# Patient Record
Sex: FEMALE | Race: WHITE | Employment: OTHER | URBAN - NONMETROPOLITAN AREA
[De-identification: names, ages, dates, MRNs, and addresses within clinical notes are randomized per-mention and may not be internally consistent; named-entity substitution may affect disease eponyms.]

---

## 2017-01-26 ENCOUNTER — TELEPHONE (OUTPATIENT)
Dept: CARDIOLOGY | Age: 42
End: 2017-01-26

## 2017-01-27 ENCOUNTER — OFFICE VISIT (OUTPATIENT)
Dept: CARDIOLOGY | Age: 42
End: 2017-01-27

## 2017-01-27 VITALS
HEIGHT: 66 IN | WEIGHT: 293 LBS | BODY MASS INDEX: 47.09 KG/M2 | DIASTOLIC BLOOD PRESSURE: 70 MMHG | SYSTOLIC BLOOD PRESSURE: 110 MMHG | HEART RATE: 80 BPM

## 2017-01-27 DIAGNOSIS — R07.89 CHEST PAIN, ATYPICAL: ICD-10-CM

## 2017-01-27 DIAGNOSIS — G90.A POTS (POSTURAL ORTHOSTATIC TACHYCARDIA SYNDROME): ICD-10-CM

## 2017-01-27 DIAGNOSIS — I50.32 CHRONIC DIASTOLIC CONGESTIVE HEART FAILURE (HCC): Primary | ICD-10-CM

## 2017-01-27 DIAGNOSIS — E66.01 MORBID OBESITY DUE TO EXCESS CALORIES (HCC): ICD-10-CM

## 2017-01-27 DIAGNOSIS — R42 DIZZINESS: ICD-10-CM

## 2017-01-27 DIAGNOSIS — R07.9 CHEST PAIN, UNSPECIFIED TYPE: ICD-10-CM

## 2017-01-27 DIAGNOSIS — R60.0 BILATERAL LEG EDEMA: ICD-10-CM

## 2017-01-27 PROCEDURE — 99214 OFFICE O/P EST MOD 30 MIN: CPT | Performed by: INTERNAL MEDICINE

## 2017-01-27 PROCEDURE — G8484 FLU IMMUNIZE NO ADMIN: HCPCS | Performed by: INTERNAL MEDICINE

## 2017-01-27 PROCEDURE — 93000 ELECTROCARDIOGRAM COMPLETE: CPT | Performed by: INTERNAL MEDICINE

## 2017-01-27 PROCEDURE — G8419 CALC BMI OUT NRM PARAM NOF/U: HCPCS | Performed by: INTERNAL MEDICINE

## 2017-01-27 PROCEDURE — 1036F TOBACCO NON-USER: CPT | Performed by: INTERNAL MEDICINE

## 2017-01-27 PROCEDURE — G8427 DOCREV CUR MEDS BY ELIG CLIN: HCPCS | Performed by: INTERNAL MEDICINE

## 2017-01-27 RX ORDER — FUROSEMIDE 20 MG/1
20 TABLET ORAL DAILY
Qty: 90 TABLET | Refills: 1 | Status: SHIPPED | OUTPATIENT
Start: 2017-01-27 | End: 2017-07-28 | Stop reason: SDUPTHER

## 2017-01-27 RX ORDER — METOPROLOL SUCCINATE 25 MG/1
25 TABLET, EXTENDED RELEASE ORAL DAILY
Qty: 90 TABLET | Refills: 1 | Status: SHIPPED | OUTPATIENT
Start: 2017-01-27 | End: 2017-07-28 | Stop reason: SDUPTHER

## 2017-04-13 PROBLEM — I89.0 LYMPHEDEMA OF BOTH LOWER EXTREMITIES: Status: ACTIVE | Noted: 2017-04-13

## 2017-07-21 ENCOUNTER — HOSPITAL ENCOUNTER (EMERGENCY)
Age: 42
Discharge: HOME | End: 2017-07-21
Payer: MEDICARE

## 2017-07-21 VITALS
HEART RATE: 107 BPM | HEIGHT: 66 IN | TEMPERATURE: 98.9 F | WEIGHT: 293 LBS | BODY MASS INDEX: 47.09 KG/M2 | SYSTOLIC BLOOD PRESSURE: 131 MMHG | RESPIRATION RATE: 18 BRPM | OXYGEN SATURATION: 97 % | DIASTOLIC BLOOD PRESSURE: 74 MMHG

## 2017-07-21 DIAGNOSIS — H65.04 RECURRENT ACUTE SEROUS OTITIS MEDIA OF RIGHT EAR: Primary | ICD-10-CM

## 2017-07-21 PROCEDURE — 99212 OFFICE O/P EST SF 10 MIN: CPT

## 2017-07-21 PROCEDURE — 99213 OFFICE O/P EST LOW 20 MIN: CPT | Performed by: NURSE PRACTITIONER

## 2017-07-21 RX ORDER — AMOXICILLIN 500 MG/1
500 CAPSULE ORAL 3 TIMES DAILY
Qty: 30 CAPSULE | Refills: 0 | Status: SHIPPED | OUTPATIENT
Start: 2017-07-21 | End: 2017-07-27 | Stop reason: ALTCHOICE

## 2017-07-21 RX ORDER — FEXOFENADINE HYDROCHLORIDE 60 MG/1
60 TABLET, FILM COATED ORAL DAILY
Qty: 30 TABLET | Refills: 0 | Status: SHIPPED | OUTPATIENT
Start: 2017-07-21 | End: 2017-08-20

## 2017-07-21 ASSESSMENT — ENCOUNTER SYMPTOMS
VOMITING: 0
EYE REDNESS: 0
CHEST TIGHTNESS: 0
SORE THROAT: 0
ABDOMINAL PAIN: 0
COUGH: 0
EYE PAIN: 0
NAUSEA: 0
RHINORRHEA: 1
DIARRHEA: 0

## 2017-07-21 ASSESSMENT — PAIN DESCRIPTION - LOCATION: LOCATION: EAR

## 2017-07-21 ASSESSMENT — PAIN DESCRIPTION - ORIENTATION: ORIENTATION: RIGHT

## 2017-07-21 ASSESSMENT — PAIN DESCRIPTION - DESCRIPTORS: DESCRIPTORS: ACHING

## 2017-07-21 ASSESSMENT — PAIN DESCRIPTION - PAIN TYPE: TYPE: ACUTE PAIN

## 2017-07-21 ASSESSMENT — PAIN SCALES - GENERAL: PAINLEVEL_OUTOF10: 8

## 2017-07-27 ENCOUNTER — HOSPITAL ENCOUNTER (EMERGENCY)
Age: 42
Discharge: HOME | End: 2017-07-27
Payer: MEDICARE

## 2017-07-27 VITALS
BODY MASS INDEX: 49.23 KG/M2 | WEIGHT: 293 LBS | HEART RATE: 107 BPM | DIASTOLIC BLOOD PRESSURE: 69 MMHG | TEMPERATURE: 98.8 F | RESPIRATION RATE: 20 BRPM | SYSTOLIC BLOOD PRESSURE: 100 MMHG | OXYGEN SATURATION: 97 %

## 2017-07-27 DIAGNOSIS — H60.391 INFECTIVE OTITIS EXTERNA, RIGHT: Primary | ICD-10-CM

## 2017-07-27 PROCEDURE — 99213 OFFICE O/P EST LOW 20 MIN: CPT | Performed by: NURSE PRACTITIONER

## 2017-07-27 PROCEDURE — 99212 OFFICE O/P EST SF 10 MIN: CPT

## 2017-07-27 RX ORDER — CEFDINIR 300 MG/1
300 CAPSULE ORAL 2 TIMES DAILY
Qty: 20 CAPSULE | Refills: 0 | Status: SHIPPED | OUTPATIENT
Start: 2017-07-27 | End: 2017-08-06

## 2017-07-27 RX ORDER — CIPROFLOXACIN AND DEXAMETHASONE 3; 1 MG/ML; MG/ML
4 SUSPENSION/ DROPS AURICULAR (OTIC) 2 TIMES DAILY
Qty: 1 BOTTLE | OUTPATIENT
Start: 2017-07-27 | End: 2017-08-03

## 2017-07-27 RX ORDER — CIPROFLOXACIN AND DEXAMETHASONE 3; 1 MG/ML; MG/ML
4 SUSPENSION/ DROPS AURICULAR (OTIC) 2 TIMES DAILY
Qty: 1 BOTTLE | Refills: 0 | Status: SHIPPED | OUTPATIENT
Start: 2017-07-27 | End: 2017-08-03

## 2017-07-27 ASSESSMENT — PAIN DESCRIPTION - PAIN TYPE: TYPE: ACUTE PAIN

## 2017-07-27 ASSESSMENT — ENCOUNTER SYMPTOMS
VOMITING: 0
RHINORRHEA: 0
ABDOMINAL PAIN: 0
DIARRHEA: 0
SORE THROAT: 0
COUGH: 0

## 2017-07-27 ASSESSMENT — PAIN DESCRIPTION - LOCATION: LOCATION: EAR

## 2017-07-27 ASSESSMENT — PAIN SCALES - GENERAL: PAINLEVEL_OUTOF10: 9

## 2017-07-27 ASSESSMENT — PAIN DESCRIPTION - DESCRIPTORS: DESCRIPTORS: ACHING

## 2017-07-27 ASSESSMENT — PAIN DESCRIPTION - ORIENTATION: ORIENTATION: RIGHT

## 2017-07-31 RX ORDER — FUROSEMIDE 20 MG/1
TABLET ORAL
Qty: 90 TABLET | Refills: 0 | Status: SHIPPED | OUTPATIENT
Start: 2017-07-31 | End: 2017-10-05 | Stop reason: SDUPTHER

## 2017-07-31 RX ORDER — METOPROLOL SUCCINATE 25 MG/1
TABLET, EXTENDED RELEASE ORAL
Qty: 90 TABLET | Refills: 0 | Status: SHIPPED | OUTPATIENT
Start: 2017-07-31 | End: 2017-10-05 | Stop reason: SDUPTHER

## 2017-09-15 ENCOUNTER — TELEPHONE (OUTPATIENT)
Dept: ADMINISTRATIVE | Age: 42
End: 2017-09-15

## 2017-09-28 ENCOUNTER — HOSPITAL ENCOUNTER (OUTPATIENT)
Age: 42
Discharge: HOME | End: 2017-09-28
Payer: MEDICARE

## 2017-09-28 DIAGNOSIS — R07.9 CHEST PAIN, UNSPECIFIED TYPE: ICD-10-CM

## 2017-09-28 DIAGNOSIS — R07.89 CHEST PAIN, ATYPICAL: ICD-10-CM

## 2017-09-28 DIAGNOSIS — R60.0 BILATERAL LEG EDEMA: ICD-10-CM

## 2017-09-28 DIAGNOSIS — E66.01 MORBID OBESITY DUE TO EXCESS CALORIES (HCC): ICD-10-CM

## 2017-09-28 DIAGNOSIS — G90.A POTS (POSTURAL ORTHOSTATIC TACHYCARDIA SYNDROME): ICD-10-CM

## 2017-09-28 DIAGNOSIS — I50.32 CHRONIC DIASTOLIC CONGESTIVE HEART FAILURE (HCC): ICD-10-CM

## 2017-09-28 DIAGNOSIS — R42 DIZZINESS: ICD-10-CM

## 2017-09-28 LAB
ANION GAP SERPL CALCULATED.3IONS-SCNC: 14 MEQ/L (ref 8–16)
BUN BLDV-MCNC: 8 MG/DL (ref 7–22)
CALCIUM SERPL-MCNC: 8.8 MG/DL (ref 8.5–10.5)
CHLORIDE BLD-SCNC: 101 MEQ/L (ref 98–111)
CO2: 22 MEQ/L (ref 23–33)
CREAT SERPL-MCNC: 0.6 MG/DL (ref 0.4–1.2)
GFR SERPL CREATININE-BSD FRML MDRD: > 90 ML/MIN/1.73M2
GLUCOSE BLD-MCNC: 102 MG/DL (ref 70–108)
POTASSIUM SERPL-SCNC: 3.9 MEQ/L (ref 3.5–5.2)
SODIUM BLD-SCNC: 137 MEQ/L (ref 135–145)

## 2017-09-28 PROCEDURE — 36415 COLL VENOUS BLD VENIPUNCTURE: CPT

## 2017-09-28 PROCEDURE — 80048 BASIC METABOLIC PNL TOTAL CA: CPT

## 2017-10-05 ENCOUNTER — OFFICE VISIT (OUTPATIENT)
Dept: CARDIOLOGY CLINIC | Age: 42
End: 2017-10-05
Payer: MEDICARE

## 2017-10-05 VITALS
HEART RATE: 111 BPM | BODY MASS INDEX: 47.09 KG/M2 | DIASTOLIC BLOOD PRESSURE: 86 MMHG | HEIGHT: 66 IN | WEIGHT: 293 LBS | SYSTOLIC BLOOD PRESSURE: 141 MMHG

## 2017-10-05 DIAGNOSIS — E66.01 MORBID OBESITY DUE TO EXCESS CALORIES (HCC): ICD-10-CM

## 2017-10-05 DIAGNOSIS — I50.32 CHRONIC DIASTOLIC CONGESTIVE HEART FAILURE (HCC): Primary | ICD-10-CM

## 2017-10-05 DIAGNOSIS — R60.0 BILATERAL LEG EDEMA: ICD-10-CM

## 2017-10-05 DIAGNOSIS — R06.02 SOB (SHORTNESS OF BREATH) ON EXERTION: ICD-10-CM

## 2017-10-05 DIAGNOSIS — G90.A POTS (POSTURAL ORTHOSTATIC TACHYCARDIA SYNDROME): ICD-10-CM

## 2017-10-05 PROCEDURE — 1036F TOBACCO NON-USER: CPT | Performed by: INTERNAL MEDICINE

## 2017-10-05 PROCEDURE — G8419 CALC BMI OUT NRM PARAM NOF/U: HCPCS | Performed by: INTERNAL MEDICINE

## 2017-10-05 PROCEDURE — G8427 DOCREV CUR MEDS BY ELIG CLIN: HCPCS | Performed by: INTERNAL MEDICINE

## 2017-10-05 PROCEDURE — G8484 FLU IMMUNIZE NO ADMIN: HCPCS | Performed by: INTERNAL MEDICINE

## 2017-10-05 PROCEDURE — 99214 OFFICE O/P EST MOD 30 MIN: CPT | Performed by: INTERNAL MEDICINE

## 2017-10-05 NOTE — PROGRESS NOTES
Other Topics Concern    Not on file     Social History Narrative       Current Outpatient Prescriptions   Medication Sig Dispense Refill    metoprolol succinate (TOPROL XL) 25 MG extended release tablet take 1 tablet by mouth once daily 90 tablet 0    furosemide (LASIX) 20 MG tablet take 1 tablet by mouth once daily 90 tablet 0    Acetaminophen (TYLENOL PO) Take 500 mg by mouth      MedroxyPROGESTERone Acetate (DEPO-PROVERA IM) Inject into the muscle      IRON PO Take 45 mg by mouth daily.  miconazole (MICOTIN) 2 % powder Apply topically 2 times to 3 times  daily. 45 g 1    Dicyclomine HCl (BENTYL PO)   Take 20 mg by mouth 2 times daily       QUEtiapine Fumarate (SEROQUEL PO) Take 400 mg by mouth 2 times daily.  atomoxetine (STRATTERA) 40 MG capsule Take 40 mg by mouth 2 times daily.  aripiprazole (ABILIFY) 10 MG tablet Take 10 mg by mouth daily.  ACYCLOVIR   400 mg by Does not apply route daily        No current facility-administered medications for this visit. Review of Systems -     General ROS: negative  Psychological ROS: negative  Hematological and Lymphatic ROS: No history of blood clots or bleeding disorder. Respiratory ROS: no cough, shortness of breath, or wheezing  Cardiovascular ROS: no chest pain or dyspnea on exertion  Gastrointestinal ROS: negative  Genito-Urinary ROS: no dysuria, trouble voiding, or hematuria  Musculoskeletal ROS: negative  Neurological ROS: no TIA or stroke symptoms  Dermatological ROS: negative      Blood pressure (!) 141/86, pulse 111, height 5' 6\" (1.676 m), weight (!) 313 lb 3.2 oz (142.1 kg), not currently breastfeeding.         Physical Examination:    General appearance - alert, well appearing, and in no distress  Mental status - alert, oriented to person, place, and time  Neck - supple, no significant adenopathy, no JVD, or carotid bruits  Chest - clear to auscultation, no wheezes, rales or rhonchi, symmetric air entry  Heart - normal rate, regular rhythm, normal S1, S2, no murmurs, rubs, clicks or gallops  Abdomen - soft, nontender, nondistended, no masses or organomegaly  Neurological - alert, oriented, normal speech, no focal findings or movement disorder noted  Musculoskeletal - no joint tenderness, deformity or swelling  Extremities - peripheral pulses normal, no pedal edema, no clubbing or cyanosis  Skin - normal coloration and turgor, no rashes, no suspicious skin lesions noted    Lab  No results for input(s): CKTOTAL, CKMB, CKMBINDEX, TROPONINI in the last 72 hours. CBC:   No results found for: WBC  BMP:    Lab Results   Component Value Date     09/28/2017    K 3.9 09/28/2017     09/28/2017    CO2 22 09/28/2017    BUN 8 09/28/2017    CREATININE 0.6 09/28/2017    CALCIUM 8.8 09/28/2017    LABGLOM >90 09/28/2017    GLUCOSE 102 09/28/2017     Hepatic Function Panel:    No results found for: [facility]  Magnesium:    Lab Results   Component Value Date    MG 2.2 08/08/2016     Warfarin PT/INR:    No components found for: PTPATWAR,  PTINRWAR  HgBA1c:    No results found for: LABA1C  FLP:    No components found for: CHLPL,  TRIG,  HDL,  LDLCALC,  LDLDIRECT,  LABVLDL  TSH:    No results found for: TSH  EKG 5/7/15-Sinus  Rhythm   Low voltage in precordial leads. ABNORMAL     Nuc stress test negative    IMPRESSION:      1. CT coronary artery calcium score of zero indicates low risk for coronary disease. 2. Diffusely small coronary arteries. In particular the LAD and right coronary artery are very small in their middle and distal thirds. These segments cannot be assessed. Occlusion of the distal segments cannot be excluded. 3. Normal cardiac function. Final report electronically signed by Dr. Alexis Knight on 5/15/2015 4:50 PM         EKG 8/8/16  Sinus  Rhythm   Low voltage in precordial leads. ABNORMAL       Assessment    Grandmother had CAD in her 66's  1.  Chronic diastolic congestive heart failure

## 2017-10-05 NOTE — MR AVS SNAPSHOT
After Visit Summary             Ninfa Thomason   10/5/2017 9:15 AM   Office Visit    Description:  Female : 1975   Provider:  Destin Hillman MD   Department:  [facility] [place] [address] and Future Appointments         Below is a list of your follow-up and future appointments. This may not be a complete list as you may have made appointments directly with providers that we are not aware of or your providers may have made some for you. Please call your providers to confirm appointments. It is important to keep your appointments. Please bring your current insurance card, photo ID, co-pay, and all medication bottles to your appointment. If self-pay, payment is expected at the time of service. Your To-Do List     Future Appointments Provider Department Dept Phone    2018 9:30 AM Destin Hillman MD [facility] [phone]    Please arrive 15 minutes prior to appointment, bring photo ID and insurance card. Follow-Up    Return in about 6 months (around 2018). Information from Your Visit        Department     Name Address Phone Fax    [facility] [address] [facility] [place] [address] [phone]      You Were Seen for:         Comments    Chronic diastolic congestive heart failure [facility])   [[id number]]         Vital Signs     Blood Pressure Pulse Height Weight Body Mass Index Smoking Status    141/86 111 5' 6\" (1.676 m) 313 lb 3.2 oz (142.1 kg) 50.55 kg/m2 Never Smoker      Additional Information about your Body Mass Index (BMI)           Your BMI as listed above is considered obese (30 or more). BMI is an estimate of body fat, calculated from your height and weight. The higher your BMI, the greater your risk of heart disease, high blood pressure, type 2 diabetes, stroke, gallstones, arthritis, sleep apnea, and certain cancers. BMI is not perfect.   It may overestimate body fat in athletes and people who are more muscular. Even a small weight loss (between 5 and 10 percent of your current weight) by decreasing your calorie intake and becoming more physically active will help lower your risk of developing or worsening diseases associated with obesity. Learn more at: [email or web address]             Medications and Orders      Your Current Medications Are              metoprolol succinate (TOPROL XL) 25 MG extended release tablet take 1 tablet by mouth once daily    furosemide (LASIX) 20 MG tablet take 1 tablet by mouth once daily    Acetaminophen (TYLENOL PO) Take 500 mg by mouth    MedroxyPROGESTERone Acetate (DEPO-PROVERA IM) Inject into the muscle    IRON PO Take 45 mg by mouth daily. miconazole (MICOTIN) 2 % powder Apply topically 2 times to 3 times  daily. Dicyclomine HCl (BENTYL PO)   Take 20 mg by mouth 2 times daily     QUEtiapine Fumarate (SEROQUEL PO) Take 400 mg by mouth 2 times daily. atomoxetine (STRATTERA) 40 MG capsule Take 40 mg by mouth 2 times daily. aripiprazole (ABILIFY) 10 MG tablet Take 10 mg by mouth daily.       ACYCLOVIR   400 mg by Does not apply route daily       Allergies              Latex Rash    Cat Hair Extract     Seasonal          Additional Information        Basic Information     Date Of Birth Sex Race Ethnicity Preferred Language Preferred Written Language    1975 Female White Non-/Non  English English      Problem List as of 10/5/2017  Date Reviewed: 6/29/2017                SOB (shortness of breath) on exertion    Lymphedema of both lower extremities    Bilateral leg edema +3    Morbid obesity (HCC)    POTS (postural orthostatic tachycardia syndrome)    CHF (congestive heart failure) (HCC) chronic diastolic    Skin ulcer of abdominal wall (HCC)    Dermatophytosis of groin    Keloid    MRSA (methicillin resistant staph aureus) culture positive      Preventive Care        Date Due HIV screening is recommended for all people regardless of risk factors  aged 15-65 years at least once (lifetime) who have never been HIV tested. 2/28/1992    Tetanus Combination Vaccine (1 - Tdap) 2/28/1996    Pneumococcal Vaccine - Pneumovax for adults aged 19-64 years with: chronic heart disease, chronic lung disease, diabetes mellitus, alcoholism, chronic liver disease, or cigarette smoking. (1 of 1 - PPSV23) 2/28/1996    Pap Smear 2/28/1998    Cholesterol Screening 2/28/2017    Diabetes Screening 2/28/2017    Yearly Flu Vaccine (1) 9/1/2017            MyChart Signup           [employer] allows you to send messages to your doctor, view your test results, renew your prescriptions, schedule appointments, view visit notes, and more. How Do I Sign Up? 1. In your Internet browser, go to [email or web address]  2. Click on the Sign Up Now link in the Sign In box. You will see the New Member Sign Up page. 3. Enter your [employer] Access Code exactly as it appears below. You will not need to use this code after youve completed the sign-up process. If you do not sign up before the expiration date, you must request a new code. [employer] Access Code: [email or web address]  Expires: 12/4/2017  9:33 AM    4. Enter your Social Security Number ([id number]) and Date of Birth (mm/dd/yyyy) as indicated and click Submit. You will be taken to the next sign-up page. 5. Create a [employer] ID. This will be your [employer] login ID and cannot be changed, so think of one that is secure and easy to remember. 6. Create a [employer] password. You can change your password at any time. 7. Enter your Password Reset Question and Answer. This can be used at a later time if you forget your password. 8. Enter your e-mail address. You will receive e-mail notification when new information is available in [address] 9. Click Sign Up. You can now view your medical record.      Additional Information

## 2017-10-06 RX ORDER — METOPROLOL SUCCINATE 25 MG/1
TABLET, EXTENDED RELEASE ORAL
Qty: 90 TABLET | Refills: 1 | Status: SHIPPED | OUTPATIENT
Start: 2017-10-06 | End: 2018-04-05 | Stop reason: SDUPTHER

## 2017-10-06 RX ORDER — FUROSEMIDE 20 MG/1
TABLET ORAL
Qty: 90 TABLET | Refills: 1 | Status: SHIPPED | OUTPATIENT
Start: 2017-10-06 | End: 2018-04-05 | Stop reason: SDUPTHER

## 2017-11-06 ENCOUNTER — HOSPITAL ENCOUNTER (OUTPATIENT)
Dept: PHYSICAL THERAPY | Age: 42
Setting detail: THERAPIES SERIES
Discharge: HOME | End: 2017-11-06
Payer: MEDICARE

## 2017-11-06 PROCEDURE — 97163 PT EVAL HIGH COMPLEX 45 MIN: CPT

## 2017-11-06 PROCEDURE — G8990 OTHER PT/OT CURRENT STATUS: HCPCS

## 2017-11-06 PROCEDURE — G8991 OTHER PT/OT GOAL STATUS: HCPCS

## 2017-11-06 PROCEDURE — 97140 MANUAL THERAPY 1/> REGIONS: CPT

## 2017-11-07 ASSESSMENT — PAIN SCALES - GENERAL: PAINLEVEL_OUTOF10: 8

## 2017-11-07 ASSESSMENT — PAIN DESCRIPTION - LOCATION: LOCATION: ANKLE;FOOT;LEG

## 2017-11-07 ASSESSMENT — PAIN DESCRIPTION - PAIN TYPE: TYPE: CHRONIC PAIN

## 2017-11-07 NOTE — PROGRESS NOTES
for Lymphedema:           Complete Decongestive Therapy/Manual Lymph Drainage:  No            Compression garments only:  No            Sequential compression pumps:  No        Elevation: Yes    Restrictions/Precautions:  Fall risk, Universal precautions,The patient ambulates with the support of a straight cane. Pain:  Patient Currently in Pain: Yes  Pain Assessment  Pain Assessment: 0-10  Pain Level: 8  Pain Type: Chronic pain  Pain Location: Ankle, Foot, Leg  Pain Orientation:  (RIGHT > LEFT)  Pain Descriptors: Numbness, Heaviness, Throbbing, Burning (\"My right leg feels like dead weight\" per patient.)    SUBJECTIVE:     -The patient is a pleasant 38year old female who presented to the Lymphedema clinic on this date with severe bilateral lower extremity and truncal Lymphedema swelling. The mother was presented and assisted the patient with answering some of the questions. According to the patient and her mother, the patient was in her normal state of health until she developed swelling in her lower extremities in July 2017. After 3 weeks the swelling resolved and returned to normal. However, the swelling returned again with a progression of swelling in her feet. The swelling resolved again, however, within one week (October 2017) the swelling returned in her legs/feet and has gotten progressively worse (right > left). The patient's mother reported that she has tried wrapping her legs, elevation and increased diuretics for 3 days. \"I am afraid that if we don't get this swelling down that it's going to get worse and she has such hard time with healing that I don't want any wounds to get started. \" per patient. The patient reported that she has had a non-healing wound on her abdominal region for 15 years and it has recently healed. SOCIAL  -The patient lives alone in an apartment.   -FUNCTIONAL DEFICITS: Difficulty with prolonged standing, difficulty with ambulation due to size and heaviness of lower extremities, Difficulty donning regular sized shoes and leg braces, which could increase potential of falls/injuries. OBJECTIVE    Physical Assessment:  Range of Motion: Impaired: Bilateral lower extremities limited due to large size and heaviness of legs. Strength: Impaired: Generalized weakness bilateral lower extremities. Sensation: Intact to light touch. However, the patient reported numbness in the right lower extremity and intermittent burning sensations in feet. Transfers: Sit to stand transfer with modified independence with bilateral upper extremity support of armrests. Ambulation: Impaired: Limited distance due to heaviness of legs, increased genu valgus of the right knee, decreased hip/knee/ankle flexion during swing phase leading to decreased foot/floor cleanance and decreased decreased heel strike at initial contact. Hyperkeratosis: Mild - Right lower extremity (medially). Hyperplasia:  Moderate - Bilateral lower extremities (right distlly > left). -medially. Hyperpigmentation:Mild - Bilateral lower extremities. Papillomas: NA  Lymphorrhea (Weeping): NA    Lymphedema Assessment:  Pitting Edema Slight (+1) - Bilateral ankle regions. Skin Appearance/Condition Refer to Hyperplasia and Hyperpigmentation above. Skin Condition Dry - Severe bilateral lower extremities. Open Wound(s) No   Tissue Temperature Normal   Skin Folds Large - Location: Right LE (Distal > Proximal). Moderate-Left LE (Distal > Proximal). Fibrotic Tissue Moderate - Bilateral lower extremities. Orange Peel Texture None   Nailbed Appearance/Condition Thickened. Leakage of Fluid Amount: None   -- --     Circumferential Measurements:  Measurements to be taken during initial treatment session.     TREATMENT  Treatment was initiated during this evaluation and consisted of Skin Care and Compression Bandaging  -SKIN CARE: Washed, dried and applied Original Eucerin Moisturizing creme to the left lower extremity and foot. -COMPRESSION BANDAGING:    -LOCATION: Metatarsal heads to knee joint line. -COMPRESSION GRADIENT: Moderate to minimal   -SUPPLIES: (Stockinette, Artiflex size 10 cm x 1 roll; Rosidal Soft x 2 rolls; Rosidal K size 4 cm x 1 roll, 6 cm x 1 roll, 8 cm x 1 roll and 10 cm x 1 roll; Secured with Tubigrip stockinette size H). Treatment tolerance: Patient tolerance of evaluation: fair     ASSESSMENT  Problem Areas: (X indicates a problem area)  X Severe edema at bilateral lower extremities and lower trunk which has led to the following limitations: Difficulty with ambulation, donning regular sized shoes and brace. X Increased risk of recurrent infection   X Limited Range of Motion   - Open wounds   - Leakage of fluid from - leading to risk of infection         Clinical Presentation: High - Unstable with Unpredictable Characteristics: Severe bilateral lower extremity sweling and lower truncal swelling, progressive swelling. :    Decision Making (based on patient assessment and decision making process of determining plan of care and establishing reasonable expectations for measurable functional outcomes): Decision making was of High Complexity secondary to Patient lives alone, mother works outside of home, patient attends [facility]. Rehabilitation Potential: Patient demonstrates good  potential to achieve rehabilitation goals. Rickie Push Strength(s): Patient demonstrates the ability to achieve established functional goals due to the following strengths:Motivated, Cooperative and Pleasant    Treatment Recommendations: This patient would benefit from skilled therapy services to achieve the established functional goals by utilizing the following treatment interventions:    X Manual Lymph Drainage: To promote and re direct drainage of excess lymphatic fluid back into the central circulation and lead to a decrease in swelling. X Skin Care/Education:  To improve the elasticity of the skin and decrease the risk of recurrent infection. X Compression Bandaging: To decrease the excess lymphatic fluid from re accumulating in the involved area, break down fibrotic tissue and act as a counter force against the muscle pumping action during functional mobility and exercising. X Therapeutic Exercising: To stimulate the flow of excess lymphatic fluid while the muscles contract against the compression bandages/garments during functional mobility/exercises that will lead to a decrease in swelling. X Patient Education: To train and educate the patient and/or caregiver on becoming independent with the home management skills for this chronic condition of Lymphedema. X Garment Fitting/Assessment:  Assist with recommending and obtaining appropriate compression garments to be worn daily to keep swelling down in the involved areas. EDUCATION   New Education Provided: Lymphedema awareness, treatment options, goals, plan of care, compression bandaging precautions, ankle pumps (hourly) with compression bandages on, wearing schedule (remove compression bandages within the next 2 days). Learner:patient and patient's mother. Method: demonstration, explanation and handout       Outcome: acknowledged understanding of goals/plan of care/precautions/exercises, verbalized concerns, demonstrated understanding, needs reinforcement and asked questions     GOALS:   PATIENT GOAL: \"I want to get rid of this swelling so I can walk better\" per patient. SHORT-TERM GOALS:  to be met in 6 weeks   X  Patient will demonstrate a decrease in circumferential measurements of the affected extremity by 20.0 cm working towards the lymphedema swelling stabilizing and patient being able to get measured and fitted for a new compression garment to be worn daily to keep swelling down.    X  Patient will tolerate wearing the compression bandages from one treatment session until the next treatment session working towards meeting short-term high complexity. Niranjan CORDON, DPM FOR THE REFERRAL OF THIS PATIENT. PT G-Codes  Functional Assessment Tool Used: LYMPHEDEMA LIFE IMPACT SCALE  Score: 71%  Functional Limitation: Other PT primary  Other PT Primary Current Status (): At least 60 percent but less than 80 percent impaired, limited or restricted  Other PT Primary Goal Status ():  At least 40 percent but less than 60 percent impaired, limited or restricted    Lisa Bush, P.T. #[id number], [facility]       11/6/2017

## 2017-11-15 ENCOUNTER — HOSPITAL ENCOUNTER (OUTPATIENT)
Dept: PHYSICAL THERAPY | Age: 42
Setting detail: THERAPIES SERIES
Discharge: HOME | End: 2017-11-15
Payer: MEDICARE

## 2017-11-15 ENCOUNTER — TELEPHONE (OUTPATIENT)
Dept: CARDIOLOGY CLINIC | Age: 42
End: 2017-11-15

## 2017-11-15 PROCEDURE — 97140 MANUAL THERAPY 1/> REGIONS: CPT

## 2017-11-15 ASSESSMENT — PAIN DESCRIPTION - PAIN TYPE: TYPE: CHRONIC PAIN

## 2017-11-15 ASSESSMENT — PAIN DESCRIPTION - LOCATION: LOCATION: LEG

## 2017-11-15 ASSESSMENT — PAIN DESCRIPTION - ORIENTATION: ORIENTATION: RIGHT;LEFT;LOWER

## 2017-11-15 ASSESSMENT — PAIN SCALES - GENERAL: PAINLEVEL_OUTOF10: 7

## 2017-11-15 NOTE — TELEPHONE ENCOUNTER
GREGORIO De Anda from [facility] called and wanted to let you know that they will be seeing pt in the lymphedema clinic for treatments. If you have any questions or concerns to let them know.

## 2017-11-15 NOTE — PROGRESS NOTES
Measures  Washed involved extremity/body part and applied Original Eucerin moisturizing creme and Vaseline to moisturize skin    Compression Bandaging  Location: Right lower extremity (Metatarsal heads to knee joint line). Compression Gradient: Moderate to Minimal  Supplies (per involved extremity):   Stockinette: Size: 6 inches, Thickness: Thick, soft   Transelast none   10 cm Artiflex Cotton none   15 cm Artiflex Cotton 1 roll   4 cm Rosidal K none   6 cm Rosidal K 1 roll   8 cm Rosidal K 2 rolls   10 cm Rosidal K 1 roll   12 cm Rosidal K none   Rosidal Soft 1.5 rolls   --   --       Decongestive/Therapeutic Exercise  The patient was able to complete Decongestive Therapy exercises (x 10 reps) including ankle pumps. The exercises were completed with compression bandages on, without complaints of pain from the patient. The Decongestive Therapy exercises assist with decreasing the swelling by increasing the muscle pumping action of the lymph collectors and by increasing the fluid uptake in the lymphatic system. Patient Education  New Education Provided:   -11/15/2017: Reviewed the Lymphedema program, goals/plan of care and precautions. Learner:patient and patient's mother. Method: demonstration and explanation       Outcome: acknowledged understanding of goals/plan of care, verbalized concerns, demonstrated understanding and asked questions     GOALS   SHORT-TERM GOALS:  to be met in 6 weeks   X  Patient will demonstrate a decrease in circumferential measurements of the affected extremity by 20.0 cm working towards the lymphedema swelling stabilizing and patient being able to get measured and fitted for a new compression garment to be worn daily to keep swelling down. X  Patient will tolerate wearing the compression bandages from one treatment session until the next treatment session working towards meeting short-term goal #1.    X Patient/family/caregiver will demonstrate and verbalize 3-5 skin care

## 2017-11-16 ENCOUNTER — APPOINTMENT (OUTPATIENT)
Dept: PHYSICAL THERAPY | Age: 42
End: 2017-11-16
Payer: MEDICARE

## 2017-11-17 ENCOUNTER — HOSPITAL ENCOUNTER (OUTPATIENT)
Dept: PHYSICAL THERAPY | Age: 42
Setting detail: THERAPIES SERIES
Discharge: HOME | End: 2017-11-17
Payer: MEDICARE

## 2017-11-17 PROCEDURE — 97140 MANUAL THERAPY 1/> REGIONS: CPT

## 2017-11-17 ASSESSMENT — PAIN DESCRIPTION - ORIENTATION: ORIENTATION: LEFT;RIGHT

## 2017-11-17 ASSESSMENT — PAIN DESCRIPTION - PAIN TYPE: TYPE: CHRONIC PAIN;ACUTE PAIN

## 2017-11-17 ASSESSMENT — PAIN SCALES - GENERAL: PAINLEVEL_OUTOF10: 7

## 2017-11-17 ASSESSMENT — PAIN DESCRIPTION - LOCATION: LOCATION: LEG;ANKLE

## 2017-11-17 NOTE — PROGRESS NOTES
[facility] 62  LYMPHEDEMA SERVICES  DAILY NOTE    Date: 2017  Patient Name: Clif Palomino        CSN: [id number]   : 1975  (38 y.o.)  Gender: female   Referring Practitioner: DR. Stephanie Jones DPM  Diagnosis: LYMPHEDEMA OF LEGS  Referral Date : 10/30/17    PT Visit Information  PT Insurance Information: MEDICARE-PRIMARY ([address] [place] [address]. PT/ST HAVE A $3700 CAP PER CALENDAR YEAR). MEDICAID-SECONDARY  Total # of Visits to Date: 3  Plan of Care/Certification Expiration Date: 18  No Show: 0  Canceled Appointment: 0  Progress Note Counter: 3/10    Restrictions/Precautions  Fall risk, Universal precautions,CHF (Medication controlled) and POTS Syndrome (which affects the patient's heart. Pain  Patient Currently in Pain: Yes  Pain Assessment  Pain Assessment: 0-10  Pain Level: 7  Pain Type: Chronic pain, Acute pain  Pain Location: Leg, Ankle  Pain Orientation: Left, Right     SUBJECTIVE     -2017: The patient presented to the Lymphedema clinic on this date with the right lower extremity compression bandages intact. -11/15/2017: The patient presented to the Lymphedema clinic on this date with reports of increased swelling and redness on the distal aspect of bilateral lower legs. The patient stated, \"I'm excited about starting my new treatments\" per patient.      TREATMENT SESSION  Manual Lymph Drainage  Unilateral Lower Extremity Manual Lymph Drainage (MLD):   Right  Trunk:  Effleurage, Profundus, Terminus, Inguinal Lnn, Axillary Lnn and (IA) Anterior Inguinal to Axillary (3 steps)       Lower Extremity:  Thigh (Anterior, Posterior, Lateral, Medial to Lateral), Knee (Anterior, Posterior, Lateral, Medial, Lower Leg (Anterior,Posterior), Ankle (Anterior, Posterior, Lateral, Medial and Foot/Toes (Anterior, Posterior)       Rework  Lower Extremity (Toes to Groin), Inguinal Lnn, Axillary Lnn, (IA) Anterior Inguinal to Axillary, Terminus, Profundus and Effleurage      Skin Care Measures  Washed involved extremity/body part and applied Original Eucerin moisturizing creme and Vaseline to moisturize skin    Compression Bandaging  Location: Right lower extremity (Metatarsal heads to knee joint line). Compression Gradient: Moderate to Minimal  Supplies (per involved extremity):   Stockinette: Size: 6 inches, Thickness: Thick, soft   Transelast none   10 cm Artiflex Cotton none   15 cm Artiflex Cotton 1 roll   4 cm Rosidal K none   6 cm Rosidal K 1 roll   8 cm Rosidal K 2 rolls   10 cm Rosidal K 1 roll   12 cm Rosidal K none   Rosidal Soft 1.5 rolls   --   --       Decongestive/Therapeutic Exercise  The patient was able to complete Decongestive Therapy exercises (x 10 reps) including ankle pumps. The exercises were completed with compression bandages on, without complaints of pain from the patient. The Decongestive Therapy exercises assist with decreasing the swelling by increasing the muscle pumping action of the lymph collectors and by increasing the fluid uptake in the lymphatic system. Patient Education  New Education Provided:   -11/17/2017: Reviewed plan of care. -11/15/2017: Reviewed the Lymphedema program, goals/plan of care and precautions. Learner:patient and patient's mother. Method: demonstration and explanation       Outcome: acknowledged understanding of goals/plan of care, verbalized concerns, demonstrated understanding and asked questions     GOALS   SHORT-TERM GOALS:  to be met in 6 weeks   X  Patient will demonstrate a decrease in circumferential measurements of the affected extremity by 20.0 cm working towards the lymphedema swelling stabilizing and patient being able to get measured and fitted for a new compression garment to be worn daily to keep swelling down. X  Patient will tolerate wearing the compression bandages from one treatment session until the next treatment session working towards meeting short-term goal #1.    X Patient/family/caregiver will demonstrate and verbalize 3-5 skin care precautionary measures to decrease dry skin and risk of infection. X Patient/family/caregiver will demonstrate applying compression bandages with no greater than moderate assist and verbal cues from the therapist working towards being modified independent with applying the compression bandages for night time swelling. ASSESSMENT:  Assessment:  Patient progressing toward goal achievement. Activity Tolerance:  Patient tolerance of  treatment: Fair.       Plan: Continue treatment according to established plan of care         Time In: 1345   Time Out: 1515   Timed Code Minutes: 90   Untimed Code Minutes: 0   Total Treatment Time: [address], P.T. #7641,[place], [facility]     11/17/2017

## 2017-11-20 ENCOUNTER — HOSPITAL ENCOUNTER (EMERGENCY)
Age: 42
Discharge: HOME | End: 2017-11-20
Payer: MEDICARE

## 2017-11-20 ENCOUNTER — HOSPITAL ENCOUNTER (OUTPATIENT)
Dept: PHYSICAL THERAPY | Age: 42
Setting detail: THERAPIES SERIES
Discharge: HOME | End: 2017-11-20
Payer: MEDICARE

## 2017-11-20 VITALS
HEART RATE: 94 BPM | DIASTOLIC BLOOD PRESSURE: 77 MMHG | WEIGHT: 293 LBS | BODY MASS INDEX: 47.09 KG/M2 | RESPIRATION RATE: 18 BRPM | SYSTOLIC BLOOD PRESSURE: 130 MMHG | HEIGHT: 66 IN | OXYGEN SATURATION: 97 % | TEMPERATURE: 98.2 F

## 2017-11-20 DIAGNOSIS — H65.01 RIGHT ACUTE SEROUS OTITIS MEDIA, RECURRENCE NOT SPECIFIED: Primary | ICD-10-CM

## 2017-11-20 DIAGNOSIS — J02.9 ACUTE PHARYNGITIS, UNSPECIFIED ETIOLOGY: ICD-10-CM

## 2017-11-20 PROCEDURE — 99213 OFFICE O/P EST LOW 20 MIN: CPT | Performed by: NURSE PRACTITIONER

## 2017-11-20 PROCEDURE — 97140 MANUAL THERAPY 1/> REGIONS: CPT

## 2017-11-20 PROCEDURE — 99212 OFFICE O/P EST SF 10 MIN: CPT

## 2017-11-20 RX ORDER — AMOXICILLIN AND CLAVULANATE POTASSIUM 875; 125 MG/1; MG/1
1 TABLET, FILM COATED ORAL 2 TIMES DAILY
Qty: 20 TABLET | Refills: 0 | Status: SHIPPED | OUTPATIENT
Start: 2017-11-20 | End: 2017-11-30

## 2017-11-20 ASSESSMENT — ENCOUNTER SYMPTOMS
CONSTIPATION: 0
SORE THROAT: 1
WHEEZING: 0
NAUSEA: 0
SHORTNESS OF BREATH: 1
ABDOMINAL PAIN: 0
COUGH: 1
DIARRHEA: 1

## 2017-11-20 ASSESSMENT — PAIN DESCRIPTION - LOCATION
LOCATION: CHEST;EAR;THROAT
LOCATION: LEG;ANKLE

## 2017-11-20 ASSESSMENT — PAIN SCALES - GENERAL
PAINLEVEL_OUTOF10: 8
PAINLEVEL_OUTOF10: 7

## 2017-11-20 ASSESSMENT — PAIN DESCRIPTION - PAIN TYPE: TYPE: CHRONIC PAIN

## 2017-11-20 ASSESSMENT — PAIN DESCRIPTION - DESCRIPTORS: DESCRIPTORS: TENDER;THROBBING

## 2017-11-20 ASSESSMENT — PAIN DESCRIPTION - ORIENTATION: ORIENTATION: LEFT;RIGHT;ANTERIOR

## 2017-11-20 NOTE — ED NOTES
Pt discharged. Discharge assessments completed. No changes. All discharge education and information given. Pt instructed to go to ED for any shortness of breath, chest pain or abd pain. Verbalized understanding. Left stable.      Court Sinclair LPN  52/10/91 [id number]

## 2017-11-20 NOTE — ED NOTES
Presents with c/o productive cough, PERERA, bilat ear pain, sore throat, chest pain that is worse with deep breathing. Mom at bedside.      Salo Whiting RN  11/20/17 1270

## 2017-11-20 NOTE — ED PROVIDER NOTES
maxillary sinus tenderness. Mouth/Throat: Uvula is midline and mucous membranes are normal. Posterior oropharyngeal edema and posterior oropharyngeal erythema present. Eyes: Conjunctivae, EOM and lids are normal. Pupils are equal, round, and reactive to light. Neck: Full passive range of motion without pain. Cardiovascular: Normal rate, regular rhythm and normal heart sounds. Pulmonary/Chest: Breath sounds normal.   Lymphadenopathy:     She has no cervical adenopathy. Neurological: She is alert. She has normal strength. GCS eye subscore is 4. GCS verbal subscore is 5. GCS motor subscore is 6. Skin: Skin is warm and dry. No rash noted. She is not diaphoretic. No pallor. Psychiatric: She has a normal mood and affect. Her speech is normal and behavior is normal. Judgment and thought content normal. Cognition and memory are normal.       DIAGNOSTIC RESULTS   Labs:No results found for this visit on 11/20/17. IMAGING:    URGENT CARE COURSE:     Vitals:    11/20/17 1054   BP: 130/77   Pulse: 94   Resp: 18   Temp: 98.2 °F (36.8 °C)   TempSrc: Temporal   SpO2: 97%   Weight: (!) 325 lb (147.4 kg)   Height: 5' 6\" (1.676 m)     Patient appears ill and tired, is afebrile with a pulse ox of 97%. Significant nasal congestion, skin is warm and dry, respirations easy, lungs are clear. Posterior oropharynx is erythematous and edematous without exudate, no cervical adenopathy. Right TM is bulging and erythematous, left TM is normal.    Medications - No data to display  PROCEDURES:  None  FINAL IMPRESSION      1. Right acute serous otitis media, recurrence not specified    2.  Acute pharyngitis, unspecified etiology        DISPOSITION/PLAN   DISPOSITION Decision to Discharge  PATIENT REFERRED TO:  Sanjay Stout MD  [address] [facility] [address] [phone]    In 3 days  If symptoms worsen    DISCHARGE MEDICATIONS:  New Prescriptions    AMOXICILLIN-CLAVULANATE (AUGMENTIN) 875-125 MG PER TABLET    Take

## 2017-11-20 NOTE — PROGRESS NOTES
[facility]  LYMPHEDEMA SERVICES  DAILY NOTE    Date: 2017  Patient Name: Robert Serrano        CSN: [id number]   : 1975  (38 y.o.)  Gender: female   Referring Practitioner: DR. Terence Ibanez DPM  Diagnosis: LYMPHEDEMA OF LEGS  Referral Date : 10/30/17    PT Visit Information  PT Insurance Information: MEDICARE-PRIMARY ([address] [facility]. PT/ST HAVE A $3700 CAP PER CALENDAR YEAR). MEDICAID-SECONDARY  Total # of Visits to Date: 4  Plan of Care/Certification Expiration Date: 18  No Show: 0  Canceled Appointment: 0  Progress Note Counter: 4/10    Restrictions/Precautions  Fall risk, Universal precautions,CHF (Medication controlled) and POTS Syndrome (which affects the patient's heart. Pain  Patient Currently in Pain: Yes  Pain Assessment  Pain Assessment: 0-10  Pain Level: 7  Pain Type: Chronic pain  Pain Location: Leg, Ankle  Pain Orientation: Left, Right, Anterior  Pain Descriptors: Tender, Throbbing     SUBJECTIVE     -2017: The patient presented to the Lymphedema clinic on this date with bilateral lower extremity compression bandages intact. She reported tenderness in the anterior aspect of bilateral ankles. The therapist explained to the patient and her mother that sometimes when the patient walks and her ankles dorsiflex and plantarflex, and when she does the ankle pumps, the bandages may rub at times, causing irritation. Although foam padding was applied during the last treatment session. Additional padding will be applied today. The patient reported not feeling well today \"I think I might have strep\" per patient. The patient stated, \"I can tell that my legs are smaller, I was even able to get my legs up into my mom's car easier today, it's not as heavy\" per patient. -2017: The patient presented to the Lymphedema clinic on this date with the right lower extremity compression bandages intact.     -11/15/2017: The patient presented to the Decongestive Therapy exercises (x 10 reps) including ankle pumps. The exercises were completed with compression bandages on, without complaints of pain from the patient. The Decongestive Therapy exercises assist with decreasing the swelling by increasing the muscle pumping action of the lymph collectors and by increasing the fluid uptake in the lymphatic system. Patient Education  Education Provided:   -11/20/2017: Reviewed goals, and use of basic pump for home trial (the patient has limited treatment sessions this week due to scheduling conflicts and department closure due to holiday). The patient and her mother were able to brandon/doff pump and operate machine with independence, instructed to remove bandages in 3 days and begin pump use twice daily (one leg per trial) for no longer than 1 hour per session. -11/17/2017: Reviewed plan of care. -11/15/2017: Reviewed the Lymphedema program, goals/plan of care and precautions. Learner:patient and patient's mother. Method: demonstration and explanation       Outcome: acknowledged understanding of goals/plan of care, verbalized concerns, demonstrated understanding and asked questions     GOALS   SHORT-TERM GOALS:  to be met in 6 weeks   X  Patient will demonstrate a decrease in circumferential measurements of the affected extremity by 20.0 cm working towards the lymphedema swelling stabilizing and patient being able to get measured and fitted for a new compression garment to be worn daily to keep swelling down. X  Patient will tolerate wearing the compression bandages from one treatment session until the next treatment session working towards meeting short-term goal #1. X Patient/family/caregiver will demonstrate and verbalize 3-5 skin care precautionary measures to decrease dry skin and risk of infection.    X Patient/family/caregiver will demonstrate applying compression bandages with no greater than moderate assist and verbal cues from the therapist working towards being modified independent with applying the compression bandages for night time swelling. ASSESSMENT:  Assessment:  Patient progressing toward goal achievement. Activity Tolerance:  Patient tolerance of  treatment: Fair. Plan: Week of November 27 (2x).          Time In: 0830   Time Out: 1028   Timed Code Minutes: 88   Untimed Code Minutes: 0   Total Treatment Time: [address]., P.T. #7641,CLT, [facility]     11/20/2017

## 2017-11-29 ENCOUNTER — HOSPITAL ENCOUNTER (OUTPATIENT)
Dept: PHYSICAL THERAPY | Age: 42
Setting detail: THERAPIES SERIES
Discharge: HOME | End: 2017-11-29
Payer: MEDICARE

## 2017-11-29 PROCEDURE — 97140 MANUAL THERAPY 1/> REGIONS: CPT

## 2017-11-29 ASSESSMENT — PAIN DESCRIPTION - PAIN TYPE: TYPE: CHRONIC PAIN

## 2017-11-29 ASSESSMENT — PAIN SCALES - GENERAL: PAINLEVEL_OUTOF10: 6

## 2017-11-29 ASSESSMENT — PAIN DESCRIPTION - DESCRIPTORS: DESCRIPTORS: TENDER;SORE

## 2017-11-29 ASSESSMENT — PAIN DESCRIPTION - ORIENTATION: ORIENTATION: RIGHT

## 2017-11-29 ASSESSMENT — PAIN DESCRIPTION - LOCATION: LOCATION: ANKLE;FOOT

## 2017-11-29 NOTE — PROGRESS NOTES
M[facility] Cosme 62  LYMPHEDEMA SERVICES  DAILY NOTE    Date: 2017  Patient Name: Carmencita Mendes        CSN: [id number]   : 1975  (38 y.o.)  Gender: female   Referring Practitioner: DR. Oneida Snyder DPM  Diagnosis: LYMPHEDEMA OF LEGS  Referral Date : 10/30/17    PT Visit Information  PT Insurance Information: MEDICARE-PRIMARY ([address] [facility]. PT/ST HAVE A $3700 CAP PER CALENDAR YEAR). MEDICAID-SECONDARY  Total # of Visits to Date: 5  Plan of Care/Certification Expiration Date: 18  No Show: 0  Canceled Appointment: 0  Progress Note Counter: 5/10    Restrictions/Precautions  Fall risk, Universal precautions,CHF (Medication controlled) and POTS Syndrome (which affects the patient's heart. Pain  Patient Currently in Pain: Yes  Pain Assessment  Pain Assessment: 0-10  Pain Level: 6  Pain Type: Chronic pain  Pain Location: Ankle, Foot  Pain Orientation: Right  Pain Descriptors: Tender, Sore     SUBJECTIVE     -2017: The patient presented to the Lymphedema clinic on this date with Tubigrip stockinette on bilateral lower legs. The right lower extremity is larger than the left lower extremity. -2017: The patient presented to the Lymphedema clinic on this date with bilateral lower extremity compression bandages intact. She reported tenderness in the anterior aspect of bilateral ankles. The therapist explained to the patient and her mother that sometimes when the patient walks and her ankles dorsiflex and plantarflex, and when she does the ankle pumps, the bandages may rub at times, causing irritation. Although foam padding was applied during the last treatment session. Additional padding will be applied today. The patient reported not feeling well today \"I think I might have strep\" per patient. The patient stated, \"I can tell that my legs are smaller, I was even able to get my legs up into my mom's car easier today, it's not as heavy\" per patient. progressing toward goal achievement. Activity Tolerance:  Patient tolerance of  treatment: Fair. Plan: Week of November 27 (2x).          Time In: 1515   Time Out: 1610   Timed Code Minutes: 55   Untimed Code Minutes: 0   Total Treatment Time: Delfino Martines, P.T. #7641,[place], Anali Israel Cliveas 9445     11/29/2017

## 2017-11-30 ENCOUNTER — HOSPITAL ENCOUNTER (OUTPATIENT)
Dept: PHYSICAL THERAPY | Age: 42
Setting detail: THERAPIES SERIES
Discharge: HOME | End: 2017-11-30
Payer: MEDICARE

## 2017-11-30 PROCEDURE — 97140 MANUAL THERAPY 1/> REGIONS: CPT

## 2017-11-30 ASSESSMENT — PAIN DESCRIPTION - DESCRIPTORS: DESCRIPTORS: TENDER;SORE

## 2017-11-30 ASSESSMENT — PAIN SCALES - GENERAL: PAINLEVEL_OUTOF10: 7

## 2017-11-30 ASSESSMENT — PAIN DESCRIPTION - ORIENTATION: ORIENTATION: LEFT;RIGHT;ANTERIOR

## 2017-11-30 ASSESSMENT — PAIN DESCRIPTION - PAIN TYPE: TYPE: CHRONIC PAIN

## 2017-11-30 ASSESSMENT — PAIN DESCRIPTION - LOCATION: LOCATION: ANKLE;FOOT

## 2017-11-30 NOTE — PROGRESS NOTES
session. Additional padding will be applied today. The patient reported not feeling well today \"I think I might have strep\" per patient. The patient stated, \"I can tell that my legs are smaller, I was even able to get my legs up into my mom's car easier today, it's not as heavy\" per patient. -11/17/2017: The patient presented to the Lymphedema clinic on this date with the right lower extremity compression bandages intact. -11/15/2017: The patient presented to the Lymphedema clinic on this date with reports of increased swelling and redness on the distal aspect of bilateral lower legs. The patient stated, \"I'm excited about starting my new treatments\" per patient. OBJECTIVE  Blood Pressure: 126/91 (Resting)  Heart Rate: 87 beats per minute  Patient denied chest pain or shortness of breath. TREATMENT SESSION  Manual Lymph Drainage   Bilateral Lower Extremity Manual Lymph Drainage (MLD):     Trunk:   Effleurage, Profundus, Terminus, Axillary Lnn and (IA) Anterior Inguinal to Axillary (3 steps)       Lower Extremity:  Thigh (Anterior, Posterior, Lateral, Medial to Lateral), Knee (Anterior, Posterior, Lateral, Medial, Lower Leg (Anterior,Posterior), Ankle (Anterior, Posterior, Lateral, Medial and Foot/Toes (Anterior, Posterior)       Rework  Lower Extremity (Toes to Groin), Axillary Lnn, (IA) Anterior Inguinal to Axillary, Terminus, Profundus and Effleurage    Skin Care Measures  Washed involved extremity/body part and applied Original Eucerin moisturizing creme and Vaseline to moisturize skin    Compression Bandaging  Location: Right lower extremity (Metatarsal heads to knee joint line). Compression Gradient: Moderate to Minimal  Supplies (per involved extremity):    Stockinette: Size: 6 inches, Thickness: Thick, soft   Transelast none   10 cm Artiflex Cotton none   15 cm Artiflex Cotton 1 roll   4 cm Rosidal K none   6 cm Rosidal K 1 roll   8 cm Rosidal K 1 roll.    10 cm Rosidal K none   12 cm Rosidal K 1 roll   Rosidal Soft 1.5 rolls   --Tubigrip stockinette   Applied to bilateral lower extremities. Decongestive/Therapeutic Exercise  The patient was able to complete Decongestive Therapy exercises (x 10 reps) including ankle pumps. The exercises were completed with compression bandages on, without complaints of pain from the patient. The Decongestive Therapy exercises assist with decreasing the swelling by increasing the muscle pumping action of the lymph collectors and by increasing the fluid uptake in the lymphatic system. Patient Education  Education Provided:   -11/30/2017: Reviewed goals. -11/29/2017: Reviewed plan of care to resume bilateral lower extremities during tomorrow's treatment. -11/20/2017: Reviewed goals, and use of basic pump for home trial (the patient has limited treatment sessions this week due to scheduling conflicts and department closure due to holiday). The patient and her mother were able to brandon/doff pump and operate machine with independence, instructed to remove bandages in 3 days and begin pump use twice daily (one leg per trial) for no longer than 1 hour per session. -11/17/2017: Reviewed plan of care. -11/15/2017: Reviewed the Lymphedema program, goals/plan of care and precautions. Learner:patient and patient's mother. Method: demonstration and explanation       Outcome: acknowledged understanding of goals/plan of care, verbalized concerns, demonstrated understanding and asked questions     GOALS   SHORT-TERM GOALS:  to be met in 6 weeks   X  Patient will demonstrate a decrease in circumferential measurements of the affected extremity by 20.0 cm working towards the lymphedema swelling stabilizing and patient being able to get measured and fitted for a new compression garment to be worn daily to keep swelling down.    X  Patient will tolerate wearing the compression bandages from one treatment session until the next treatment session working towards meeting short-term goal #1. X Patient/family/caregiver will demonstrate and verbalize 3-5 skin care precautionary measures to decrease dry skin and risk of infection. X Patient/family/caregiver will demonstrate applying compression bandages with no greater than moderate assist and verbal cues from the therapist working towards being modified independent with applying the compression bandages for night time swelling. ASSESSMENT:  Assessment:  Patient progressing toward goal achievement. Activity Tolerance:  Patient tolerance of  treatment: Fair. Plan: Week of December 4 (1x)-frequency decreased due to department closure the week of December 4.          Time In: 0935   Time Out: 1045   Timed Code Minutes: 70   Untimed Code Minutes: 0   Total Treatment Time: [address], P.TAshely #7641,CLT, [facility]     11/30/2017

## 2017-12-07 ENCOUNTER — HOSPITAL ENCOUNTER (OUTPATIENT)
Dept: PHYSICAL THERAPY | Age: 42
Setting detail: THERAPIES SERIES
Discharge: HOME | End: 2017-12-07
Payer: MEDICARE

## 2017-12-07 PROCEDURE — 97140 MANUAL THERAPY 1/> REGIONS: CPT

## 2017-12-07 ASSESSMENT — PAIN DESCRIPTION - DESCRIPTORS: DESCRIPTORS: TENDER;THROBBING

## 2017-12-07 ASSESSMENT — PAIN DESCRIPTION - ORIENTATION: ORIENTATION: RIGHT;LEFT

## 2017-12-07 ASSESSMENT — PAIN SCALES - GENERAL: PAINLEVEL_OUTOF10: 6

## 2017-12-07 ASSESSMENT — PAIN DESCRIPTION - LOCATION: LOCATION: ANKLE;LEG

## 2017-12-07 ASSESSMENT — PAIN DESCRIPTION - PAIN TYPE: TYPE: CHRONIC PAIN

## 2017-12-07 NOTE — PROGRESS NOTES
LE).  Heart Rate: 87 beats per minute (Pre); 87 beats per minute (post)  Patient denied chest pain or shortness of breath. TREATMENT SESSION  Manual Lymph Drainage   Bilateral Lower Extremity Manual Lymph Drainage (MLD):     Trunk:   Effleurage, Profundus, Terminus, Axillary Lnn and (IA) Anterior Inguinal to Axillary (3 steps)       Lower Extremity:  Thigh (Anterior, Posterior, Lateral, Medial to Lateral), Knee (Anterior, Posterior, Lateral, Medial, Lower Leg (Anterior,Posterior), Ankle (Anterior, Posterior, Lateral, Medial and Foot/Toes (Anterior, Posterior)       Rework  Lower Extremity (Toes to Groin), Axillary Lnn, (IA) Anterior Inguinal to Axillary, Terminus, Profundus and Effleurage    Skin Care Measures  Washed involved extremity/body part and applied Original Eucerin moisturizing creme and Vaseline to moisturize skin    Compression Bandaging  Location: Right lower extremity (Metatarsal heads to knee joint line). Compression Gradient: Moderate to Minimal  Supplies (per involved extremity):    Stockinette: Size: 6 inches, Thickness: Thick, soft   Transelast none   10 cm Artiflex Cotton none   15 cm Artiflex Cotton 1 roll   4 cm Rosidal K none   6 cm Rosidal K 1 roll   8 cm Rosidal K 1 roll. 10 cm Rosidal K none   12 cm Rosidal K 1 roll   Rosidal Soft 1.5 rolls   --Tubigrip stockinette   Applied to bilateral lower extremities. Decongestive/Therapeutic Exercise  The patient was able to complete Decongestive Therapy exercises (x 10 reps) including ankle pumps. The exercises were completed with compression bandages on, without complaints of pain from the patient. The Decongestive Therapy exercises assist with decreasing the swelling by increasing the muscle pumping action of the lymph collectors and by increasing the fluid uptake in the lymphatic system.     Patient Education  Education Provided:   -12/07/2017: Discussed recommendations for a CIRC AID REDUCTION KIT compression garment for compression for bilateral lower extremities being more appropriate versus compression stockings and compression bandages when the patient is discharged. Patient's mother was in agreement. -11/30/2017: Reviewed goals. -11/29/2017: Reviewed plan of care to resume bilateral lower extremities during tomorrow's treatment. -11/20/2017: Reviewed goals, and use of basic pump for home trial (the patient has limited treatment sessions this week due to scheduling conflicts and department closure due to holiday). The patient and her mother were able to brandon/doff pump and operate machine with independence, instructed to remove bandages in 3 days and begin pump use twice daily (one leg per trial) for no longer than 1 hour per session. -11/17/2017: Reviewed plan of care. -11/15/2017: Reviewed the Lymphedema program, goals/plan of care and precautions. Learner:patient and patient's mother. Method: demonstration and explanation       Outcome: acknowledged understanding of goals/plan of care, verbalized concerns, demonstrated understanding and asked questions     GOALS   SHORT-TERM GOALS:  to be met in 6 weeks   X  Patient will demonstrate a decrease in circumferential measurements of the affected extremity by 20.0 cm working towards the lymphedema swelling stabilizing and patient being able to get measured and fitted for a new compression garment to be worn daily to keep swelling down. X  Patient will tolerate wearing the compression bandages from one treatment session until the next treatment session working towards meeting short-term goal #1. X Patient/family/caregiver will demonstrate and verbalize 3-5 skin care precautionary measures to decrease dry skin and risk of infection. X Patient/family/caregiver will demonstrate applying compression bandages with no greater than moderate assist and verbal cues from the therapist working towards being modified independent with applying the compression bandages for night time swelling. ASSESSMENT:  Assessment:  Patient progressing toward goal achievement. Activity Tolerance:  Patient tolerance of  treatment: Fair. Plan: Week of December 11 (1x).          Time In: 0830   Time Out: 0945   Timed Code Minutes: 75   Untimed Code Minutes: 0   Total Treatment Time: [address], P.T. #8391,[place], [facility]     12/7/2017

## 2017-12-14 ENCOUNTER — HOSPITAL ENCOUNTER (OUTPATIENT)
Dept: PHYSICAL THERAPY | Age: 42
Setting detail: THERAPIES SERIES
Discharge: HOME | End: 2017-12-14
Payer: MEDICARE

## 2017-12-14 PROCEDURE — 97140 MANUAL THERAPY 1/> REGIONS: CPT

## 2017-12-14 ASSESSMENT — PAIN SCALES - GENERAL: PAINLEVEL_OUTOF10: 8

## 2017-12-14 ASSESSMENT — PAIN DESCRIPTION - ORIENTATION: ORIENTATION: LEFT;RIGHT

## 2017-12-14 ASSESSMENT — PAIN DESCRIPTION - PAIN TYPE: TYPE: CHRONIC PAIN

## 2017-12-14 ASSESSMENT — PAIN DESCRIPTION - DESCRIPTORS: DESCRIPTORS: TENDER;THROBBING;SORE

## 2017-12-14 ASSESSMENT — PAIN DESCRIPTION - LOCATION: LOCATION: LEG;ANKLE

## 2017-12-14 NOTE — PROGRESS NOTES
[facility] 62  LYMPHEDEMA SERVICES  DAILY NOTE    Date: 2017  Patient Name: Chance Oh        CSN: [id number]   : 1975  (38 y.o.)  Gender: female   Referring Practitioner: DR. Joanna Snyder DPM  Diagnosis: LYMPHEDEMA OF LEGS  Referral Date : 10/30/17    PT Visit Information  PT Insurance Information: MEDICARE-PRIMARY ([address] [place] [address]. PT/ST HAVE A $3700 CAP PER CALENDAR YEAR). MEDICAID-SECONDARY  Total # of Visits to Date: 8  Plan of Care/Certification Expiration Date: 18  No Show: 0  Canceled Appointment: 0  Progress Note Counter: 8/10    Restrictions/Precautions  Fall risk, Universal precautions,CHF (Medication controlled) and POTS Syndrome (which affects the patient's heart. Pain  Patient Currently in Pain: Yes  Pain Assessment  Pain Assessment: 0-10  Pain Level: 8  Pain Type: Chronic pain  Pain Location: Leg, Ankle  Pain Orientation: Left, Right  Pain Descriptors: Tender, Throbbing, Sore     SUBJECTIVE     -2017: The patient presented to the Lymphedema clinic on this date with Tubigrip stockinette in place (double layer). Noted increased swelling in right lower extremity > left lower extremity. \"I was concerned because the swelling was good for a few days but then it started going up again. \" per patient. -2017: The patient presented to the Lymphedema clinic on this date with Tubigrip stockinette on bilateral lower extremities. She reported increased swelling within the last 24 hours,  \"I don't think I did anything different, I keep my legs up on a box when I'm at [address] and then I don't eat a lot of salt. \" per patient. The therapist noted increased swelling in the right LE > left LE.    -2017: The patient presented to the Lymphedema clinic on this date with compression bandages on the right lower extremity. She denied pain from the bandages.  The patient's mother verbalized being concerned over the increase in the amount of swelling in the left lower extremity. -11/29/2017: The patient presented to the Lymphedema clinic on this date with Tubigrip stockinette on bilateral lower legs. The right lower extremity is larger than the left lower extremity. OBJECTIVE  -MEASUREMENTS  -LOCATION A: Metatarsal heads to knee joint line. -RIGHT: 537.3 cm (Increased by 6.7 cm in comparison to baseline measurements taken on 11/17/17). -LEFT: 496.7 cm (Decreased by 22.4 cm in comparison to baseline measurements taken on 11/17/17). -LOCATION B: Knee joint line to mid-thigh. -RIGHT: 300.8 cm (Decreased by 7.4 cm in comparison to baseline measurements taken on 11/17/17). -LEFT: 290.4 cm (Decreasead by 7.8 cm in comparison to baseline measurements taken on 11/1/7/17). TREATMENT SESSION  Manual Lymph Drainage   Bilateral Lower Extremity Manual Lymph Drainage (MLD):     Trunk:   Effleurage, Profundus, Terminus, Axillary Lnn and (IA) Anterior Inguinal to Axillary (3 steps)       Lower Extremity:  Thigh (Anterior, Posterior, Lateral, Medial to Lateral), Knee (Anterior, Posterior, Lateral, Medial, Lower Leg (Anterior,Posterior), Ankle (Anterior, Posterior, Lateral, Medial and Foot/Toes (Anterior, Posterior)       Rework  Lower Extremity (Toes to Groin), Axillary Lnn, (IA) Anterior Inguinal to Axillary, Terminus, Profundus and Effleurage    Skin Care Measures  Washed involved extremity/body part and applied Original Eucerin moisturizing creme and Vaseline to moisturize skin    Compression Bandaging  Location: Right lower extremity (Metatarsal heads to knee joint line). Compression Gradient: Moderate to Minimal  Supplies (per involved extremity):    Stockinette: Size: 6 inches, Thickness: Thick, soft   Transelast none   10 cm Artiflex Cotton none   15 cm Artiflex Cotton 1 roll   4 cm Rosidal K none   6 cm Rosidal K 1 roll   8 cm Rosidal K 1 roll.    10 cm Rosidal K none   12 cm Rosidal K 1 roll   Rosidal Soft 1.5 rolls   --Tubigrip stockinette   Applied to bilateral lower extremities. Decongestive/Therapeutic Exercise  The patient was able to complete Decongestive Therapy exercises (x 10 reps) including ankle pumps. The exercises were completed with compression bandages on, without complaints of pain from the patient. The Decongestive Therapy exercises assist with decreasing the swelling by increasing the muscle pumping action of the lymph collectors and by increasing the fluid uptake in the lymphatic system. Patient Education  Education Provided:   -12/14/2017: Reviewed plan of care. -12/07/2017: Discussed recommendations for a CIRC AID REDUCTION KIT compression garment for compression for bilateral lower extremities being more appropriate versus compression stockings and compression bandages when the patient is discharged. Patient's mother was in agreement. -11/30/2017: Reviewed goals. -11/29/2017: Reviewed plan of care to resume bilateral lower extremities during tomorrow's treatment. -11/20/2017: Reviewed goals, and use of basic pump for home trial (the patient has limited treatment sessions this week due to scheduling conflicts and department closure due to holiday). The patient and her mother were able to brandon/doff pump and operate machine with independence, instructed to remove bandages in 3 days and begin pump use twice daily (one leg per trial) for no longer than 1 hour per session. -11/17/2017: Reviewed plan of care. -11/15/2017: Reviewed the Lymphedema program, goals/plan of care and precautions. Learner:patient and patient's mother.   Method: demonstration and explanation       Outcome: acknowledged understanding of goals/plan of care, verbalized concerns, demonstrated understanding and asked questions     GOALS   SHORT-TERM GOALS:  to be met in 6 weeks   X  Patient will demonstrate a decrease in circumferential measurements of the affected extremity by 20.0 cm working towards the lymphedema swelling stabilizing and patient being able to get measured and fitted for a new compression garment to be worn daily to keep swelling down. X  Patient will tolerate wearing the compression bandages from one treatment session until the next treatment session working towards meeting short-term goal #1. X Patient/family/caregiver will demonstrate and verbalize 3-5 skin care precautionary measures to decrease dry skin and risk of infection. X Patient/family/caregiver will demonstrate applying compression bandages with no greater than moderate assist and verbal cues from the therapist working towards being modified independent with applying the compression bandages for night time swelling. ASSESSMENT:  Assessment:  Patient progressing toward goal achievement. The patient has demonstrated improvement with treatments as indicated above the the decrease in total circumferential measurements. However, it has been noted that the patient continues to have problems with swelling as indicated with the increase in swelling in the right lower extremity. She would strongly benefit from a sequential compression pump for home use when discharged from the program since the patient lives alone (with minimal assistance as needed) and has psychological and emotional issues. Keeping the swelling down would decrease the risk of the Lymphedema swelling progressing to the final stages of Lymphedema, including Elephantiasis. Activity Tolerance:  Patient tolerance of  treatment: Fair. Plan: Week of December 18 (3x).          Time In: 1030   Time Out: 1145   Timed Code Minutes: 75   Untimed Code Minutes: 0   Total Treatment Time: [address], [address],[place], [facility]     12/14/2017

## 2017-12-18 ENCOUNTER — HOSPITAL ENCOUNTER (OUTPATIENT)
Dept: PHYSICAL THERAPY | Age: 42
Setting detail: THERAPIES SERIES
Discharge: HOME | End: 2017-12-18
Payer: MEDICARE

## 2017-12-18 PROCEDURE — 97140 MANUAL THERAPY 1/> REGIONS: CPT

## 2017-12-18 ASSESSMENT — PAIN SCALES - GENERAL: PAINLEVEL_OUTOF10: 9

## 2017-12-18 ASSESSMENT — PAIN DESCRIPTION - PAIN TYPE: TYPE: CHRONIC PAIN

## 2017-12-18 ASSESSMENT — PAIN DESCRIPTION - LOCATION: LOCATION: LEG;ANKLE;FOOT

## 2017-12-18 ASSESSMENT — PAIN DESCRIPTION - ORIENTATION: ORIENTATION: LEFT;RIGHT

## 2017-12-18 NOTE — PROGRESS NOTES
[facility] 62  LYMPHEDEMA SERVICES  DAILY NOTE    Date: 2017  Patient Name: Arianne Carrillo        CSN: [id number]   : 1975  (38 y.o.)  Gender: female   Referring Practitioner: DR. Gorge Guevara DPM  Diagnosis: LYMPHEDEMA OF LEGS  Referral Date : 10/30/17    PT Visit Information  PT Insurance Information: MEDICARE-PRIMARY ([address] [facility]. PT/ST HAVE A $3700 CAP PER CALENDAR YEAR). MEDICAID-SECONDARY  Total # of Visits to Date: 5  Plan of Care/Certification Expiration Date: 18  No Show: 0  Canceled Appointment: 0  Progress Note Counter: 9/10    Restrictions/Precautions  Fall risk, Universal precautions,CHF (Medication controlled) and POTS Syndrome (which affects the patient's heart. Pain  Patient Currently in Pain: Yes  Pain Assessment  Pain Assessment: 0-10  Pain Level: 9  Pain Type: Chronic pain  Pain Location: Leg, Ankle, Foot  Pain Orientation: Left, Right  Pain Descriptors: Aching, Tender, Sore, Tightness, Heaviness     SUBJECTIVE     -2017: The patient presented to the Lymphedema clinic on this date with Tubigrip stockinette off. She reported, \"My right leg really feels bigger and it hurts. It's so heavy. \" per patient. Noticeable increase in swelling in the right lower extremity in comparison to the last treatment session. Continued discussion with the patient and her mother and she reported that the patient's legs have always been big, but it has gotten progressively worse. Indicates possible Primary Lymphedema versus Secondary Lymphedema swelling. -2017: The patient presented to the Lymphedema clinic on this date with Tubigrip stockinette in place (double layer). Noted increased swelling in right lower extremity > left lower extremity. \"I was concerned because the swelling was good for a few days but then it started going up again. \" per patient.    -2017: The patient presented to the Lymphedema clinic on this date with Tubigrip stockinette on bilateral lower extremities. She reported increased swelling within the last 24 hours,  \"I don't think I did anything different, I keep my legs up [facility] and then I don't eat a lot of salt. \" per patient. The therapist noted increased swelling in the right LE > left LE.    -11/30/2017: The patient presented to the Lymphedema clinic on this date with compression bandages on the right lower extremity. She denied pain from the bandages. The patient's mother verbalized being concerned over the increase in the amount of swelling in the left lower extremity. -11/29/2017: The patient presented to the Lymphedema clinic on this date with Tubigrip stockinette on bilateral lower legs. The right lower extremity is larger than the left lower extremity. OBJECTIVE  -MEASUREMENTS  -LOCATION A: Metatarsal heads to knee joint line. -RIGHT: 561.1 cm (Increased by 23.8 cm in comparison to baseline measurements taken on 12/14/17). -LEFT: 491.5 cm (Decreased by 5.2 cm in comparison to baseline measurements taken on 12/14/17). -LOCATION B: Knee joint line to mid-thigh. -RIGHT: 311.6 cm (Decreased by 10.8 cm in comparison to baseline measurements taken on 12/14/17). -LEFT: 299.4 cm (Decreasead by 9.0 cm in comparison to baseline measurements taken on 12/14/17).   TREATMENT SESSION  Manual Lymph Drainage   Bilateral Lower Extremity Manual Lymph Drainage (MLD):     Trunk:   Effleurage, Profundus, Terminus, Axillary Lnn and (IA) Anterior Inguinal to Axillary (3 steps)       Lower Extremity:  Thigh (Anterior, Posterior, Lateral, Medial to Lateral), Knee (Anterior, Posterior, Lateral, Medial, Lower Leg (Anterior,Posterior), Ankle (Anterior, Posterior, Lateral, Medial and Foot/Toes (Anterior, Posterior)       Rework  Lower Extremity (Toes to Groin), Axillary Lnn, (IA) Anterior Inguinal to Axillary, Terminus, Profundus and Effleurage    Skin Care Measures  Washed involved extremity/body part and

## 2017-12-20 ENCOUNTER — HOSPITAL ENCOUNTER (OUTPATIENT)
Dept: PHYSICAL THERAPY | Age: 42
Setting detail: THERAPIES SERIES
Discharge: HOME | End: 2017-12-20
Payer: MEDICARE

## 2017-12-20 PROCEDURE — 97140 MANUAL THERAPY 1/> REGIONS: CPT

## 2017-12-20 ASSESSMENT — PAIN DESCRIPTION - PAIN TYPE: TYPE: CHRONIC PAIN

## 2017-12-20 ASSESSMENT — PAIN SCALES - GENERAL: PAINLEVEL_OUTOF10: 7

## 2017-12-20 ASSESSMENT — PAIN DESCRIPTION - DESCRIPTORS: DESCRIPTORS: TENDER;ACHING;SORE

## 2017-12-20 ASSESSMENT — PAIN DESCRIPTION - LOCATION: LOCATION: ANKLE;FOOT;LEG

## 2017-12-22 PROCEDURE — G8991 OTHER PT/OT GOAL STATUS: HCPCS

## 2017-12-22 PROCEDURE — G8990 OTHER PT/OT CURRENT STATUS: HCPCS

## 2017-12-22 NOTE — PROGRESS NOTES
[facility]  LYMPHEDEMA SERVICES  DAILY NOTE & PROGRESS NOTE    Date: 2017  Patient Name: Leighton Vicente        CSN: [id number]   : 1975  (38 y.o.)  Gender: female   Referring Practitioner: DR. Cheng Guzman DPM  Diagnosis: LYMPHEDEMA OF LEGS  Referral Date : 10/30/17    PT Visit Information  PT Insurance Information: MEDICARE-PRIMARY ([address] [place] [address]. PT/ST HAVE A $3700 CAP PER CALENDAR YEAR). MEDICAID-SECONDARY  Total # of Visits to Date: 10  Plan of Care/Certification Expiration Date: 18  No Show: 0  Canceled Appointment: 0  Progress Note Counter: 10/10 (PROGRESS NOTE)    Restrictions/Precautions  Fall risk, Universal precautions,CHF (Medication controlled) and POTS Syndrome (which affects the patient's heart. Pain  Patient Currently in Pain: Yes  Pain Assessment  Pain Assessment: 0-10  Pain Level: 7  Pain Type: Chronic pain  Pain Location: Ankle, Foot, Leg  Pain Orientation:  (Right distally greater than left.)  Pain Descriptors: Tender, Aching, Sore (Less tightness reported by patient.)     SUBJECTIVE     -2017: The patient is a pleasant and motivated 38year old female who initially presented  with severe bilateral lower extremity and lower truncal late onset (Tarda) Lymphedema swelling. The patient  presented to the Lymphedema clinic on this date with compression bandages on. She denied pain from the bandages. She reported that she can tell that her legs don't feel as heavy as they did prior to beginning the Lymphedema treatments. She stated \"I was able to lift my legs better when I had to get on the bus\" per patient. The patient's mother stated that she can tell that the treatments are helping her daughter's legs. OBJECTIVE  Lymphedema Assessment:  Pitting Edema None   Skin Appearance/Condition Hyperplasia -Grossly on bilateral lower extremities.   Moderate Redness-located at the distal aspect of bilateral lower legs and ankle region (Right > Left). Hyperpigmentation: Noted at the distal, medial aspect of the right lower leg (moderately). Skin Condition Decreased dry skin by approximately 50% in comparison to the initial evaluation. Noted a moderate dry patch on the distal, medial aspect of the right lower leg. Open Wound(s) No   Tissue Temperature Normal   Skin Folds Moderate skin fold/lobe noted at the distal aspect of the right lower leg (above the right ankle). Minimal skin fold/lobe noted on the left distal aspect of the lower leg (above the ankle region). Fibrotic Tissue Moderate    Orange Peel Texture None   Nailbed Appearance/Condition Thickened, and brittle toe nails noted on bilateral feet. Leakage of Fluid Amount: None   Measurements -LOCATION A: Metatarsal heads to knee joint line. -RIGHT: 561.1 cm (Increased by 23.8 cm since 12/14)  -LEFT: 491.5 cm (Decreased by 5.2 cm since 12/14)  -LOCATION B: Knee joint line to mid-thigh. -RIGHT: 311.6 cm (Increased by 10.8 cm since 12/14)  -LEFT: 299.4 cm (Increased by 9.0 cm since 12/14)     TREATMENT SESSION  Manual Lymph Drainage   Bilateral Lower Extremity Manual Lymph Drainage (MLD):     Trunk:   Effleurage, Profundus, Terminus, Axillary Lnn and (IA) Anterior Inguinal to Axillary (3 steps)       Lower Extremity:  Thigh (Anterior, Posterior, Lateral, Medial to Lateral), Knee (Anterior, Posterior, Lateral, Medial, Lower Leg (Anterior,Posterior), Ankle (Anterior, Posterior, Lateral, Medial and Foot/Toes (Anterior, Posterior)       Rework  Lower Extremity (Toes to Groin), Axillary Lnn, (IA) Anterior Inguinal to Axillary, Terminus, Profundus and Effleurage    Skin Care Measures  Washed involved extremity/body part and applied Original Eucerin moisturizing creme and Vaseline to moisturize skin    Compression Bandaging  Location: Right lower extremity (Metatarsal heads to knee joint line).   Compression Gradient: Moderate to Minimal  Supplies (per involved extremity):    Stockinette: Size: 6 inches, Thickness: Thick, soft   Transelast none   10 cm Artiflex Cotton 1 roll   15 cm Artiflex Cotton 1 roll   4 cm Rosidal K none   6 cm Rosidal K 1 roll   8 cm Rosidal K 1 roll. 10 cm Rosidal K none   12 cm Rosidal K 1 roll   Rosidal Soft 1.5 rolls   --Tubigrip stockinette   Applied to bilateral lower extremities to secure compression bandages. Decongestive/Therapeutic Exercise  The patient was able to complete Decongestive Therapy exercises (x 10 reps) including ankle pumps. The exercises were completed with compression bandages on, without complaints of pain from the patient. The Decongestive Therapy exercises assist with decreasing the swelling by increasing the muscle pumping action of the lymph collectors and by increasing the fluid uptake in the lymphatic system. Patient Education  Education Provided:   -12/20/2017: Reviewed goals, plan of care. -12/18/2017: Discussed goal of obtaining a compression pump and a CIRC AID REDUCTION KIT.   -12/14/2017: Reviewed plan of care. -12/07/2017: Discussed recommendations for a CIRC AID REDUCTION KIT compression garment for compression for bilateral lower extremities being more appropriate versus compression stockings and compression bandages when the patient is discharged. Patient's mother was in agreement. -11/30/2017: Reviewed goals. -11/29/2017: Reviewed plan of care to resume bilateral lower extremities during tomorrow's treatment. -11/20/2017: Reviewed goals, and use of basic pump for home trial (the patient has limited treatment sessions this week due to scheduling conflicts and department closure due to holiday). The patient and her mother were able to brandon/doff pump and operate machine with independence, instructed to remove bandages in 3 days and begin pump use twice daily (one leg per trial) for no longer than 1 hour per session. -11/17/2017: Reviewed plan of care.   -11/15/2017: Reviewed the Lymphedema program, goals/plan of care and precautions. Learner:patient and patient's mother. Method: demonstration and explanation       Outcome: acknowledged understanding of goals/plan of care, verbalized concerns, demonstrated understanding and asked questions     GOALS   SHORT-TERM GOALS:  to be met in 6 weeks   X  NOT MET  (ONGOING)  Patient will demonstrate a decrease in circumferential measurements of the affected extremity by 20.0 cm working towards the lymphedema swelling stabilizing and patient being able to get measured and fitted for a new compression garment to be worn daily to keep swelling down. X  MET  Patient will tolerate wearing the compression bandages from one treatment session until the next treatment session working towards meeting short-term goal #1. X  MET Patient/family/caregiver will demonstrate and verbalize 3-5 skin care precautionary measures to decrease dry skin and risk of infection. X  NOT MET  (ONGOING) Patient/family/caregiver will demonstrate applying compression bandages with no greater than moderate assist and verbal cues from the therapist working towards being modified independent with applying the compression bandages for night time swelling. LONG-TERM GOALS:  to be met in 12 weeks   X  NOT MET  (ONGOING) 1. Lymphedema swelling will stabilize as noted by total circumferential changes being no greater than 3.0-5.0 cm in preparation for being measured for new compression garment(s) to be worn daily to keep swelling down. X  NOT MET  (ONGOING) 2. Patient/family/caregiver will demonstrate applying compression bandages with modified independence to apply at night to keep swelling down overnight to be able to brandon compression stockings in the morning. X  NOT MET   (ONGOING) 3. Patient/family/caregiver will demonstrate donning/doffing compression garments with modified independence to wear compression garments daily to keep swelling down. X  NOT MET  (ONGOING) 4.  Patient/family/caregiver will verbalize a good understanding regarding proper wearing and replacement schedule, precautions and care of garment(s). ASSESSMENT:  Assessment:  Patient progressing toward goal achievement. The patient is a pleasant and cooperative 38year old female who has made good progress with the Complete Decongestive Therapy/Manual Lymph Drainage (CDT/MLD) treatments as noted by the followin) The patient has demonstrated a decrease in total circumferential measurements in comparison to previous measurements by 5.2 cm in the left lower extremity. 2) The patient has met 2/4 short term goals and 0/4 long term goals. 3) The patient has demonstrated a decrease in firm/fibrotic tissue from moderate to minimal (left lower extremity) as noted by the increase in skin stretch during MLD and by softened tissue texture. 4) The patient has demonstrated decreased dry skin. 6) The patient has functionally improved her ambulation and being able to ascend/descend her transportation/bus by lifting her legs up and down easier with less assistance reported by patient. Although the patient is making progress with the Complete Decongestive Therapy/Manual Lymph Drainage (CDT/MLD) treatments, she continues to demonstrate difficulty with keeping the swelling down as noted by the circumferential measurements above. It is highly recommended that this patient is able to obtain a sequential compression pump for bilateral lower extremities to be able to use at home daily when she is discharged from the Lymphedema program. The patient has good potential to meet all remaining goals while progressing through Phases I & II working towards becoming modified independent prior to discharge to home management. Activity Tolerance:  Patient tolerance of  treatment: Fair to [address] (+).      Plan: Recommend continue with Complete Decongestive Therapy/Manual Lymph Drainage (CDT/MLD) treatments for 5 weeks at 3 times per week (decreasing

## 2017-12-28 ENCOUNTER — HOSPITAL ENCOUNTER (OUTPATIENT)
Dept: PHYSICAL THERAPY | Age: 42
Setting detail: THERAPIES SERIES
Discharge: HOME | End: 2017-12-28
Payer: MEDICARE

## 2017-12-28 PROCEDURE — 97140 MANUAL THERAPY 1/> REGIONS: CPT

## 2017-12-28 ASSESSMENT — PAIN DESCRIPTION - LOCATION: LOCATION: ANKLE;LEG

## 2017-12-28 ASSESSMENT — PAIN DESCRIPTION - PAIN TYPE: TYPE: CHRONIC PAIN

## 2017-12-28 ASSESSMENT — PAIN DESCRIPTION - ORIENTATION: ORIENTATION: RIGHT;LEFT

## 2017-12-28 ASSESSMENT — PAIN SCALES - GENERAL: PAINLEVEL_OUTOF10: 3

## 2017-12-28 ASSESSMENT — PAIN DESCRIPTION - DESCRIPTORS: DESCRIPTORS: ACHING;TENDER;SORE

## 2017-12-28 NOTE — PROGRESS NOTES
[facility] 62  LYMPHEDEMA SERVICES  DAILY NOTE     Date: 2017   Patient Name: Karissa Chin        CSN: [id number]   : 1975  (38 y.o.)  Gender: female   Referring Practitioner: DR. Ciera Francois DPM  Diagnosis: LYMPHEDEMA OF LEGS  Referral Date : 10/30/17    PT Visit Information  PT Insurance Information: MEDICARE-PRIMARY ([facility]. PT/ST HAVE A $3700 CAP PER CALENDAR YEAR). MEDICAID-SECONDARY  Total # of Visits to Date: 15  Plan of Care/Certification Expiration Date: 18  No Show: 0  Canceled Appointment: 0  Progress Note Counter:     Restrictions/Precautions  Fall risk, Universal precautions,CHF (Medication controlled) and POTS Syndrome (which affects the patient's heart. Pain  Patient Currently in Pain: Yes  Pain Assessment  Pain Assessment: 0-10  Pain Level: 3  Pain Type: Chronic pain  Pain Location: Ankle, Leg  Pain Orientation: Right, Left  Pain Descriptors: Aching, Tender, Sore     SUBJECTIVE     -2017: The patient presented to the Lymphedema clinic on this date with Tubigrip stockinette on bilateral lower extremities. Noted decreased swelling in bilateral lower extremities (Right lower extremity larger than Left lower extremity). Noted decreased tissue tightness and firmness in bilateral lower extremities. \"My legs look a lot better. They're not as big and I'm able to walk better and get up on the bus easier, without the  helping\" per patient.  -2017: The patient presented to the Lymphedema clinic on this date with bilateral lower extremity compression bandages off due to removing this morning for showering. The patient reported less pain in her legs/ankle regions today. -2017: The patient is a pleasant and motivated 38year old female who initially presented  with severe bilateral lower extremity and lower truncal late onset (Tarda) Lymphedema swelling.  The patient  presented to the Lymphedema clinic on this date with compression bandages on. She denied pain from the bandages. She reported that she can tell that her legs don't feel as heavy as they did prior to beginning the Lymphedema treatments. She stated \"I was able to lift my legs better when I had to get on the bus\" per patient. The patient's mother stated that she can tell that the treatments are helping her daughter's legs. TREATMENT SESSION  Manual Lymph Drainage   Bilateral Lower Extremity Manual Lymph Drainage (MLD):     Trunk:   Effleurage, Profundus, Terminus, Axillary Lnn and (IA) Anterior Inguinal to Axillary (3 steps)       Lower Extremity:  Thigh (Anterior, Posterior, Lateral, Medial to Lateral), Knee (Anterior, Posterior, Lateral, Medial, Lower Leg (Anterior,Posterior), Ankle (Anterior, Posterior, Lateral, Medial and Foot/Toes (Anterior, Posterior)       Rework  Lower Extremity (Toes to Groin), Axillary Lnn, (IA) Anterior Inguinal to Axillary, Terminus, Profundus and Effleurage    Skin Care Measures  Washed involved extremity/body part and applied Original Eucerin moisturizing creme and Vaseline to moisturize skin    Compression Bandaging  Location: Right lower extremity (Metatarsal heads to knee joint line). Compression Gradient: Moderate to Minimal  Supplies (per involved extremity):    Stockinette: Size: 6 inches, Thickness: Thick, soft   Transelast none   10 cm Artiflex Cotton 1 roll   15 cm Artiflex Cotton 1 roll   4 cm Rosidal K none   6 cm Rosidal K 1 roll   8 cm Rosidal K 1 roll. 10 cm Rosidal K none   12 cm Rosidal K 1 roll   Rosidal Soft 1.5 rolls   --Tubigrip stockinette   Applied to bilateral lower extremities to secure compression bandages. Decongestive/Therapeutic Exercise  The patient was able to complete Decongestive Therapy exercises (x 10 reps) including ankle pumps. The exercises were completed with compression bandages on, without complaints of pain from the patient.  The Decongestive Therapy exercises assist with decreasing the swelling by increasing the muscle pumping action of the lymph collectors and by increasing the fluid uptake in the lymphatic system. Patient Education  Education Provided:   -12/28/2017: Reviewed plan of care. -12/22/2017: Ongoing education. -12/20/2017: Reviewed goals, plan of care. -12/18/2017: Discussed goal of obtaining a compression pump and a CIRC AID REDUCTION KIT.   -12/14/2017: Reviewed plan of care. -12/07/2017: Discussed recommendations for a CIRC AID REDUCTION KIT compression garment for compression for bilateral lower extremities being more appropriate versus compression stockings and compression bandages when the patient is discharged. Patient's mother was in agreement. -11/30/2017: Reviewed goals. -11/29/2017: Reviewed plan of care to resume bilateral lower extremities during tomorrow's treatment. -11/20/2017: Reviewed goals, and use of basic pump for home trial (the patient has limited treatment sessions this week due to scheduling conflicts and department closure due to holiday). The patient and her mother were able to brandon/doff pump and operate machine with independence, instructed to remove bandages in 3 days and begin pump use twice daily (one leg per trial) for no longer than 1 hour per session. -11/17/2017: Reviewed plan of care. -11/15/2017: Reviewed the Lymphedema program, goals/plan of care and precautions. Learner:patient and patient's mother. Method: demonstration and explanation       Outcome: acknowledged understanding of goals/plan of care, verbalized concerns, demonstrated understanding and asked questions     GOALS   SHORT-TERM GOALS:  to be met in 6 weeks   X  NOT MET  (ONGOING)  Patient will demonstrate a decrease in circumferential measurements of the affected extremity by 20.0 cm working towards the lymphedema swelling stabilizing and patient being able to get measured and fitted for a new compression garment to be worn daily to keep swelling down. Time: Jesus Black P.T. #[id number],[place], [facility]     12/28/2017

## 2018-01-02 ENCOUNTER — APPOINTMENT (OUTPATIENT)
Dept: PHYSICAL THERAPY | Age: 43
End: 2018-01-02
Payer: MEDICARE

## 2018-01-05 ENCOUNTER — APPOINTMENT (OUTPATIENT)
Dept: PHYSICAL THERAPY | Age: 43
End: 2018-01-05
Payer: MEDICARE

## 2018-01-09 ENCOUNTER — HOSPITAL ENCOUNTER (OUTPATIENT)
Dept: PHYSICAL THERAPY | Age: 43
Setting detail: THERAPIES SERIES
Discharge: HOME | End: 2018-01-09
Payer: MEDICARE

## 2018-01-09 PROCEDURE — 97140 MANUAL THERAPY 1/> REGIONS: CPT

## 2018-01-09 ASSESSMENT — PAIN DESCRIPTION - PAIN TYPE: TYPE: CHRONIC PAIN

## 2018-01-09 ASSESSMENT — PAIN DESCRIPTION - ORIENTATION: ORIENTATION: LEFT;RIGHT

## 2018-01-09 ASSESSMENT — PAIN DESCRIPTION - LOCATION: LOCATION: LEG;ANKLE

## 2018-01-09 ASSESSMENT — PAIN DESCRIPTION - DESCRIPTORS: DESCRIPTORS: ACHING;TENDER;SORE

## 2018-01-09 ASSESSMENT — PAIN SCALES - GENERAL: PAINLEVEL_OUTOF10: 5

## 2018-01-09 NOTE — PROGRESS NOTES
[facility] 62  LYMPHEDEMA SERVICES  DAILY NOTE     Date: 2018   Patient Name: Felipe Littlejohn        CSN: [id number]   : 1975  (38 y.o.)  Gender: female   Referring Practitioner: DR. Pablo Evans DPM  Diagnosis: LYMPHEDEMA OF LEGS  Referral Date : 10/30/17    PT Visit Information  PT Insurance Information: MEDICARE-PRIMARY ([address] [place] [address]. PT/ST HAVE A $3700 CAP PER CALENDAR YEAR). MEDICAID-SECONDARY  Total # of Visits to Date: 15  Plan of Care/Certification Expiration Date: 18  No Show: 0  Canceled Appointment: 0  Progress Note Counter:     Restrictions/Precautions  Fall risk, Universal precautions,CHF (Medication controlled) and POTS Syndrome (which affects the patient's heart. Pain  Patient Currently in Pain: Yes  Pain Assessment  Pain Assessment: 0-10  Pain Level: 5  Pain Type: Chronic pain  Pain Location: Leg, Ankle  Pain Orientation: Left, Right  Pain Descriptors: Aching, Tender, Sore     SUBJECTIVE     -2017: The patient presented to the Lymphedema clinic on this date without compression bandages/tubigrip stockinette intact. However, noted a decrease in swelling in bilateral lower extremities. The patient's mother reported that she received a call in regards to the compression pump, but she will notify the therapist when they receive it.   -2017: The patient presented to the Lymphedema clinic on this date with Tubigrip stockinette on bilateral lower extremities. Noted decreased swelling in bilateral lower extremities (Right lower extremity larger than Left lower extremity). Noted decreased tissue tightness and firmness in bilateral lower extremities. \"My legs look a lot better.  They're not as big and I'm able to walk better and get up on the bus easier, without the  helping\" per patient.  -2017: The patient presented to the Lymphedema clinic on this date with bilateral lower extremity compression bandages off due to

## 2018-01-12 ENCOUNTER — HOSPITAL ENCOUNTER (OUTPATIENT)
Dept: PHYSICAL THERAPY | Age: 43
Setting detail: THERAPIES SERIES
Discharge: HOME | End: 2018-01-12
Payer: MEDICARE

## 2018-01-12 PROCEDURE — 97140 MANUAL THERAPY 1/> REGIONS: CPT

## 2018-01-12 ASSESSMENT — PAIN DESCRIPTION - PAIN TYPE: TYPE: CHRONIC PAIN

## 2018-01-12 ASSESSMENT — PAIN DESCRIPTION - DESCRIPTORS: DESCRIPTORS: TENDER;SORE

## 2018-01-12 ASSESSMENT — PAIN SCALES - GENERAL: PAINLEVEL_OUTOF10: 5

## 2018-01-12 ASSESSMENT — PAIN DESCRIPTION - ORIENTATION: ORIENTATION: LEFT;RIGHT

## 2018-01-12 ASSESSMENT — PAIN DESCRIPTION - LOCATION: LOCATION: ANKLE

## 2018-01-12 NOTE — PROGRESS NOTES
part and applied Original Eucerin moisturizing creme and Vaseline to moisturize skin  COMPRESSION GARMENT RE- ASSESSMENT WITH HANDS ON TRAINING WITH THE PATIENT. -BRAND: CIRC AID     -TYPE: Bilateral lower extremities (ankle to knee joint line). -COMPRESSION GRADIENT: Adjustable (Recommend not greater than 30-40 mm Hg)     -GARMENT SIZE: Long, adjustable. -ADDITIONAL INFORMATION ON GARMENT: Shelf strap used to secured the medial aspect of the right lower leg (skin fold/shelf). -HAS A GOOD FIT BEEN ACHIEVED: Yes (additional adjustments may need to be made during the next treatment session). -TRAINING: The patient was able to doff the compression leggings with minimal verbal cues to make sure that she attaches the velcro straps properly. The patient was able to brandon the compression leggings with minimal hands on assistance and moderate verbal cues for proper alignment of the garment, proper application and adjustments for the velcro straps. -OTHER: The therapist fabricated shoestring loops on the bottom 4 velcro straps to make it easier for the patient to adjust the straps properly. The patient verbalized that the loops made it easier to brandon the lower straps. Sequential Compression Pump Training  -BRAND: Entre  -TYPE: Basic 8 chamber sequential compression pump.  -PRESSURE: Moderate (no greater than 40 mm Hg)  -LOCATION: Bilateral lower extremities (Toes to groin)  -DURATION: 20 minutes (Recommend 60 minutes per day. Recommend 30 minutes per day unilateral if shortness of breath develops). TRAINING: The patient was able to brandon/doff, turn on/off, problem solve with the use of the [employer] sequential compression pump with minimal verbal cues. Patient Education  Education Provided:   -01/12/2018:  The patient's mother was present (very attentive) and the patient completed training for the CIRC AID compression leggings and the compression pump (Refer above).  -01/09/2018: Refer

## 2018-01-16 ENCOUNTER — HOSPITAL ENCOUNTER (OUTPATIENT)
Dept: PHYSICAL THERAPY | Age: 43
Setting detail: THERAPIES SERIES
Discharge: HOME | End: 2018-01-16
Payer: MEDICARE

## 2018-01-16 PROCEDURE — 97140 MANUAL THERAPY 1/> REGIONS: CPT

## 2018-01-16 ASSESSMENT — PAIN DESCRIPTION - ORIENTATION: ORIENTATION: RIGHT;LEFT

## 2018-01-16 ASSESSMENT — PAIN SCALES - GENERAL: PAINLEVEL_OUTOF10: 3

## 2018-01-16 ASSESSMENT — PAIN DESCRIPTION - PAIN TYPE: TYPE: CHRONIC PAIN

## 2018-01-16 ASSESSMENT — PAIN DESCRIPTION - LOCATION: LOCATION: ANKLE

## 2018-01-16 ASSESSMENT — PAIN DESCRIPTION - DESCRIPTORS: DESCRIPTORS: TENDER

## 2018-01-17 NOTE — PROGRESS NOTES
care.  -12/18/2017: Discussed goal of obtaining a compression pump and a CIRC AID REDUCTION KIT.   -12/14/2017: Reviewed plan of care. -12/07/2017: Discussed recommendations for a CIRC AID REDUCTION KIT compression garment for compression for bilateral lower extremities being more appropriate versus compression stockings and compression bandages when the patient is discharged. Patient's mother was in agreement. -11/30/2017: Reviewed goals. -11/29/2017: Reviewed plan of care to resume bilateral lower extremities during tomorrow's treatment. -11/20/2017: Reviewed goals, and use of basic pump for home trial (the patient has limited treatment sessions this week due to scheduling conflicts and department closure due to holiday). The patient and her mother were able to brandon/doff pump and operate machine with independence, instructed to remove bandages in 3 days and begin pump use twice daily (one leg per trial) for no longer than 1 hour per session. -11/17/2017: Reviewed plan of care. -11/15/2017: Reviewed the Lymphedema program, goals/plan of care and precautions. Learner:patient and patient's mother. Method: demonstration and explanation       Outcome: acknowledged understanding of goals/plan of care, verbalized concerns, demonstrated understanding and asked questions     GOALS   SHORT-TERM GOALS:  to be met in 6 weeks   X  MET  Patient will demonstrate a decrease in circumferential measurements of the affected extremity by 20.0 cm working towards the lymphedema swelling stabilizing and patient being able to get measured and fitted for a new compression garment to be worn daily to keep swelling down. X  MET  Patient will tolerate wearing the compression bandages from one treatment session until the next treatment session working towards meeting short-term goal #1. X  MET Patient/family/caregiver will demonstrate and verbalize 3-5 skin care precautionary measures to decrease dry skin and risk of infection.

## 2018-01-23 ENCOUNTER — HOSPITAL ENCOUNTER (OUTPATIENT)
Dept: PHYSICAL THERAPY | Age: 43
Setting detail: THERAPIES SERIES
Discharge: HOME | End: 2018-01-23
Payer: MEDICARE

## 2018-01-23 PROCEDURE — 97140 MANUAL THERAPY 1/> REGIONS: CPT

## 2018-01-23 ASSESSMENT — PAIN DESCRIPTION - PAIN TYPE: TYPE: CHRONIC PAIN

## 2018-01-23 ASSESSMENT — PAIN DESCRIPTION - ORIENTATION: ORIENTATION: RIGHT;LEFT

## 2018-01-23 NOTE — PROGRESS NOTES
[facility] 62  LYMPHEDEMA SERVICES  DAILY NOTE     Date: 2018   Patient Name: Ed Pablo        CSN: [id number]   : 1975  (38 y.o.)  Gender: female   Referring Practitioner: EDDIE Aleman  Diagnosis: LYMPHEDEMA OF LEGS  Referral Date : 10/30/17    PT Visit Information  PT Insurance Information: MEDICARE-PRIMARY ([facility]. PT/ST HAVE A $3700 CAP PER CALENDAR YEAR). MEDICAID-SECONDARY  Total # of Visits to Date: 12  Plan of Care/Certification Expiration Date: 18  No Show: 0  Canceled Appointment: 0  Progress Note Counter:     Restrictions/Precautions  Fall risk, Universal precautions,CHF (Medication controlled) and POTS Syndrome (which affects the patient's heart. Pain  Patient Currently in Pain: Yes  Pain Assessment  Pain Assessment: 0-10  Pain Level:  (6-Left heel; 4-right distal, medial lower leg-above ankle.)  Pain Type: Chronic pain  Pain Orientation: Right, Left  Pain Descriptors:  (The patient reported that when she removed the bandages, the skin on the bottom of her left heel peel off and her left heel has been sore. No open areas.)     SUBJECTIVE     -2018: The patient presented to the Lymphedema clinic on this date with the CIRC AID compression leggings intact. Noted that the leggings were tighter than the last time and that the patient has donned them on her legs better.   -2018: The patient presented to the Lymphedema clinic on this date with CIRC AID compression leggings intact. She reported that she felt as if the leggings had gotten loose on the right leg. The therapist reassured the patient and her mother that she would continue to undergo training to be able to properly brandon/doff and adjust the compression leggings and also on the proper use of the sequential compression pump, which she received in the mail today. \"I'm so excited that I got my pump for my legs,  I can hardly wait to use them\" per patient. to slow down and sit up and rest as needed. (The therapist wrote numbers on the straps (1-8) from distal to proximal to assist the patient with applying the straps in proper sequencing on the left lower extremity due to the patient verbalizing that the numbers on the right legging helped with her donning the compression garment). Numbers were written upside down in order for the patient to be able to see them when donning the leggings. Patient Education  Education Provided:   -01/23/2018: Reviewed home management program including: daily use of sequential compression pump, proper skin care, proper donning techniques and wearing schedule for CIRC AID compression garments. -01/16/2018: Refer to Compression Garment Re-assessment above.   -01/12/2018: The patient's mother was present (very attentive) and the patient completed training for the CIRC AID compression leggings and the compression pump (Refer above).  -01/09/2018: Refer to \"Compression Garment Adjustment and Fitting\" per patient.   -12/28/2017: Reviewed plan of care. -12/22/2017: Ongoing education. -12/20/2017: Reviewed goals, plan of care. -12/18/2017: Discussed goal of obtaining a compression pump and a CIRC AID REDUCTION KIT.   -12/14/2017: Reviewed plan of care. -12/07/2017: Discussed recommendations for a CIRC AID REDUCTION KIT compression garment for compression for bilateral lower extremities being more appropriate versus compression stockings and compression bandages when the patient is discharged. Patient's mother was in agreement. -11/30/2017: Reviewed goals. -11/29/2017: Reviewed plan of care to resume bilateral lower extremities during tomorrow's treatment. -11/20/2017: Reviewed goals, and use of basic pump for home trial (the patient has limited treatment sessions this week due to scheduling conflicts and department closure due to holiday).  The patient and her mother were able to brandon/doff pump and operate machine with independence, instructed to remove bandages in 3 days and begin pump use twice daily (one leg per trial) for no longer than 1 hour per session. -11/17/2017: Reviewed plan of care. -11/15/2017: Reviewed the Lymphedema program, goals/plan of care and precautions. Learner:patient and patient's mother. Method: demonstration and explanation       Outcome: acknowledged understanding of goals/plan of care, verbalized concerns, demonstrated understanding and asked questions     GOALS   SHORT-TERM GOALS:  to be met in 6 weeks   X  MET  Patient will demonstrate a decrease in circumferential measurements of the affected extremity by 20.0 cm working towards the lymphedema swelling stabilizing and patient being able to get measured and fitted for a new compression garment to be worn daily to keep swelling down. X  MET  Patient will tolerate wearing the compression bandages from one treatment session until the next treatment session working towards meeting short-term goal #1. X  MET Patient/family/caregiver will demonstrate and verbalize 3-5 skin care precautionary measures to decrease dry skin and risk of infection. X  MET Patient/family/caregiver will demonstrate applying compression bandages with no greater than moderate assist and verbal cues from the therapist working towards being modified independent with applying the compression bandages for night time swelling. LONG-TERM GOALS:  to be met in 12 weeks   X  MET 1. Lymphedema swelling will stabilize as noted by total circumferential changes being no greater than 3.0-5.0 cm in preparation for being measured for new compression garment(s) to be worn daily to keep swelling down. X  MET   2. Patient/family/caregiver will demonstrate applying compression bandages with modified independence to apply at night to keep swelling down overnight to be able to brandon compression stockings in the morning. X  NOT MET   (ONGOING) 3.  Patient/family/caregiver will demonstrate donning/doffing compression garments with modified independence to wear compression garments daily to keep swelling down. X  NOT MET  (ONGOING) 4. Patient/family/caregiver will verbalize a good understanding regarding proper wearing and replacement schedule, precautions and care of garment(s). ASSESSMENT:  Assessment:  Patient progressing toward goal achievement. Activity Tolerance:  Patient tolerance of  treatment: Good. Plan:Follow up appointment in 3 weeks (Week of February 12 (1x) with possible discharge if patient modified independent with home management program for the Lymphedema. THE PATIENT DEMONSTRATES GOOD POTENTIAL TO MAKE GOOD PROGRESS AND MEET ALL GOALS WITH TREATMENTS COMPLETED BY A SKILLED PHYSICAL THERAPIST/PHYSICAL THERAPIST ASSISTANT.          Time In: 1300   Time Out: 1425   Timed Code Minutes: 85   Untimed Code Minutes: 0   Total Treatment Time: [place], P.T. #[id number],CLT, [facility]     1/23/2018

## 2018-01-31 ENCOUNTER — HOSPITAL ENCOUNTER (OUTPATIENT)
Dept: PHYSICAL THERAPY | Age: 43
Setting detail: THERAPIES SERIES
End: 2018-01-31
Payer: MEDICARE

## 2018-02-15 ENCOUNTER — HOSPITAL ENCOUNTER (OUTPATIENT)
Dept: PHYSICAL THERAPY | Age: 43
Setting detail: THERAPIES SERIES
Discharge: HOME | End: 2018-02-15
Payer: MEDICARE

## 2018-02-15 PROCEDURE — G8991 OTHER PT/OT GOAL STATUS: HCPCS

## 2018-02-15 PROCEDURE — G8990 OTHER PT/OT CURRENT STATUS: HCPCS

## 2018-02-15 PROCEDURE — G8992 OTHER PT/OT  D/C STATUS: HCPCS

## 2018-02-15 PROCEDURE — 97140 MANUAL THERAPY 1/> REGIONS: CPT

## 2018-02-15 ASSESSMENT — PAIN DESCRIPTION - DESCRIPTORS: DESCRIPTORS: TENDER;SORE

## 2018-02-15 ASSESSMENT — PAIN DESCRIPTION - LOCATION: LOCATION: ANKLE

## 2018-02-15 ASSESSMENT — PAIN SCALES - GENERAL: PAINLEVEL_OUTOF10: 4

## 2018-02-15 ASSESSMENT — PAIN DESCRIPTION - ORIENTATION: ORIENTATION: RIGHT

## 2018-02-15 ASSESSMENT — PAIN DESCRIPTION - PAIN TYPE: TYPE: CHRONIC PAIN

## 2018-02-15 NOTE — PROGRESS NOTES
would try and that it is difficult for her to reach down that far. Open Wound(s) No   Tissue Temperature Normal   Skin Folds Minimal at the left ankle region and moderate at the right ankle region. Fibrotic Tissue Minimal in the left lower extremity. Minimal in the right lower extremity with moderate in the distal right lower extremity. Orange Peel Texture None   Nailbed Appearance/Condition Thickened toe nails in bilateral feet. Leakage of Fluid Amount: None   -- --   Circumferential Measurements:   Left Right   (A) Metatarsal heads to knee joint line 430.9 cm 457.0 cm   (B) Knee joint line to mid-thigh 292.9 cm 300.4 cm   (C) = (A) + (B) 723.8 cm 757.4 cm     TREATMENT SESSION  Unilateral Lower Extremity Manual Lymph Drainage (MLD):   Right  Trunk:  Effleurage, Profundus, Terminus, Inguinal Lnn, Axillary Lnn and (IA) Anterior Inguinal to Axillary (3 steps)       Lower Extremity:  Thigh (Anterior, Posterior, Lateral, Medial to Lateral), Knee (Anterior, Posterior, Lateral, Medial, Lower Leg (Anterior,Posterior), Ankle (Anterior, Posterior, Lateral, Medial and Foot/Toes (Anterior, Posterior)       Rework  Lower Extremity (Toes to Groin), Inguinal Lnn, Axillary Lnn, (IA) Anterior Inguinal to Axillary, Terminus, Profundus and Effleurage  Skin Care Measures  Washed involved extremity/body part and applied Original Eucerin moisturizing creme and Vaseline to moisturize skin  COMPRESSION GARMENT RE- ASSESSMENT with Review with patient on proper compression gradient. -BRAND: CIRC AID     -TYPE: Bilateral lower extremities (ankle to knee joint line). -COMPRESSION GRADIENT: Adjustable (Recommend not greater than 30-40 mm Hg)     -GARMENT SIZE: Long, adjustable. -HAS A GOOD FIT BEEN ACHIEVED: Yes     -TRAINING: Reviewed securing the CIRC AID Velcro straps and readjusting the straps during the day with the patient.   Patient Education  Education Provided:   -02/15/2018: Reviewed Compression garment donnicko, Lymphedema home program including (use of the sequential compression pump, skin care, compression garment), proper laundering of garment and reordering information. Reviewed goals and plan for discharge.   -01/23/2018: Reviewed home management program including: daily use of sequential compression pump, proper skin care, proper donning techniques and wearing schedule for CIRC AID compression garments. -01/16/2018: Refer to Compression Garment Re-assessment above.   -01/12/2018: The patient's mother was present (very attentive) and the patient completed training for the CIRC AID compression leggings and the compression pump (Refer above).  -01/09/2018: Refer to \"Compression Garment Adjustment and Fitting\" per patient.   -12/28/2017: Reviewed plan of care. -12/22/2017: Ongoing education. -12/20/2017: Reviewed goals, plan of care. -12/18/2017: Discussed goal of obtaining a compression pump and a CIRC AID REDUCTION KIT.   -12/14/2017: Reviewed plan of care. -12/07/2017: Discussed recommendations for a CIRC AID REDUCTION KIT compression garment for compression for bilateral lower extremities being more appropriate versus compression stockings and compression bandages when the patient is discharged. Patient's mother was in agreement. -11/30/2017: Reviewed goals. -11/29/2017: Reviewed plan of care to resume bilateral lower extremities during tomorrow's treatment. -11/20/2017: Reviewed goals, and use of basic pump for home trial (the patient has limited treatment sessions this week due to scheduling conflicts and department closure due to holiday). The patient and her mother were able to brandon/doff pump and operate machine with independence, instructed to remove bandages in 3 days and begin pump use twice daily (one leg per trial) for no longer than 1 hour per session. -11/17/2017: Reviewed plan of care. -11/15/2017: Reviewed the Lymphedema program, goals/plan of care and precautions.   Natasha Williamson

## 2018-04-05 RX ORDER — METOPROLOL SUCCINATE 25 MG/1
TABLET, EXTENDED RELEASE ORAL
Qty: 90 TABLET | Refills: 1 | Status: SHIPPED | OUTPATIENT
Start: 2018-04-05 | End: 2018-06-08 | Stop reason: SDUPTHER

## 2018-04-05 RX ORDER — FUROSEMIDE 20 MG/1
TABLET ORAL
Qty: 90 TABLET | Refills: 1 | Status: SHIPPED | OUTPATIENT
Start: 2018-04-05 | End: 2018-06-08 | Stop reason: SDUPTHER

## 2018-04-06 ENCOUNTER — HOSPITAL ENCOUNTER (EMERGENCY)
Age: 43
Discharge: HOME | End: 2018-04-06
Payer: MEDICARE

## 2018-04-06 VITALS
DIASTOLIC BLOOD PRESSURE: 69 MMHG | SYSTOLIC BLOOD PRESSURE: 153 MMHG | OXYGEN SATURATION: 97 % | RESPIRATION RATE: 18 BRPM | WEIGHT: 293 LBS | HEART RATE: 87 BPM | BODY MASS INDEX: 50.52 KG/M2 | TEMPERATURE: 98.9 F

## 2018-04-06 DIAGNOSIS — J04.0 LARYNGITIS: ICD-10-CM

## 2018-04-06 DIAGNOSIS — J02.9 ACUTE PHARYNGITIS, UNSPECIFIED ETIOLOGY: ICD-10-CM

## 2018-04-06 DIAGNOSIS — J40 BRONCHITIS: Primary | ICD-10-CM

## 2018-04-06 PROCEDURE — 99213 OFFICE O/P EST LOW 20 MIN: CPT | Performed by: NURSE PRACTITIONER

## 2018-04-06 PROCEDURE — 99214 OFFICE O/P EST MOD 30 MIN: CPT

## 2018-04-06 RX ORDER — AMOXICILLIN AND CLAVULANATE POTASSIUM 875; 125 MG/1; MG/1
1 TABLET, FILM COATED ORAL 2 TIMES DAILY
Qty: 20 TABLET | Refills: 0 | Status: SHIPPED | OUTPATIENT
Start: 2018-04-06 | End: 2018-04-16

## 2018-04-06 ASSESSMENT — ENCOUNTER SYMPTOMS
CHEST TIGHTNESS: 0
NAUSEA: 0
ABDOMINAL PAIN: 0
VOICE CHANGE: 1
BACK PAIN: 0
DIARRHEA: 0
SINUS PRESSURE: 0
RHINORRHEA: 0
VOMITING: 0
SORE THROAT: 1
SINUS PAIN: 0
COUGH: 1

## 2018-04-06 ASSESSMENT — PAIN DESCRIPTION - PAIN TYPE: TYPE: ACUTE PAIN

## 2018-04-06 ASSESSMENT — PAIN SCALES - GENERAL: PAINLEVEL_OUTOF10: 5

## 2018-04-19 ENCOUNTER — HOSPITAL ENCOUNTER (OUTPATIENT)
Age: 43
Discharge: HOME | End: 2018-04-19
Payer: MEDICARE

## 2018-04-19 DIAGNOSIS — R06.02 SOB (SHORTNESS OF BREATH) ON EXERTION: ICD-10-CM

## 2018-04-19 DIAGNOSIS — E66.01 MORBID OBESITY DUE TO EXCESS CALORIES (HCC): ICD-10-CM

## 2018-04-19 DIAGNOSIS — G90.A POTS (POSTURAL ORTHOSTATIC TACHYCARDIA SYNDROME): ICD-10-CM

## 2018-04-19 DIAGNOSIS — I50.32 CHRONIC DIASTOLIC CONGESTIVE HEART FAILURE (HCC): ICD-10-CM

## 2018-04-19 DIAGNOSIS — R60.0 BILATERAL LEG EDEMA: ICD-10-CM

## 2018-04-19 LAB
ANION GAP SERPL CALCULATED.3IONS-SCNC: 17 MEQ/L (ref 8–16)
BUN BLDV-MCNC: 10 MG/DL (ref 7–22)
CALCIUM SERPL-MCNC: 9.2 MG/DL (ref 8.5–10.5)
CHLORIDE BLD-SCNC: 106 MEQ/L (ref 98–111)
CO2: 20 MEQ/L (ref 23–33)
CREAT SERPL-MCNC: 0.7 MG/DL (ref 0.4–1.2)
GFR SERPL CREATININE-BSD FRML MDRD: > 90 ML/MIN/1.73M2
GLUCOSE BLD-MCNC: 100 MG/DL (ref 70–108)
POTASSIUM SERPL-SCNC: 4.1 MEQ/L (ref 3.5–5.2)
SODIUM BLD-SCNC: 143 MEQ/L (ref 135–145)

## 2018-04-19 PROCEDURE — 36415 COLL VENOUS BLD VENIPUNCTURE: CPT

## 2018-04-19 PROCEDURE — 80048 BASIC METABOLIC PNL TOTAL CA: CPT

## 2018-04-20 ENCOUNTER — TELEPHONE (OUTPATIENT)
Dept: CARDIOLOGY CLINIC | Age: 43
End: 2018-04-20

## 2018-04-21 ENCOUNTER — HOSPITAL ENCOUNTER (EMERGENCY)
Age: 43
Discharge: HOME | End: 2018-04-21
Payer: MEDICARE

## 2018-04-21 VITALS
BODY MASS INDEX: 47.09 KG/M2 | RESPIRATION RATE: 16 BRPM | TEMPERATURE: 97.8 F | SYSTOLIC BLOOD PRESSURE: 135 MMHG | HEART RATE: 99 BPM | WEIGHT: 293 LBS | DIASTOLIC BLOOD PRESSURE: 74 MMHG | OXYGEN SATURATION: 98 % | HEIGHT: 66 IN

## 2018-04-21 DIAGNOSIS — R30.0 DYSURIA: ICD-10-CM

## 2018-04-21 DIAGNOSIS — L30.4 INTERTRIGO: Primary | ICD-10-CM

## 2018-04-21 LAB
BILIRUBIN URINE: NEGATIVE
BLOOD, URINE: NORMAL
CHARACTER, URINE: CLEAR
COLOR: YELLOW
GLUCOSE, URINE: NEGATIVE MG/DL
KETONES, URINE: NEGATIVE
LEUKOCYTES, UA: NEGATIVE
NITRATE, UA: NEGATIVE
PH UA: 5.5 (ref 5–9)
PROTEIN UA: NEGATIVE MG/DL
REFLEX TO URINE C & S: NORMAL
SPECIFIC GRAVITY UA: 1.02 (ref 1–1.03)
UROBILINOGEN, URINE: 0.2 EU/DL (ref 0–1)

## 2018-04-21 PROCEDURE — 51701 INSERT BLADDER CATHETER: CPT

## 2018-04-21 PROCEDURE — 99213 OFFICE O/P EST LOW 20 MIN: CPT

## 2018-04-21 PROCEDURE — 81003 URINALYSIS AUTO W/O SCOPE: CPT

## 2018-04-21 PROCEDURE — 99213 OFFICE O/P EST LOW 20 MIN: CPT | Performed by: NURSE PRACTITIONER

## 2018-04-21 RX ORDER — NYSTATIN 100000 [USP'U]/G
POWDER TOPICAL
Qty: 1 BOTTLE | Refills: 0 | Status: SHIPPED | OUTPATIENT
Start: 2018-04-21 | End: 2018-10-26 | Stop reason: ALTCHOICE

## 2018-04-21 RX ORDER — FLUCONAZOLE 150 MG/1
TABLET ORAL
Qty: 2 TABLET | Refills: 0 | Status: SHIPPED | OUTPATIENT
Start: 2018-04-21 | End: 2018-04-21 | Stop reason: CLARIF

## 2018-04-21 RX ORDER — FLUCONAZOLE 150 MG/1
150 TABLET ORAL
Qty: 3 TABLET | Refills: 0 | Status: SHIPPED | OUTPATIENT
Start: 2018-04-21 | End: 2018-04-26

## 2018-04-21 ASSESSMENT — ENCOUNTER SYMPTOMS
EYES NEGATIVE: 1
RESPIRATORY NEGATIVE: 1
ALLERGIC/IMMUNOLOGIC NEGATIVE: 1
GASTROINTESTINAL NEGATIVE: 1
COLOR CHANGE: 1

## 2018-04-21 ASSESSMENT — PAIN DESCRIPTION - PAIN TYPE: TYPE: ACUTE PAIN

## 2018-04-21 ASSESSMENT — PAIN DESCRIPTION - LOCATION: LOCATION: ABDOMEN

## 2018-04-21 ASSESSMENT — PAIN SCALES - GENERAL: PAINLEVEL_OUTOF10: 8

## 2018-06-06 ENCOUNTER — HOSPITAL ENCOUNTER (EMERGENCY)
Age: 43
Discharge: HOME | End: 2018-06-06
Attending: EMERGENCY MEDICINE
Payer: MEDICARE

## 2018-06-06 VITALS
TEMPERATURE: 98.8 F | DIASTOLIC BLOOD PRESSURE: 75 MMHG | SYSTOLIC BLOOD PRESSURE: 134 MMHG | HEART RATE: 84 BPM | BODY MASS INDEX: 51.33 KG/M2 | WEIGHT: 293 LBS | OXYGEN SATURATION: 98 % | RESPIRATION RATE: 18 BRPM

## 2018-06-06 DIAGNOSIS — H60.391 ACUTE INFECTIVE OTITIS EXTERNA OF RIGHT EAR: Primary | ICD-10-CM

## 2018-06-06 PROCEDURE — 99213 OFFICE O/P EST LOW 20 MIN: CPT | Performed by: EMERGENCY MEDICINE

## 2018-06-06 PROCEDURE — 99212 OFFICE O/P EST SF 10 MIN: CPT

## 2018-06-06 RX ORDER — ACETAMINOPHEN 500 MG
1000 TABLET ORAL EVERY 6 HOURS PRN
COMMUNITY

## 2018-06-06 RX ORDER — CIPROFLOXACIN 500 MG/1
500 TABLET, FILM COATED ORAL 2 TIMES DAILY
Qty: 14 TABLET | Refills: 0 | Status: SHIPPED | OUTPATIENT
Start: 2018-06-06 | End: 2018-06-13

## 2018-06-06 RX ORDER — IBUPROFEN 600 MG/1
600 TABLET ORAL 3 TIMES DAILY PRN
Qty: 20 TABLET | Refills: 0 | Status: SHIPPED | OUTPATIENT
Start: 2018-06-06 | End: 2018-10-26 | Stop reason: ALTCHOICE

## 2018-06-06 RX ORDER — CIPROFLOXACIN AND DEXAMETHASONE 3; 1 MG/ML; MG/ML
4 SUSPENSION/ DROPS AURICULAR (OTIC) 2 TIMES DAILY
Qty: 1 BOTTLE | Refills: 0 | Status: SHIPPED | OUTPATIENT
Start: 2018-06-06 | End: 2018-06-13

## 2018-06-06 ASSESSMENT — ENCOUNTER SYMPTOMS
BLOOD IN STOOL: 0
CONSTIPATION: 0
VOICE CHANGE: 0
CHOKING: 0
TROUBLE SWALLOWING: 0
NAUSEA: 0
EYE PAIN: 0
WHEEZING: 0
COUGH: 0
SHORTNESS OF BREATH: 0
EYE DISCHARGE: 0
FACIAL SWELLING: 0
VOMITING: 0
ABDOMINAL PAIN: 0
DIARRHEA: 0
BACK PAIN: 0
SINUS PRESSURE: 0
SORE THROAT: 0
EYE REDNESS: 0
STRIDOR: 0

## 2018-06-06 ASSESSMENT — PAIN SCALES - GENERAL: PAINLEVEL_OUTOF10: 8

## 2018-06-06 ASSESSMENT — PAIN DESCRIPTION - ORIENTATION: ORIENTATION: RIGHT

## 2018-06-06 ASSESSMENT — PAIN DESCRIPTION - LOCATION: LOCATION: EAR

## 2018-06-06 ASSESSMENT — PAIN DESCRIPTION - FREQUENCY: FREQUENCY: CONTINUOUS

## 2018-06-06 ASSESSMENT — PAIN DESCRIPTION - PAIN TYPE: TYPE: ACUTE PAIN

## 2018-06-06 ASSESSMENT — PAIN DESCRIPTION - DESCRIPTORS: DESCRIPTORS: ACHING

## 2018-06-08 ENCOUNTER — OFFICE VISIT (OUTPATIENT)
Dept: CARDIOLOGY CLINIC | Age: 43
End: 2018-06-08
Payer: MEDICARE

## 2018-06-08 VITALS
DIASTOLIC BLOOD PRESSURE: 81 MMHG | BODY MASS INDEX: 47.09 KG/M2 | WEIGHT: 293 LBS | SYSTOLIC BLOOD PRESSURE: 128 MMHG | HEIGHT: 66 IN | HEART RATE: 81 BPM

## 2018-06-08 DIAGNOSIS — I50.32 CHRONIC DIASTOLIC CONGESTIVE HEART FAILURE (HCC): Primary | ICD-10-CM

## 2018-06-08 DIAGNOSIS — I89.0 LYMPHEDEMA OF BOTH LOWER EXTREMITIES: ICD-10-CM

## 2018-06-08 DIAGNOSIS — R06.02 SOB (SHORTNESS OF BREATH) ON EXERTION: ICD-10-CM

## 2018-06-08 DIAGNOSIS — G90.A POTS (POSTURAL ORTHOSTATIC TACHYCARDIA SYNDROME): ICD-10-CM

## 2018-06-08 PROCEDURE — G8427 DOCREV CUR MEDS BY ELIG CLIN: HCPCS | Performed by: INTERNAL MEDICINE

## 2018-06-08 PROCEDURE — 1036F TOBACCO NON-USER: CPT | Performed by: INTERNAL MEDICINE

## 2018-06-08 PROCEDURE — 93000 ELECTROCARDIOGRAM COMPLETE: CPT | Performed by: INTERNAL MEDICINE

## 2018-06-08 PROCEDURE — 99214 OFFICE O/P EST MOD 30 MIN: CPT | Performed by: INTERNAL MEDICINE

## 2018-06-08 PROCEDURE — G8417 CALC BMI ABV UP PARAM F/U: HCPCS | Performed by: INTERNAL MEDICINE

## 2018-06-08 RX ORDER — MEDROXYPROGESTERONE ACETATE 150 MG/ML
150 INJECTION, SUSPENSION INTRAMUSCULAR
COMMUNITY
End: 2018-11-09

## 2018-06-08 RX ORDER — FUROSEMIDE 20 MG/1
TABLET ORAL
Qty: 90 TABLET | Refills: 3 | Status: SHIPPED | OUTPATIENT
Start: 2018-06-08 | End: 2018-12-10 | Stop reason: SDUPTHER

## 2018-06-08 RX ORDER — METOPROLOL SUCCINATE 25 MG/1
TABLET, EXTENDED RELEASE ORAL
Qty: 90 TABLET | Refills: 3 | Status: SHIPPED | OUTPATIENT
Start: 2018-06-08 | End: 2018-12-10 | Stop reason: SDUPTHER

## 2018-08-22 ENCOUNTER — HOSPITAL ENCOUNTER (EMERGENCY)
Age: 43
Discharge: HOME | End: 2018-08-22
Payer: MEDICARE

## 2018-08-22 VITALS
TEMPERATURE: 98.4 F | RESPIRATION RATE: 24 BRPM | HEART RATE: 92 BPM | BODY MASS INDEX: 50.2 KG/M2 | DIASTOLIC BLOOD PRESSURE: 63 MMHG | WEIGHT: 293 LBS | SYSTOLIC BLOOD PRESSURE: 139 MMHG | OXYGEN SATURATION: 96 %

## 2018-08-22 DIAGNOSIS — M94.0 ACUTE COSTOCHONDRITIS: ICD-10-CM

## 2018-08-22 DIAGNOSIS — H60.393 OTHER INFECTIVE ACUTE OTITIS EXTERNA OF BOTH EARS: ICD-10-CM

## 2018-08-22 DIAGNOSIS — H66.003 ACUTE SUPPURATIVE OTITIS MEDIA OF BOTH EARS WITHOUT SPONTANEOUS RUPTURE OF TYMPANIC MEMBRANES, RECURRENCE NOT SPECIFIED: ICD-10-CM

## 2018-08-22 DIAGNOSIS — H10.31 ACUTE BACTERIAL CONJUNCTIVITIS OF RIGHT EYE: ICD-10-CM

## 2018-08-22 DIAGNOSIS — J20.9 ACUTE BRONCHITIS, UNSPECIFIED ORGANISM: Primary | ICD-10-CM

## 2018-08-22 PROCEDURE — 2709999900 HC NON-CHARGEABLE SUPPLY

## 2018-08-22 PROCEDURE — 99212 OFFICE O/P EST SF 10 MIN: CPT

## 2018-08-22 PROCEDURE — 6360000002 HC RX W HCPCS: Performed by: NURSE PRACTITIONER

## 2018-08-22 PROCEDURE — 94640 AIRWAY INHALATION TREATMENT: CPT

## 2018-08-22 PROCEDURE — 99214 OFFICE O/P EST MOD 30 MIN: CPT | Performed by: NURSE PRACTITIONER

## 2018-08-22 RX ORDER — PREDNISONE 20 MG/1
20 TABLET ORAL 2 TIMES DAILY
Qty: 10 TABLET | Refills: 0 | Status: SHIPPED | OUTPATIENT
Start: 2018-08-22 | End: 2018-08-27

## 2018-08-22 RX ORDER — ALBUTEROL SULFATE 2.5 MG/3ML
2.5 SOLUTION RESPIRATORY (INHALATION) ONCE
Status: COMPLETED | OUTPATIENT
Start: 2018-08-22 | End: 2018-08-22

## 2018-08-22 RX ORDER — AMOXICILLIN AND CLAVULANATE POTASSIUM 875; 125 MG/1; MG/1
1 TABLET, FILM COATED ORAL 2 TIMES DAILY
Qty: 14 TABLET | Refills: 0 | Status: SHIPPED | OUTPATIENT
Start: 2018-08-22 | End: 2018-08-29

## 2018-08-22 RX ORDER — AZITHROMYCIN 250 MG/1
TABLET, FILM COATED ORAL
Qty: 6 TABLET | Refills: 0 | Status: SHIPPED | OUTPATIENT
Start: 2018-08-22 | End: 2018-10-26 | Stop reason: ALTCHOICE

## 2018-08-22 RX ORDER — OFLOXACIN 3 MG/ML
10 SOLUTION AURICULAR (OTIC) DAILY
Qty: 1 BOTTLE | Refills: 0 | Status: SHIPPED | OUTPATIENT
Start: 2018-08-22 | End: 2018-08-29

## 2018-08-22 RX ORDER — TOBRAMYCIN 3 MG/ML
1 SOLUTION/ DROPS OPHTHALMIC
Qty: 1 BOTTLE | Refills: 0 | Status: SHIPPED | OUTPATIENT
Start: 2018-08-22 | End: 2018-08-29

## 2018-08-22 RX ADMIN — ALBUTEROL SULFATE 2.5 MG: 2.5 SOLUTION RESPIRATORY (INHALATION) at 19:13

## 2018-08-22 ASSESSMENT — ENCOUNTER SYMPTOMS
WHEEZING: 0
STRIDOR: 0
VOMITING: 0
DOUBLE VISION: 0
SHORTNESS OF BREATH: 0
NAUSEA: 0
VOICE CHANGE: 0
CHEST TIGHTNESS: 1
PHOTOPHOBIA: 0
SORE THROAT: 1
RHINORRHEA: 0
EYE DISCHARGE: 1
EYE PAIN: 0
EYE ITCHING: 0
EYE REDNESS: 1
COUGH: 1
DIARRHEA: 0
SINUS PRESSURE: 0
TROUBLE SWALLOWING: 0
ABDOMINAL PAIN: 0
SWOLLEN GLANDS: 1

## 2018-08-22 ASSESSMENT — PAIN DESCRIPTION - PAIN TYPE: TYPE: ACUTE PAIN

## 2018-08-22 ASSESSMENT — PAIN SCALES - GENERAL: PAINLEVEL_OUTOF10: 8

## 2018-08-22 NOTE — ED PROVIDER NOTES
painful than the other. The sore throat is characterized by pain only. The ear pain is severe. There is pain in both ears. There is no abnormality behind the ear. The eye pain is mild. The right eye is affected. The eye pain is not associated with movement. The eyelid exhibits no abnormality. The neck pain is moderate. There is no swelling present in the neck. There is generalized neck pain. She has been eating and drinking normally. Urine output has been normal. There were sick contacts at home. She has received no recent medical care. REVIEW OF SYSTEMS     Review of Systems   Constitutional: Negative for activity change, appetite change, chills, diaphoresis, fatigue, fever and unexpected weight change. HENT: Positive for congestion, ear discharge, ear pain and sore throat. Negative for hearing loss, mouth sores, postnasal drip, rhinorrhea, sinus pressure, trouble swallowing and voice change. Eyes: Positive for discharge and redness. Negative for double vision, photophobia, pain and itching. Respiratory: Positive for cough and chest tightness (anterior chest soreness with deep breathing). Negative for shortness of breath, wheezing and stridor. Gastrointestinal: Negative for abdominal pain, diarrhea, nausea and vomiting. Musculoskeletal: Negative for myalgias, neck pain and neck stiffness. Skin: Negative for pallor and rash. Neurological: Positive for dizziness. Negative for headaches. Hematological: Positive for adenopathy.        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     during hospital visit    Bronchitis     CHF (congestive heart failure) ([address]     Elbow fracture 10/09/14    left    Irritable bowel syndrome     Irritable bowel    Lymph edema 2017    Obesity     Conde disease     Schizophrenia ([address]     Seasonal allergies        SURGICAL HISTORY Patient  has a past surgical history that includes Tonsillectomy and adenoidectomy and layer wound closure. CURRENT MEDICATIONS       Discharge Medication List as of 8/22/2018  7:18 PM      CONTINUE these medications which have NOT CHANGED    Details   !! medroxyPROGESTERone (DEPO-PROVERA) 150 MG/ML injection Inject 150 mg into the muscle every 3 monthsHistorical Med      furosemide (LASIX) 20 MG tablet take 1 tablet by mouth once daily, Disp-90 tablet, R-3Normal      metoprolol succinate (TOPROL XL) 25 MG extended release tablet take 1 tablet by mouth once daily, Disp-90 tablet, R-3Normal      acetaminophen (TYLENOL) 500 MG tablet Take 1,000 mg by mouth every 6 hours as needed for PainHistorical Med      ibuprofen (ADVIL;MOTRIN) 600 MG tablet Take 1 tablet by mouth 3 times daily as needed for Pain or Fever (Take with food 3 times daily for pain or fever), Disp-20 tablet, R-0Print      nystatin (MYCOSTATIN) 257090 UNIT/GM powder Apply topically 4 times daily until clear, Disp-1 Bottle, R-0, Normal      Cholecalciferol (VITAMIN D3) 5000 units TABS Take by mouthHistorical Med      !! MedroxyPROGESTERone Acetate (DEPO-PROVERA IM) Inject into the muscle      IRON PO Take 45 mg by mouth daily. miconazole (MICOTIN) 2 % powder Apply topically 2 times to 3 times  daily. , Disp-45 g, R-1, NO PRINT      Dicyclomine HCl (BENTYL PO)   Take 20 mg by mouth 2 times daily       QUEtiapine Fumarate (SEROQUEL PO) Take 400 mg by mouth 2 times daily. atomoxetine (STRATTERA) 40 MG capsule Take 40 mg by mouth 2 times daily. aripiprazole (ABILIFY) 10 MG tablet Take 10 mg by mouth 2 times daily Historical Med      ACYCLOVIR 400 mg by Does not apply route 3 times daily Historical Med       !! - Potential duplicate medications found. Please discuss with provider.           ALLERGIES     Patient is is allergic to latex; cat hair extract; and seasonal.    FAMILY HISTORY     Patient's family history includes Cancer in her maternal grandmother and paternal grandmother; Diabetes in her father. SOCIAL HISTORY     Patient  reports that she has never smoked. She has never used smokeless tobacco. She reports that she does not drink alcohol or use drugs. PHYSICAL EXAM     ED TRIAGE VITALS  BP: 139/63, Temp: 98.4 °F (36.9 °C), Pulse: 92, Resp: 24, SpO2: 96 %  Physical Exam   Constitutional: She is oriented to person, place, and time. She appears well-developed and well-nourished. Non-toxic appearance. She has a sickly appearance. She does not appear ill. No distress. HENT:   Head: Normocephalic and atraumatic. Head is without right periorbital erythema and without left periorbital erythema. Right Ear: Hearing and ear canal normal. There is drainage (serous), swelling and tenderness (over tragus and pulling of auricle). No mastoid tenderness. Tympanic membrane is injected and erythematous. Tympanic membrane is not perforated, not retracted and not bulging. A middle ear effusion is present. No hemotympanum. Left Ear: Hearing and ear canal normal. There is drainage (serous), swelling and tenderness (over tragus with pulling auricle). No mastoid tenderness. Tympanic membrane is injected and erythematous. Tympanic membrane is not perforated, not retracted and not bulging. A middle ear effusion is present. No hemotympanum. Ears:    Nose: Mucosal edema (bilateral turbinates) present. No rhinorrhea or sinus tenderness. Right sinus exhibits no maxillary sinus tenderness and no frontal sinus tenderness. Left sinus exhibits no maxillary sinus tenderness and no frontal sinus tenderness. Mouth/Throat: Uvula is midline, oropharynx is clear and moist and mucous membranes are normal. No trismus in the jaw. No uvula swelling. No oropharyngeal exudate, posterior oropharyngeal edema or posterior oropharyngeal erythema. Eyes: Pupils are equal, round, and reactive to light. EOM are normal. Right eye exhibits discharge.  Right conjunctiva is Disp-1 Bottle, R-0Normal      tobramycin (TOBREX) 0.3 % ophthalmic solution Place 1 drop into the right eye every 6 hours while awake for 7 days, Disp-1 Bottle, R-0Normal           Discharge Medication List as of 8/22/2018  7:18 PM          Patient given educational materials - see patient instructions. Discussed use, benefit, and side effects of prescribed medications. All patient questions answered. Pt voiced understanding. Reviewed health maintenance. Patient agreed with treatment plan. Follow up as directed.      VIRGIE Vasquez CNP, APRN - CNP  08/22/18 [id number]

## 2018-10-03 ENCOUNTER — TELEPHONE (OUTPATIENT)
Dept: CARDIOLOGY CLINIC | Age: 43
End: 2018-10-03

## 2018-10-03 DIAGNOSIS — I89.0 LYMPHEDEMA OF BOTH LOWER EXTREMITIES: ICD-10-CM

## 2018-10-03 DIAGNOSIS — I50.9 CONGESTIVE HEART FAILURE, UNSPECIFIED HF CHRONICITY, UNSPECIFIED HEART FAILURE TYPE (HCC): ICD-10-CM

## 2018-10-03 DIAGNOSIS — G90.A POTS (POSTURAL ORTHOSTATIC TACHYCARDIA SYNDROME): Primary | ICD-10-CM

## 2018-10-08 NOTE — TELEPHONE ENCOUNTER
Called and talked to [id number] [facility], on [address]. Orders placed and she stated she would get blood work done here.

## 2018-10-12 ENCOUNTER — HOSPITAL ENCOUNTER (OUTPATIENT)
Dept: PHYSICAL THERAPY | Age: 43
Setting detail: THERAPIES SERIES
Discharge: HOME | End: 2018-10-12
Payer: MEDICARE

## 2018-10-12 PROCEDURE — 97163 PT EVAL HIGH COMPLEX 45 MIN: CPT

## 2018-10-12 PROCEDURE — G8979 MOBILITY GOAL STATUS: HCPCS

## 2018-10-12 PROCEDURE — G8978 MOBILITY CURRENT STATUS: HCPCS

## 2018-10-12 NOTE — FLOWSHEET NOTE
manage your lymphedema from 0 being good understanding to 4 no understanding? 0   III. Functional Concerns   13. How would you rate how your lymphedema affects your ability to perform self-care activities (i.e. eating, dressing, hygiene) from 0 being no interference to 4 being interferes completely? 2   14. How would you rate how your lymphedema affects your ability to perform routine home or work duties from 0 being no interference to 4 being interferes completely? 3   15. How would you rate how your lymphedema affects your performance of preferred leisure activities from 0 being no interference to 4 being interferes completely? 4   16. How would you rate how your lymphedema has affected the proper fit of clothing/shoes from 0 being fit normally to 4 unable to wear? 3   17. How would you rate how your lymphedema has affected your sleep from 0 being no interference to 4 being interferes greatly? 2   IV. Infection Occurrence (reference only, not included in score)   18.  How often have you become ill with an infection in your swollen limb requiring oral antibiotics or hospitalization in the past year? 0   LLIS Total Score (sum of all points for first 17 items) 48   Percent Impairment (Total score/68 x100) 70.6%   *Used with permission from Alicia Palacios, [facility], 2016*        Treatment tolerance: Patient tolerance of evaluation: good     ASSESSMENT  Problem Areas: (X indicates a problem area)  X Severe edema at Bilateral LE which has led to the following limitations: Difficulty with ambulation, Difficulty with transfers   X Increased risk of recurrent infection   X Limited Range of Motion    Open wounds    Leakage of fluid from  leading to risk of infection       Clinical Presentation: High - Unstable with Unpredictable Characteristics: Patient has possible cellulitis, difficulty with ambulation:  Decision Making (based on patient assessment and decision making process of determining plan of care and establishing reasonable expectations for measurable functional outcomes): Decision making was of High Complexity secondary to Poor decision making, difficulty following directions    Rehabilitation Potential: Patient demonstrates fair  potential to achieve rehabilitation goals. Parth Diaz Strength(s): Patient demonstrates the ability to achieve established functional goals due to the following strengths:Family support, Cooperative and Pleasant    Treatment Recommendations: This patient would benefit from skilled therapy services to achieve the established functional goals by utilizing the following treatment interventions:    X Manual Lymph Drainage: To promote and re direct drainage of excess lymphatic fluid back into the central circulation and lead to a decrease in swelling. X Skin Care/Education: To improve the elasticity of the skin and decrease the risk of recurrent infection. X Compression Bandaging: To decrease the excess lymphatic fluid from re accumulating in the involved area, break down fibrotic tissue and act as a counter force against the muscle pumping action during functional mobility and exercising. X Therapeutic Exercising: To stimulate the flow of excess lymphatic fluid while the muscles contract against the compression bandages/garments during functional mobility/exercises that will lead to a decrease in swelling. X Patient Education: To train and educate the patient and/or caregiver on becoming independent with the home management skills for this chronic condition of Lymphedema. X Garment Fitting/Assessment:  Assist with recommending and obtaining appropriate compression garments to be worn daily to keep swelling down in the involved areas. EDUCATION   New Education Provided: Mother contacted doctor regarding possible cellulitis and patient is waiting a call back from doctor. Learner:patient  Method: demonstration and explanation. Handouts.      Outcome:

## 2018-10-15 ENCOUNTER — HOSPITAL ENCOUNTER (OUTPATIENT)
Dept: PHYSICAL THERAPY | Age: 43
Setting detail: THERAPIES SERIES
Discharge: HOME | End: 2018-10-15
Payer: MEDICARE

## 2018-10-15 PROCEDURE — 97140 MANUAL THERAPY 1/> REGIONS: CPT

## 2018-10-15 ASSESSMENT — PAIN SCALES - GENERAL: PAINLEVEL_OUTOF10: 8

## 2018-10-15 ASSESSMENT — PAIN DESCRIPTION - LOCATION: LOCATION: LEG

## 2018-10-15 ASSESSMENT — PAIN DESCRIPTION - ORIENTATION: ORIENTATION: RIGHT

## 2018-10-15 NOTE — PROGRESS NOTES
[facility]  LYMPHEDEMA SERVICES  DAILY NOTE    Date: 10/15/2018  Patient Name: Kiersten Can        CSN: [id number]   : 1975  (41 y.o.)  Gender: female   Referring Practitioner: Evangelista Terry MD  Diagnosis: I89.0 Lymphedema Bilateral Legs       PT Visit Information  Onset Date: 10/12/18  PT Insurance Information: MEDICARE-Unlimited visits based on medical necessity. Aquatics and modalities are covered except ionto and hot/cold packs. Total # of Visits to Date: 2  Progress Note Counter: 2/10 for PN    Restrictions/Precautions  Fall risk, Universal precautions,NA    Pain  Patient Currently in Pain: Yes  Pain Assessment  Pain Assessment: 0-10  Pain Level: 8  Pain Location: Leg  Pain Orientation: Right  Pain Radiating Towards: 2/10 left leg    SUBJECTIVE     Patient reporting started to take antibiotics on Friday afternoon. Reports legs are looking better, but still having pain. OBJECTIVE  Skin Appearance/Texture: Moist  Skin Color: Mottled  Papillomas: Mild - Bilateral LE   Lymphorrhea (Weeping): None    TREATMENT SESSION   Manual Lymph Drainage  MLD completed to Left LE with patient lying in supine with a bolster under knees. Trunk:   Effleurage, Profundus, Terminus, Axillary Lnn and (IA) (Did not completed right inguinal today due to open wound on abdomen and patient does not have it covered today)  Lower Extremity:  Thigh (Anterior, Posterior, Lateral, Medial to Lateral), Knee (Anterior, Posterior, Lateral, Medial, Lower Leg (Anterior,Posterior), Ankle (Anterior, Posterior, Lateral, Medial and Foot/Toes (Anterior, Posterior)       Rework  Lower Extremity (Toes to Groin), Axillary Lnn, (IA) Terminus, Profundus and Effleurage  Skin Care Measures  Due to patient continuing to recoverer from cellulitis, kept everything off of skin. Educated patient to not use CircAid wraps until next appointment. Compression Bandaging  Location: No bandages completed today.   Patient to

## 2018-10-17 ENCOUNTER — HOSPITAL ENCOUNTER (OUTPATIENT)
Dept: PHYSICAL THERAPY | Age: 43
Setting detail: THERAPIES SERIES
Discharge: HOME | End: 2018-10-17
Payer: MEDICARE

## 2018-10-17 PROCEDURE — G8982 BODY POS GOAL STATUS: HCPCS

## 2018-10-17 PROCEDURE — 97140 MANUAL THERAPY 1/> REGIONS: CPT

## 2018-10-17 PROCEDURE — G8978 MOBILITY CURRENT STATUS: HCPCS

## 2018-10-17 PROCEDURE — 97161 PT EVAL LOW COMPLEX 20 MIN: CPT

## 2018-10-17 PROCEDURE — G8979 MOBILITY GOAL STATUS: HCPCS

## 2018-10-17 PROCEDURE — 97110 THERAPEUTIC EXERCISES: CPT

## 2018-10-17 PROCEDURE — G8981 BODY POS CURRENT STATUS: HCPCS

## 2018-10-17 ASSESSMENT — PAIN SCALES - GENERAL: PAINLEVEL_OUTOF10: 8

## 2018-10-17 ASSESSMENT — PAIN DESCRIPTION - PROGRESSION: CLINICAL_PROGRESSION: GRADUALLY WORSENING

## 2018-10-17 ASSESSMENT — PAIN DESCRIPTION - PAIN TYPE: TYPE: CHRONIC PAIN

## 2018-10-17 ASSESSMENT — PAIN DESCRIPTION - DESCRIPTORS: DESCRIPTORS: BURNING;SPASM

## 2018-10-17 ASSESSMENT — PAIN DESCRIPTION - FREQUENCY: FREQUENCY: CONTINUOUS

## 2018-10-17 ASSESSMENT — PAIN DESCRIPTION - ORIENTATION: ORIENTATION: LEFT

## 2018-10-17 ASSESSMENT — PAIN DESCRIPTION - LOCATION: LOCATION: BACK

## 2018-10-17 NOTE — FLOWSHEET NOTE
the back seized up on her. She has had muscle spasms since. Patient finds that sleeping helps the most; when pain intensifies she finds that putting a pillow under her back helps the most. Sitting and bending forward are most painful; once she has been standing for a minute or 2 she is fine. Denies any disruption of sleep, however she does sleep in a recliner due to breathing issues. Pain:  Patient Currently in Pain: Yes  Pain Assessment: 0-10  Pain Level: 8  Pain Type: Chronic pain  Pain Location: Back  Pain Orientation: Left  Pain Radiating Towards: localized to left side of low back  Pain Descriptors: Burning, Spasm  Pain Frequency: Continuous  Clinical Progression: Gradually worsening  Effect of Pain on Daily Activities: Continues to push through the pain, however it is painful to get up out of the chair.       Social/Functional History:    Lives With: Alone  Type of Home: Apartment  Home Layout: One level  Home Access: Level entry             ADL Assistance: Independent  Homemaking Assistance: Independent  Homemaking Responsibilities: Yes  Ambulation Assistance: Independent  Transfer Assistance: Independent    Active : No  Occupation: On disability  Leisure & Hobbies: watching TV; talking on the phone; Julieanne Goldmann doing different jobs and works 2 days per week at [employer]     Objective              Observation/Palpation  Posture: Fair  Palpation: Highly tender to palpation throughout right iliac crest and along lumbar paraspinals   Observation: obese  Edema: lymphedema in both legs          Lumbar: Very little to no rotation, side bend or extension; very limited by pain          Comment: Hip flexor: 4-; quads and hamstrings: 4+              Comment: Hip flexor: 4-; quads and hamstrings: 4+                Supine to Sit: Modified independent  Sit to Supine: Minimal assistance                Transfers  Sit to Stand: Dependent/Total  Stand to sit: Modified independent  Comment: Completely dependent upon rollator to help her get up and stand             Ambulation 1  Surface: carpet  Device: Rollator  Assistance: Supervision  Quality of Gait: shortened stride on left side with toe drag; increase in hip hike on right to clear the foot; maintains knee extension throughout all phases of gait   Distance: in clinic                    Balance  Posture: Poor  Sitting - Static: Good  Sitting - Dynamic: Good, -  Standing - Static: Poor  Comments: Unwilling to let go of rollator to test her standing balance            Exercises  Exercise 1: Isometric abdominal exercise to perform while sitting; 10 second hold performing throughout the day                  Activity Tolerance:  Activity Tolerance: Patient limited by pain  Activity Tolerance: Limited with tolerated positions due to back pain; unable to consider prone position due to open wound on abdomen     Assessment: Body structures, Functions, Activity limitations: Decreased functional mobility , Decreased ROM, Decreased strength, Decreased endurance, Decreased safe awareness, Decreased balance  Assessment: Chris Her is a 41 yr old woman presenting to therapy with clinical signs and symptoms consistent with likely muscle spasm due to her postural dysfunction. Palpation and observation of her spine indicates possible scoliosis, however this has not been diagnosed with imaging. She will greatly benefit from therapy to help restore tissue flexibility, resolve pain and help to improve posture and core stabilization to minimize future exacerbations.    Prognosis: Fair  Discharge Recommendations: Continue to assess pending progress    Patient Education:  Patient Education: Provided and reviewed HEP with TA activation isometric                    Plan:  Times per week: 2x   Plan weeks: 6  Specific instructions for Next Treatment: Review TA isometric; initiate neutral spine core exercises   Current Treatment Recommendations: Strengthening, ROM, Balance Training, Transfer Training, Functional Mobility Training, Endurance Training, Gait Training, Neuromuscular Re-education, Manual Therapy - Soft Tissue Mobilization, Pain Management, Home Exercise Program, Patient/Caregiver Education & Training, Modalities, Positioning    History: Personal factors or comorbidities that impact plan of care -  Moderate Complexity: 1-2 personal factors or comorbidities. See history section above for details. Examination: Body structures and functions, activity limitations, participation restrictions; using standardized tests and measures - Low Complexity: 1-2 body structures and functional, activity limitation and/or participation restrictions. See restrictions and objective section above for details. Clinical Presentation: Low - Stable and Uncomplicated: signs and symptoms are consistent with muscle spasm; no additional complicating factors present     Decision Making: Low Complexity due to standard presentation of low back pain due to muscle guarding/spasms . Decision making was based on patient assessment and decision making process in determining plan of care and establishing reasonable expectations for measurable functional outcomes. Evaluation Complexity: Based on the findings of patient history, examination, clinical presentation, and decision making during this evaluation, the evaluation of Hui [place]  is of low complexity. Goals:  Patient goals : Resolve low back pain    Short term goals  Time Frame for Short term goals: 3 weeks   Short term goal 1: Patient will be able to transfer from sit-stand with mod-assist using rollator minimally. Short term goal 2: Patient will be able to demonstrate neutral spine when seated and standing requiring minimal verbal or manual cuing. Short term goal 3: Patient will report pain of 3-4/10 at it's max with all acitivites and positions.      Long term goals  Time Frame for Long term goals : 6 weeks   Long term goal 1: Patient will transfer from

## 2018-10-17 NOTE — PROGRESS NOTES
compression wraps off of bilateral legs  Compression Gradient: NA  Supplies (per involved extremity):   Stockinette: none   Transelast none   10 cm Artiflex Cotton none   15 cm Artiflex Cotton none   4 cm Rosidal K none   6 cm Rosidal K none   8 cm Rosidal K none   10 cm Rosidal K none   12 cm Rosidal K none   Rosidal Soft none                Decongestive/Therapeutic Exercise  Deep Breathing    Patient Education  New Education Provided:   Completed measuring and MLD to left leg only today. Legs are looking better but right leg still has a redness to it. Instructed to not use CIrcAids until returns to therapy. Learner:caregiver and patient  Method: demonstration and explanation       Outcome: verbalized concerns, demonstrated understanding, needs reinforcement and asked questions     GOALS   SHORT-TERM GOALS:  to be met in 4 weeks   X  Patient will demonstrate a decrease in circumferential measurements of the affected extremity by 2 cm working towards the lymphedema swelling stabilizing and patient being able to get measured and fitted for a new compression garment to be worn daily to keep swelling down. X  Patient will tolerate wearing the compression bandages from one treatment session until the next treatment session working towards meeting short-term goal #1. X Patient/family/caregiver will demonstrate and verbalize 3-5 skin care precautionary measures to decrease dry skin and risk of infection. X Patient/family/caregiver will demonstrate applying compression bandages with no greater than moderate assist and verbal cues from the therapist working towards being modified independent with applying the compression bandages for night time swelling. ASSESSMENT:  Assessment:  Patient progressing toward goal achievement. Activity Tolerance:  Patient tolerance of  treatment: Good.       Plan: Continue treatment according to established plan of care         Time In: 0900   Time Out: 1005   Timed Code Minutes: 65   Untimed Code Minutes: 0   Total Treatment Time: 3 [facility].  [id number], [address] [facility]

## 2018-10-19 ENCOUNTER — HOSPITAL ENCOUNTER (OUTPATIENT)
Dept: PHYSICAL THERAPY | Age: 43
Setting detail: THERAPIES SERIES
Discharge: HOME | End: 2018-10-19
Payer: MEDICARE

## 2018-10-19 PROCEDURE — 97035 APP MDLTY 1+ULTRASOUND EA 15: CPT

## 2018-10-19 PROCEDURE — 97140 MANUAL THERAPY 1/> REGIONS: CPT

## 2018-10-19 ASSESSMENT — PAIN DESCRIPTION - DIRECTION: RADIATING_TOWARDS: RIGHT LOW BACK.

## 2018-10-19 ASSESSMENT — PAIN DESCRIPTION - PAIN TYPE: TYPE: CHRONIC PAIN

## 2018-10-19 ASSESSMENT — PAIN DESCRIPTION - ORIENTATION: ORIENTATION: RIGHT;LOWER

## 2018-10-19 ASSESSMENT — PAIN SCALES - GENERAL: PAINLEVEL_OUTOF10: 8

## 2018-10-22 ENCOUNTER — HOSPITAL ENCOUNTER (OUTPATIENT)
Dept: PHYSICAL THERAPY | Age: 43
Setting detail: THERAPIES SERIES
Discharge: HOME | End: 2018-10-22
Payer: MEDICARE

## 2018-10-22 PROCEDURE — 97110 THERAPEUTIC EXERCISES: CPT

## 2018-10-22 PROCEDURE — 97140 MANUAL THERAPY 1/> REGIONS: CPT

## 2018-10-22 PROCEDURE — 97035 APP MDLTY 1+ULTRASOUND EA 15: CPT

## 2018-10-22 ASSESSMENT — PAIN SCALES - GENERAL: PAINLEVEL_OUTOF10: 8

## 2018-10-22 ASSESSMENT — PAIN DESCRIPTION - LOCATION: LOCATION: BACK

## 2018-10-22 ASSESSMENT — PAIN DESCRIPTION - ORIENTATION: ORIENTATION: RIGHT;LOWER

## 2018-10-24 ENCOUNTER — HOSPITAL ENCOUNTER (OUTPATIENT)
Dept: PHYSICAL THERAPY | Age: 43
Setting detail: THERAPIES SERIES
Discharge: HOME | End: 2018-10-24
Payer: MEDICARE

## 2018-10-24 PROCEDURE — 97140 MANUAL THERAPY 1/> REGIONS: CPT

## 2018-10-24 PROCEDURE — 97110 THERAPEUTIC EXERCISES: CPT

## 2018-10-24 PROCEDURE — 97035 APP MDLTY 1+ULTRASOUND EA 15: CPT

## 2018-10-24 ASSESSMENT — PAIN DESCRIPTION - LOCATION: LOCATION: BACK

## 2018-10-24 ASSESSMENT — PAIN DESCRIPTION - PAIN TYPE: TYPE: CHRONIC PAIN

## 2018-10-24 ASSESSMENT — PAIN SCALES - GENERAL: PAINLEVEL_OUTOF10: 8

## 2018-10-24 ASSESSMENT — PAIN DESCRIPTION - ORIENTATION: ORIENTATION: LOWER;RIGHT

## 2018-10-24 NOTE — PROGRESS NOTES
[place]     Time In: 2883  Time Out: [address]  Minutes: 29  Timed Code Treatment Minutes: 29 Minutes     Date: 10/24/2018  Patient Name: River Llanes,  Gender:  female        CSN: [id number]   : 1975  (41 y.o.)  Referral Date : 10/10/18    Referring Practitioner: Brayan Omalley MD      Diagnosis: Low back pain (M54.5)  Treatment Diagnosis: postural dysfunction; low back pain           General:  PT Visit Information  Onset Date: 10/17/18  PT Insurance Information: MEDICARE-Unlimited visits based on medical necessity. Aquatics and modalities are covered except ionto and hot/cold packs. Total # of Visits to Date: 5  Plan of Care/Certification Expiration Date: 18  Progress Note Counter:                Subjective:  Chart Reviewed: Yes  Patient assessed for rehabilitation services?: Yes  Response To Previous Treatment: Patient with no complaints from previous session. Family / Caregiver Present: Yes  Comments: Unsure of any follow ups      Subjective: Patient states US does help with pain for about an hour then pain returns. Pain:  Patient Currently in Pain: Yes  Pain Assessment: 0-10  Pain Level: 8  Pain Type: Chronic pain  Pain Location: Back  Pain Orientation: Lower, Right    Objective              Exercises  Exercise 1:                                                                                                          Exercise 2: US 1.0, 100%, 1.2 w/cm2 x 8 minute to right low back in left  sidelying. Exercise 4: Education: log roll transfer. Exercise 5: Seated ball squeeze x10, LAQ x8, hip ABD x5 with abd bracing -c/o pain with LAQ and hip ABD         Activity Tolerance: Tolerated well. Assessment:   Body structures, Functions, Activity limitations: Decreased functional mobility , Decreased ROM, Decreased strength, Decreased endurance, Decreased balance  Assessment: Seated DLS but

## 2018-10-24 NOTE — PROGRESS NOTES
bottom of left foot. Compression Bandaging  Location: No bandages completed today. Patient to leave compression wraps off of bilateral legs  Compression Gradient: NA  Supplies (per involved extremity):   Stockinette: none   Transelast none   10 cm Artiflex Cotton none   15 cm Artiflex Cotton none   4 cm Rosidal K none   6 cm Rosidal K none   8 cm Rosidal K none   10 cm Rosidal K none   12 cm Rosidal K none   Rosidal Soft none                Decongestive/Therapeutic Exercise  Deep Breathing and ankle pumps    Patient Education  New Education Provided:   Therapist concerned with redness on right lower leg. This area is also shiny and tight. Caregiver aware and therapist called mother and suggested contacting doctor since the redness is still present. Learner:caregiver and patient  Method: demonstration and explanation       Outcome: verbalized concerns, demonstrated understanding, needs reinforcement and asked questions     GOALS   SHORT-TERM GOALS:  to be met in 4 weeks   X  Patient will demonstrate a decrease in circumferential measurements of the affected extremity by 2 cm working towards the lymphedema swelling stabilizing and patient being able to get measured and fitted for a new compression garment to be worn daily to keep swelling down. X  Patient will tolerate wearing the compression bandages from one treatment session until the next treatment session working towards meeting short-term goal #1. X Patient/family/caregiver will demonstrate and verbalize 3-5 skin care precautionary measures to decrease dry skin and risk of infection. X Patient/family/caregiver will demonstrate applying compression bandages with no greater than moderate assist and verbal cues from the therapist working towards being modified independent with applying the compression bandages for night time swelling. ASSESSMENT:  Assessment:  Patient progressing toward goal achievement.      Activity Tolerance:  Patient

## 2018-10-26 ENCOUNTER — HOSPITAL ENCOUNTER (OUTPATIENT)
Dept: WOUND CARE | Age: 43
Discharge: HOME | End: 2018-10-26
Payer: MEDICARE

## 2018-10-26 VITALS
SYSTOLIC BLOOD PRESSURE: 125 MMHG | OXYGEN SATURATION: 98 % | DIASTOLIC BLOOD PRESSURE: 82 MMHG | WEIGHT: 293 LBS | HEART RATE: 115 BPM | TEMPERATURE: 98.9 F | RESPIRATION RATE: 20 BRPM | BODY MASS INDEX: 54.54 KG/M2

## 2018-10-26 DIAGNOSIS — L91.0 KELOID: ICD-10-CM

## 2018-10-26 DIAGNOSIS — L98.491 ULCER OF ABDOMEN WALL, LIMITED TO BREAKDOWN OF SKIN (HCC): Primary | ICD-10-CM

## 2018-10-26 PROCEDURE — 99214 OFFICE O/P EST MOD 30 MIN: CPT | Performed by: NURSE PRACTITIONER

## 2018-10-26 PROCEDURE — 99213 OFFICE O/P EST LOW 20 MIN: CPT

## 2018-10-26 RX ORDER — PREDNISONE 20 MG/1
10 TABLET ORAL DAILY
Qty: 7 TABLET | Refills: 0 | Status: SHIPPED | OUTPATIENT
Start: 2018-10-26 | End: 2018-11-09

## 2018-10-26 NOTE — PROGRESS NOTES
HISTORY    Past Surgical History:   Procedure Laterality Date    LAYER WOUND CLOSURE      TONSILLECTOMY AND ADENOIDECTOMY         FAMILY HISTORY    Family History   Problem Relation Age of Onset    Diabetes Father     Cancer Maternal Grandmother     Cancer Paternal Grandmother        SOCIAL HISTORY    Social History   Substance Use Topics    Smoking status: Never Smoker    Smokeless tobacco: Never Used    Alcohol use No       ALLERGIES    Allergies   Allergen Reactions    Latex Rash    Cat Hair Extract     Seasonal        MEDICATIONS    Current Outpatient Prescriptions on File Prior to Encounter   Medication Sig Dispense Refill    furosemide (LASIX) 20 MG tablet take 1 tablet by mouth once daily 90 tablet 3    metoprolol succinate (TOPROL XL) 25 MG extended release tablet take 1 tablet by mouth once daily 90 tablet 3    acetaminophen (TYLENOL) 500 MG tablet Take 1,000 mg by mouth every 6 hours as needed for Pain      Cholecalciferol (VITAMIN D3) 5000 units TABS Take by mouth      IRON PO Take 45 mg by mouth daily.  miconazole (MICOTIN) 2 % powder Apply topically 2 times to 3 times  daily. 45 g 1    Dicyclomine HCl (BENTYL PO)   Take 20 mg by mouth 2 times daily       QUEtiapine Fumarate (SEROQUEL PO) Take 400 mg by mouth 2 times daily.  atomoxetine (STRATTERA) 40 MG capsule Take 40 mg by mouth 2 times daily.  aripiprazole (ABILIFY) 10 MG tablet Take 10 mg by mouth 2 times daily       ACYCLOVIR 400 mg by Does not apply route three times a week       medroxyPROGESTERone (DEPO-PROVERA) 150 MG/ML injection Inject 150 mg into the muscle every 3 months      MedroxyPROGESTERone Acetate (DEPO-PROVERA IM) Inject into the muscle       No current facility-administered medications on file prior to encounter.         REVIEW OF SYSTEMS    Constitutional: negative for chills, fatigue and fevers  Eyes: negative for irritation and redness  Ears, nose, mouth, throat, and face: negative for

## 2018-10-29 ENCOUNTER — HOSPITAL ENCOUNTER (OUTPATIENT)
Dept: PHYSICAL THERAPY | Age: 43
Setting detail: THERAPIES SERIES
Discharge: HOME | End: 2018-10-29
Payer: MEDICARE

## 2018-10-29 PROCEDURE — 97140 MANUAL THERAPY 1/> REGIONS: CPT

## 2018-10-29 PROCEDURE — 97110 THERAPEUTIC EXERCISES: CPT

## 2018-10-29 PROCEDURE — 97035 APP MDLTY 1+ULTRASOUND EA 15: CPT

## 2018-10-29 ASSESSMENT — PAIN DESCRIPTION - PAIN TYPE: TYPE: CHRONIC PAIN

## 2018-10-29 ASSESSMENT — PAIN SCALES - GENERAL: PAINLEVEL_OUTOF10: 8

## 2018-10-31 ENCOUNTER — HOSPITAL ENCOUNTER (OUTPATIENT)
Dept: PHYSICAL THERAPY | Age: 43
Setting detail: THERAPIES SERIES
Discharge: HOME | End: 2018-10-31
Payer: MEDICARE

## 2018-10-31 PROCEDURE — 97035 APP MDLTY 1+ULTRASOUND EA 15: CPT

## 2018-10-31 PROCEDURE — 97140 MANUAL THERAPY 1/> REGIONS: CPT

## 2018-10-31 PROCEDURE — 97110 THERAPEUTIC EXERCISES: CPT

## 2018-10-31 ASSESSMENT — PAIN DESCRIPTION - ORIENTATION: ORIENTATION: LOWER;RIGHT

## 2018-10-31 ASSESSMENT — PAIN SCALES - GENERAL: PAINLEVEL_OUTOF10: 8

## 2018-10-31 ASSESSMENT — PAIN DESCRIPTION - PAIN TYPE: TYPE: CHRONIC PAIN

## 2018-10-31 ASSESSMENT — PAIN DESCRIPTION - LOCATION: LOCATION: BACK

## 2018-11-02 ENCOUNTER — HOSPITAL ENCOUNTER (OUTPATIENT)
Dept: PHYSICAL THERAPY | Age: 43
Setting detail: THERAPIES SERIES
Discharge: HOME | End: 2018-11-02
Payer: MEDICARE

## 2018-11-02 PROCEDURE — 97140 MANUAL THERAPY 1/> REGIONS: CPT

## 2018-11-02 PROCEDURE — 97110 THERAPEUTIC EXERCISES: CPT

## 2018-11-02 PROCEDURE — 97035 APP MDLTY 1+ULTRASOUND EA 15: CPT

## 2018-11-02 ASSESSMENT — PAIN SCALES - GENERAL: PAINLEVEL_OUTOF10: 8

## 2018-11-02 NOTE — PROGRESS NOTES
Decreased functional mobility , Decreased strength, Decreased balance, Decreased endurance  Assessment: Seated exercises completed at start of session with patient tolerating well and without increased pain. Attempted seated hamstring curl but patient reported \"pulling\" in her back. Ended session with ultrasound followed by myofascial work. Amarilis felt \"much better\" at end of session and was very appreciative. Discharge Recommendations: Continue to assess pending progress    Patient Education:  Patient Education: Continue with seated ex. Plan:  Times per week: 2x   Plan weeks: 6  Current Treatment Recommendations: Strengthening, ROM, Balance Training, Transfer Training, Functional Mobility Training, Endurance Training, Gait Training, Neuromuscular Re-education, Manual Therapy - Soft Tissue Mobilization, Pain Management, Home Exercise Program, Patient/Caregiver Education & Training, Modalities, Positioning    Goals:  Patient goals : Resolve low back pain    Short term goals  Time Frame for Short term goals: 3 weeks   Short term goal 1: Patient will be able to transfer from sit-stand with mod-assist using rollator minimally. Short term goal 2: Patient will be able to demonstrate neutral spine when seated and standing requiring minimal verbal or manual cuing. Short term goal 3: Patient will report pain of 3-4/10 at it's max with all acitivites and positions. Long term goals  Time Frame for Long term goals : 6 weeks   Long term goal 1: Patient will transfer from sit-stand independently without assistance from her rollator. Long term goal 2: Patient will be able to demonstrate and maintain neutral spine, without verbal or manual cuing, when performing all exercises and transfers. Long term goal 3: Patient will report trace to no pain with all activities and positions. Long term goal 4: Discharge with independent Jenny Bush.  Fernando Mullen, [address] [place], [place] [address]aiden, 11/2/2018

## 2018-11-05 ENCOUNTER — HOSPITAL ENCOUNTER (OUTPATIENT)
Dept: PHYSICAL THERAPY | Age: 43
Setting detail: THERAPIES SERIES
Discharge: HOME | End: 2018-11-05
Payer: MEDICARE

## 2018-11-05 PROCEDURE — 97035 APP MDLTY 1+ULTRASOUND EA 15: CPT

## 2018-11-05 PROCEDURE — 97140 MANUAL THERAPY 1/> REGIONS: CPT

## 2018-11-05 PROCEDURE — 97530 THERAPEUTIC ACTIVITIES: CPT

## 2018-11-05 PROCEDURE — G8979 MOBILITY GOAL STATUS: HCPCS

## 2018-11-05 PROCEDURE — G8978 MOBILITY CURRENT STATUS: HCPCS

## 2018-11-07 ENCOUNTER — HOSPITAL ENCOUNTER (OUTPATIENT)
Dept: PHYSICAL THERAPY | Age: 43
Setting detail: THERAPIES SERIES
Discharge: HOME | End: 2018-11-07
Payer: MEDICARE

## 2018-11-07 PROCEDURE — 97140 MANUAL THERAPY 1/> REGIONS: CPT

## 2018-11-09 ENCOUNTER — HOSPITAL ENCOUNTER (OUTPATIENT)
Dept: WOUND CARE | Age: 43
Discharge: HOME | End: 2018-11-09
Payer: MEDICARE

## 2018-11-09 ENCOUNTER — APPOINTMENT (OUTPATIENT)
Dept: PHYSICAL THERAPY | Age: 43
End: 2018-11-09
Payer: MEDICARE

## 2018-11-09 VITALS
WEIGHT: 293 LBS | BODY MASS INDEX: 47.09 KG/M2 | OXYGEN SATURATION: 99 % | TEMPERATURE: 98.1 F | RESPIRATION RATE: 20 BRPM | HEIGHT: 66 IN | DIASTOLIC BLOOD PRESSURE: 70 MMHG | HEART RATE: 104 BPM | SYSTOLIC BLOOD PRESSURE: 131 MMHG

## 2018-11-09 DIAGNOSIS — B37.2 CANDIDIASIS, INTERTRIGINOUS: ICD-10-CM

## 2018-11-09 DIAGNOSIS — I89.0 LYMPHEDEMA OF BOTH LOWER EXTREMITIES: ICD-10-CM

## 2018-11-09 PROCEDURE — 99213 OFFICE O/P EST LOW 20 MIN: CPT

## 2018-11-09 PROCEDURE — 99213 OFFICE O/P EST LOW 20 MIN: CPT | Performed by: NURSE PRACTITIONER

## 2018-11-09 ASSESSMENT — PAIN DESCRIPTION - FREQUENCY: FREQUENCY: CONTINUOUS

## 2018-11-09 ASSESSMENT — PAIN SCALES - GENERAL: PAINLEVEL_OUTOF10: 8

## 2018-11-09 ASSESSMENT — PAIN DESCRIPTION - DESCRIPTORS: DESCRIPTORS: BURNING;SPASM

## 2018-11-09 ASSESSMENT — PAIN DESCRIPTION - ORIENTATION: ORIENTATION: MID

## 2018-11-09 ASSESSMENT — PAIN DESCRIPTION - LOCATION: LOCATION: ABDOMEN

## 2018-11-09 NOTE — PROGRESS NOTES
[facility]          Progress Note      Cj RECORD NUMBER:  [id number]  AGE: 41 y.o. GENDER: female  : 1975  EPISODE DATE:  2018    Subjective:     Chief Complaint   Patient presents with    Wound Check     Abdomen          HISTORY of PRESENT ILLNESS HPI     Kaye Mojica is a 41 y.o. female Established patient referred by Dr. Veronica Riddle, who presents today for wound/ulcer evaluation. History of Wound Context: Patient has reoccurrence of open ulcer to abdominal keloid. She has a history of abdominal ulcers related to keloids since 2001 which were open for nearly 15 years and healed in 2017. She had been doing well until early Oct 2018 when a small area spontaneously opened to the mid distal keloid and and since gotten larger. She has a history of the areas being surgically excised and injected with steroids with no improvement. She has significant bilateral lower leg lymphedema and is being treated at the lymphedema clinic as well as doing physical therapy for a back sprain. 18: The ulcer is measuring smaller. She is almost done with her Prednisone. She is also on an antibiotic for cellulitis of her right leg ordered by her PCP. She has been complaining of issues with odor and has found to have significant fungal rash under her abdominal pannus and groin. She has not been showering since her abdominal ulcer opened up. She has a small open area to the upper abdomen keloid from scratching which is dry and scabbed. Wound/Ulcer Pain Timing/Severity: moderate  Quality of pain: aching, tender  Severity:  8 / 10   Modifying Factors: Pain worsens with dressing changes  Associated Signs/Symptoms: drainage and pain    Interval History:   Patient presents today for follow up on wound/ulcer's progression. The patient is currently on antibiotics.   Current dressing care includes:    Endoform to the wound and moisten with a couple drops of saline, cover with hydrofera blue ready then apply medipore tape to the edges. Change twice a week. 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 [address]     Elbow fracture 10/09/14    left    Irritable bowel syndrome     Irritable bowel    Lymph edema 2017    Obesity     Conde disease     Schizophrenia [facility] [address]     Seasonal allergies        PAST SURGICAL HISTORY    Past Surgical History:   Procedure Laterality Date    LAYER WOUND CLOSURE      TONSILLECTOMY AND ADENOIDECTOMY         FAMILY HISTORY    Family History   Problem Relation Age of Onset    Diabetes Father     Cancer Maternal Grandmother     Cancer Paternal Grandmother        SOCIAL HISTORY    Social History   Substance Use Topics    Smoking status: Never Smoker    Smokeless tobacco: Never Used    Alcohol use No       ALLERGIES    Allergies   Allergen Reactions    Latex Rash    Cat Hair Extract     Seasonal        MEDICATIONS    Current Outpatient Prescriptions on File Prior to Encounter   Medication Sig Dispense Refill    predniSONE (DELTASONE) 20 MG tablet Take 0.5 tablets by mouth daily for 14 days 7 tablet 0    furosemide (LASIX) 20 MG tablet take 1 tablet by mouth once daily 90 tablet 3    metoprolol succinate (TOPROL XL) 25 MG extended release tablet take 1 tablet by mouth once daily 90 tablet 3    acetaminophen (TYLENOL) 500 MG tablet Take 1,000 mg by mouth every 6 hours as needed for Pain      Cholecalciferol (VITAMIN D3) 5000 units TABS Take by mouth      IRON PO Take 45 mg by mouth daily.  miconazole (MICOTIN) 2 % powder Apply topically 2 times to 3 times  daily. 45 g 1    Dicyclomine HCl (BENTYL PO)   Take 20 mg by mouth 2 times daily       QUEtiapine Fumarate (SEROQUEL PO) Take 400 mg by mouth 2 times daily.         atomoxetine (STRATTERA) 40 MG capsule Take 40 mg by mouth 2 times daily.  aripiprazole (ABILIFY) 10 MG tablet Take 10 mg by mouth 2 times daily       ACYCLOVIR 400 mg by Does not apply route three times a week       MedroxyPROGESTERone Acetate (DEPO-PROVERA IM) Inject into the muscle       No current facility-administered medications on file prior to encounter. REVIEW OF SYSTEMS    Constitutional: negative for chills, fatigue and fevers  Eyes: negative for irritation and redness  Ears, nose, mouth, throat, and face: negative for hearing loss and hoarseness   Respiratory: negative for cough and shortness of breath  Cardiovascular: negative for chest pain, dyspnea and positive for bilateral lower leg edema  Integument/breast: positive for abdominal keloids and abdominal ulcer   Neurological: negative for dizziness and gait problems  Behavioral/Psych: positive for learning difficulty    Objective:      /70   Pulse 104   Temp 98.1 °F (36.7 °C) (Tympanic)   Resp 20   Ht 5' 6\" (1.676 m)   Wt (!) 320 lb (145.2 kg)   SpO2 99%   BMI 51.65 kg/m²     Wt Readings from Last 3 Encounters:   11/09/18 (!) 320 lb (145.2 kg)   10/26/18 (!) 337 lb 14.4 oz (153.3 kg)   08/22/18 (!) 311 lb (141.1 kg)       PHYSICAL EXAM    General Appearance: alert and oriented to person, place and time  Skin: warm and dry; fungal rash noted under the abdominal pannus and in the bilateral groin  Head: normocephalic and atraumatic  Eyes: Pupils equal and round. No redness or irritation  ENT: hearing grossly normal bilaterally  Pulmonary/Chest: clear to auscultation bilaterally- normal air movement, no respiratory distress  Cardiovascular: normal rate, regular rhythm  Abdomen: soft, non-tender and bowel sounds normal  Neurologic: gait and coordination normal, speech normal and developmentally delayed  Extremities: 3-4+ edema to bilateral lower legs  Abdominal ulcer near the umbilicus: Measurements are smaller.  Ulcer bed is 100% red and elevated. Draining moderate amount of serosanguineous drainage. Periulcer skin is scarred and intact. No induration, redness, or warmth noted. Abdomen, lower    Wound 10/24/13 Abdomen Upper #1 (Active)   Wound Image   11/9/2018  1:11 PM   Wound Type Wound 11/9/2018  1:11 PM   Dressing Status Intact 10/26/2018 10:54 AM   Dressing Changed Changed/New 10/26/2018 10:54 AM   Wound Cleansed Rinsed/Irrigated with saline 11/9/2018  1:11 PM   Wound Length (cm) 1.3 cm 11/9/2018  1:11 PM   Wound Width (cm) 5.5 cm 11/9/2018  1:11 PM   Wound Depth (cm)  hypergranular 11/9/2018  1:11 PM   Calculated Wound Size (cm^2) (l*w) 7.15 cm^2 11/9/2018  1:11 PM   Change in Wound Size % (l*w) 85.33 11/9/2018  1:11 PM   Wound Assessment Red 11/9/2018  1:11 PM   Drainage Amount Small 11/9/2018  1:11 PM   Drainage Description Serosanguinous 11/9/2018  1:11 PM   Odor Mild 11/9/2018  1:11 PM   Margins Attached edges 11/9/2018  1:11 PM   Gwen-wound Assessment Excoriated; Maceration 11/9/2018  1:11 PM   [place]%Wound Bed 50 10/26/2018 10:54 AM   Red%Wound Bed 100 11/9/2018  1:11 PM   Yellow%Wound Bed 50 10/26/2018 10:54 AM   Black%Wound Bed 100 6/15/2017 10:06 AM   Time out Yes 5/25/2017 10:05 AM   Number of days: 1842           LABS      CBC: No results found for: WBC, HGB, HCT, MCV, PLT  BMP:   Lab Results   Component Value Date     04/19/2018    K 4.1 04/19/2018     04/19/2018    CO2 20 04/19/2018    BUN 10 04/19/2018    CREATININE 0.7 04/19/2018     PT/INR: No results found for: PROTIME, INR  Prealbumin: No results found for: PREALBUMIN  Albumin:No results found for: Danita Ehsan  Sed Rate:No results found for: SEDRATE  CRP: No results found for: CRP  Micro: No results found for: BC   Hemoglobin A1C: No results found for: LABA1C    Assessment:     Skin ulcer of abdominal wall  Keloid     Contributing Factors: obesity and History of keloids    Patient Active Problem List   Diagnosis Code    Keloid L91.0    MRSA (methicillin resistant staph aureus) culture positive Z22.322    Skin ulcer of abdominal wall ([facility]) L98.499    POTS (postural orthostatic tachycardia syndrome) R00.0, I95.1    CHF (congestive heart failure) ([facility]) chronic diastolic O56.5    Morbid obesity ([facility]) E66.01    Bilateral leg edema =LYMPHEDEMA- ON f/U WITH LYMPHEDEMA CLINIC R60.0    Lymphedema of both lower extremities I89.0    SOB (shortness of breath) on exertion R06.02    Candidiasis, intertriginous B37.2       Procedure Note  Indications:  Based on my examination of this patient's wound(s)/ulcer(s) today, debridement is not required to promote healing and evaluate the extent healing. Wound/Ulcer Descriptions are listed under Physical Exam above. Plan:     Patient examined and evaluated. The ulcer is measuring smaller. She is currently on an antibiotic for cellulitis of the lower leg prescribed by her PCP. She is concerned about an odor and was found to have fungal rash and moisture under her abdominal pannus and bilateral groin most likely contributing to her odor. She has not been able to shower since her ulcer reopened. Continue Prednisone until gone    Wash and dry abdominal folds and groin 2-3 times daily and apply Desenex powder. Abdomen: Apply Endoform (either side)to the wound/ulcer and moisten with a couple drops of saline, cover with hydrofera blue, cover with gauze ( the shiny side up) cover with transparent dressing. Change twice a week. Ok to shower with transparent waterproof dressing in place    Antibiotics: No    Follow up: 3 weeks    Please see attached Discharge Instructions    Written patient dismissal instructions given to patient and signed by patient or POA.          Discharge Instructions       Visit Discharge/Physician Orders:  - Clean abdomen twice a day with soap and water apply Desenex to folds  - May shower if cover with water proof dressing   -continue      Wound Location: abdomen     Dressing orders:

## 2018-11-09 NOTE — PLAN OF CARE
Problem: Wound:  Goal: Will show signs of wound healing; wound closure and no evidence of infection  Will show signs of wound healing; wound closure and no evidence of infection  Outcome: Ongoing  Pt. Seen today for follow up for  an abdominal wound. Mother at bedside. Wound measured smaller in size. Patient afebrile currently reports taking antibiotics but mother does not know what kind. Abdomen washed and Desenex applied to abdominal folds. endoform and hydrofera blue ready, gauze and transparent dressing applied. Patient may shower as long as dressing is in place. Pt.'s mother to apply twice a week. Pt mother states has enough supplies and will call if need more ordered. Pt to follow up in 2 weeks. Comments: Care plan reviewed with patient. Patient  verbalize understanding of the plan of care and contribute to goal setting.

## 2018-11-12 ENCOUNTER — HOSPITAL ENCOUNTER (OUTPATIENT)
Dept: PHYSICAL THERAPY | Age: 43
Setting detail: THERAPIES SERIES
Discharge: HOME | End: 2018-11-12
Payer: MEDICARE

## 2018-11-12 PROCEDURE — 97140 MANUAL THERAPY 1/> REGIONS: CPT

## 2018-11-12 NOTE — PROGRESS NOTES
established plan of care         Time In: 1015   Time Out: 1125   Timed Code Minutes: 70   Untimed Code Minutes: 0   Total Treatment Time: [address]  [id number] [address], [address] [facility]

## 2018-11-14 ENCOUNTER — HOSPITAL ENCOUNTER (OUTPATIENT)
Dept: PHYSICAL THERAPY | Age: 43
Setting detail: THERAPIES SERIES
Discharge: HOME | End: 2018-11-14
Payer: MEDICARE

## 2018-11-14 PROCEDURE — 97110 THERAPEUTIC EXERCISES: CPT

## 2018-11-14 PROCEDURE — 97140 MANUAL THERAPY 1/> REGIONS: CPT

## 2018-11-14 ASSESSMENT — PAIN DESCRIPTION - LOCATION: LOCATION: BACK

## 2018-11-14 ASSESSMENT — PAIN SCALES - GENERAL: PAINLEVEL_OUTOF10: 7

## 2018-11-14 ASSESSMENT — PAIN DESCRIPTION - ORIENTATION: ORIENTATION: RIGHT

## 2018-11-14 ASSESSMENT — PAIN DESCRIPTION - PAIN TYPE: TYPE: CHRONIC PAIN

## 2018-11-16 ENCOUNTER — HOSPITAL ENCOUNTER (OUTPATIENT)
Dept: PHYSICAL THERAPY | Age: 43
Setting detail: THERAPIES SERIES
Discharge: HOME | End: 2018-11-16
Payer: MEDICARE

## 2018-11-16 PROCEDURE — 97140 MANUAL THERAPY 1/> REGIONS: CPT

## 2018-11-16 NOTE — PROGRESS NOTES
[facility]  LYMPHEDEMA SERVICES  DAILY NOTE    Date: 2018  Patient Name: Manuel Austin        CSN: [id number]   : 1975  (41 y.o.)  Gender: female   Referring Practitioner: Humza Dos Santos MD  Diagnosis: I89.0 Lymphedema Bilateral Legs       PT Visit Information  Onset Date: 10/12/18  PT Insurance Information: MEDICARE-Unlimited visits based on medical necessity. Aquatics and modalities are covered except ionto and hot/cold packs. Total # of Visits to Date: 14  Progress Note Counter: 4/10 for PN    Restrictions/Precautions  Fall risk, Universal precautions,NA    Pain  Patient Currently in Pain: Yes  Pain Assessment  Pain Assessment: 0-10  Pain Level: 8  Pain Location: Leg  Pain Orientation: Right, 0/10 left leg    SUBJECTIVE     Patient reporting she can tell her legs are getting smaller. OBJECTIVE  Skin Appearance/Texture: Dry, Shiny at spots Right>Left  Skin Color: Mottled  Papillomas: Mild - Bilateral LE   Lymphorrhea (Weeping): None       TREATMENT SESSION     Manual Lymph Drainage  MLD completed to bilateral LE with patient lying in supine with HOB elevated. Trunk:   Effleurage, Profundus, Terminus, Axillary Lnn and (IA) (Did not complete abdominal watershed today due to open wound on abdomen)  Lower Extremity:  Thigh (Anterior, Posterior, Lateral, Medial to Lateral), Knee (Anterior, Posterior, Lateral, Medial, Lower Leg (Anterior,Posterior), Ankle (Anterior, Posterior, Lateral, Medial and Foot/Toes (Anterior, Posterior)       Rework  Lower Extremity (Toes to Groin), Axillary Lnn, (IA) Terminus, Profundus and Effleurage  Skin Care Measures  Completed MLD to left LE and right LE today. Compression Bandaging  Location: No bandages completed today. Patient using pump on left leg at home.   Compression Gradient: NA  Supplies (per involved extremity):   Stockinette: none   Transelast none   10 cm Artiflex Cotton none   15 cm Artiflex Cotton none   4 cm Rosidal K none   6 cm

## 2018-11-19 ENCOUNTER — HOSPITAL ENCOUNTER (OUTPATIENT)
Dept: PHYSICAL THERAPY | Age: 43
Setting detail: THERAPIES SERIES
Discharge: HOME | End: 2018-11-19
Payer: MEDICARE

## 2018-11-19 PROCEDURE — 97140 MANUAL THERAPY 1/> REGIONS: CPT

## 2018-11-21 ENCOUNTER — HOSPITAL ENCOUNTER (OUTPATIENT)
Dept: PHYSICAL THERAPY | Age: 43
Setting detail: THERAPIES SERIES
Discharge: HOME | End: 2018-11-21
Payer: MEDICARE

## 2018-11-21 PROCEDURE — 97110 THERAPEUTIC EXERCISES: CPT

## 2018-11-21 PROCEDURE — 97140 MANUAL THERAPY 1/> REGIONS: CPT

## 2018-11-21 ASSESSMENT — PAIN DESCRIPTION - PAIN TYPE: TYPE: CHRONIC PAIN

## 2018-11-21 ASSESSMENT — PAIN SCALES - GENERAL: PAINLEVEL_OUTOF10: 8

## 2018-11-21 ASSESSMENT — PAIN DESCRIPTION - LOCATION: LOCATION: BACK

## 2018-11-21 ASSESSMENT — PAIN DESCRIPTION - ORIENTATION: ORIENTATION: LOWER

## 2018-11-21 NOTE — PROGRESS NOTES
[place]     Time In: 3701  Time Out: 1100  Minutes: 38  Timed Code Treatment Minutes: 38 Minutes     Date: 2018  Patient Name: Chayito Ford,  Gender:  female        CSN: [id number]   : 1975  (41 y.o.)            Treatment Diagnosis: postural dysfunction; low back pain       General:  PT Visit Information  Total # of Visits to Date: 10  Plan of Care/Certification Expiration Date: 18  Progress Note Counter: 2/3. Subjective:  Comments: Unsure of any follow ups      Subjective: Patient 8 minutes late. States she always has back pain. Pain:  Patient Currently in Pain: Yes  Pain Level: 8  Pain Type: Chronic pain  Pain Location: Back  Pain Orientation: Lower    Objective          Exercises  Exercise 7: Standing: marches, ham curls, squats  3 way hip x 10. seated rest break after. Exercise 8: Balance: feet together x 30 seconds, patient steady. Step stance x 30 seconds B patient steady x 30 seconds. Exercise 9: Step ups 2\" forward x 10. Cues for sequence. Exercise 10: Nustep S11, A12 L2 x 7 minutes. New machine. Exercise 11: Rockerboard x 2 directions B UE,  x 10. Balance x 30 seconds 1 UE. Activity Tolerance: Tolerated well. Assessment: Body structures, Functions, Activity limitations: Decreased functional mobility , Decreased ROM, Decreased strength, Decreased endurance, Decreased balance  Assessment: Patient less fearfull of balance exercise. Patient keeping in midline and steady with balance activity. Patient tolerated standing ex well with seated rest time afterwards. Increase time on Nustep at Level 2. Discharge Recommendations: Continue to assess pending progress    Patient Education:  Patient Education: Standing at kitchen sink and marching. Continue with seated ex.              Plan:  Times per week: 1x  Plan weeks: 4  Specific instructions for Next Treatment: Review walking without rollator   Current Treatment Recommendations: Strengthening, ROM, Balance Training, Transfer Training, Functional Mobility Training, Endurance Training, Gait Training, Neuromuscular Re-education, Manual Therapy - Soft Tissue Mobilization, Pain Management, Home Exercise Program, Patient/Caregiver Education & Training, Modalities, Positioning    Goals:  Patient goals : Resolve low back pain    Short term goals  Time Frame for Short term goals: 3 weeks   Short term goal 1: Patient will be able to transfer from sit-stand with mod-assist using rollator minimally. GOAL MET: while in clinic she was observed performing sit-stand transfer from bed to chair without using her UE or rollator  Short term goal 2: Patient will be able to demonstrate neutral spine when seated and standing requiring minimal verbal or manual cuing. NOT MET-struggles with muscle pain  Short term goal 3: Patient will report pain of 3-4/10 at it's max with all acitivites and positions. NOT MET: 25% improvement     Long term goals  Time Frame for Long term goals : 6 weeks   Long term goal 1: Patient will transfer from sit-stand independently without assistance from her rollator. NOT MET: 80% improvement-she is able to perform sit-stand transfer without her hands, however she is not consistent with it. Long term goal 2: Patient will be able to demonstrate and maintain neutral spine, without verbal or manual cuing, when performing all exercises and transfers. NOT MET-limited by pain   Long term goal 3: Patient will report trace to no pain with all activities and positions.  NOT MET-improving   Long term goal 4: Discharge with independent [address] [place] [address]  PTA [id number]

## 2018-11-26 ENCOUNTER — HOSPITAL ENCOUNTER (OUTPATIENT)
Dept: PHYSICAL THERAPY | Age: 43
Setting detail: THERAPIES SERIES
Discharge: HOME | End: 2018-11-26
Payer: MEDICARE

## 2018-11-26 PROCEDURE — 97140 MANUAL THERAPY 1/> REGIONS: CPT

## 2018-11-26 NOTE — PROGRESS NOTES
55.5cm   32cm 59.5 cm 58.1cm   40cm 63.5 cm 64.2cm   48cm 66.8 cm 64.2 cm   Left=366.1cm (decrease of 19.4cm since last week) and Right=378.3cm (decrease of 7.8cm since last week)    Compression Bandaging  Location: Using pumps at home. Compression Gradient: minimal  Supplies (per involved extremity):   Stockinette: none   Transelast none   10 cm Artiflex Cotton none   15 cm Artiflex Cotton none   4 cm Rosidal K none   6 cm Rosidal K none   8 cm Rosidal K none   10 cm Rosidal K none   12 cm Rosidal K none   Rosidal Soft none             Decongestive/Therapeutic Exercise  Deep Breathing and ankle pumps    Patient Education  New Education Provided:   Can use pumps on bilateral legs after seeing wound clinic if they clear her with opened area behind left knee. Learner:caregiver and patient  Method: demonstration and explanation       Outcome: verbalized concerns, demonstrated understanding, needs reinforcement and asked questions     GOALS   SHORT-TERM GOALS:  to be met in 4 weeks   X MET  Patient will demonstrate a decrease in circumferential measurements of the affected extremity by 2 cm working towards the lymphedema swelling stabilizing and patient being able to get measured and fitted for a new compression garment to be worn daily to keep swelling down. X  Patient will tolerate wearing the compression bandages from one treatment session until the next treatment session working towards meeting short-term goal #1. X Patient/family/caregiver will demonstrate and verbalize 3-5 skin care precautionary measures to decrease dry skin and risk of infection. X Patient/family/caregiver will demonstrate applying compression bandages with no greater than moderate assist and verbal cues from the therapist working towards being modified independent with applying the compression bandages for night time swelling. ASSESSMENT:  Assessment:  Patient progressing toward goal achievement.      Activity Tolerance:

## 2018-11-28 ENCOUNTER — HOSPITAL ENCOUNTER (OUTPATIENT)
Age: 43
Discharge: HOME | End: 2018-11-28
Payer: MEDICARE

## 2018-11-28 ENCOUNTER — HOSPITAL ENCOUNTER (OUTPATIENT)
Dept: PHYSICAL THERAPY | Age: 43
Setting detail: THERAPIES SERIES
Discharge: HOME | End: 2018-11-28
Payer: MEDICARE

## 2018-11-28 ENCOUNTER — HOSPITAL ENCOUNTER (OUTPATIENT)
Dept: WOUND CARE | Age: 43
Discharge: HOME | End: 2018-11-28
Payer: MEDICARE

## 2018-11-28 VITALS
HEART RATE: 121 BPM | DIASTOLIC BLOOD PRESSURE: 81 MMHG | RESPIRATION RATE: 20 BRPM | TEMPERATURE: 98.4 F | WEIGHT: 293 LBS | SYSTOLIC BLOOD PRESSURE: 130 MMHG | OXYGEN SATURATION: 98 % | BODY MASS INDEX: 52.96 KG/M2

## 2018-11-28 DIAGNOSIS — L89.893 PRESSURE ULCER OF LEFT LEG, STAGE 3 (HCC): ICD-10-CM

## 2018-11-28 DIAGNOSIS — I50.9 CONGESTIVE HEART FAILURE, UNSPECIFIED HF CHRONICITY, UNSPECIFIED HEART FAILURE TYPE (HCC): ICD-10-CM

## 2018-11-28 DIAGNOSIS — G90.A POTS (POSTURAL ORTHOSTATIC TACHYCARDIA SYNDROME): ICD-10-CM

## 2018-11-28 DIAGNOSIS — L91.0 KELOID: Primary | ICD-10-CM

## 2018-11-28 DIAGNOSIS — L98.491 ULCER OF ABDOMEN WALL, LIMITED TO BREAKDOWN OF SKIN (HCC): ICD-10-CM

## 2018-11-28 DIAGNOSIS — I89.0 LYMPHEDEMA OF BOTH LOWER EXTREMITIES: ICD-10-CM

## 2018-11-28 LAB
ANION GAP SERPL CALCULATED.3IONS-SCNC: 13 MEQ/L (ref 8–16)
BUN BLDV-MCNC: 7 MG/DL (ref 7–22)
CALCIUM SERPL-MCNC: 9.6 MG/DL (ref 8.5–10.5)
CHLORIDE BLD-SCNC: 104 MEQ/L (ref 98–111)
CO2: 22 MEQ/L (ref 23–33)
CREAT SERPL-MCNC: 0.6 MG/DL (ref 0.4–1.2)
GFR SERPL CREATININE-BSD FRML MDRD: > 90 ML/MIN/1.73M2
GLUCOSE BLD-MCNC: 87 MG/DL (ref 70–108)
MAGNESIUM: 2.1 MG/DL (ref 1.6–2.4)
POTASSIUM SERPL-SCNC: 3.6 MEQ/L (ref 3.5–5.2)
SODIUM BLD-SCNC: 139 MEQ/L (ref 135–145)

## 2018-11-28 PROCEDURE — 99213 OFFICE O/P EST LOW 20 MIN: CPT | Performed by: NURSE PRACTITIONER

## 2018-11-28 PROCEDURE — G8981 BODY POS CURRENT STATUS: HCPCS

## 2018-11-28 PROCEDURE — 36415 COLL VENOUS BLD VENIPUNCTURE: CPT

## 2018-11-28 PROCEDURE — 97110 THERAPEUTIC EXERCISES: CPT

## 2018-11-28 PROCEDURE — 83735 ASSAY OF MAGNESIUM: CPT

## 2018-11-28 PROCEDURE — 99213 OFFICE O/P EST LOW 20 MIN: CPT

## 2018-11-28 PROCEDURE — G8982 BODY POS GOAL STATUS: HCPCS

## 2018-11-28 PROCEDURE — 6370000000 HC RX 637 (ALT 250 FOR IP): Performed by: NURSE PRACTITIONER

## 2018-11-28 PROCEDURE — G8983 BODY POS D/C STATUS: HCPCS

## 2018-11-28 PROCEDURE — 80048 BASIC METABOLIC PNL TOTAL CA: CPT

## 2018-11-28 PROCEDURE — 2709999900 HC NON-CHARGEABLE SUPPLY

## 2018-11-28 PROCEDURE — 97530 THERAPEUTIC ACTIVITIES: CPT

## 2018-11-28 RX ADMIN — FLUOCINONIDE: 0.5 CREAM TOPICAL at 14:30

## 2018-11-28 ASSESSMENT — PAIN SCALES - GENERAL
PAINLEVEL_OUTOF10: 4
PAINLEVEL_OUTOF10: 8

## 2018-11-28 ASSESSMENT — PAIN DESCRIPTION - LOCATION
LOCATION: BACK
LOCATION: ABDOMEN

## 2018-11-28 ASSESSMENT — PAIN DESCRIPTION - PAIN TYPE: TYPE: CHRONIC PAIN

## 2018-11-28 NOTE — PROGRESS NOTES
atraumatic  Eyes: Pupils equal and round. No redness or irritation  ENT: hearing grossly normal bilaterally  Pulmonary/Chest: clear to auscultation bilaterally- normal air movement, no respiratory distress  Cardiovascular: normal rate, regular rhythm  Abdomen: soft, non-tender and bowel sounds normal  Neurologic: gait and coordination normal, speech normal and developmentally delayed  Extremities: 3-4+ edema to bilateral lower legs  Abdominal ulcer near the umbilicus: Healed with scarring and irritation noted.  Periulcer skin is scarred and intact. No induration, redness, or warmth noted. Abdomen, upper right: Ulcer bed is 100% red. Draining moderate amount of serosanguinous drainage. Periulcer skin is scarred and intact. No redness, warmth, or induration noted. Abdomen, upper mid: dry scab noted. Small amount of drainage. Periulcer skin is scarred and intact. Left leg/medial posterior knee: Pressure injury-stage 3- Ulcer bed is 60% yellow slough, 20% red, and 20% brown slough. Draining small amount of serosanguinous drainage. Periulcer skin is red and intact. No warmth or induration noted. Lower mid abdomen    Right upper abdomen    Left medial posterior knee    Wound 10/24/13 Abdomen Upper #1 (Active)   Wound Image   11/28/2018  1:44 PM   Wound Type Wound 11/28/2018  1:44 PM   Dressing Status Intact; Old drainage 11/28/2018  1:44 PM   Dressing Changed Changed/New 11/28/2018  1:44 PM   Wound Cleansed Rinsed/Irrigated with saline 11/28/2018  1:44 PM   Wound Length (cm) 2.1 cm 11/28/2018  1:44 PM   Wound Width (cm) 3.2 cm 11/28/2018  1:44 PM   Wound Depth (cm)  .05 11/28/2018  1:44 PM   Calculated Wound Size (cm^2) (l*w) 6.72 cm^2 11/28/2018  1:44 PM   Change in Wound Size % (l*w) 86.22 11/28/2018  1:44 PM   Wound Assessment Red 11/28/2018  1:44 PM   Drainage Amount Moderate 11/28/2018  1:44 PM   Drainage Description Serosanguinous 11/28/2018  1:44 PM   Odor None 11/28/2018  1:44 PM   Margins Attached edges 11/28/2018  1:44 PM   Margins Attached edges 11/28/2018  1:44 PM   Gwen-wound Assessment Dry;Red 11/28/2018  1:44 PM   Red%Wound Bed 30 11/28/2018  1:44 PM   Yellow%Wound Bed 50 11/28/2018  1:44 PM   Black%Wound Bed 20 11/28/2018  1:44 PM   Number of days: 0       Wound 11/28/18 Abdomen Mid #2 (Active)   Wound Image   11/28/2018  1:44 PM   Dressing Status Intact; Old drainage 11/28/2018  1:44 PM   Dressing Changed Changed/New 11/28/2018  1:44 PM   Wound Cleansed Rinsed/Irrigated with saline 11/28/2018  1:44 PM   Wound Length (cm) 1.5 cm 11/28/2018  1:44 PM   Wound Width (cm) 2.5 cm 11/28/2018  1:44 PM   Wound Depth (cm) 0.05 cm 11/28/2018  1:44 PM   Wound Surface Area (cm^2) 3.75 cm^2 11/28/2018  1:44 PM   Wound Volume (cm^3) 0.19 cm^3 11/28/2018  1:44 PM   Wound Assessment Red 11/28/2018  1:44 PM   Drainage Amount Small 11/28/2018  1:44 PM   Drainage Description Serosanguinous 11/28/2018  1:44 PM   Margins Attached edges 11/28/2018  1:44 PM   Gwen-wound Assessment Dry;Red 11/28/2018  1:44 PM   Red%Wound Bed 100 11/28/2018  1:44 PM   Number of days: 0       LABS      CBC: No results found for: WBC, HGB, HCT, MCV, PLT  BMP:   Lab Results   Component Value Date     11/28/2018    K 3.6 11/28/2018     11/28/2018    CO2 22 11/28/2018    BUN 7 11/28/2018    CREATININE 0.6 11/28/2018     PT/INR: No results found for: PROTIME, INR  Prealbumin: No results found for: PREALBUMIN  Albumin:No results found for: LABALBU  Sed Rate:No results found for: SEDRATE  CRP: No results found for: CRP  Micro: No results found for: BC   Hemoglobin A1C: No results found for: LABA1C    Assessment:     Skin ulcer of abdominal wall  Keloid  Pressure ulcer of left leg, stage 3     Contributing Factors: obesity and History of keloids    Patient Active Problem List   Diagnosis Code    Keloid L91.0    MRSA (methicillin resistant staph aureus) culture positive Z22.322    Skin ulcer of abdominal wall ([facility]) L98.499    POTS (postural

## 2018-11-28 NOTE — PROGRESS NOTES
position  Changing and Maintaining Body Position Current Status (): At least 20 percent but less than 40 percent impaired, limited or restricted  Changing and Maintaining Body Position Goal Status (): At least 1 percent but less than 20 percent impaired, limited or restricted  Changing and Maintaining Body Position Discharge Status ():  At least 20 percent but less than 40 percent impaired, limited or restricted    Todd Pitch, PT

## 2018-11-30 ENCOUNTER — HOSPITAL ENCOUNTER (OUTPATIENT)
Dept: PHYSICAL THERAPY | Age: 43
Setting detail: THERAPIES SERIES
Discharge: HOME | End: 2018-11-30
Payer: MEDICARE

## 2018-11-30 PROCEDURE — 97140 MANUAL THERAPY 1/> REGIONS: CPT

## 2018-11-30 NOTE — PROGRESS NOTES
[facility]  LYMPHEDEMA SERVICES  DAILY NOTE    Date: 2018  Patient Name: Denys Denson        CSN: [id number]   : 1975  (41 y.o.)  Gender: female   Referring Practitioner: Reece Booth MD  Diagnosis: I89.0 Lymphedema Bilateral Legs       PT Visit Information  Onset Date: 10/12/18  PT Insurance Information: MEDICARE-Unlimited visits based on medical necessity. Aquatics and modalities are covered except ionto and hot/cold packs. Total # of Visits to Date: 25  Progress Note Counter: 8/10 for PN    Restrictions/Precautions  Fall risk, Universal precautions,NA    Pain  Patient Currently in Pain: Yes  Pain Assessment  Pain Assessment: 0-10  Pain Level: 7  Pain Location: Leg  Pain Orientation: Right, 4/10 left leg    SUBJECTIVE     Patient presenting to lymphedema clinic today with CircAids on bilateral legs. Went to Wound Clinic on Wednesday and patient is now putting antimicrobial gauze over open area posterior left knee. Therapist had called and spoke with patient's mother after last session informing mother of opened area behind left knee. Mother was unaware of this spot. Patient had dried blood on right tubigrip but had no blood on right leg. Apparently when patient had removed tubigrip previously she had switched legs since the only bloody area was behind left knee. Patient and mother were both unaware of bloody area behind left knee. Informed to make Wound Clinic aware of this at her Wednesday appointment which they did. Patient is now using antimicrobial gauze on it. OBJECTIVE  Skin Appearance/Texture: Dry, Shiny at spots Right>Left  Skin Color: Mottled  Papillomas: Mild - Bilateral LE   Lymphorrhea (Weeping): None      TREATMENT SESSION     Manual Lymph Drainage  MLD completed to bilateral LE with patient lying in supine with HOB elevated.     Trunk:   Effleurage, Profundus, Terminus, Axillary Lnn and (IA) (Did not complete abdominal watershed today due to open Patient/family/caregiver will demonstrate applying compression bandages with no greater than moderate assist and verbal cues from the therapist working towards being modified independent with applying the compression bandages for night time swelling. ASSESSMENT:  Assessment:  Patient progressing toward goal achievement. Activity Tolerance:  Patient tolerance of  treatment: Good. Plan: Continue treatment according to established plan of care         Time In: 1000   Time Out: 1115   Timed Code Minutes: 75   Untimed Code Minutes: 0   Total Treatment Time: [address].  [id number], [address] [facility]

## 2018-12-03 ENCOUNTER — HOSPITAL ENCOUNTER (OUTPATIENT)
Dept: PHYSICAL THERAPY | Age: 43
Setting detail: THERAPIES SERIES
Discharge: HOME | End: 2018-12-03
Payer: MEDICARE

## 2018-12-03 PROCEDURE — 97140 MANUAL THERAPY 1/> REGIONS: CPT

## 2018-12-03 NOTE — PROGRESS NOTES
[facility]  LYMPHEDEMA SERVICES  DAILY NOTE    Date: 12/3/2018  Patient Name: Zonia Peres        CSN: [id number]   : 1975  (41 y.o.)  Gender: female   Referring Practitioner: Rudy Carl MD  Diagnosis: I89.0 Lymphedema Bilateral Legs       PT Visit Information  Onset Date: 10/12/18  PT Insurance Information: MEDICARE-Unlimited visits based on medical necessity. Aquatics and modalities are covered except ionto and hot/cold packs. Total # of Visits to Date: 19  Progress Note Counter: 9/10 for PN    Restrictions/Precautions  Fall risk, Universal precautions,NA    Pain  Patient Currently in Pain: Yes  Pain Assessment  Pain Assessment: 0-10  Pain Level: 7  Pain Location: Leg  Pain Orientation: Right, 4/10 left leg    SUBJECTIVE     Patient presenting to lymphedema clinic today with blood on hands. Patient reported she had bent down earlier today and hit her head on an open dresser drawer. Betsey reports her head was bleeding and the blood was from that. After hitting her head, Betsey said she had blurred vision for 5 minutes. [facility] staff present and made aware of situation. Therapist did not see any blood on patient's head. Betsey answered questions appropriately and did not appear injured. After session, therapist called Betsey's mother and left a message informing her of above and also that Betsey had the information on CirAid wraps. OBJECTIVE  Skin Appearance/Texture: Dry, Shiny at spots Right>Left  Skin Color: Mottled  Papillomas: Mild - Bilateral LE   Lymphorrhea (Weeping): None      TREATMENT SESSION     Manual Lymph Drainage  MLD completed to bilateral LE with patient lying in supine with HOB elevated.     Trunk:   Effleurage, Profundus, Terminus, Axillary Lnn and (IA) (Did not complete abdominal watershed today due to open wound on abdomen)  Lower Extremity:  Thigh (Anterior, Posterior, Lateral, Medial to Lateral), Knee (Anterior, Posterior, Lateral, Medial, Lower Leg (Anterior,Posterior), Ankle (Anterior, Posterior, Lateral, Medial and Foot/Toes (Anterior, Posterior)       Rework  Lower Extremity (Toes to Groin), Axillary Lnn, (IA) Terminus, Profundus and Effleurage  Skin Care Measures  Completed MLD to left LE and right LE today. Opened area behind left knee covered with Abd pad today. Compression Bandaging  Location: Using pumps at home. Patient reports she left CircAid wraps in mother's car over the weekend. Compression Gradient: none today  Supplies (per involved extremity):   Stockinette: none   Transelast none   10 cm Artiflex Cotton none   15 cm Artiflex Cotton none   4 cm Rosidal K none   6 cm Rosidal K none   8 cm Rosidal K none   10 cm Rosidal K none   12 cm Rosidal K none   Rosidal Soft none             Decongestive/Therapeutic Exercise  Deep Breathing and ankle pumps    Patient Education  New Education Provided:   Can use CircAids and pumps on bilateral legs. Patient instrusted to continuing inspecting skin and to let staff know when she hits her head or gets hurt. Learner:patient  Method: demonstration and explanation       Outcome: verbalized concerns, demonstrated understanding, needs reinforcement and asked questions     GOALS   SHORT-TERM GOALS:  to be met in 4 weeks   X MET  Patient will demonstrate a decrease in circumferential measurements of the affected extremity by 2 cm working towards the lymphedema swelling stabilizing and patient being able to get measured and fitted for a new compression garment to be worn daily to keep swelling down. X  Patient will tolerate wearing the compression bandages from one treatment session until the next treatment session working towards meeting short-term goal #1. X Patient/family/caregiver will demonstrate and verbalize 3-5 skin care precautionary measures to decrease dry skin and risk of infection.    X Patient/family/caregiver will demonstrate applying compression bandages with no greater than moderate assist and verbal cues from the therapist working towards being modified independent with applying the compression bandages for night time swelling. ASSESSMENT:  Assessment:  Patient progressing toward goal achievement. Activity Tolerance:  Patient tolerance of  treatment: Good. Plan: Continue treatment according to established plan of care         Time In: 0845   Time Out: 0945   Timed Code Minutes: 60   Untimed Code Minutes: 0   Total Treatment Time: 57 [facility].  [id number], [address] [facility]

## 2018-12-05 ENCOUNTER — HOSPITAL ENCOUNTER (OUTPATIENT)
Dept: PHYSICAL THERAPY | Age: 43
Setting detail: THERAPIES SERIES
Discharge: HOME | End: 2018-12-05
Payer: MEDICARE

## 2018-12-05 PROCEDURE — 97140 MANUAL THERAPY 1/> REGIONS: CPT

## 2018-12-05 PROCEDURE — G8979 MOBILITY GOAL STATUS: HCPCS

## 2018-12-05 PROCEDURE — G8978 MOBILITY CURRENT STATUS: HCPCS

## 2018-12-05 NOTE — PROGRESS NOTES
limb from 0 being same size to 4 being extremely large? 3   5. How would you rate how the lymphedema affects movement of your swollen limb from 0 being normal movement to 4 being greatly limited? 3   6. How would you rate the strength in your swollen limb compared with the unaffected limb from \"0\" being equal strength to \"4\" being extremely weak? 3   II. Psychosocial Concerns   7. How would you rate how lymphedema affects your body image from 0 being not at all to 4 being severely? 3   8. How would you rate how lymphedema affects your socializing with others from 0 being no interference to 4 being interferes completely? 0   9. How would you rate how lymphedema affects your intimate relations from 0 being no interference to 4 being interferes completely? 0   10. How often does lymphedema get you down (i.e. feelings of depression, frustration, or anger due to the lymphedema) from 0 being never to 4 being constantly? 4   11. How would you rate your reliance on others for help due to your lymphedema from 0 being not at all to [facility] completely? 3   12. How would you rate your understanding of what to do to manage your lymphedema from 0 being good understanding to 4 no understanding? 0   III. Functional Concerns   13. How would you rate how your lymphedema affects your ability to perform self-care activities (i.e. eating, dressing, hygiene) from 0 being no interference to 4 being interferes completely? 3   14. How would you rate how your lymphedema affects your ability to perform routine home or work duties from 0 being no interference to 4 being interferes completely? 3   15. How would you rate how your lymphedema affects your performance of preferred leisure activities from 0 being no interference to 4 being interferes completely? 3   16.  How would you rate how your lymphedema has affected the proper fit of clothing/shoes from 0 being fit normally to 4 unable to wear? 3

## 2018-12-10 ENCOUNTER — OFFICE VISIT (OUTPATIENT)
Dept: CARDIOLOGY CLINIC | Age: 43
End: 2018-12-10
Payer: MEDICARE

## 2018-12-10 VITALS
DIASTOLIC BLOOD PRESSURE: 78 MMHG | HEART RATE: 96 BPM | WEIGHT: 293 LBS | BODY MASS INDEX: 47.09 KG/M2 | SYSTOLIC BLOOD PRESSURE: 124 MMHG | HEIGHT: 66 IN

## 2018-12-10 DIAGNOSIS — E66.01 MORBID OBESITY (HCC): ICD-10-CM

## 2018-12-10 DIAGNOSIS — R60.0 BILATERAL LEG EDEMA: ICD-10-CM

## 2018-12-10 DIAGNOSIS — I89.0 LYMPHEDEMA OF BOTH LOWER EXTREMITIES: ICD-10-CM

## 2018-12-10 DIAGNOSIS — I50.32 CHRONIC DIASTOLIC CONGESTIVE HEART FAILURE (HCC): Primary | ICD-10-CM

## 2018-12-10 DIAGNOSIS — G90.A POTS (POSTURAL ORTHOSTATIC TACHYCARDIA SYNDROME): ICD-10-CM

## 2018-12-10 DIAGNOSIS — R06.02 SOB (SHORTNESS OF BREATH) ON EXERTION: ICD-10-CM

## 2018-12-10 PROCEDURE — 99214 OFFICE O/P EST MOD 30 MIN: CPT | Performed by: INTERNAL MEDICINE

## 2018-12-10 PROCEDURE — 1036F TOBACCO NON-USER: CPT | Performed by: INTERNAL MEDICINE

## 2018-12-10 PROCEDURE — G8484 FLU IMMUNIZE NO ADMIN: HCPCS | Performed by: INTERNAL MEDICINE

## 2018-12-10 PROCEDURE — G8427 DOCREV CUR MEDS BY ELIG CLIN: HCPCS | Performed by: INTERNAL MEDICINE

## 2018-12-10 PROCEDURE — G8417 CALC BMI ABV UP PARAM F/U: HCPCS | Performed by: INTERNAL MEDICINE

## 2018-12-10 NOTE — PROGRESS NOTES
tobacco: Never Used    Alcohol use No    Drug use: No    Sexual activity: Not on file     Other Topics Concern    Not on file     Social History Narrative    No narrative on file       Current Outpatient Prescriptions   Medication Sig Dispense Refill    fluocinonide (LIDEX) 0.05 % cream Apply topically to abdominal ulcers twice weekly and as needed. 60 g 3    furosemide (LASIX) 20 MG tablet take 1 tablet by mouth once daily 90 tablet 3    metoprolol succinate (TOPROL XL) 25 MG extended release tablet take 1 tablet by mouth once daily 90 tablet 3    acetaminophen (TYLENOL) 500 MG tablet Take 1,000 mg by mouth every 6 hours as needed for Pain      Cholecalciferol (VITAMIN D3) 5000 units TABS Take by mouth      MedroxyPROGESTERone Acetate (DEPO-PROVERA IM) Inject into the muscle      IRON PO Take 45 mg by mouth daily.  miconazole (MICOTIN) 2 % powder Apply topically 2 times to 3 times  daily. 45 g 1    Dicyclomine HCl (BENTYL PO)   Take 20 mg by mouth 2 times daily       QUEtiapine Fumarate (SEROQUEL PO) Take 400 mg by mouth 2 times daily.  atomoxetine (STRATTERA) 40 MG capsule Take 40 mg by mouth 2 times daily.  aripiprazole (ABILIFY) 10 MG tablet Take 10 mg by mouth 2 times daily       ACYCLOVIR 400 mg by Does not apply route three times a week        No current facility-administered medications for this visit. Review of Systems -     General ROS: negative  Psychological ROS: negative  Hematological and Lymphatic ROS: No history of blood clots or bleeding disorder.    Respiratory ROS: no cough, shortness of breath, or wheezing  Cardiovascular ROS: no chest pain or dyspnea on exertion  Gastrointestinal ROS: negative  Genito-Urinary ROS: no dysuria, trouble voiding, or hematuria  Musculoskeletal ROS: negative  Neurological ROS: no TIA or stroke symptoms  Dermatological ROS: negative      Blood pressure 124/78, pulse 96, height 5' 6\" (1.676 m), weight (!) 328 lb (148.8 kg), not

## 2018-12-11 RX ORDER — METOPROLOL SUCCINATE 25 MG/1
TABLET, EXTENDED RELEASE ORAL
Qty: 90 TABLET | Refills: 3 | Status: SHIPPED | OUTPATIENT
Start: 2018-12-11 | End: 2019-12-18 | Stop reason: SDUPTHER

## 2018-12-11 RX ORDER — FUROSEMIDE 20 MG/1
TABLET ORAL
Qty: 90 TABLET | Refills: 3 | Status: SHIPPED | OUTPATIENT
Start: 2018-12-11 | End: 2019-12-18 | Stop reason: SDUPTHER

## 2018-12-12 ENCOUNTER — HOSPITAL ENCOUNTER (OUTPATIENT)
Dept: PHYSICAL THERAPY | Age: 43
Setting detail: THERAPIES SERIES
Discharge: HOME | End: 2018-12-12
Payer: MEDICARE

## 2018-12-12 PROCEDURE — 97140 MANUAL THERAPY 1/> REGIONS: CPT

## 2018-12-14 ENCOUNTER — APPOINTMENT (OUTPATIENT)
Dept: INTERVENTIONAL RADIOLOGY/VASCULAR | Age: 43
End: 2018-12-14
Payer: MEDICARE

## 2018-12-14 ENCOUNTER — APPOINTMENT (OUTPATIENT)
Dept: GENERAL RADIOLOGY | Age: 43
End: 2018-12-14
Payer: MEDICARE

## 2018-12-14 ENCOUNTER — HOSPITAL ENCOUNTER (OUTPATIENT)
Dept: PHYSICAL THERAPY | Age: 43
Setting detail: THERAPIES SERIES
Discharge: HOME | End: 2018-12-14
Payer: MEDICARE

## 2018-12-14 ENCOUNTER — HOSPITAL ENCOUNTER (OUTPATIENT)
Dept: WOUND CARE | Age: 43
Discharge: HOME | End: 2018-12-14
Payer: MEDICARE

## 2018-12-14 ENCOUNTER — HOSPITAL ENCOUNTER (EMERGENCY)
Age: 43
Discharge: HOME | End: 2018-12-14
Payer: MEDICARE

## 2018-12-14 VITALS
SYSTOLIC BLOOD PRESSURE: 130 MMHG | DIASTOLIC BLOOD PRESSURE: 70 MMHG | WEIGHT: 293 LBS | RESPIRATION RATE: 18 BRPM | BODY MASS INDEX: 48.58 KG/M2 | HEART RATE: 95 BPM | TEMPERATURE: 98.1 F | OXYGEN SATURATION: 100 %

## 2018-12-14 VITALS
SYSTOLIC BLOOD PRESSURE: 126 MMHG | HEART RATE: 104 BPM | RESPIRATION RATE: 20 BRPM | TEMPERATURE: 98.6 F | DIASTOLIC BLOOD PRESSURE: 79 MMHG | OXYGEN SATURATION: 100 %

## 2018-12-14 DIAGNOSIS — S99.911A RIGHT ANKLE INJURY, INITIAL ENCOUNTER: ICD-10-CM

## 2018-12-14 DIAGNOSIS — L03.115 CELLULITIS OF RIGHT LOWER EXTREMITY: Primary | ICD-10-CM

## 2018-12-14 LAB
ANION GAP SERPL CALCULATED.3IONS-SCNC: 14 MEQ/L (ref 8–16)
BASOPHILS # BLD: 0.7 %
BASOPHILS ABSOLUTE: 0.1 THOU/MM3 (ref 0–0.1)
BUN BLDV-MCNC: 10 MG/DL (ref 7–22)
CALCIUM SERPL-MCNC: 9.1 MG/DL (ref 8.5–10.5)
CHLORIDE BLD-SCNC: 104 MEQ/L (ref 98–111)
CO2: 22 MEQ/L (ref 23–33)
CREAT SERPL-MCNC: 0.5 MG/DL (ref 0.4–1.2)
EOSINOPHIL # BLD: 3.8 %
EOSINOPHILS ABSOLUTE: 0.3 THOU/MM3 (ref 0–0.4)
ERYTHROCYTE [DISTWIDTH] IN BLOOD BY AUTOMATED COUNT: 13.5 % (ref 11.5–14.5)
ERYTHROCYTE [DISTWIDTH] IN BLOOD BY AUTOMATED COUNT: 47.8 FL (ref 35–45)
GFR SERPL CREATININE-BSD FRML MDRD: > 90 ML/MIN/1.73M2
GLUCOSE BLD-MCNC: 86 MG/DL (ref 70–108)
HCT VFR BLD CALC: 47.7 % (ref 37–47)
HEMOGLOBIN: 14.9 GM/DL (ref 12–16)
IMMATURE GRANS (ABS): 0.04 THOU/MM3 (ref 0–0.07)
IMMATURE GRANULOCYTES: 0.5 %
LYMPHOCYTES # BLD: 31.2 %
LYMPHOCYTES ABSOLUTE: 2.3 THOU/MM3 (ref 1–4.8)
MCH RBC QN AUTO: 30.3 PG (ref 26–33)
MCHC RBC AUTO-ENTMCNC: 31.2 GM/DL (ref 32.2–35.5)
MCV RBC AUTO: 97.1 FL (ref 81–99)
MONOCYTES # BLD: 8.7 %
MONOCYTES ABSOLUTE: 0.6 THOU/MM3 (ref 0.4–1.3)
NUCLEATED RED BLOOD CELLS: 0 /100 WBC
OSMOLALITY CALCULATION: 277.7 MOSMOL/KG (ref 275–300)
PLATELET # BLD: 235 THOU/MM3 (ref 130–400)
PMV BLD AUTO: 10.5 FL (ref 9.4–12.4)
POTASSIUM SERPL-SCNC: 3.9 MEQ/L (ref 3.5–5.2)
RBC # BLD: 4.91 MILL/MM3 (ref 4.2–5.4)
SEG NEUTROPHILS: 55.1 %
SEGMENTED NEUTROPHILS ABSOLUTE COUNT: 4 THOU/MM3 (ref 1.8–7.7)
SODIUM BLD-SCNC: 140 MEQ/L (ref 135–145)
WBC # BLD: 7.3 THOU/MM3 (ref 4.8–10.8)

## 2018-12-14 PROCEDURE — 99213 OFFICE O/P EST LOW 20 MIN: CPT | Performed by: NURSE PRACTITIONER

## 2018-12-14 PROCEDURE — 99213 OFFICE O/P EST LOW 20 MIN: CPT

## 2018-12-14 PROCEDURE — 6370000000 HC RX 637 (ALT 250 FOR IP): Performed by: STUDENT IN AN ORGANIZED HEALTH CARE EDUCATION/TRAINING PROGRAM

## 2018-12-14 PROCEDURE — 73610 X-RAY EXAM OF ANKLE: CPT

## 2018-12-14 PROCEDURE — 97140 MANUAL THERAPY 1/> REGIONS: CPT

## 2018-12-14 PROCEDURE — 85025 COMPLETE CBC W/AUTO DIFF WBC: CPT

## 2018-12-14 PROCEDURE — 36415 COLL VENOUS BLD VENIPUNCTURE: CPT

## 2018-12-14 PROCEDURE — 99284 EMERGENCY DEPT VISIT MOD MDM: CPT

## 2018-12-14 PROCEDURE — 93971 EXTREMITY STUDY: CPT

## 2018-12-14 PROCEDURE — 80048 BASIC METABOLIC PNL TOTAL CA: CPT

## 2018-12-14 RX ORDER — CEPHALEXIN 500 MG/1
500 CAPSULE ORAL 4 TIMES DAILY
Qty: 28 CAPSULE | Refills: 0 | Status: SHIPPED | OUTPATIENT
Start: 2018-12-14 | End: 2018-12-21

## 2018-12-14 RX ORDER — ACETAMINOPHEN 325 MG/1
650 TABLET ORAL ONCE
Status: COMPLETED | OUTPATIENT
Start: 2018-12-14 | End: 2018-12-14

## 2018-12-14 RX ADMIN — ACETAMINOPHEN 650 MG: 325 TABLET ORAL at 13:30

## 2018-12-14 ASSESSMENT — ENCOUNTER SYMPTOMS
EYE PAIN: 0
BACK PAIN: 0
COUGH: 0
ABDOMINAL PAIN: 0
RHINORRHEA: 0
SHORTNESS OF BREATH: 0
DIARRHEA: 0
NAUSEA: 0
VOMITING: 0
WHEEZING: 0
EYE DISCHARGE: 0
SORE THROAT: 0

## 2018-12-14 ASSESSMENT — PAIN SCALES - GENERAL
PAINLEVEL_OUTOF10: 8
PAINLEVEL_OUTOF10: 8

## 2018-12-14 ASSESSMENT — PAIN DESCRIPTION - PAIN TYPE: TYPE: ACUTE PAIN

## 2018-12-14 ASSESSMENT — PAIN DESCRIPTION - ORIENTATION: ORIENTATION: RIGHT

## 2018-12-14 ASSESSMENT — PAIN DESCRIPTION - LOCATION: LOCATION: LEG

## 2018-12-14 NOTE — ED NOTES
Pt returned from vascular in stable condition another set of vs obtained and stable. Pt medicated per order will continue to monitor and update on the POC. KRISTIN Carter Mediate into update on the POC at this time.      Jon Duarte RN  12/14/18 [id number]

## 2018-12-14 NOTE — ED TRIAGE NOTES
Patient states since last night around midnight she started having pain in her left ankle. Patient states it hurts on the bed but also hurts worse with pressure of standing. Patient right ankle appears redden and per patients care taker it looks better.

## 2018-12-14 NOTE — PROGRESS NOTES
folds  Neurological: negative for dizziness and gait problems  Behavioral/Psych: positive for learning difficulty    Objective:      /79   Pulse 104   Temp 98.6 °F (37 °C) (Oral)   Resp 20   SpO2 100%     Wt Readings from Last 3 Encounters:   12/10/18 (!) 328 lb (148.8 kg)   11/28/18 (!) 328 lb 1.6 oz (148.8 kg)   11/09/18 (!) 320 lb (145.2 kg)       PHYSICAL EXAM    General Appearance: alert and oriented to person, place and time  Skin: warm and dry; fungal rash noted under the abdominal pannus and in the bilateral groin  Head: normocephalic and atraumatic  Eyes: Pupils equal and round. No redness or irritation  ENT: hearing grossly normal bilaterally  Pulmonary/Chest: clear to auscultation bilaterally- normal air movement, no respiratory distress  Cardiovascular: normal rate, regular rhythm  Abdomen: soft, non-tender and bowel sounds normal  Neurologic: gait and coordination normal, speech normal and developmentally delayed  Extremities: 3-4+ edema to bilateral lower legs  Abdominal ulcer near the umbilicus: Healed with scarring and irritation noted.  Periulcer skin is scarred and intact. No induration, redness, or warmth noted. Abdomen, upper right: Improved, measurements are smaller. Ulcer bed is 100% red. Draining moderate amount of serosanguinous drainage. Periulcer skin is scarred and intact. No redness, warmth, or induration noted. Abdomen, upper left: Ulcer is red and moist. Draining moderate amount of serosanguinous drainage. Periulcer skin is scarred and intact. No redness, warmth, or induration noted. Left leg/medial posterior knee: Pressure injury-stage 3- Improved, measurements are smaller. Ulcer bed is 60% yellow slough, 40% red. Draining small amount of serosanguinous drainage. Periulcer skin is scarred and intact. No warmth, redness, or induration noted.        Upper abdomen    Lower abdomen    Left medial posterior knee    Wound 11/28/18 Knee Left;Posterior #3 (Active)   Wound Image (Active)   Dressing Status Intact; Old drainage 12/14/2018 11:25 AM   Dressing Changed Changed/New 12/14/2018 11:25 AM   Wound Cleansed Rinsed/Irrigated with saline 12/14/2018 11:25 AM   Wound Length (cm) 0.8 cm 12/14/2018 11:25 AM   Wound Width (cm) 0.9 cm 12/14/2018 11:25 AM   Wound Depth (cm) 0.05 cm 12/14/2018 11:25 AM   Wound Surface Area (cm^2) 0.72 cm^2 12/14/2018 11:25 AM   Wound Volume (cm^3) 0.04 cm^3 12/14/2018 11:25 AM   Wound Assessment Pink 12/14/2018 11:25 AM   Drainage Amount Small 12/14/2018 11:25 AM   Drainage Description Serosanguinous 12/14/2018 11:25 AM   Odor None 12/14/2018 11:25 AM   Margins Attached edges 12/14/2018 11:25 AM   Gwen-wound Assessment Dry;Pink 12/14/2018 11:25 AM   [place]%Wound Bed 100 12/14/2018 11:25 AM   Number of days: 0       LABS      CBC: No results found for: WBC, HGB, HCT, MCV, PLT  BMP:   Lab Results   Component Value Date     11/28/2018    K 3.6 11/28/2018     11/28/2018    CO2 22 11/28/2018    BUN 7 11/28/2018    CREATININE 0.6 11/28/2018     PT/INR: No results found for: PROTIME, INR  Prealbumin: No results found for: PREALBUMIN  Albumin:No results found for: Lonn Cinnamon  Sed Rate:No results found for: SEDRATE  CRP: No results found for: CRP  Micro: No results found for: BC   Hemoglobin A1C: No results found for: LABA1C    Assessment:     Skin ulcer of abdominal wall  Keloid  Pressure ulcer of left leg, stage 3     Contributing Factors: obesity and History of keloids    Patient Active Problem List   Diagnosis Code    Keloid L91.0    MRSA (methicillin resistant staph aureus) culture positive Z22.322    Skin ulcer of abdominal wall ([facility]) L98.499    POTS (postural orthostatic tachycardia syndrome) R00.0, I95.1    CHF (congestive heart failure) ([facility]) chronic diastolic G21.2    Morbid obesity ([facility]) E66.01    Bilateral leg edema =LYMPHEDEMA- ON f/U WITH LYMPHEDEMA CLINIC R60.0    Lymphedema of both lower extremities I89.0    SOB (shortness of breath) on

## 2018-12-14 NOTE — ED PROVIDER NOTES
[facility] EMERGENCY DEPT      CHIEF COMPLAINT     No chief complaint on file. Nurses Notesreviewed and I agree except as noted in the HPI. HISTORY OF PRESENT ILLNESS    Jairo Sanchez is a 41 y.o. female who presents to the ED with history of lymphedema to lower extremities. Patient has been diagnosed with cellulitis in the past. Patient woke up this morning and was experiencing pain, redness, and increased swelling to the right lower leg. She explained her symptoms to her physical therapist this morning whom advised patient to come here for evaluation. Patient rates her pain at a 8/10 in severity which worsens with palpation. Patient does not have a history of blood clots. She denies new shortness of breath out of her baseline. She denies chest pain. There are no other complaints or symptoms at this time. Caregiver of patient is at bedside. Location/Symptom: right leg pain, redness, and swelling  Timing/Onset: this morning  Context/Setting: woke up with symptoms  Quality: aching  Duration: constant  Modifying Factors: none  Severity: 8/10    REVIEW OF SYSTEMS     Review of Systems   Constitutional: Negative for appetite change, chills, fatigue and fever. HENT: Negative for congestion, ear pain, rhinorrhea and sore throat. Eyes: Negative for pain, discharge and visual disturbance. Respiratory: Negative for cough, shortness of breath and wheezing. Cardiovascular: Positive for leg swelling (chronic - worse on right today). Negative for chest pain and palpitations. Gastrointestinal: Negative for abdominal pain, diarrhea, nausea and vomiting. Genitourinary: Negative for difficulty urinating, dysuria, hematuria and vaginal discharge. Musculoskeletal: Negative for arthralgias, back pain, joint swelling and neck pain. Skin: Negative for pallor and rash. Neurological: Negative for dizziness, syncope, weakness, light-headedness, numbness and headaches.    Hematological: Negative for Her behavior is normal.   Nursing note and vitals reviewed. DIFFERENTIAL DIAGNOSIS:   Including but not limited to Cellulitis, DVT, Lymphedema, sprain, fracture    DIAGNOSTIC RESULTS     EKG: All EKG's are interpreted by the Emergency Department Physician who either signs or Co-signs this chart in the absence of a cardiologist.    RADIOLOGY: non-plain film images(s) such as CT, Ultrasound andMRI are read by the radiologist.  Plain radiographic images are visualized and preliminarily interpreted by the emergency physician unless otherwise stated below. XR ANKLE RIGHT (MIN 3 VIEWS)   Final Result      No acute fracture or dislocation. Final report electronically signed by Dr. Héctor Roberson on 12/14/2018 1:59 PM      VL DUP LOWER EXTREMITY VENOUS RIGHT   Final Result   Study very difficult and somewhat limited, as mentioned above. No evidence for acute deep or superficial venous thrombosis. **This report has been created using voice recognition software. It may contain minor errors which are inherent in voice recognition technology. **      Final report electronically signed by Dr. Yue Eller on 12/14/2018 1:25 PM          LABS:   Labs Reviewed   CBC WITH AUTO DIFFERENTIAL - Abnormal; Notable for the following:        Result Value    Hematocrit 47.7 (*)     MCHC 31.2 (*)     RDW-SD 47.8 (*)     All other components within normal limits   BASIC METABOLIC PANEL - Abnormal; Notable for the following:     CO2 22 (*)     All other components within normal limits   ANION GAP   GLOMERULAR FILTRATION RATE, ESTIMATED   OSMOLALITY       EMERGENCY DEPARTMENT COURSE:   Vitals:    Vitals:    12/14/18 1214 12/14/18 1332   BP: (!) 142/91 130/70   Pulse: 94 95   Resp: 20 18   Temp: 98.1 °F (36.7 °C)    TempSrc: Oral    SpO2: 100% 100%   Weight: (!) 301 lb (136.5 kg)      1220 Labs ordered. Tylenol ordered. Imaging ordered    History and physical exam were obtained.  Vitals, above reviewed, within normal limits. Clinically indicated imaging and labs were ordered and reviewed upon completion. Within the department, the pt was treated with tylenol. I observed the pt's condition to improve during the duration of their stay. She was able to ambulate and weight bear. Imaging shows no DVT or fracture and WBC is within normal limits. Clinically, this appears to be cellulitis. She requested crutches due to pain with weight bearing; family and I both agreed she may have difficulty and we discussed trying a wheelchair. Patient is amenable. I explained my proposed course of treatment to family and patient, and they were amenable to my decision. I discussed nursing home care if she is unable to ambulate at home or unable to take care of herself; they are aware this may need to occur for the patient and will try outpatient treatment first with family checking in on her for assistance. They were discharged home, and they will return to the ED if their symptoms become more severe in nature, or otherwise change. I have given the patient strict written and verbal instructions about care at home, follow-up, and sign and symptoms of worsening of condition and they did verbalize understanding. CRITICAL CARE:   None    CONSULTS:  ED physician, Dr. Alexys Olivera:  None    FINALIMPRESSION      1. Cellulitis of right lower extremity    2.  Right ankle injury, initial encounter          DISPOSITION/PLAN   discharge    PATIENT REFERRED TO:  Rudy Carl, [address] [place] [address]  [phone]    Go in 2 days  For wound re-check    [facility] EMERGENCY DEPT  [address]  [phone]    If symptoms worsen or change, chest pain, shortness of breath, coughing up blood      DISCHARGE MEDICATIONS:  Discharge Medication List as of 12/14/2018  1:32 PM      START taking these medications    Details   cephALEXin (KEFLEX) 500 MG capsule Take 1 capsule by mouth 4 times daily for 7 days,

## 2018-12-14 NOTE — PLAN OF CARE
Problem: Wound:  Intervention: Assess wound size, appearance and drainage  Care plan reviewed with patient. Patient verbalize understanding of the plan of care and contribute to goal setting. Goal: Will show signs of wound healing; wound closure and no evidence of infection  Will show signs of wound healing; wound closure and no evidence of infection   Outcome: Ongoing  Pt. Seen today for abdomen wounds and left posterior knee wound. Lower wound to abdomen is healed. Upper abdomen is measuring smaller and left knee is measuring smaller. Steroid cream to be applied to lower abdomen twice a week, endoform and hydrofera blue, gauze, ABD and drape to the upper abdomen twice a week and AMD gauze to the left posterior knee daily.  Follow up in 3 weeeks    Comments:

## 2018-12-17 ENCOUNTER — HOSPITAL ENCOUNTER (OUTPATIENT)
Dept: PHYSICAL THERAPY | Age: 43
Setting detail: THERAPIES SERIES
Discharge: HOME | End: 2018-12-17
Payer: MEDICARE

## 2018-12-17 PROCEDURE — 97530 THERAPEUTIC ACTIVITIES: CPT

## 2018-12-17 NOTE — PROGRESS NOTES
[facility]  LYMPHEDEMA SERVICES  LYMPHEDEMA NOTE    Date: 2018  Patient Name: Mitzy Mar        CSN: [id number]   : 1975  (41 y.o.)  Gender: female   Referring Practitioner: Rommel Boland MD  Diagnosis: I89.0 Lymphedema Bilateral Legs       PT Visit Information  Onset Date: 10/12/18  PT Insurance Information: MEDICARE-Unlimited visits based on medical necessity. Aquatics and modalities are covered except ionto and hot/cold packs. Total # of Visits to Date: 23  Progress Note Counter: 3/10 for PN    Restrictions/Precautions  Fall risk, Universal precautions,NA    Pain  Patient Currently in Pain: Yes  Pain Assessment  Pain Assessment: 0-10  Pain Level: 6  Pain Location: Leg  Pain Orientation: Right, 0/10 left leg     SUBJECTIVE     Patient presenting to lymphedema clinic today in wheelchair. Patient's ER trip on Friday went well and was diagnosed with a bad sprain. Had doppler and xray which was clear. Is supposed to stay off of right ankle for 4 days. OBJECTIVE  Skin Appearance/Texture: Dry, Shiny at spots Right>Left  Patient not wearing CircAids on legs when presented to clinic. TREATMENT SESSION   Manual Lymph Drainage  No MLD completed today. CircAids were fitted to bilateral LE. Right CircAid needed an additional stitch to help fit at lower leg. Patient instructed to wear CircAids daily and to take off at night. If CircAids begins to dig in, cause skin irritation, or uncomfortable to take off legs until therapist adjusts. Skin Care Measures  Small wound behind left knee covered with dressing throughout session. Compression Bandaging  Location: Using pumps at home.    Compression Gradient:   Supplies (per involved extremity):   Stockinette: none   Transelast none   10 cm Artiflex Cotton none   15 cm Artiflex Cotton none   4 cm Rosidal K none   6 cm Rosidal K none   8 cm Rosidal K none   10 cm Rosidal K none   12 cm Rosidal K none   Rosidal Soft none

## 2018-12-19 ENCOUNTER — HOSPITAL ENCOUNTER (OUTPATIENT)
Dept: PHYSICAL THERAPY | Age: 43
Setting detail: THERAPIES SERIES
Discharge: HOME | End: 2018-12-19
Payer: MEDICARE

## 2018-12-19 PROCEDURE — G8980 MOBILITY D/C STATUS: HCPCS

## 2018-12-19 PROCEDURE — 97140 MANUAL THERAPY 1/> REGIONS: CPT

## 2018-12-19 PROCEDURE — G8979 MOBILITY GOAL STATUS: HCPCS

## 2018-12-19 NOTE — DISCHARGE SUMMARY
interference to 4 being interferes completely? 0   15. How would you rate how your lymphedema affects your performance of preferred leisure activities from 0 being no interference to 4 being interferes completely? 0   16. How would you rate how your lymphedema has affected the proper fit of clothing/shoes from 0 being fit normally to 4 unable to wear? 2   17. How would you rate how your lymphedema has affected your sleep from 0 being no interference to 4 being interferes greatly? 2   IV. Infection Occurrence (reference only, not included in score)   18. How often have you become ill with an infection in your swollen limb requiring oral antibiotics or hospitalization in the past year? 1X   LLIS Total Score (sum of all points for first 17 items) 24   Percent Impairment (Total score/68 x100) 35.3%   *Used with permission from [employer], 2016*      Decongestive/Therapeutic Exercise  Deep Breathing and ankle pumps    Patient Education  New Education Provided:   Can use CircAids on bilateral legs. Learner:patient  Method: demonstration and explanation       Outcome: verbalized concerns, demonstrated understanding, needs reinforcement and asked questions     GOALS   SHORT-TERM GOALS:  to be met in 4 weeks   X MET  Patient will demonstrate a decrease in circumferential measurements of the affected extremity by 2 cm working towards the lymphedema swelling stabilizing and patient being able to get measured and fitted for a new compression garment to be worn daily to keep swelling down. X MET  Patient will tolerate wearing the compression bandages from one treatment session until the next treatment session working towards meeting short-term goal #1. X MET Patient/family/caregiver will demonstrate and verbalize 3-5 skin care precautionary measures to decrease dry skin and risk of infection.    X MET Patient/family/caregiver will demonstrate applying compression bandages with no greater than

## 2019-01-03 ENCOUNTER — HOSPITAL ENCOUNTER (OUTPATIENT)
Dept: WOUND CARE | Age: 44
Discharge: HOME | End: 2019-01-03
Payer: MEDICARE

## 2019-01-03 VITALS
HEIGHT: 66 IN | OXYGEN SATURATION: 98 % | HEART RATE: 99 BPM | BODY MASS INDEX: 47.09 KG/M2 | WEIGHT: 293 LBS | DIASTOLIC BLOOD PRESSURE: 83 MMHG | SYSTOLIC BLOOD PRESSURE: 144 MMHG | RESPIRATION RATE: 18 BRPM | TEMPERATURE: 98.9 F

## 2019-01-03 DIAGNOSIS — I89.0 LYMPHEDEMA OF BOTH LOWER EXTREMITIES: ICD-10-CM

## 2019-01-03 DIAGNOSIS — B37.2 CANDIDIASIS, INTERTRIGINOUS: ICD-10-CM

## 2019-01-03 PROCEDURE — 99213 OFFICE O/P EST LOW 20 MIN: CPT

## 2019-01-03 PROCEDURE — 99213 OFFICE O/P EST LOW 20 MIN: CPT | Performed by: NURSE PRACTITIONER

## 2019-01-03 ASSESSMENT — PAIN DESCRIPTION - ORIENTATION: ORIENTATION: RIGHT

## 2019-01-03 ASSESSMENT — PAIN DESCRIPTION - LOCATION: LOCATION: ABDOMEN

## 2019-01-03 ASSESSMENT — PAIN DESCRIPTION - FREQUENCY: FREQUENCY: CONTINUOUS

## 2019-01-03 ASSESSMENT — PAIN DESCRIPTION - DESCRIPTORS: DESCRIPTORS: BURNING

## 2019-01-03 ASSESSMENT — PAIN SCALES - GENERAL: PAINLEVEL_OUTOF10: 6

## 2019-01-03 ASSESSMENT — PAIN DESCRIPTION - PAIN TYPE: TYPE: ACUTE PAIN

## 2019-01-24 ENCOUNTER — HOSPITAL ENCOUNTER (OUTPATIENT)
Dept: WOUND CARE | Age: 44
Discharge: HOME | End: 2019-01-24
Payer: MEDICARE

## 2019-01-24 VITALS
SYSTOLIC BLOOD PRESSURE: 126 MMHG | TEMPERATURE: 98.3 F | BODY MASS INDEX: 51.83 KG/M2 | DIASTOLIC BLOOD PRESSURE: 81 MMHG | RESPIRATION RATE: 18 BRPM | WEIGHT: 293 LBS | HEART RATE: 110 BPM | OXYGEN SATURATION: 97 %

## 2019-01-24 PROCEDURE — 99213 OFFICE O/P EST LOW 20 MIN: CPT | Performed by: NURSE PRACTITIONER

## 2019-01-24 PROCEDURE — 99211 OFF/OP EST MAY X REQ PHY/QHP: CPT

## 2019-06-12 ENCOUNTER — OFFICE VISIT (OUTPATIENT)
Dept: CARDIOLOGY CLINIC | Age: 44
End: 2019-06-12
Payer: MEDICARE

## 2019-06-12 VITALS
HEIGHT: 66 IN | HEART RATE: 100 BPM | DIASTOLIC BLOOD PRESSURE: 70 MMHG | SYSTOLIC BLOOD PRESSURE: 128 MMHG | WEIGHT: 293 LBS | BODY MASS INDEX: 47.09 KG/M2

## 2019-06-12 DIAGNOSIS — E66.01 MORBID OBESITY WITH BMI OF 50.0-59.9, ADULT (HCC): ICD-10-CM

## 2019-06-12 DIAGNOSIS — G90.A POTS (POSTURAL ORTHOSTATIC TACHYCARDIA SYNDROME): ICD-10-CM

## 2019-06-12 DIAGNOSIS — I50.32 CHRONIC DIASTOLIC CONGESTIVE HEART FAILURE (HCC): Primary | ICD-10-CM

## 2019-06-12 PROCEDURE — G8427 DOCREV CUR MEDS BY ELIG CLIN: HCPCS | Performed by: PHYSICIAN ASSISTANT

## 2019-06-12 PROCEDURE — 93000 ELECTROCARDIOGRAM COMPLETE: CPT | Performed by: PHYSICIAN ASSISTANT

## 2019-06-12 PROCEDURE — G8417 CALC BMI ABV UP PARAM F/U: HCPCS | Performed by: PHYSICIAN ASSISTANT

## 2019-06-12 PROCEDURE — 1036F TOBACCO NON-USER: CPT | Performed by: PHYSICIAN ASSISTANT

## 2019-06-12 PROCEDURE — 99213 OFFICE O/P EST LOW 20 MIN: CPT | Performed by: PHYSICIAN ASSISTANT

## 2019-06-12 NOTE — PROGRESS NOTES
Pt here for 6 mo check up     Pt denies chest pain , heart palpitations     Pt continues with sob on exertion , swelling in bilateral legs and feet, dizziness at times,     Pt concerned has fallen today  Right wrist is sore

## 2019-06-12 NOTE — PROGRESS NOTES
[facility] PROFESSIONAL SERVICES  [facility] [address]   [address] [address]   Dept: [phone]   Dept Fax: [phone]   Loc: [phone]      Chief Complaint   Patient presents with   Fadychristy Villalobos    Congestive Heart Failure     Patient with a history of diastolic congestive heart failure presents for six-month follow-up. Patient states she has had some more increased shortness of breath at times. She has chronic lymphedema and wears lymphedema pumps at times. She denies any chest pain or palpitations. Cardiologist:  Dr. 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     during hospital visit    Bronchitis     [facility]     Elbow fracture 10/09/14    left    Irritable bowel syndrome     Irritable bowel    Lymph edema 2017    Obesity     Conde disease     Schizophrenia ([facility] [place] [address]     Seasonal allergies        Allergies   Allergen Reactions    Latex Rash    Cat Hair Extract     Seasonal        Current Outpatient Medications   Medication Sig Dispense Refill    metoprolol succinate (TOPROL XL) 25 MG extended release tablet take 1 tablet by mouth once daily 90 tablet 3    furosemide (LASIX) 20 MG tablet take 1 tablet by mouth once daily 90 tablet 3    fluocinonide (LIDEX) 0.05 % cream Apply topically to abdominal ulcers twice weekly and as needed.  60 g 3    acetaminophen (TYLENOL) 500 MG tablet Take 1,000 mg by mouth every 6 hours as needed for Pain      Cholecalciferol (VITAMIN D3) 5000 units TABS Take by mouth      MedroxyPROGESTERone Acetate (DEPO-PROVERA IM) Inject into the muscle      IRON PO Take 45 mg by mouth daily.  miconazole (MICOTIN) 2 % powder Apply topically 2 times to 3 times  daily. 45 g 1    Dicyclomine HCl (BENTYL PO)   Take 20 mg by mouth 2 times daily       QUEtiapine Fumarate (SEROQUEL PO) Take 400 mg by mouth 2 times daily.  atomoxetine (STRATTERA) 40 MG capsule Take 40 mg by mouth 2 times daily.  aripiprazole (ABILIFY) 10 MG tablet Take 10 mg by mouth 2 times daily       ACYCLOVIR 400 mg by Does not apply route three times a week        No current facility-administered medications for this visit.         Social History     Socioeconomic History    Marital status: Single     Spouse name: None    Number of children: None    Years of education: None    Highest education level: None   Occupational History    None   Social Needs    Financial resource strain: None    Food insecurity:     Worry: None     Inability: None    Transportation needs:     Medical: None     Non-medical: None   Tobacco Use    Smoking status: Never Smoker    Smokeless tobacco: Never Used   Substance and Sexual Activity    Alcohol use: No    Drug use: No    Sexual activity: None   Lifestyle    Physical activity:     Days per week: None     Minutes per session: None    Stress: None   Relationships    Social connections:     Talks on phone: None     Gets together: None     Attends Taoist service: None     Active member of club or organization: None     Attends meetings of clubs or organizations: None     Relationship status: None    Intimate partner violence:     Fear of current or ex partner: None     Emotionally abused: None     Physically abused: None     Forced sexual activity: None   Other Topics Concern    None   Social History Narrative    None       Family History   Problem Relation Age of Onset    Diabetes Father     Cancer Maternal Grandmother     Cancer Paternal Grandmother        Blood pressure 128/70, pulse 100, height 5' 6\" (1.676 m), weight (!) 332 lb 2 oz (150.7 kg), not currently breastfeeding. General:   Well developed, well nourished  Lungs:   Clear to auscultation  Heart:    Normal S1 S2, No murmur, rubs, or gallops  Abdomen:   Soft, non tender, no organomegalies, positive bowel sounds  Extremities:   ++ edema, no cyanosis, good peripheral pulses  Neurological:   Awake, alert, oriented. No obvious focal deficits  Musculoskeletal:  No obvious deformities        TTT  CONCLUSION:       1.   This tilt table test is an abnormal study.        2Lael Theresa is consistent with postural orthostatic tachycardia syndrome, in             view of the increment of the heart rate up to 130 from 92 and the             difference of 39 beats per minute.  Blood pressure has been well             maintained all the way until the end, and at the end the blood             pressure and the heart rate came down, suggesting it was inducing             vasovagal reaction.       3.   POTS induced vasovagal response.       4.   Marked dizziness throughout the tilting in view of the tachycardia.       5.   Marked chest pressure and short of breath throughout tilting,             related to the tachycardia.  Once the patient assumed supine             position, the symptoms resolved.       6.   Nonspecific ST segment change during the assumption of standing.     RECOMMENDATION:        1.  At this level, the patient needs appropriate hydration and liberal             salt intake.  Avoid prolonged standing, physical therapy.             further clinical correlation.       2.   The patient may need functional study if clinically indicated, like             Lexiscan nuclear stress test in view of the nonspecific ST segment             changes on standing.       3.   Avoid prolonged standing in view of prolonged standing induced             vasovagal response.       4.   I discussed with the patient.        Erum Damon M.D. Diagnosis Orders   1. Chronic diastolic congestive heart failure (HCC)  EKG 12 Lead    CBC    Basic Metabolic Panel   2. POTS (postural orthostatic tachycardia syndrome)         Orders Placed This Encounter   Procedures    CBC     Standing Status:   Future     Standing Expiration Date:   6/12/2020    Basic Metabolic Panel     Standing Status:   Future     Standing Expiration Date:   6/12/2020    EKG 12 Lead     Order Specific Question:   Reason for Exam?     Answer: Other     Continue Dr Danita Thorne current treatment plan    Will have her increase her diuretic to 40 mg 3 times a week. Otherwise she will take 20 mg daily. Continue all other current medications and with constant vigilance to changes in symptoms and also any potential side effects. Return for care if become worse or seek medical attention immediately. The patient is educated on symptoms of heart disease that include chest pain and passing out, dizziness, etc and to report them if there is any change or go to emergency room.        Follow up with Dr Artem Damon as scheduled or sooner if needed  (Please note that portions of this note were completed with a voice recognition program.  Efforts were made to edit the dictation but occasionally words are mis-transcribed.)      Christopher Woodruff PA-C

## 2019-07-11 ENCOUNTER — HOSPITAL ENCOUNTER (OUTPATIENT)
Dept: WOUND CARE | Age: 44
Discharge: HOME | End: 2019-07-11
Payer: MEDICARE

## 2019-07-11 VITALS
HEART RATE: 106 BPM | OXYGEN SATURATION: 97 % | DIASTOLIC BLOOD PRESSURE: 71 MMHG | TEMPERATURE: 98.8 F | SYSTOLIC BLOOD PRESSURE: 137 MMHG | BODY MASS INDEX: 47.09 KG/M2 | WEIGHT: 293 LBS | RESPIRATION RATE: 18 BRPM | HEIGHT: 66 IN

## 2019-07-11 DIAGNOSIS — Z20.7 SCABIES EXPOSURE: Primary | ICD-10-CM

## 2019-07-11 PROCEDURE — 99213 OFFICE O/P EST LOW 20 MIN: CPT | Performed by: NURSE PRACTITIONER

## 2019-07-11 PROCEDURE — 2709999900 HC NON-CHARGEABLE SUPPLY

## 2019-07-11 PROCEDURE — 99213 OFFICE O/P EST LOW 20 MIN: CPT

## 2019-07-11 RX ORDER — PERMETHRIN 50 MG/G
CREAM TOPICAL WEEKLY
Qty: 60 G | Refills: 2 | Status: SHIPPED | OUTPATIENT
Start: 2019-07-11 | End: 2019-07-19

## 2019-07-11 NOTE — PROGRESS NOTES
[facility]          Progress Note       Cj RECORD NUMBER:  [id number]  AGE: 45 y.o. GENDER: female  : 1975  EPISODE DATE:  2019    Subjective:     Chief Complaint   Patient presents with    Wound Check     left leg         HISTORY of PRESENT ILLNESS HPI     Michelle Llamas is a 45 y.o. female Established patient referred by Dr. Charity Conner, who presents today for wound/ulcer evaluation. History of Wound Context: Patient has reoccurrence of open ulcers to abdominal keloid which opened in May 2019. She has a history of abdominal ulcers related to keloids since 2001 which were open for nearly 15 years then have healed and reopened a few times since then. She has a history of the areas being surgically excised and injected with steroids with no improvement. She has significant bilateral lower leg lymphedema and has lymphedema pumps and Velcro compression wraps to manage her edema. She was recently exposed to scabies by her nephew that was staying with her and her mother for approximately 1 week. Wound/Ulcer Pain Timing/Severity: intermittent  Quality of pain: aching, burning, tender  Severity:  5 / 10   Modifying Factors: Pain worsens with dressing changes  Associated Signs/Symptoms: drainage and pain    Interval History:   Patient presents today for follow up on wound/ulcer's progression. The patient is currently not on antibiotics. Current dressing care includes Hydrofera Blue.         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     during hospital visit    Bronchitis     [facility]F ([facility]estive [facility] failure) [facility]     Elbow fracture 10/09/14    left    Irritable bowel syndrome     Irritable bowel    Lymph edema 2017    Obesity     Conde disease     Schizophrenia ([facility]     Seasonal allergies        PAST SURGICAL HISTORY    Past Surgical History:   Procedure Laterality Date    LAYER WOUND CLOSURE      TONSILLECTOMY AND ADENOIDECTOMY         FAMILY HISTORY    Family History   Problem Relation Age of Onset    Diabetes Father     Cancer Maternal Grandmother     Cancer Paternal Grandmother        SOCIAL HISTORY    Social History     Tobacco Use    Smoking status: Never Smoker    Smokeless tobacco: Never Used   Substance Use Topics    Alcohol use: No    Drug use: No       ALLERGIES    Allergies   Allergen Reactions    Latex Rash    Cat Hair Extract     Seasonal        MEDICATIONS    Current Outpatient Medications on File Prior to Encounter   Medication Sig Dispense Refill    metoprolol succinate (TOPROL XL) 25 MG extended release tablet take 1 tablet by mouth once daily 90 tablet 3    furosemide (LASIX) 20 MG tablet take 1 tablet by mouth once daily 90 tablet 3    fluocinonide (LIDEX) 0.05 % cream Apply topically to abdominal ulcers twice weekly and as needed. 60 g 3    acetaminophen (TYLENOL) 500 MG tablet Take 1,000 mg by mouth every 6 hours as needed for Pain      Cholecalciferol (VITAMIN D3) 5000 units TABS Take by mouth      MedroxyPROGESTERone Acetate (DEPO-PROVERA IM) Inject into the muscle      IRON PO Take 45 mg by mouth daily.  miconazole (MICOTIN) 2 % powder Apply topically 2 times to 3 times  daily. 45 g 1    Dicyclomine HCl (BENTYL PO)   Take 20 mg by mouth 2 times daily       QUEtiapine Fumarate (SEROQUEL PO) Take 400 mg by mouth 2 times daily.  atomoxetine (STRATTERA) 40 MG capsule Take 40 mg by mouth 2 times daily.  aripiprazole (ABILIFY) 10 MG tablet Take 10 mg by mouth 2 times daily       ACYCLOVIR 400 mg by Does not apply route three times a week        No current facility-administered medications on file prior to encounter.         REVIEW OF SYSTEMS    Constitutional: negative for chills, fatigue and fevers  Eyes: negative for irritation and redness  Ears, nose, mouth, Rinsed/Irrigated with saline 7/11/2019  1:26 PM   Wound Length (cm) 5.5 cm 7/11/2019  1:26 PM   Wound Width (cm) 5.5 cm 7/11/2019  1:26 PM   Wound Depth (cm) 0.05 cm 7/11/2019  1:26 PM   Wound Surface Area (cm^2) 30.25 cm^2 7/11/2019  1:26 PM   Wound Volume (cm^3) 1.51 cm^3 7/11/2019  1:26 PM   Wound Assessment Yellow;Red;Epithelialization 7/11/2019  1:26 PM   Drainage Amount Small 7/11/2019  1:26 PM   Drainage Description Serosanguinous 7/11/2019  1:26 PM   Odor None 7/11/2019  1:26 PM   Margins Attached edges 7/11/2019  1:26 PM   Gwen-wound Assessment Dry;Red 7/11/2019  1:26 PM   Red%Wound Bed 30 7/11/2019  1:26 PM   Yellow%Wound Bed 50 7/11/2019  1:26 PM   Other%Wound Bed 20 epi 7/11/2019  1:26 PM   Number of days: 0       Wound 07/11/19 Abdomen Left #2 (Active)   Dressing Status Intact; Old drainage 7/11/2019  1:26 PM   Dressing Changed Changed/New 7/11/2019  1:26 PM   Wound Cleansed Rinsed/Irrigated with saline 7/11/2019  1:26 PM   Wound Length (cm) 2.9 cm 7/11/2019  1:26 PM   Wound Width (cm) 2.5 cm 7/11/2019  1:26 PM   Wound Depth (cm) 0.05 cm 7/11/2019  1:26 PM   Wound Surface Area (cm^2) 7.25 cm^2 7/11/2019  1:26 PM   Wound Volume (cm^3) 0.36 cm^3 7/11/2019  1:26 PM   Wound Assessment Yellow;Red;Pink 7/11/2019  1:26 PM   Drainage Amount Small 7/11/2019  1:26 PM   Drainage Description Serosanguinous 7/11/2019  1:26 PM   Odor None 7/11/2019  1:26 PM   Margins Attached edges 7/11/2019  1:26 PM   Gwen-wound Assessment Dry;Pink 7/11/2019  1:26 PM   Red%Wound Bed 20 7/11/2019  1:26 PM   Yellow%Wound Bed 50 7/11/2019  1:26 PM   Other%Wound Bed 30 epi 7/11/2019  1:26 PM   Number of days: 0       Wound 07/11/19 Abdomen Right #3 (Active)   Wound Image   7/11/2019  1:26 PM   Dressing Status Intact; Old drainage 7/11/2019  1:26 PM   Dressing Changed Changed/New 7/11/2019  1:26 PM   Wound Cleansed Rinsed/Irrigated with saline 7/11/2019  1:26 PM   Wound Length (cm) 2.6 cm 7/11/2019  1:26 PM   Wound Width (cm) 3.1 cm 7/11/2019  1:26 PM   Wound Depth (cm) 0.05 cm 7/11/2019  1:26 PM   Wound Surface Area (cm^2) 8.06 cm^2 7/11/2019  1:26 PM   Wound Volume (cm^3) 0.4 cm^3 7/11/2019  1:26 PM   Wound Assessment Yellow; Red 7/11/2019  1:26 PM   Drainage Amount Small 7/11/2019  1:26 PM   Drainage Description Serosanguinous 7/11/2019  1:26 PM   Odor None 7/11/2019  1:26 PM   Margins Attached edges 7/11/2019  1:26 PM   Gwen-wound Assessment Dry;Pink 7/11/2019  1:26 PM   Red%Wound Bed 30 7/11/2019  1:26 PM   Yellow%Wound Bed 60 7/11/2019  1:26 PM   Other%Wound Bed 10 epi 7/11/2019  1:26 PM   Number of days: 0       LABS      CBC:   Lab Results   Component Value Date    WBC 7.3 12/14/2018    HGB 14.9 12/14/2018    HCT 47.7 12/14/2018    MCV 97.1 12/14/2018     12/14/2018     BMP:   Lab Results   Component Value Date     12/14/2018    K 3.9 12/14/2018     12/14/2018    CO2 22 12/14/2018    BUN 10 12/14/2018    CREATININE 0.5 12/14/2018     PT/INR: No results found for: PROTIME, INR  Prealbumin: No results found for: PREALBUMIN  Albumin:No results found for: LABALBU  Sed Rate:No results found for: SEDRATE  CRP: No results found for: CRP  Micro: No results found for: BC   Hemoglobin A1C: No results found for: LABA1C    Assessment:     Skin ulcer of abdominal wall  Keloids  Scabies exposure     Contributing Factors: obesity and history of keloids    Patient Active Problem List   Diagnosis Code    Keloid L91.0    MRSA (methicillin resistant staph aureus) culture positive Z22.322    Skin ulcer of abdominal wall ([facility]) L98.499    POTS (postural orthostatic tachycardia syndrome) R00.0, I95.1    CHF (congestive heart failure) ([facility]) chronic diastolic O08.9    Morbid obesity ([facility]) E66.01    Bilateral leg edema =LYMPHEDEMA- ON f/U WITH LYMPHEDEMA CLINIC R60.0    Lymphedema of both lower extremities I89.0    SOB (shortness of breath) on exertion R06.02    Candidiasis, intertriginous B37.2    Pressure ulcer of left leg, stage 3 [facility]) K56.927    Scabies exposure Z20.89       Procedure Note  Indications:  Based on my examination of this patient's wound(s)/ulcer(s) today, debridement is not required to promote healing and evaluate the extent healing. Wound/Ulcer Descriptions are listed under Physical Exam above. Plan:     Patient examined and evaluated. Patient had reopening of her abdomen ulcers. She was also exposed to scabies last week by her nephew who spent the night at her mother's while she was staying there. Prescription sent to pharmacy for Permethrin 5% cream to be applied topically and then reapply 7 days later for scabies exposure. Ok to shower on dressing change days    Cleanse wound with normal saline or wound cleanser and gauze. Pat dry with clean gauze. Apply endoform to the open areas and moisten with a few drops of saline, cover with hydroferra blue ready and tegaderm. Change three times per week. Applied promogran and bordered foams today    Antibiotics: No    Follow up: 3 weeks    Please see attached Discharge Instructions    Written patient dismissal instructions given to patient and signed by patient or POA. Discharge Instructions         Discharge Instructions        Visit Discharge/Physician Orders:  - Clean abdomen twice a day with soap and water apply Desenex to folds an apply a pillow case or soft cloth to separate the folds all day long    - use a new clean cloth every time and apply more powder  -  use lymphedema pumps every day   - prescription called into the pharmacy for cream for scabies  - supplies ordered through [facility] [phone]     Wound Location: abdomen     Dressing orders: Cleanse wound with normal saline or wound cleanser and gauze. Pat dry with clean gauze. Apply endoform to the open areas and moisten with a few drops of saline, cover with hydroferra blue ready and tegaderm.  Change three times per week   ok to shower on dressing change days    Applied promogran and

## 2019-08-09 ENCOUNTER — HOSPITAL ENCOUNTER (OUTPATIENT)
Dept: WOUND CARE | Age: 44
Discharge: HOME | End: 2019-08-09
Payer: MEDICARE

## 2019-08-09 VITALS
OXYGEN SATURATION: 98 % | BODY MASS INDEX: 47.09 KG/M2 | WEIGHT: 293 LBS | SYSTOLIC BLOOD PRESSURE: 137 MMHG | HEART RATE: 117 BPM | HEIGHT: 66 IN | DIASTOLIC BLOOD PRESSURE: 96 MMHG | RESPIRATION RATE: 20 BRPM | TEMPERATURE: 98.4 F

## 2019-08-09 DIAGNOSIS — L98.491 ULCER OF ABDOMEN WALL, LIMITED TO BREAKDOWN OF SKIN (HCC): Primary | ICD-10-CM

## 2019-08-09 DIAGNOSIS — B37.2 CANDIDIASIS, INTERTRIGINOUS: ICD-10-CM

## 2019-08-09 DIAGNOSIS — I89.0 LYMPHEDEMA OF BOTH LOWER EXTREMITIES: ICD-10-CM

## 2019-08-09 DIAGNOSIS — Z20.7 SCABIES EXPOSURE: ICD-10-CM

## 2019-08-09 DIAGNOSIS — L91.0 KELOID: ICD-10-CM

## 2019-08-09 PROCEDURE — 6370000000 HC RX 637 (ALT 250 FOR IP): Performed by: NURSE PRACTITIONER

## 2019-08-09 PROCEDURE — 17250 CHEM CAUT OF GRANLTJ TISSUE: CPT | Performed by: NURSE PRACTITIONER

## 2019-08-09 PROCEDURE — 17250 CHEM CAUT OF GRANLTJ TISSUE: CPT

## 2019-08-09 RX ADMIN — SILVER NITRATE APPLICATORS 1 EACH: 25; 75 STICK TOPICAL at 10:53

## 2019-08-09 ASSESSMENT — PAIN DESCRIPTION - DESCRIPTORS: DESCRIPTORS: BURNING

## 2019-08-09 ASSESSMENT — PAIN DESCRIPTION - PROGRESSION: CLINICAL_PROGRESSION: NOT CHANGED

## 2019-08-09 ASSESSMENT — PAIN DESCRIPTION - FREQUENCY: FREQUENCY: CONTINUOUS

## 2019-08-09 ASSESSMENT — PAIN DESCRIPTION - PAIN TYPE: TYPE: ACUTE PAIN

## 2019-08-09 ASSESSMENT — PAIN SCALES - GENERAL: PAINLEVEL_OUTOF10: 8

## 2019-08-09 ASSESSMENT — PAIN DESCRIPTION - ONSET: ONSET: ON-GOING

## 2019-08-09 ASSESSMENT — PAIN DESCRIPTION - LOCATION: LOCATION: ABDOMEN

## 2019-08-09 ASSESSMENT — PAIN DESCRIPTION - ORIENTATION: ORIENTATION: RIGHT

## 2019-08-09 NOTE — PROGRESS NOTES
allergies        PAST SURGICAL HISTORY    Past Surgical History:   Procedure Laterality Date    LAYER WOUND CLOSURE      TONSILLECTOMY AND ADENOIDECTOMY         FAMILY HISTORY    Family History   Problem Relation Age of Onset    Diabetes Father     Cancer Maternal Grandmother     Cancer Paternal Grandmother        SOCIAL HISTORY    Social History     Tobacco Use    Smoking status: Never Smoker    Smokeless tobacco: Never Used   Substance Use Topics    Alcohol use: No    Drug use: No       ALLERGIES    Allergies   Allergen Reactions    Latex Rash    Cat Hair Extract     Seasonal        MEDICATIONS    Current Outpatient Medications on File Prior to Encounter   Medication Sig Dispense Refill    metoprolol succinate (TOPROL XL) 25 MG extended release tablet take 1 tablet by mouth once daily 90 tablet 3    furosemide (LASIX) 20 MG tablet take 1 tablet by mouth once daily 90 tablet 3    fluocinonide (LIDEX) 0.05 % cream Apply topically to abdominal ulcers twice weekly and as needed. 60 g 3    acetaminophen (TYLENOL) 500 MG tablet Take 1,000 mg by mouth every 6 hours as needed for Pain      Cholecalciferol (VITAMIN D3) 5000 units TABS Take by mouth      MedroxyPROGESTERone Acetate (DEPO-PROVERA IM) Inject into the muscle      IRON PO Take 45 mg by mouth daily.  miconazole (MICOTIN) 2 % powder Apply topically 2 times to 3 times  daily. 45 g 1    Dicyclomine HCl (BENTYL PO)   Take 20 mg by mouth 2 times daily       QUEtiapine Fumarate (SEROQUEL PO) Take 400 mg by mouth 2 times daily.  atomoxetine (STRATTERA) 40 MG capsule Take 40 mg by mouth 2 times daily.  aripiprazole (ABILIFY) 10 MG tablet Take 10 mg by mouth 2 times daily       ACYCLOVIR 400 mg by Does not apply route three times a week        No current facility-administered medications on file prior to encounter.         REVIEW OF SYSTEMS    Constitutional: negative for chills, fatigue and fevers  Eyes: negative for irritation

## 2019-08-12 ENCOUNTER — HOSPITAL ENCOUNTER (EMERGENCY)
Age: 44
Discharge: HOME | End: 2019-08-12
Payer: MEDICARE

## 2019-08-12 VITALS
HEIGHT: 66 IN | BODY MASS INDEX: 47.09 KG/M2 | DIASTOLIC BLOOD PRESSURE: 84 MMHG | RESPIRATION RATE: 20 BRPM | TEMPERATURE: 99 F | SYSTOLIC BLOOD PRESSURE: 125 MMHG | HEART RATE: 116 BPM | OXYGEN SATURATION: 98 % | WEIGHT: 293 LBS

## 2019-08-12 DIAGNOSIS — H65.01 NON-RECURRENT ACUTE SEROUS OTITIS MEDIA OF RIGHT EAR: Primary | ICD-10-CM

## 2019-08-12 DIAGNOSIS — J01.10 ACUTE NON-RECURRENT FRONTAL SINUSITIS: ICD-10-CM

## 2019-08-12 PROCEDURE — 99214 OFFICE O/P EST MOD 30 MIN: CPT | Performed by: NURSE PRACTITIONER

## 2019-08-12 PROCEDURE — 99212 OFFICE O/P EST SF 10 MIN: CPT

## 2019-08-12 RX ORDER — FLUTICASONE PROPIONATE 50 MCG
1 SPRAY, SUSPENSION (ML) NASAL DAILY
Qty: 1 BOTTLE | Refills: 0 | Status: SHIPPED | OUTPATIENT
Start: 2019-08-12 | End: 2020-07-29 | Stop reason: ALTCHOICE

## 2019-08-12 RX ORDER — ALBUTEROL SULFATE 90 UG/1
2 AEROSOL, METERED RESPIRATORY (INHALATION) EVERY 4 HOURS PRN
Qty: 1 INHALER | Refills: 0 | Status: SHIPPED | OUTPATIENT
Start: 2019-08-12 | End: 2020-04-29

## 2019-08-12 RX ORDER — AMOXICILLIN AND CLAVULANATE POTASSIUM 875; 125 MG/1; MG/1
1 TABLET, FILM COATED ORAL EVERY 12 HOURS
Qty: 14 TABLET | Refills: 0 | Status: SHIPPED | OUTPATIENT
Start: 2019-08-12 | End: 2019-08-19

## 2019-08-12 RX ORDER — DEXTROMETHORPHAN HYDROBROMIDE AND PROMETHAZINE HYDROCHLORIDE 15; 6.25 MG/5ML; MG/5ML
5 SYRUP ORAL 4 TIMES DAILY PRN
Qty: 120 ML | Refills: 0 | Status: SHIPPED | OUTPATIENT
Start: 2019-08-12 | End: 2019-08-19

## 2019-08-12 ASSESSMENT — ENCOUNTER SYMPTOMS
VOMITING: 0
SHORTNESS OF BREATH: 0
SINUS PRESSURE: 1
COUGH: 1
EYE DISCHARGE: 0
SINUS PAIN: 1
NAUSEA: 0
RHINORRHEA: 1
EYE ITCHING: 0
DIARRHEA: 0
SORE THROAT: 1

## 2019-08-12 ASSESSMENT — PAIN DESCRIPTION - FREQUENCY: FREQUENCY: CONTINUOUS

## 2019-08-12 ASSESSMENT — PAIN DESCRIPTION - ONSET: ONSET: GRADUAL

## 2019-08-12 ASSESSMENT — PAIN - FUNCTIONAL ASSESSMENT: PAIN_FUNCTIONAL_ASSESSMENT: PREVENTS OR INTERFERES SOME ACTIVE ACTIVITIES AND ADLS

## 2019-08-12 ASSESSMENT — PAIN SCALES - GENERAL: PAINLEVEL_OUTOF10: 8

## 2019-08-12 ASSESSMENT — PAIN DESCRIPTION - PAIN TYPE: TYPE: ACUTE PAIN

## 2019-08-12 ASSESSMENT — PAIN DESCRIPTION - LOCATION: LOCATION: THROAT

## 2019-08-12 ASSESSMENT — PAIN DESCRIPTION - PROGRESSION: CLINICAL_PROGRESSION: GRADUALLY WORSENING

## 2019-08-12 ASSESSMENT — PAIN DESCRIPTION - DESCRIPTORS: DESCRIPTORS: ACHING;SORE

## 2019-08-12 NOTE — ED PROVIDER NOTES
layer wound closure. CURRENT MEDICATIONS       Discharge Medication List as of 8/12/2019 11:53 AM      CONTINUE these medications which have NOT CHANGED    Details   metoprolol succinate (TOPROL XL) 25 MG extended release tablet take 1 tablet by mouth once daily, Disp-90 tablet, R-3Normal      furosemide (LASIX) 20 MG tablet take 1 tablet by mouth once daily, Disp-90 tablet, R-3Normal      acetaminophen (TYLENOL) 500 MG tablet Take 1,000 mg by mouth every 6 hours as needed for PainHistorical Med      Cholecalciferol (VITAMIN D3) 5000 units TABS Take by mouthHistorical Med      MedroxyPROGESTERone Acetate (DEPO-PROVERA IM) Inject into the muscle      IRON PO Take 45 mg by mouth daily. miconazole (MICOTIN) 2 % powder Apply topically 2 times to 3 times  daily. , Disp-45 g, R-1, NO PRINT      Dicyclomine HCl (BENTYL PO)   Take 20 mg by mouth 2 times daily       QUEtiapine Fumarate (SEROQUEL PO) Take 400 mg by mouth 2 times daily. atomoxetine (STRATTERA) 40 MG capsule Take 40 mg by mouth 2 times daily. aripiprazole (ABILIFY) 10 MG tablet Take 10 mg by mouth 2 times daily Historical Med      ACYCLOVIR 400 mg by Does not apply route three times a week Historical Med             ALLERGIES     Patient is is allergic to latex; cat hair extract; and seasonal.    Patients   There is no immunization history on file for this patient. FAMILY HISTORY     Patient's family history includes Cancer in her maternal grandmother and paternal grandmother; Diabetes in her father. SOCIAL HISTORY     Patient  reports that she has never smoked. She has never used smokeless tobacco. She reports that she does not drink alcohol or use drugs. PHYSICAL EXAM     ED TRIAGE VITALS  BP: 125/84, Temp: 99 °F (37.2 °C), Pulse: 116, Resp: 20, SpO2: 98 %,Estimated body mass index is 51.97 kg/m² as calculated from the following:    Height as of this encounter: 5' 6\" (1.676 m).     Weight as of this encounter: 322 lb (146.1

## 2019-09-18 ENCOUNTER — HOSPITAL ENCOUNTER (OUTPATIENT)
Dept: WOUND CARE | Age: 44
Discharge: HOME | End: 2019-09-18
Payer: MEDICARE

## 2019-09-18 ENCOUNTER — TELEPHONE (OUTPATIENT)
Dept: ADMINISTRATIVE | Age: 44
End: 2019-09-18

## 2019-09-18 VITALS
HEART RATE: 103 BPM | HEIGHT: 66 IN | BODY MASS INDEX: 47.09 KG/M2 | DIASTOLIC BLOOD PRESSURE: 57 MMHG | OXYGEN SATURATION: 100 % | WEIGHT: 293 LBS | RESPIRATION RATE: 20 BRPM | TEMPERATURE: 99 F | SYSTOLIC BLOOD PRESSURE: 121 MMHG

## 2019-09-18 DIAGNOSIS — I89.0 LYMPHEDEMA OF BOTH LOWER EXTREMITIES: ICD-10-CM

## 2019-09-18 DIAGNOSIS — R60.0 BILATERAL LEG EDEMA: ICD-10-CM

## 2019-09-18 DIAGNOSIS — L98.491 ULCER OF ABDOMEN WALL, LIMITED TO BREAKDOWN OF SKIN (HCC): ICD-10-CM

## 2019-09-18 DIAGNOSIS — B37.2 CANDIDIASIS, INTERTRIGINOUS: ICD-10-CM

## 2019-09-18 DIAGNOSIS — L89.893 PRESSURE ULCER OF LEFT LEG, STAGE 3 (HCC): Primary | ICD-10-CM

## 2019-09-18 DIAGNOSIS — Z20.7 SCABIES EXPOSURE: ICD-10-CM

## 2019-09-18 PROCEDURE — 99213 OFFICE O/P EST LOW 20 MIN: CPT

## 2019-09-18 PROCEDURE — 2709999900 HC NON-CHARGEABLE SUPPLY

## 2019-09-18 PROCEDURE — 99214 OFFICE O/P EST MOD 30 MIN: CPT | Performed by: NURSE PRACTITIONER

## 2019-09-18 ASSESSMENT — PAIN DESCRIPTION - LOCATION: LOCATION: LEG

## 2019-09-18 ASSESSMENT — PAIN DESCRIPTION - PAIN TYPE: TYPE: ACUTE PAIN

## 2019-09-18 ASSESSMENT — PAIN SCALES - GENERAL: PAINLEVEL_OUTOF10: 8

## 2019-09-18 ASSESSMENT — PAIN DESCRIPTION - DESCRIPTORS: DESCRIPTORS: SORE;BURNING

## 2019-09-18 ASSESSMENT — PAIN DESCRIPTION - ORIENTATION: ORIENTATION: RIGHT

## 2019-09-18 ASSESSMENT — PAIN DESCRIPTION - FREQUENCY: FREQUENCY: CONTINUOUS

## 2019-09-18 ASSESSMENT — PAIN DESCRIPTION - ONSET: ONSET: ON-GOING

## 2019-09-18 ASSESSMENT — PAIN DESCRIPTION - PROGRESSION: CLINICAL_PROGRESSION: NOT CHANGED

## 2019-09-19 ENCOUNTER — HOSPITAL ENCOUNTER (OUTPATIENT)
Age: 44
Discharge: HOME | End: 2019-09-19
Payer: MEDICARE

## 2019-09-19 ENCOUNTER — OFFICE VISIT (OUTPATIENT)
Dept: CARDIOLOGY CLINIC | Age: 44
End: 2019-09-19
Payer: MEDICARE

## 2019-09-19 ENCOUNTER — HOSPITAL ENCOUNTER (OUTPATIENT)
Dept: GENERAL RADIOLOGY | Age: 44
Discharge: HOME | End: 2019-09-19
Payer: MEDICARE

## 2019-09-19 VITALS
SYSTOLIC BLOOD PRESSURE: 133 MMHG | HEIGHT: 67 IN | DIASTOLIC BLOOD PRESSURE: 87 MMHG | BODY MASS INDEX: 45.99 KG/M2 | HEART RATE: 89 BPM | WEIGHT: 293 LBS

## 2019-09-19 DIAGNOSIS — I50.32 CHRONIC DIASTOLIC CONGESTIVE HEART FAILURE (HCC): ICD-10-CM

## 2019-09-19 DIAGNOSIS — G90.A POTS (POSTURAL ORTHOSTATIC TACHYCARDIA SYNDROME): ICD-10-CM

## 2019-09-19 DIAGNOSIS — E66.01 MORBID OBESITY (HCC): ICD-10-CM

## 2019-09-19 DIAGNOSIS — I50.32 CHRONIC DIASTOLIC CONGESTIVE HEART FAILURE (HCC): Primary | ICD-10-CM

## 2019-09-19 DIAGNOSIS — R06.02 SOB (SHORTNESS OF BREATH) ON EXERTION: ICD-10-CM

## 2019-09-19 DIAGNOSIS — I89.0 LYMPHEDEMA OF BOTH LOWER EXTREMITIES: ICD-10-CM

## 2019-09-19 LAB
ANION GAP SERPL CALCULATED.3IONS-SCNC: 15 MEQ/L (ref 8–16)
BUN BLDV-MCNC: 9 MG/DL (ref 7–22)
CALCIUM SERPL-MCNC: 9.4 MG/DL (ref 8.5–10.5)
CHLORIDE BLD-SCNC: 104 MEQ/L (ref 98–111)
CO2: 21 MEQ/L (ref 23–33)
CREAT SERPL-MCNC: 0.6 MG/DL (ref 0.4–1.2)
ERYTHROCYTE [DISTWIDTH] IN BLOOD BY AUTOMATED COUNT: 13.8 % (ref 11.5–14.5)
ERYTHROCYTE [DISTWIDTH] IN BLOOD BY AUTOMATED COUNT: 47 FL (ref 35–45)
GFR SERPL CREATININE-BSD FRML MDRD: > 90 ML/MIN/1.73M2
GLUCOSE BLD-MCNC: 100 MG/DL (ref 70–108)
HCT VFR BLD CALC: 43.2 % (ref 37–47)
HEMOGLOBIN: 13.7 GM/DL (ref 12–16)
MAGNESIUM: 2.1 MG/DL (ref 1.6–2.4)
MCH RBC QN AUTO: 29.7 PG (ref 26–33)
MCHC RBC AUTO-ENTMCNC: 31.7 GM/DL (ref 32.2–35.5)
MCV RBC AUTO: 93.5 FL (ref 81–99)
PLATELET # BLD: 283 THOU/MM3 (ref 130–400)
PMV BLD AUTO: 9.8 FL (ref 9.4–12.4)
POTASSIUM SERPL-SCNC: 4.1 MEQ/L (ref 3.5–5.2)
PRO-BNP: 36.2 PG/ML (ref 0–450)
RBC # BLD: 4.62 MILL/MM3 (ref 4.2–5.4)
SODIUM BLD-SCNC: 140 MEQ/L (ref 135–145)
WBC # BLD: 6.8 THOU/MM3 (ref 4.8–10.8)

## 2019-09-19 PROCEDURE — 83880 ASSAY OF NATRIURETIC PEPTIDE: CPT

## 2019-09-19 PROCEDURE — 85027 COMPLETE CBC AUTOMATED: CPT

## 2019-09-19 PROCEDURE — 80048 BASIC METABOLIC PNL TOTAL CA: CPT

## 2019-09-19 PROCEDURE — 36415 COLL VENOUS BLD VENIPUNCTURE: CPT

## 2019-09-19 PROCEDURE — 71046 X-RAY EXAM CHEST 2 VIEWS: CPT

## 2019-09-19 PROCEDURE — 1036F TOBACCO NON-USER: CPT | Performed by: NURSE PRACTITIONER

## 2019-09-19 PROCEDURE — 99213 OFFICE O/P EST LOW 20 MIN: CPT | Performed by: NURSE PRACTITIONER

## 2019-09-19 PROCEDURE — 83735 ASSAY OF MAGNESIUM: CPT

## 2019-09-19 PROCEDURE — G8427 DOCREV CUR MEDS BY ELIG CLIN: HCPCS | Performed by: NURSE PRACTITIONER

## 2019-09-19 PROCEDURE — G8417 CALC BMI ABV UP PARAM F/U: HCPCS | Performed by: NURSE PRACTITIONER

## 2019-09-19 RX ORDER — CEPHALEXIN 500 MG/1
CAPSULE ORAL
Refills: 0 | COMMUNITY
Start: 2019-09-17 | End: 2019-11-08 | Stop reason: ALTCHOICE

## 2019-09-19 NOTE — PROGRESS NOTES
Pt here for check up                Yesi Griffin OF [place] [address]   [address] [address]   Dept: [phone]   Dept Fax: [phone]   Loc: [phone]      Chief Complaint   Patient presents with   Eufemia Saleh    Congestive Heart Failure     Office visit for increased BLE edema and sob in mentally delayed 46 y/o female with history of lymphedema, chronic diastolic CHF, POTS, and obesity. Her mother is with her today and states she has gained 21 lbs, is more sob than usual, and her legs are more swollen than usual. She denies being sob at rest. She is sleeping in her normal position in bed with the same number of pillows. The lymphedema clinic suggested she come to see cardiology. Denies chest pain, palpitations, lightheadedness or syncope. She has chronic intermittent dizziness but states it is no worse and not everyday.      Cardiologist:  Dr. Jb Khanning:   No fever, no chills, No fatigue or weight loss  Pulmonary:    + dyspnea, no wheezing  Cardiac:    Denies recent chest pain   GI:     No nausea or vomiting, no abdominal pain  Neuro:    No dizziness or light headedness  Musculoskeletal:  No recent active issues  Extremities:   No edema, good peripheral pulses      Past Medical History:   Diagnosis Date    ADHD (attention deficit hyperactivity disorder)     Artificial skin graft or decellularized allodermis mechanical complic     during hospital visit    Bronchitis     [facility]est[facility])     Elbow fracture 10/09/14    left    Irritable bowel syndrome     Irritable bowel    Lymph edema 2017    Obesity     Conde disease     [facility]     Seasonal allergies        Allergies   Allergen Reactions    Latex Rash    Cat Hair Extract     Seasonal        Current Outpatient Medications   Medication Sig Dispense Refill    cephALEXin (KEFLEX) 500 MG capsule take 1 capsule by mouth three times a day  0    albuterol

## 2019-09-26 NOTE — PROGRESS NOTES
[facility]          Progress Note       Cj RECORD NUMBER:  [id number]  AGE: 45 y.o. GENDER: female  : 1975  EPISODE DATE:  2019    Subjective:     Chief Complaint   Patient presents with    Wound Check     abdomen and leg swelling          HISTORY of PRESENT ILLNESS HPI     Danuta Daniels is a 45 y.o. female Established patient referred by Dr. Suman Trinidad, who presents today for wound/ulcer evaluation. History of Wound Context: Patient has reoccurrence of open ulcers to abdominal keloid which opened in May 2019. She has a history of abdominal ulcers related to keloids since 2001 which were open for nearly 15 years then have healed and reopened a few times since then. She has a history of the areas being surgically excised and injected with steroids with no improvement. She has significant bilateral lower leg lymphedema and has lymphedema pumps and Velcro compression wraps to manage her edema. She has had in increase in shortness of breath with ambulation, increased bilateral lower leg edema, and 21 lb weight gain since her last visit. Wound/Ulcer Pain Timing/Severity: moderate  Quality of pain: aching, tender  Severity:  8 / 10   Modifying Factors: Pain worsens with dressing changes  Associated Signs/Symptoms: drainage and pain    Interval History:   Patient presents today for follow up on wound/ulcer's progression. The patient is currently not on antibiotics. Current dressing care includes:    Abdomen ulcers:Apply endoform to the open areas and moisten with a few drops of saline, cover with hydroferra blue ready and tegaderm. Change three times per week.  Ok to shower on dressing change days        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     during hospital visit    Bronchitis     CHF (congestive heart failure) [facility]     Elbow fracture 10/09/14    left    Irritable bowel syndrome     Irritable bowel    Lymph edema 2017    Obesity     Conde disease     Schizophrenia ([facility]     Seasonal allergies        PAST SURGICAL HISTORY    Past Surgical History:   Procedure Laterality Date    LAYER WOUND CLOSURE      TOE SURGERY  09/2019    TOENAIL EXCISION      TONSILLECTOMY AND ADENOIDECTOMY         FAMILY HISTORY    Family History   Problem Relation Age of Onset    Diabetes Father     Cancer Maternal Grandmother     Cancer Paternal Grandmother        SOCIAL HISTORY    Social History     Tobacco Use    Smoking status: Never Smoker    Smokeless tobacco: Never Used   Substance Use Topics    Alcohol use: No    Drug use: No       ALLERGIES    Allergies   Allergen Reactions    Latex Rash    Cat Hair Extract     Seasonal        MEDICATIONS    Current Outpatient Medications on File Prior to Encounter   Medication Sig Dispense Refill    albuterol sulfate HFA (PROAIR HFA) 108 (90 Base) MCG/ACT inhaler Inhale 2 puffs into the lungs every 4 hours as needed for Wheezing or Shortness of Breath 1 Inhaler 0    fluticasone (FLONASE) 50 MCG/ACT nasal spray 1 spray by Nasal route daily for 7 days 1 Bottle 0    metoprolol succinate (TOPROL XL) 25 MG extended release tablet take 1 tablet by mouth once daily 90 tablet 3    furosemide (LASIX) 20 MG tablet take 1 tablet by mouth once daily 90 tablet 3    acetaminophen (TYLENOL) 500 MG tablet Take 1,000 mg by mouth every 6 hours as needed for Pain      Cholecalciferol (VITAMIN D3) 5000 units TABS Take 2,000 Units by mouth       MedroxyPROGESTERone Acetate (DEPO-PROVERA IM) Inject into the muscle      IRON PO Take 65 mg by mouth daily       miconazole (MICOTIN) 2 % powder Apply topically 2 times to 3 times  daily. 45 g 1    Dicyclomine HCl (BENTYL PO)   Take 20 mg by mouth 2 times daily       QUEtiapine Fumarate (SEROQUEL PO) Take 400 mg by mouth 2 times daily.         Wound Volume (cm^3) 0.3 cm^3 9/18/2019  3:19 PM   Wound Healing % 17 9/18/2019  3:19 PM   Wound Assessment Red;Yellow 8/9/2019 10:36 AM   Drainage Amount Small 9/18/2019  3:19 PM   Drainage Description Serosanguinous 9/18/2019  3:19 PM   Odor None 9/18/2019  3:19 PM   Margins Attached edges 9/18/2019  3:19 PM   Gwen-wound Assessment Dry;Pink 9/18/2019  3:19 PM   Red%Wound Bed 80 9/18/2019  3:19 PM   Yellow%Wound Bed 20 9/18/2019  3:19 PM   Other%Wound Bed 30 epi 7/11/2019  1:26 PM   Number of days: 76       LABS      CBC:   Lab Results   Component Value Date    WBC 6.8 09/19/2019    HGB 13.7 09/19/2019    HCT 43.2 09/19/2019    MCV 93.5 09/19/2019     09/19/2019     BMP:   Lab Results   Component Value Date     09/19/2019    K 4.1 09/19/2019     09/19/2019    CO2 21 09/19/2019    BUN 9 09/19/2019    CREATININE 0.6 09/19/2019     PT/INR: No results found for: PROTIME, INR  Prealbumin: No results found for: PREALBUMIN  Albumin:No results found for: Ana Cristina King  Sed Rate:No results found for: SEDRATE  CRP: No results found for: CRP  Micro: No results found for: BC   Hemoglobin A1C: No results found for: LABA1C    Assessment:     Skin ulcer of abdominal wall  Keloids     Contributing Factors: obesity and history of keloids    Patient Active Problem List   Diagnosis Code    Keloid L91.0    MRSA (methicillin resistant staph aureus) culture positive Z22.322    Skin ulcer of abdominal wall ([facility]) L98.499    POTS (postural orthostatic tachycardia syndrome) R00.0, I95.1    CHF (congestive heart failure) ([facility] [address] [address] chronic diastolic E35.3    Morbid obesity ([facility] [address] E66.01    Bilateral leg edema =LYMPHEDEMA- ON f/U WITH LYMPHEDEMA CLINIC R60.0    Lymphedema of both lower extremities I89.0    SOB (shortness of breath) on exertion R06.02    Candidiasis, intertriginous B37.2    Pressure ulcer of left leg, stage 3 ([facility]) L89.893    Scabies exposure Z20.89       Procedure Note  Indications:  Based on my

## 2019-10-17 ENCOUNTER — HOSPITAL ENCOUNTER (OUTPATIENT)
Dept: WOUND CARE | Age: 44
Discharge: HOME | End: 2019-10-17
Payer: MEDICARE

## 2019-10-17 VITALS
RESPIRATION RATE: 20 BRPM | TEMPERATURE: 96.3 F | BODY MASS INDEX: 45.99 KG/M2 | DIASTOLIC BLOOD PRESSURE: 91 MMHG | HEIGHT: 67 IN | HEART RATE: 104 BPM | OXYGEN SATURATION: 96 % | WEIGHT: 293 LBS | SYSTOLIC BLOOD PRESSURE: 141 MMHG

## 2019-10-17 DIAGNOSIS — L98.491 ULCER OF ABDOMEN WALL, LIMITED TO BREAKDOWN OF SKIN (HCC): Primary | ICD-10-CM

## 2019-10-17 DIAGNOSIS — Z20.7 SCABIES EXPOSURE: ICD-10-CM

## 2019-10-17 DIAGNOSIS — B37.2 CANDIDIASIS, INTERTRIGINOUS: ICD-10-CM

## 2019-10-17 DIAGNOSIS — I89.0 LYMPHEDEMA OF BOTH LOWER EXTREMITIES: ICD-10-CM

## 2019-10-17 PROBLEM — L89.893 PRESSURE ULCER OF LEFT LEG, STAGE 3 (HCC): Status: RESOLVED | Noted: 2018-11-28 | Resolved: 2019-10-17

## 2019-10-17 PROCEDURE — 2709999900 HC NON-CHARGEABLE SUPPLY

## 2019-10-17 PROCEDURE — 99213 OFFICE O/P EST LOW 20 MIN: CPT

## 2019-10-17 PROCEDURE — 99214 OFFICE O/P EST MOD 30 MIN: CPT | Performed by: NURSE PRACTITIONER

## 2019-10-17 ASSESSMENT — PAIN DESCRIPTION - LOCATION: LOCATION: ABDOMEN

## 2019-10-17 ASSESSMENT — PAIN DESCRIPTION - PAIN TYPE: TYPE: CHRONIC PAIN

## 2019-10-17 ASSESSMENT — PAIN DESCRIPTION - FREQUENCY: FREQUENCY: CONTINUOUS

## 2019-10-17 ASSESSMENT — PAIN DESCRIPTION - DESCRIPTORS: DESCRIPTORS: CONSTANT

## 2019-10-17 ASSESSMENT — PAIN SCALES - GENERAL: PAINLEVEL_OUTOF10: 8

## 2019-11-08 ENCOUNTER — HOSPITAL ENCOUNTER (EMERGENCY)
Age: 44
Discharge: HOME | End: 2019-11-08
Payer: MEDICARE

## 2019-11-08 VITALS
HEART RATE: 99 BPM | SYSTOLIC BLOOD PRESSURE: 116 MMHG | RESPIRATION RATE: 20 BRPM | OXYGEN SATURATION: 96 % | TEMPERATURE: 97.4 F | DIASTOLIC BLOOD PRESSURE: 65 MMHG | WEIGHT: 293 LBS | BODY MASS INDEX: 52.47 KG/M2

## 2019-11-08 DIAGNOSIS — J06.9 ACUTE UPPER RESPIRATORY INFECTION: Primary | ICD-10-CM

## 2019-11-08 PROCEDURE — 99212 OFFICE O/P EST SF 10 MIN: CPT

## 2019-11-08 PROCEDURE — 99213 OFFICE O/P EST LOW 20 MIN: CPT | Performed by: NURSE PRACTITIONER

## 2019-11-08 RX ORDER — CEFDINIR 300 MG/1
300 CAPSULE ORAL 2 TIMES DAILY
Qty: 14 CAPSULE | Refills: 0 | Status: SHIPPED | OUTPATIENT
Start: 2019-11-08 | End: 2019-11-15

## 2019-11-08 ASSESSMENT — ENCOUNTER SYMPTOMS
VOMITING: 0
SINUS PRESSURE: 1
SORE THROAT: 1
COUGH: 1
SHORTNESS OF BREATH: 0
NAUSEA: 0

## 2019-11-08 ASSESSMENT — PAIN DESCRIPTION - PAIN TYPE: TYPE: ACUTE PAIN

## 2019-11-08 ASSESSMENT — PAIN DESCRIPTION - LOCATION: LOCATION: THROAT

## 2019-11-08 ASSESSMENT — PAIN SCALES - GENERAL: PAINLEVEL_OUTOF10: 7

## 2019-11-14 ENCOUNTER — HOSPITAL ENCOUNTER (OUTPATIENT)
Dept: WOUND CARE | Age: 44
Discharge: HOME | End: 2019-11-14
Payer: MEDICARE

## 2019-11-14 ENCOUNTER — HOSPITAL ENCOUNTER (OUTPATIENT)
Age: 44
Discharge: HOME | End: 2019-11-14
Payer: MEDICARE

## 2019-11-14 ENCOUNTER — OFFICE VISIT (OUTPATIENT)
Dept: CARDIOLOGY CLINIC | Age: 44
End: 2019-11-14
Payer: MEDICARE

## 2019-11-14 VITALS
SYSTOLIC BLOOD PRESSURE: 119 MMHG | HEART RATE: 102 BPM | BODY MASS INDEX: 45.99 KG/M2 | WEIGHT: 293 LBS | OXYGEN SATURATION: 97 % | HEIGHT: 67 IN | DIASTOLIC BLOOD PRESSURE: 67 MMHG | TEMPERATURE: 98.4 F | RESPIRATION RATE: 20 BRPM

## 2019-11-14 VITALS
BODY MASS INDEX: 45.99 KG/M2 | WEIGHT: 293 LBS | HEIGHT: 67 IN | DIASTOLIC BLOOD PRESSURE: 86 MMHG | SYSTOLIC BLOOD PRESSURE: 144 MMHG | HEART RATE: 101 BPM

## 2019-11-14 DIAGNOSIS — E66.01 MORBID OBESITY (HCC): ICD-10-CM

## 2019-11-14 DIAGNOSIS — I89.0 LYMPHEDEMA OF BOTH LOWER EXTREMITIES: ICD-10-CM

## 2019-11-14 DIAGNOSIS — R06.02 SOB (SHORTNESS OF BREATH) ON EXERTION: ICD-10-CM

## 2019-11-14 DIAGNOSIS — L91.0 KELOID: ICD-10-CM

## 2019-11-14 DIAGNOSIS — G90.A POTS (POSTURAL ORTHOSTATIC TACHYCARDIA SYNDROME): ICD-10-CM

## 2019-11-14 DIAGNOSIS — I50.32 CHRONIC DIASTOLIC CONGESTIVE HEART FAILURE (HCC): ICD-10-CM

## 2019-11-14 DIAGNOSIS — R60.0 BILATERAL LEG EDEMA: ICD-10-CM

## 2019-11-14 DIAGNOSIS — I50.32 CHRONIC DIASTOLIC CONGESTIVE HEART FAILURE (HCC): Primary | ICD-10-CM

## 2019-11-14 DIAGNOSIS — L98.491 ULCER OF ABDOMEN WALL, LIMITED TO BREAKDOWN OF SKIN (HCC): Primary | ICD-10-CM

## 2019-11-14 LAB
ANION GAP SERPL CALCULATED.3IONS-SCNC: 15 MEQ/L (ref 8–16)
BUN BLDV-MCNC: 10 MG/DL (ref 7–22)
CALCIUM SERPL-MCNC: 9.2 MG/DL (ref 8.5–10.5)
CHLORIDE BLD-SCNC: 104 MEQ/L (ref 98–111)
CO2: 22 MEQ/L (ref 23–33)
CREAT SERPL-MCNC: 0.7 MG/DL (ref 0.4–1.2)
GFR SERPL CREATININE-BSD FRML MDRD: > 90 ML/MIN/1.73M2
GLUCOSE BLD-MCNC: 86 MG/DL (ref 70–108)
MAGNESIUM: 2.3 MG/DL (ref 1.6–2.4)
POTASSIUM SERPL-SCNC: 3.9 MEQ/L (ref 3.5–5.2)
SODIUM BLD-SCNC: 141 MEQ/L (ref 135–145)

## 2019-11-14 PROCEDURE — G8427 DOCREV CUR MEDS BY ELIG CLIN: HCPCS | Performed by: INTERNAL MEDICINE

## 2019-11-14 PROCEDURE — 36415 COLL VENOUS BLD VENIPUNCTURE: CPT

## 2019-11-14 PROCEDURE — 99214 OFFICE O/P EST MOD 30 MIN: CPT | Performed by: INTERNAL MEDICINE

## 2019-11-14 PROCEDURE — 99213 OFFICE O/P EST LOW 20 MIN: CPT

## 2019-11-14 PROCEDURE — G8417 CALC BMI ABV UP PARAM F/U: HCPCS | Performed by: INTERNAL MEDICINE

## 2019-11-14 PROCEDURE — 83735 ASSAY OF MAGNESIUM: CPT

## 2019-11-14 PROCEDURE — 99213 OFFICE O/P EST LOW 20 MIN: CPT | Performed by: NURSE PRACTITIONER

## 2019-11-14 PROCEDURE — 80048 BASIC METABOLIC PNL TOTAL CA: CPT

## 2019-11-14 PROCEDURE — 1036F TOBACCO NON-USER: CPT | Performed by: INTERNAL MEDICINE

## 2019-11-14 PROCEDURE — G8484 FLU IMMUNIZE NO ADMIN: HCPCS | Performed by: INTERNAL MEDICINE

## 2019-11-14 ASSESSMENT — PAIN DESCRIPTION - ONSET: ONSET: ON-GOING

## 2019-11-14 ASSESSMENT — PAIN DESCRIPTION - FREQUENCY: FREQUENCY: CONTINUOUS

## 2019-11-14 ASSESSMENT — PAIN DESCRIPTION - DESCRIPTORS: DESCRIPTORS: CONSTANT;BURNING

## 2019-11-14 ASSESSMENT — PAIN DESCRIPTION - ORIENTATION: ORIENTATION: RIGHT

## 2019-11-14 ASSESSMENT — PAIN SCALES - GENERAL: PAINLEVEL_OUTOF10: 8

## 2019-11-14 ASSESSMENT — PAIN DESCRIPTION - LOCATION: LOCATION: ABDOMEN;LEG

## 2019-11-14 ASSESSMENT — PAIN DESCRIPTION - PAIN TYPE: TYPE: ACUTE PAIN;CHRONIC PAIN

## 2019-11-14 ASSESSMENT — PAIN DESCRIPTION - PROGRESSION: CLINICAL_PROGRESSION: NOT CHANGED

## 2019-11-18 ENCOUNTER — HOSPITAL ENCOUNTER (OUTPATIENT)
Dept: PHYSICAL THERAPY | Age: 44
Setting detail: THERAPIES SERIES
Discharge: HOME | End: 2019-11-18
Payer: MEDICARE

## 2019-11-18 PROCEDURE — 97162 PT EVAL MOD COMPLEX 30 MIN: CPT

## 2019-11-18 PROCEDURE — 97140 MANUAL THERAPY 1/> REGIONS: CPT

## 2019-11-18 ASSESSMENT — PAIN DESCRIPTION - PAIN TYPE: TYPE: CHRONIC PAIN

## 2019-11-18 ASSESSMENT — PAIN DESCRIPTION - LOCATION: LOCATION: LEG

## 2019-11-18 ASSESSMENT — PAIN DESCRIPTION - DESCRIPTORS: DESCRIPTORS: ACHING;CONSTANT;SORE

## 2019-11-18 ASSESSMENT — PAIN SCALES - GENERAL: PAINLEVEL_OUTOF10: 6

## 2019-12-05 ENCOUNTER — HOSPITAL ENCOUNTER (OUTPATIENT)
Dept: WOUND CARE | Age: 44
Discharge: HOME | End: 2019-12-05
Payer: MEDICARE

## 2019-12-05 VITALS
DIASTOLIC BLOOD PRESSURE: 77 MMHG | BODY MASS INDEX: 51.06 KG/M2 | HEART RATE: 97 BPM | RESPIRATION RATE: 20 BRPM | WEIGHT: 293 LBS | OXYGEN SATURATION: 97 % | SYSTOLIC BLOOD PRESSURE: 139 MMHG | TEMPERATURE: 99.3 F

## 2019-12-05 DIAGNOSIS — L98.491 ULCER OF ABDOMEN WALL, LIMITED TO BREAKDOWN OF SKIN (HCC): Primary | ICD-10-CM

## 2019-12-05 PROCEDURE — 99213 OFFICE O/P EST LOW 20 MIN: CPT | Performed by: NURSE PRACTITIONER

## 2019-12-05 PROCEDURE — 15271 SKIN SUB GRAFT TRNK/ARM/LEG: CPT | Performed by: NURSE PRACTITIONER

## 2019-12-05 PROCEDURE — 15271 SKIN SUB GRAFT TRNK/ARM/LEG: CPT

## 2019-12-05 PROCEDURE — 2709999900 HC NON-CHARGEABLE SUPPLY

## 2019-12-05 ASSESSMENT — PAIN SCALES - GENERAL: PAINLEVEL_OUTOF10: 7

## 2019-12-05 ASSESSMENT — PAIN DESCRIPTION - LOCATION: LOCATION: ABDOMEN

## 2019-12-05 ASSESSMENT — PAIN DESCRIPTION - PAIN TYPE: TYPE: CHRONIC PAIN

## 2019-12-12 ENCOUNTER — HOSPITAL ENCOUNTER (OUTPATIENT)
Dept: WOUND CARE | Age: 44
Discharge: HOME | End: 2019-12-12
Payer: MEDICARE

## 2019-12-12 VITALS
RESPIRATION RATE: 20 BRPM | SYSTOLIC BLOOD PRESSURE: 131 MMHG | TEMPERATURE: 97.7 F | HEIGHT: 67 IN | WEIGHT: 293 LBS | OXYGEN SATURATION: 97 % | BODY MASS INDEX: 45.99 KG/M2 | DIASTOLIC BLOOD PRESSURE: 84 MMHG | HEART RATE: 105 BPM

## 2019-12-12 DIAGNOSIS — I89.0 LYMPHEDEMA OF BOTH LOWER EXTREMITIES: Primary | ICD-10-CM

## 2019-12-12 DIAGNOSIS — L98.491 ULCER OF ABDOMEN WALL, LIMITED TO BREAKDOWN OF SKIN (HCC): ICD-10-CM

## 2019-12-12 DIAGNOSIS — L91.0 KELOID: ICD-10-CM

## 2019-12-12 DIAGNOSIS — Z22.322 MRSA (METHICILLIN RESISTANT STAPH AUREUS) CULTURE POSITIVE: ICD-10-CM

## 2019-12-12 PROCEDURE — 99213 OFFICE O/P EST LOW 20 MIN: CPT | Performed by: NURSE PRACTITIONER

## 2019-12-12 PROCEDURE — 15271 SKIN SUB GRAFT TRNK/ARM/LEG: CPT

## 2019-12-12 PROCEDURE — 2709999900 HC NON-CHARGEABLE SUPPLY

## 2019-12-12 PROCEDURE — 15271 SKIN SUB GRAFT TRNK/ARM/LEG: CPT | Performed by: NURSE PRACTITIONER

## 2019-12-12 ASSESSMENT — PAIN DESCRIPTION - FREQUENCY: FREQUENCY: CONTINUOUS

## 2019-12-12 ASSESSMENT — PAIN DESCRIPTION - ONSET: ONSET: ON-GOING

## 2019-12-12 ASSESSMENT — PAIN DESCRIPTION - PROGRESSION: CLINICAL_PROGRESSION: NOT CHANGED

## 2019-12-12 ASSESSMENT — PAIN DESCRIPTION - DESCRIPTORS: DESCRIPTORS: CONSTANT

## 2019-12-12 ASSESSMENT — PAIN DESCRIPTION - PAIN TYPE: TYPE: CHRONIC PAIN

## 2019-12-12 ASSESSMENT — PAIN SCALES - GENERAL: PAINLEVEL_OUTOF10: 7

## 2019-12-12 ASSESSMENT — PAIN DESCRIPTION - LOCATION: LOCATION: ABDOMEN

## 2019-12-18 RX ORDER — FUROSEMIDE 20 MG/1
TABLET ORAL
Qty: 90 TABLET | Refills: 1 | Status: SHIPPED | OUTPATIENT
Start: 2019-12-18 | End: 2020-03-17 | Stop reason: SDUPTHER

## 2019-12-18 RX ORDER — METOPROLOL SUCCINATE 25 MG/1
TABLET, EXTENDED RELEASE ORAL
Qty: 90 TABLET | Refills: 1 | Status: SHIPPED | OUTPATIENT
Start: 2019-12-18 | End: 2020-03-17 | Stop reason: SDUPTHER

## 2019-12-19 ENCOUNTER — HOSPITAL ENCOUNTER (OUTPATIENT)
Dept: WOUND CARE | Age: 44
Discharge: HOME | End: 2019-12-19
Payer: MEDICARE

## 2019-12-19 VITALS
SYSTOLIC BLOOD PRESSURE: 134 MMHG | TEMPERATURE: 98.2 F | BODY MASS INDEX: 45.99 KG/M2 | HEART RATE: 100 BPM | RESPIRATION RATE: 18 BRPM | DIASTOLIC BLOOD PRESSURE: 73 MMHG | WEIGHT: 293 LBS | OXYGEN SATURATION: 97 % | HEIGHT: 67 IN

## 2019-12-19 DIAGNOSIS — Z22.322 MRSA (METHICILLIN RESISTANT STAPH AUREUS) CULTURE POSITIVE: ICD-10-CM

## 2019-12-19 DIAGNOSIS — L91.0 KELOID: ICD-10-CM

## 2019-12-19 DIAGNOSIS — I89.0 LYMPHEDEMA OF BOTH LOWER EXTREMITIES: ICD-10-CM

## 2019-12-19 DIAGNOSIS — B37.2 CANDIDIASIS, INTERTRIGINOUS: ICD-10-CM

## 2019-12-19 DIAGNOSIS — L98.491 ULCER OF ABDOMEN WALL, LIMITED TO BREAKDOWN OF SKIN (HCC): Primary | ICD-10-CM

## 2019-12-19 DIAGNOSIS — R60.0 BILATERAL LEG EDEMA: ICD-10-CM

## 2019-12-19 PROCEDURE — 99213 OFFICE O/P EST LOW 20 MIN: CPT | Performed by: NURSE PRACTITIONER

## 2019-12-19 PROCEDURE — 99212 OFFICE O/P EST SF 10 MIN: CPT

## 2019-12-19 ASSESSMENT — PAIN DESCRIPTION - FREQUENCY: FREQUENCY: CONTINUOUS

## 2019-12-19 ASSESSMENT — PAIN DESCRIPTION - LOCATION: LOCATION: ABDOMEN

## 2019-12-19 ASSESSMENT — PAIN DESCRIPTION - ONSET: ONSET: ON-GOING

## 2019-12-19 ASSESSMENT — PAIN DESCRIPTION - PROGRESSION: CLINICAL_PROGRESSION: GRADUALLY IMPROVING

## 2019-12-19 ASSESSMENT — PAIN SCALES - GENERAL: PAINLEVEL_OUTOF10: 8

## 2019-12-19 ASSESSMENT — PAIN DESCRIPTION - PAIN TYPE: TYPE: CHRONIC PAIN

## 2019-12-19 ASSESSMENT — PAIN DESCRIPTION - DESCRIPTORS: DESCRIPTORS: BURNING

## 2020-01-15 ENCOUNTER — HOSPITAL ENCOUNTER (OUTPATIENT)
Dept: WOUND CARE | Age: 45
Discharge: HOME | End: 2020-01-15
Payer: MEDICARE

## 2020-01-15 VITALS
HEIGHT: 67 IN | DIASTOLIC BLOOD PRESSURE: 74 MMHG | SYSTOLIC BLOOD PRESSURE: 134 MMHG | TEMPERATURE: 98.6 F | OXYGEN SATURATION: 98 % | HEART RATE: 101 BPM | BODY MASS INDEX: 45.99 KG/M2 | WEIGHT: 293 LBS

## 2020-01-15 PROCEDURE — 2709999900 HC NON-CHARGEABLE SUPPLY

## 2020-01-15 PROCEDURE — 99213 OFFICE O/P EST LOW 20 MIN: CPT | Performed by: NURSE PRACTITIONER

## 2020-01-15 PROCEDURE — 15271 SKIN SUB GRAFT TRNK/ARM/LEG: CPT

## 2020-01-15 PROCEDURE — 15271 SKIN SUB GRAFT TRNK/ARM/LEG: CPT | Performed by: NURSE PRACTITIONER

## 2020-01-15 ASSESSMENT — PAIN DESCRIPTION - PAIN TYPE: TYPE: CHRONIC PAIN

## 2020-01-15 ASSESSMENT — PAIN DESCRIPTION - PROGRESSION: CLINICAL_PROGRESSION: NOT CHANGED

## 2020-01-15 ASSESSMENT — PAIN SCALES - GENERAL: PAINLEVEL_OUTOF10: 9

## 2020-01-15 ASSESSMENT — PAIN DESCRIPTION - ONSET: ONSET: ON-GOING

## 2020-01-15 ASSESSMENT — PAIN DESCRIPTION - FREQUENCY: FREQUENCY: CONTINUOUS

## 2020-01-15 ASSESSMENT — PAIN DESCRIPTION - LOCATION: LOCATION: ABDOMEN

## 2020-01-15 ASSESSMENT — PAIN DESCRIPTION - DESCRIPTORS: DESCRIPTORS: CONSTANT;BURNING

## 2020-01-19 NOTE — PROGRESS NOTES
Kamryn CORNEJO has reviewed and agrees with BRAULIO Ragland LPN's shift assessment.     Andres Chun  1/15/2020
drainage.  Periulcer skin is scarred and red.  No warmth or induration noted. Left lower abdomen    Wound 07/11/19 Abdomen Left; Lower #2 (Active)   Wound Image   1/15/2020  1:44 PM   Dressing Status Intact; New drainage; Old drainage 1/15/2020  1:44 PM   Dressing Changed Changed/New 1/15/2020  1:44 PM   Wound Cleansed Rinsed/Irrigated with saline 1/15/2020  1:44 PM   Wound Length (cm) 3.6 cm 1/15/2020  1:44 PM   Wound Width (cm) 3.3 cm 1/15/2020  1:44 PM   Wound Depth (cm) 0.1 cm 1/15/2020  1:44 PM   Wound Surface Area (cm^2) 11.88 cm^2 1/15/2020  1:44 PM   Change in Wound Size % (l*w) -63.86 1/15/2020  1:44 PM   Wound Volume (cm^3) 1.19 cm^3 1/15/2020  1:44 PM   Wound Healing % -231 1/15/2020  1:44 PM   Wound Assessment Yellow;Red 1/15/2020  1:44 PM   Drainage Amount Small 1/15/2020  1:44 PM   Drainage Description Serosanguinous; Yellow 1/15/2020  1:44 PM   Odor None 1/15/2020  1:44 PM   Margins Attached edges 1/15/2020  1:44 PM   Gwen-wound Assessment Denuded;Red 1/15/2020  1:44 PM   Red%Wound Bed 30 1/15/2020  1:44 PM   Yellow%Wound Bed 70 1/15/2020  1:44 PM   Number of days: 191       LABS      CBC:   Lab Results   Component Value Date    WBC 6.8 09/19/2019    HGB 13.7 09/19/2019    HCT 43.2 09/19/2019    MCV 93.5 09/19/2019     09/19/2019     BMP:   Lab Results   Component Value Date     11/14/2019    K 3.9 11/14/2019     11/14/2019    CO2 22 11/14/2019    BUN 10 11/14/2019    CREATININE 0.7 11/14/2019     PT/INR: No results found for: PROTIME, INR  Prealbumin: No results found for: PREALBUMIN  Albumin:No results found for: LABALBU  Sed Rate:No results found for: SEDRATE  CRP: No results found for: CRP  Micro: No results found for: BC   Hemoglobin A1C: No results found for: LABA1C    Assessment:     Skin ulcer of abdominal wall  Keloids     Contributing Factors: obesity and history of keloids    Patient Active Problem List   Diagnosis Code    Keloid L91.0    MRSA (methicillin resistant

## 2020-01-21 ENCOUNTER — HOSPITAL ENCOUNTER (EMERGENCY)
Age: 45
Discharge: HOME | End: 2020-01-21
Payer: MEDICARE

## 2020-01-21 VITALS
DIASTOLIC BLOOD PRESSURE: 79 MMHG | OXYGEN SATURATION: 95 % | SYSTOLIC BLOOD PRESSURE: 120 MMHG | WEIGHT: 293 LBS | BODY MASS INDEX: 50.43 KG/M2 | HEART RATE: 94 BPM | RESPIRATION RATE: 20 BRPM | TEMPERATURE: 98.9 F

## 2020-01-21 LAB
FLU A ANTIGEN: NEGATIVE
FLU B ANTIGEN: NEGATIVE

## 2020-01-21 PROCEDURE — 99213 OFFICE O/P EST LOW 20 MIN: CPT

## 2020-01-21 PROCEDURE — 87804 INFLUENZA ASSAY W/OPTIC: CPT

## 2020-01-21 RX ORDER — AMOXICILLIN AND CLAVULANATE POTASSIUM 875; 125 MG/1; MG/1
1 TABLET, FILM COATED ORAL 2 TIMES DAILY
Qty: 14 TABLET | Refills: 0 | Status: SHIPPED | OUTPATIENT
Start: 2020-01-21 | End: 2020-01-28

## 2020-01-21 ASSESSMENT — PAIN SCALES - GENERAL: PAINLEVEL_OUTOF10: 8

## 2020-01-21 ASSESSMENT — ENCOUNTER SYMPTOMS
SORE THROAT: 1
COUGH: 1
NAUSEA: 0
VOMITING: 0
CHEST TIGHTNESS: 1
SHORTNESS OF BREATH: 0

## 2020-01-21 ASSESSMENT — PAIN DESCRIPTION - FREQUENCY: FREQUENCY: CONTINUOUS

## 2020-01-21 ASSESSMENT — PAIN DESCRIPTION - PAIN TYPE: TYPE: ACUTE PAIN

## 2020-01-21 ASSESSMENT — PAIN DESCRIPTION - DESCRIPTORS: DESCRIPTORS: ACHING;SORE

## 2020-01-21 ASSESSMENT — PAIN DESCRIPTION - LOCATION: LOCATION: THROAT

## 2020-01-21 NOTE — ED PROVIDER NOTES
reviewed. Constitutional:       General: She is not in acute distress. Appearance: She is well-developed. She is not diaphoretic. HENT:      Right Ear: Tympanic membrane normal.      Left Ear: Tympanic membrane normal.      Mouth/Throat:      Pharynx: Oropharyngeal exudate present. No posterior oropharyngeal erythema. Tonsils: No tonsillar exudate. Swellin on the right. 0 on the left. Eyes:      Conjunctiva/sclera:      Right eye: Right conjunctiva is not injected. Left eye: Left conjunctiva is not injected. Pupils: Pupils are equal.   Neck:      Musculoskeletal: Normal range of motion. Cardiovascular:      Rate and Rhythm: Normal rate and regular rhythm. Heart sounds: No murmur. Pulmonary:      Effort: Pulmonary effort is normal. No respiratory distress. Breath sounds: Normal breath sounds. Musculoskeletal:      Right knee: She exhibits normal range of motion. Left knee: She exhibits normal range of motion. Lymphadenopathy:      Head:      Right side of head: No tonsillar adenopathy. Left side of head: No tonsillar adenopathy. Cervical: Cervical adenopathy present. Left cervical: Superficial cervical adenopathy present. Skin:     General: Skin is warm. Findings: No rash. Neurological:      Mental Status: She is alert and oriented to person, place, and time.    Psychiatric:         Behavior: Behavior normal.         DIAGNOSTIC RESULTS     Labs:  Results for orders placed or performed during the hospital encounter of 20   Rapid influenza A/B antigens   Result Value Ref Range    Flu A Antigen NEGATIVE NEGATIVE    Flu B Antigen NEGATIVE NEGATIVE       IMAGING:    No orders to display         EKG:  none    URGENT CARE COURSE:     Vitals:    20 1100   BP: 120/79   Pulse: 94   Resp: 20   Temp: 98.9 °F (37.2 °C)   TempSrc: Temporal   SpO2: 95%   Weight: (!) 322 lb (146.1 kg)       Medications - No data to display PROCEDURES:  None    FINAL IMPRESSION      1. Upper respiratory tract infection, unspecified type          DISPOSITION/ PLAN       Flu swab was negative at this time. I discussed with the patient and caregiver that this could be viral in nature, however caregiver is adamant that the patient typically does not fight off infections well on her own. There does appear to be that there may be a sinus involvement with drainage and I discussed the plan to treat with an oral antibiotic as well as Coricidin with Mucinex and the patient is advised to follow-up with her PCP on an outpatient basis as needed. They are agreeable to plan as discussed.     PATIENT REFERRED TO:  MD Dorothea Cavazos [address] / [facility] [id number]      DISCHARGE MEDICATIONS:  New Prescriptions    AMOXICILLIN-CLAVULANATE (AUGMENTIN) 875-125 MG PER TABLET    Take 1 tablet by mouth 2 times daily for 7 days    DEXTROMETHORPHAN-GUAIFENESIN (CORICIDIN HBP CONGESTION/COUGH)  MG CAPS    Take 1 capsule by mouth 2 times daily as needed (cough/congestion)       Discontinued Medications    DEXTROMETHORPHAN-GUAIFENESIN (CORICIDIN HBP CONGESTION/COUGH)  MG CAPS    Take 1 capsule by mouth 2 times daily as needed (cough/congestion)       Current Discharge Medication List      CONTINUE these medications which have CHANGED    Details   Dextromethorphan-guaiFENesin (CORICIDIN HBP CONGESTION/COUGH)  MG CAPS Take 1 capsule by mouth 2 times daily as needed (cough/congestion)  Qty: 60 capsule, Refills: 0             VIRGIE Ng CNP    (Please note that portions of this note were completed with a voice recognition program. Efforts were made to edit the dictations but occasionally words are mis-transcribed.)          VIRGIE Ng CNP  01/21/20 [id number]

## 2020-01-22 ENCOUNTER — HOSPITAL ENCOUNTER (OUTPATIENT)
Dept: WOUND CARE | Age: 45
Discharge: HOME | End: 2020-01-22
Payer: MEDICARE

## 2020-01-22 VITALS
OXYGEN SATURATION: 96 % | RESPIRATION RATE: 18 BRPM | WEIGHT: 293 LBS | SYSTOLIC BLOOD PRESSURE: 134 MMHG | BODY MASS INDEX: 50.6 KG/M2 | TEMPERATURE: 98.1 F | DIASTOLIC BLOOD PRESSURE: 71 MMHG | HEART RATE: 97 BPM

## 2020-01-22 PROCEDURE — 99213 OFFICE O/P EST LOW 20 MIN: CPT

## 2020-01-22 PROCEDURE — 99213 OFFICE O/P EST LOW 20 MIN: CPT | Performed by: NURSE PRACTITIONER

## 2020-01-22 PROCEDURE — 2709999900 HC NON-CHARGEABLE SUPPLY

## 2020-01-22 ASSESSMENT — PAIN DESCRIPTION - PAIN TYPE: TYPE: ACUTE PAIN

## 2020-01-22 ASSESSMENT — PAIN DESCRIPTION - LOCATION: LOCATION: ABDOMEN

## 2020-01-22 ASSESSMENT — PAIN SCALES - GENERAL: PAINLEVEL_OUTOF10: 7

## 2020-01-27 NOTE — PROGRESS NOTES
[facility]          Progress Note      Cj RECORD NUMBER:  [id number]  AGE: 45 y.o. GENDER: female  : 1975  EPISODE DATE:  2020    Subjective:     Chief Complaint   Patient presents with    Wound Check     abdomen         HISTORY of PRESENT ILLNESS HPI     Cathryn Poole is a 45 y.o. female Established patient referred by Dr. Ramona Deras, who presents today for wound/ulcer evaluation. History of Wound Context: Patient has reoccurrence of open ulcers to abdominal keloids which opened in May 2019. She has a history of abdominal ulcers related to spontaneous keloids since 2001 which were open for nearly 15 years then have healed and reopened a few times since then. She has a history of the areas being surgically excised and injected with steroids with no improvement. She has significant bilateral lower leg lymphedema and has lymphedema pumps and Velcro compression wraps to manage her edema.  PuraPly AM applied to abdomen ulcers on 19, 19, and 1/15/2020. Wound/Ulcer Pain Timing/Severity: moderate  Quality of pain: aching, tender  Severity:  7 / 10   Modifying Factors: Pain worsens with dressing changes  Associated Signs/Symptoms: drainage and pain    Interval History:   Patient presents today for follow up on wound/ulcer's progression. The patient is currently not on antibiotics. Current dressing care includes:     Left lower abdomen: PuraPly AM skin substitute to left lower abdomen wound. Secured with tincture of benzoin, wound veil, and steri strips. Covered with alginate and silicone bordered foam dressing. Change alginate and silicone bordered foam on Saturday. Do not take wound veil or steri strips off.          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 45 g 1    Dicyclomine HCl (BENTYL PO)   Take 20 mg by mouth 2 times daily       QUEtiapine Fumarate (SEROQUEL PO) Take 400 mg by mouth 2 times daily.  atomoxetine (STRATTERA) 40 MG capsule Take 40 mg by mouth 2 times daily.  aripiprazole (ABILIFY) 10 MG tablet Take 10 mg by mouth 2 times daily       ACYCLOVIR 400 mg by Does not apply route three times a week        No current facility-administered medications on file prior to encounter.         REVIEW OF SYSTEMS    Constitutional: negative for chills, fatigue and fevers  Eyes: negative for irritation and redness  Ears, nose, mouth, throat, and face: negative for hearing loss and hoarseness   Respiratory: negative for cough and shortness of breath  Cardiovascular: negative for chest pain, positive for bilateral lower leg edema  Integument/breast: positive for abdominal keloids/ abdominal ulcer  Neurological: negative for dizziness and gait problems  Behavioral/Psych: positive for learning difficulty    Objective:      /71   Pulse 97   Temp 98.1 °F (36.7 °C) (Oral)   Resp 18   Wt (!) 323 lb 1.6 oz (146.6 kg)   SpO2 96%   BMI 50.60 kg/m²     Wt Readings from Last 3 Encounters:   01/22/20 (!) 323 lb 1.6 oz (146.6 kg)   01/21/20 (!) 322 lb (146.1 kg)   01/15/20 (!) 328 lb (148.8 kg)       PHYSICAL EXAM    General Appearance: alert and oriented to person, place and time  Skin: warm and dry  Head: normocephalic and atraumatic  Eyes: conjunctivae normal  ENT: hearing grossly normal bilaterally  Pulmonary/Chest: clear to auscultation bilaterally- normal air movement, no respiratory distress  Cardiovascular: normal rate, regular rhythm  Abdomen: soft, non-tender and bowel sounds normal  Neurologic: gait and coordination normal, speech normal and developmentally delayed  Extremities: 2+ edema to bilateral lower legs with scarred fibrotic tissue noted    Abdomen, left lower: Graft in place which is dry. Rehydrated the graft.  Periulcer skin is scarred and red.  No warmth or induration noted. Left lower abdomen    Wound 07/11/19 Abdomen Left; Lower #2 (Active)   Wound Image   1/22/2020  1:44 PM   Dressing Status Intact; Changed 1/22/2020  1:44 PM   Dressing Changed Changed/New 1/22/2020  1:44 PM   Wound Cleansed Rinsed/Irrigated with saline 1/15/2020  1:44 PM   Wound Length (cm) 3.6 cm 1/15/2020  1:44 PM   Wound Width (cm) 3.3 cm 1/15/2020  1:44 PM   Wound Depth (cm) 0.1 cm 1/15/2020  1:44 PM   Wound Surface Area (cm^2) 11.88 cm^2 1/15/2020  1:44 PM   Change in Wound Size % (l*w) -63.86 1/15/2020  1:44 PM   Wound Volume (cm^3) 1.19 cm^3 1/15/2020  1:44 PM   Wound Healing % -231 1/15/2020  1:44 PM   Wound Assessment Yellow;Red 1/15/2020  1:44 PM   Drainage Amount None 1/22/2020  1:44 PM   Drainage Description Serosanguinous; Yellow 1/15/2020  1:44 PM   Odor None 1/22/2020  1:44 PM   Margins Attached edges 1/15/2020  1:44 PM   Gwen-wound Assessment Dry;Pink 1/22/2020  1:44 PM   Red%Wound Bed 30 1/15/2020  1:44 PM   Yellow%Wound Bed 70 1/15/2020  1:44 PM   Number of days: 199       LABS      CBC:   Lab Results   Component Value Date    WBC 6.8 09/19/2019    HGB 13.7 09/19/2019    HCT 43.2 09/19/2019    MCV 93.5 09/19/2019     09/19/2019     BMP:   Lab Results   Component Value Date     11/14/2019    K 3.9 11/14/2019     11/14/2019    CO2 22 11/14/2019    BUN 10 11/14/2019    CREATININE 0.7 11/14/2019     PT/INR: No results found for: PROTIME, INR  Prealbumin: No results found for: PREALBUMIN  Albumin:No results found for: LABALBU  Sed Rate:No results found for: SEDRATE  CRP: No results found for: CRP  Micro: No results found for: BC   Hemoglobin A1C: No results found for: LABA1C    Assessment:     Skin ulcer of abdominal wall  Keloids     Contributing Factors: obesity and history of are dry. Do not use fragrance or alcohol lotions. Apply twice daily.     Home Health:  [facility]     Wound Location: Left lower abdomen      Dressing orders:      Left side Abdomen wound-  Covered with wound veil and steri strips. Covered with silicone bordered foam.  Mother to ONLY change silicone bordered foam on Saturday. Do not take wound veil or steri strips off.       Follow up visit: Thursday January 30th at 1:00pm      Keep next scheduled appointment. Please give 24 hour notice if unable to keep appointment. [phone]     If you experience any of the following, please call the Wound Care Service during business hours: Monday through Friday 8:00 am - 4:30 pm  ([phone]).               *Increase in pain              *Temperature over 101              *Increase in drainage from your wound or a foul odor              *Uncontrolled swelling              *Need for compression bandage changes due to slippage, breakthrough drainage     If you need medical attention outside of business hours, please contact your Primary Care Doctor or go to the nearest emergency room.     Electronically signed by VIRGIE Jennings CNP on 1/26/20 at 9:05 PM            Electronically signed by VIRGIE Jennings CNP on 1/26/2020 at 9:05 PM

## 2020-01-30 ENCOUNTER — HOSPITAL ENCOUNTER (OUTPATIENT)
Dept: WOUND CARE | Age: 45
Discharge: HOME | End: 2020-01-30
Payer: MEDICARE

## 2020-01-30 VITALS
OXYGEN SATURATION: 97 % | HEART RATE: 88 BPM | WEIGHT: 293 LBS | RESPIRATION RATE: 16 BRPM | BODY MASS INDEX: 51.55 KG/M2 | DIASTOLIC BLOOD PRESSURE: 80 MMHG | TEMPERATURE: 98 F | SYSTOLIC BLOOD PRESSURE: 143 MMHG

## 2020-01-30 PROBLEM — L30.9 DERMATITIS: Status: ACTIVE | Noted: 2020-01-30

## 2020-01-30 PROCEDURE — 15271 SKIN SUB GRAFT TRNK/ARM/LEG: CPT

## 2020-01-30 PROCEDURE — 99213 OFFICE O/P EST LOW 20 MIN: CPT | Performed by: NURSE PRACTITIONER

## 2020-01-30 PROCEDURE — 15271 SKIN SUB GRAFT TRNK/ARM/LEG: CPT | Performed by: NURSE PRACTITIONER

## 2020-01-30 PROCEDURE — 99213 OFFICE O/P EST LOW 20 MIN: CPT

## 2020-01-30 PROCEDURE — 2709999900 HC NON-CHARGEABLE SUPPLY

## 2020-01-30 ASSESSMENT — PAIN DESCRIPTION - PROGRESSION: CLINICAL_PROGRESSION: NOT CHANGED

## 2020-01-30 ASSESSMENT — PAIN SCALES - GENERAL: PAINLEVEL_OUTOF10: 8

## 2020-01-30 ASSESSMENT — PAIN DESCRIPTION - FREQUENCY: FREQUENCY: CONTINUOUS

## 2020-01-30 NOTE — PROGRESS NOTES
[facility]          Progress Note and Procedure Note      Lissa Holguin  MEDICAL RECORD NUMBER:  [id number]  AGE: 45 y.o. GENDER: female  : 1975  EPISODE DATE:  2020    Subjective:     Chief Complaint   Patient presents with    Wound Check         HISTORY of PRESENT ILLNESS HPI     Lissa Holguin is a 45 y.o. female Established patient referred by Dr. Mary Martin, who presents today for wound/ulcer evaluation. History of Wound Context: Patient has reoccurrence of open ulcers to abdominal keloids which opened in May 2019. She has a history of abdominal ulcers related to spontaneous keloids since 2001 which were open for nearly 15 years then have healed and reopened a few times since then. She has a history of the areas being surgically excised and injected with steroids with no improvement. She has significant bilateral lower leg lymphedema and has lymphedema pumps and Velcro compression wraps to manage her edema.  PuraPly AM applied to abdomen ulcers on 19, 19, 1/15/2020, and 2020. Wound/Ulcer Pain Timing/Severity: moderate  Quality of pain: aching, burning, tender  Severity:  8 / 10   Modifying Factors: Pain worsens with dressing changes  Associated Signs/Symptoms: drainage and pain    Interval History:   Patient presents today for follow up on wound/ulcer's progression. The patient is currently not on antibiotics. Current dressing care includes:    Left side Abdomen wound-  Graft covered with wound veil and steri strips. Covered with silicone bordered foam.  Mother to ONLY change silicone bordered foam on Saturday. Do not take wound veil or steri strips off.          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     during hospital visit    Bronchitis     [facility]estive [facility] failure) [facility] [address]     Elbow fracture 10/09/14    left    Irritable bowel syndrome     Irritable bowel    Lymph edema 2017    Obesity     Conde disease     Schizophrenia ([facility] [address]     Seasonal allergies        PAST SURGICAL HISTORY    Past Surgical History:   Procedure Laterality Date    LAYER WOUND CLOSURE      TOE SURGERY  09/2019    TOENAIL EXCISION      TONSILLECTOMY AND ADENOIDECTOMY         FAMILY HISTORY    Family History   Problem Relation Age of Onset    Diabetes Father     Cancer Maternal Grandmother     Cancer Paternal Grandmother        SOCIAL HISTORY    Social History     Tobacco Use    Smoking status: Never Smoker    Smokeless tobacco: Never Used   Substance Use Topics    Alcohol use: No    Drug use: No       ALLERGIES    Allergies   Allergen Reactions    Latex Rash    Cat Hair Extract     Seasonal        MEDICATIONS    Current Outpatient Medications on File Prior to Encounter   Medication Sig Dispense Refill    Dextromethorphan-guaiFENesin (CORICIDIN HBP CONGESTION/COUGH)  MG CAPS Take 1 capsule by mouth 2 times daily as needed (cough/congestion) 60 capsule 0    metoprolol succinate (TOPROL XL) 25 MG extended release tablet take 1 tablet by mouth once daily 90 tablet 1    furosemide (LASIX) 20 MG tablet take 1 tablet by mouth once daily 90 tablet 1    albuterol sulfate HFA (PROAIR HFA) 108 (90 Base) MCG/ACT inhaler Inhale 2 puffs into the lungs every 4 hours as needed for Wheezing or Shortness of Breath 1 Inhaler 0    acetaminophen (TYLENOL) 500 MG tablet Take 1,000 mg by mouth every 6 hours as needed for Pain      Cholecalciferol (VITAMIN D3) 5000 units TABS Take 2,000 Units by mouth       MedroxyPROGESTERone Acetate (DEPO-PROVERA IM) Inject into the muscle      IRON PO Take 65 mg by mouth daily       miconazole (MICOTIN) 2 % powder Apply topically 2 times to 3 times  daily.  45 g 1    Dicyclomine HCl (BENTYL PO)   Take 20 mg by mouth 2 times daily       QUEtiapine Fumarate (SEROQUEL PO) Take 400 mg by measurements are smaller. Ulcer bed is red and friable. Draining moderate amount of serosanguinous drainage. Periulcer skin is scarred and red.  No warmth or induration noted. Graft applied. Left lower abdomen    Wound 07/11/19 Abdomen Left; Lower #2 (Active)   Wound Image   1/30/2020  1:31 PM   Dressing Status Intact; Old drainage 1/30/2020  1:31 PM   Dressing Changed Changed/New 1/30/2020  1:31 PM   Dressing/Treatment Foam;Steri-strips; Other (comment) 1/30/2020  1:31 PM   Wound Cleansed Rinsed/Irrigated with saline 1/30/2020  1:31 PM   Wound Length (cm) 1.3 cm 1/30/2020  1:31 PM   Wound Width (cm) 1.4 cm 1/30/2020  1:31 PM   Wound Depth (cm) 0.05 cm 1/30/2020  1:31 PM   Wound Surface Area (cm^2) 1.82 cm^2 1/30/2020  1:31 PM   Change in Wound Size % (l*w) 74.9 1/30/2020  1:31 PM   Wound Volume (cm^3) 0.09 cm^3 1/30/2020  1:31 PM   Wound Healing % 75 1/30/2020  1:31 PM   Wound Assessment Red 1/30/2020  1:31 PM   Drainage Amount Scant 1/30/2020  1:31 PM   Drainage Description Serosanguinous; Yellow 1/30/2020  1:31 PM   Odor None 1/30/2020  1:31 PM   Margins Attached edges 1/30/2020  1:31 PM   Gwen-wound Assessment Red 1/30/2020  1:31 PM   Red%Wound Bed 100 1/30/2020  1:31 PM   Yellow%Wound Bed 70 1/15/2020  1:44 PM   Number of days: 203       LABS      CBC:   Lab Results   Component Value Date    WBC 6.8 09/19/2019    HGB 13.7 09/19/2019    HCT 43.2 09/19/2019    MCV 93.5 09/19/2019     09/19/2019     BMP:   Lab Results   Component Value Date     11/14/2019    K 3.9 11/14/2019     11/14/2019    CO2 22 11/14/2019    BUN 10 11/14/2019    CREATININE 0.7 11/14/2019     PT/INR: No results found for: PROTIME, INR  Prealbumin: No results found for: PREALBUMIN  Albumin:No results found for: LABALBU  Sed Rate:No results found for: SEDRATE  CRP: No results found for: CRP  Micro: No results found for: BC   Hemoglobin A1C: No results found for: LABA1C    Assessment:     Skin ulcer of abdominal wall  Keloids     Contributing Factors: obesity and history of keloids    Patient Active Problem List   Diagnosis Code    Keloid L91.0    MRSA (methicillin resistant staph aureus) culture positive Z22.322    [facility] L98.499    POTS (postural orthostatic tachycardia syndrome) R00.0, I95.1    CHF (congestive heart failure) ([facility] chronic diastolic K15.6    Morbid obesity ([facility]) E66.01    Bilateral leg edema =LYMPHEDEMA- ON f/U WITH LYMPHEDEMA CLINIC R60.0    Lymphedema of both lower extremities I89.0    SOB (shortness of breath) on exertion R06.02    Candidiasis, intertriginous B37.2    Dermatitis L30.9     Procedure:  Skin Substitute Application    Performed by: VIRGIE Alonso CNP    Ulcer Type: non-healing/non-surgical    Consent obtained: Yes    Time out taken: Yes    Product Utilized:    PuraPly AM 25 sq/cm     Fenestrated: Yes    Mesher Utilized: No    Instrument(s) scissors and forceps    Skin Substitute was Applied to Ulcer Number(s):    Ulcer #: 2      Diabetic/Pressure/Non Pressure Ulcers:  Ulcer:   Non-Pressure ulcer, limited to breakdown of skin      Total Surface Area of Ulcer(s) Covered 1.8 sq/cm    Was the Product Layered  Yes     Amount of Product Applied 4 sq/cm     Amount of Product Wasted 21 sq/cm     Reason for Waste Wound Size smaller then product size      Surgically Fixated: No    Secured With: tincture of benzoin, wound veil, and steri strips     Procedural Pain: 0/10     Post Procedural Pain: 0 / 10    Response to Treatment: Well tolerated by patient. Plan:     Patient examined and evaluated. The lower abdomen ulcer continues to slowly improve. No sign of infection. She does have some irritated/dermatitis to the upper abdomen keloid. Prescription sent to pharmacy for Lidex cream to be applied daily to the upper abdomen dermatitis/keloid area.     Patient to moisturize exposed abdomen keloids daily    Pure apply AM graft applied to the left lower abdomen ulcer today. Hydrated with saline. Secured with tincture of benzoin, wound veil, and Steri-Strips. Covered with SoloSite and silicone bordered foam.    Left lower abdomen: Mother to ONLY change silicone bordered foam with solosite gel on Saturday. Treatment:   Orders Placed This Encounter   Procedures    Wound dressing     Left side Abdomen wound-  Graft covered with wound veil and steri strips. Covered with solosite and silicone bordered foam.     Standing Status:   Standing     Number of Occurrences:   1         Antibiotics: No    Follow up: 1 week    Please see attached Discharge Instructions    Written patient dismissal instructions given to patient and signed by patient or POA. Discharge Instructions       Visit Discharge/Physician Orders:  - Terressa Jose abdomen twice a day with soap and water apply Desenex to folds an apply a pillow case or soft cloth to separate the folds all day long    -  Use a new clean cloth every time and apply more powder  -  Use lymphedema pumps twice daily for 1 hour each time  - Continue Juxta Lite Wraps (apply in morning and remove at night before bedtime)  - Call [facility] for supplies  [phone]  - Apply steroid cream to upper and lower abdomen healed areas once a day  - Apply Aquaphor or Vaseline to upper and lower healed abdomen wound areas that are dry. Do not use fragrance or alcohol lotions. Apply daily. - wash under abdominal folds twice a day with gold Dial soap and thoughly dry and apply antifungal powder . Home Health:  [facility]     Wound Location: Left lower abdomen      Dressing orders:      Left side Abdomen wound-  Covered with wound veil and steri strips. Covered with silicone bordered foam.  Mother to ONLY change silicone bordered foam with solosite gel on Saturday. Do not take wound veil or steri strips off.       Follow up visit: 1 week 02/06.2020 at 1:00       Keep next scheduled appointment.  Please give 24 hour notice if unable to keep appointment. [phone]     If you experience any of the following, please call the Wound Care Service during business hours: Monday through Friday 8:00 am - 4:30 pm  ([phone]).               *Increase in pain              *Temperature over 101              *Increase in drainage from your wound or a foul odor              *Uncontrolled swelling              *Need for compression bandage changes due to slippage, breakthrough drainage     If you need medical attention outside of business hours, please contact your Primary Care Doctor or go to the nearest emergency room.     Electronically signed by VIRGIE Rodríguez CNP on 1/30/20 at 4:11 PM            Electronically signed by VIRGIE Rodríguez CNP on 1/30/2020 at 4:22 PM

## 2020-02-06 ENCOUNTER — HOSPITAL ENCOUNTER (OUTPATIENT)
Dept: WOUND CARE | Age: 45
Discharge: HOME | End: 2020-02-06
Payer: MEDICARE

## 2020-02-06 VITALS
SYSTOLIC BLOOD PRESSURE: 131 MMHG | HEART RATE: 98 BPM | OXYGEN SATURATION: 97 % | BODY MASS INDEX: 45.99 KG/M2 | TEMPERATURE: 98.3 F | WEIGHT: 293 LBS | DIASTOLIC BLOOD PRESSURE: 75 MMHG | RESPIRATION RATE: 18 BRPM | HEIGHT: 67 IN

## 2020-02-06 PROCEDURE — 2709999900 HC NON-CHARGEABLE SUPPLY

## 2020-02-06 PROCEDURE — 99213 OFFICE O/P EST LOW 20 MIN: CPT

## 2020-02-06 PROCEDURE — 99213 OFFICE O/P EST LOW 20 MIN: CPT | Performed by: NURSE PRACTITIONER

## 2020-02-06 ASSESSMENT — PAIN SCALES - GENERAL: PAINLEVEL_OUTOF10: 0

## 2020-02-06 NOTE — PROGRESS NOTES
[facility]          Progress Note      Cj RECORD NUMBER:  [id number]  AGE: 45 y.o. GENDER: female  : 1975  EPISODE DATE:  2020    Subjective:     Chief Complaint   Patient presents with    Wound Check     Abdomen          HISTORY of PRESENT ILLNESS HPI     Kranthi Rodriguez is a 45 y.o. female Established patient referred by Dr. Jesus Nino, who presents today for wound/ulcer evaluation. History of Wound Context: Patient has reoccurrence of open ulcers to abdominal keloids which opened in May 2019. She has a history of abdominal ulcers related to spontaneous keloids since 2001 which were open for nearly 15 years then have healed and reopened a few times since then. She has a history of the areas being surgically excised and injected with steroids with no improvement. She has significant bilateral lower leg lymphedema and has lymphedema pumps and Velcro compression wraps to manage her edema.  PuraPly AM applied to abdomen ulcers on 19, 19, 1/15/2020, and 2020. Wound/Ulcer Pain Timing/Severity: moderate  Quality of pain: aching, tender  Severity:  6 / 10   Modifying Factors: Pain worsens with dressing changes  Associated Signs/Symptoms: drainage and pain    Interval History:   Patient presents today for follow up on wound/ulcer's progression. The patient is currently not on antibiotics. Current dressing care includes:    Puraply AM graft applied to the left lower abdomen ulcer today. Hydrated with saline. Secured with tincture of benzoin, wound veil, and Steri-Strips.   Covered with SoloSite and silicone bordered foam.        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 45 g 1    Dicyclomine HCl (BENTYL PO)   Take 20 mg by mouth 2 times daily       atomoxetine (STRATTERA) 40 MG capsule Take 40 mg by mouth 2 times daily.  aripiprazole (ABILIFY) 10 MG tablet Take 10 mg by mouth 2 times daily       ACYCLOVIR 400 mg by Does not apply route three times a week       QUEtiapine Fumarate (SEROQUEL PO) Take 400 mg by mouth 2 times daily. No current facility-administered medications on file prior to encounter.         REVIEW OF SYSTEMS    Constitutional: negative for chills, fatigue and fevers  Eyes: negative for irritation and redness  Ears, nose, mouth, throat, and face: negative for hearing loss and hoarseness   Respiratory: negative for cough and shortness of breath  Cardiovascular: negative for chest pain, positive for bilateral lower leg edema  Integument/breast: positive for abdominal keloids and abdominal ulcer  Neurological: negative for dizziness and gait problems  Behavioral/Psych: positive for learning difficulty    Objective:      /75   Pulse 98   Temp 98.3 °F (36.8 °C) (Oral)   Resp 18   Ht 5' 7\" (1.702 m)   Wt (!) 318 lb 11.2 oz (144.6 kg)   SpO2 97%   BMI 49.92 kg/m²     Wt Readings from Last 3 Encounters:   02/06/20 (!) 318 lb 11.2 oz (144.6 kg)   01/30/20 (!) 329 lb 2 oz (149.3 kg)   01/22/20 (!) 323 lb 1.6 oz (146.6 kg)       PHYSICAL EXAM    General Appearance: alert and oriented to person, place and time  Skin: warm and dry; fungal rash noted to groin and lower abdomen folds  Head: normocephalic and atraumatic  Eyes: conjunctivae normal  ENT: hearing grossly normal bilaterally  Pulmonary/Chest: clear to auscultation bilaterally- normal air movement, no respiratory distress  Cardiovascular: normal rate, regular rhythm  Abdomen: soft, non-tender and bowel sounds normal  Neurologic: gait and coordination normal, speech normal and developmentally delayed  Extremities: 2+ edema to bilateral lower legs with scarred fibrotic tissue noted managed with velcro compression wraps   Abdomen, left lower: Improved, measurements are smaller. Ulcer bed is red. Draining moderate amount of serosanguinous drainage. Periulcer skin is scarred and intact.  No warmth or induration noted. Left lower abdomen    Wound 07/11/19 Abdomen Left; Lower #2 (Active)   Wound Image   2/6/2020 12:55 PM   Dressing Status Intact; Old drainage 2/6/2020 12:55 PM   Dressing Changed Changed/New 2/6/2020 12:55 PM   Dressing/Treatment Foam;Steri-strips; Other (comment) 1/30/2020  1:31 PM   Wound Cleansed Rinsed/Irrigated with saline 2/6/2020 12:55 PM   Wound Length (cm) 1 cm 2/6/2020 12:55 PM   Wound Width (cm) 1 cm 2/6/2020 12:55 PM   Wound Depth (cm) 0.05 cm 2/6/2020 12:55 PM   Wound Surface Area (cm^2) 1 cm^2 2/6/2020 12:55 PM   Change in Wound Size % (l*w) 86.21 2/6/2020 12:55 PM   Wound Volume (cm^3) 0.05 cm^3 2/6/2020 12:55 PM   Wound Healing % 86 2/6/2020 12:55 PM   Wound Assessment Red 2/6/2020 12:55 PM   Drainage Amount Scant 2/6/2020 12:55 PM   Drainage Description Serosanguinous 2/6/2020 12:55 PM   Odor None 2/6/2020 12:55 PM   Margins Attached edges 2/6/2020 12:55 PM   Gwen-wound Assessment Red 2/6/2020 12:55 PM   Red%Wound Bed 100 2/6/2020 12:55 PM   Yellow%Wound Bed 70 1/15/2020  1:44 PM   Number of days: 210       LABS      CBC:   Lab Results   Component Value Date    WBC 6.8 09/19/2019    HGB 13.7 09/19/2019    HCT 43.2 09/19/2019    MCV 93.5 09/19/2019     09/19/2019     BMP:   Lab Results   Component Value Date     11/14/2019    K 3.9 11/14/2019     11/14/2019    CO2 22 11/14/2019    BUN 10 11/14/2019    CREATININE 0.7 11/14/2019     PT/INR: No results found for: PROTIME, INR  Prealbumin: No results found for: PREALBUMIN  Albumin:No results found for: LABALBU  Sed Rate:No results found for: SEDRATE  CRP: No results found for: CRP  Micro: No results found for: BC   Hemoglobin A1C: No results found for: LABA1C    Assessment:     Skin ulcer of Lite Wraps (apply in morning and remove at night before bedtime)  - Call [facility] for supplies  [phone]  - Apply steroid cream to upper and lower abdomen healed areas once a day  - Apply Aquaphor or Vaseline to upper and lower healed abdomen wound areas that are dry. Do not use fragrance or alcohol lotions. Apply daily. - wash under abdominal folds twice a day with gold Dial soap and thoughly dry and apply antifungal powder .        Wound Location: Left lower abdomen      Dressing orders:      Left side Abdomen wound-  Covered with wound veil and steri strips. Covered with silicone bordered foam.  Mother to ONLY change silicone bordered foam with solosite gel on Saturday. Do not take wound veil or steri strips off.       Follow up visit: Thursday February 13th at 09:30 am                              Thursday February 20th at 2:00pm                               Monday February 24th at 1:30pm      Keep next scheduled appointment. Please give 24 hour notice if unable to keep appointment. [phone]     If you experience any of the following, please call the Wound Care Service during business hours: Monday through Friday 8:00 am - 4:30 pm  ([phone]).               *Increase in pain              *Temperature over 101              *Increase in drainage from your wound or a foul odor              *Uncontrolled swelling              *Need for compression bandage changes due to slippage, breakthrough drainage     If you need medical attention outside of business hours, please contact your Primary Care Doctor or go to the nearest emergency room.      Electronically signed by VIRGIE Tolentino CNP on 2/6/20 at 1:24 PM            Electronically signed by VIRGIE Tolentino CNP on 2/6/2020 at 1:25 PM

## 2020-02-13 ENCOUNTER — HOSPITAL ENCOUNTER (OUTPATIENT)
Dept: WOUND CARE | Age: 45
Discharge: HOME | End: 2020-02-13
Payer: MEDICARE

## 2020-02-13 VITALS
OXYGEN SATURATION: 99 % | HEART RATE: 95 BPM | DIASTOLIC BLOOD PRESSURE: 70 MMHG | TEMPERATURE: 98.5 F | RESPIRATION RATE: 18 BRPM | SYSTOLIC BLOOD PRESSURE: 143 MMHG | BODY MASS INDEX: 49.1 KG/M2 | WEIGHT: 293 LBS

## 2020-02-13 PROCEDURE — 99212 OFFICE O/P EST SF 10 MIN: CPT

## 2020-02-13 PROCEDURE — 99213 OFFICE O/P EST LOW 20 MIN: CPT | Performed by: NURSE PRACTITIONER

## 2020-02-13 ASSESSMENT — PAIN SCALES - GENERAL: PAINLEVEL_OUTOF10: 8

## 2020-02-13 NOTE — PROGRESS NOTES
[facility]          Progress Note       Cj RECORD NUMBER:  [id number]  AGE: 45 y.o. GENDER: female  : 1975  EPISODE DATE:  2020    Subjective:     Chief Complaint   Patient presents with    Wound Check     abdomen         HISTORY of PRESENT ILLNESS HPI     Mattie Saldivar is a 45 y.o. female Established patient referred by Dr. Quintin Amaro, who presents today for wound/ulcer evaluation. History of Wound Context: Patient has reoccurrence of open ulcers to abdominal keloids which opened in May 2019. She has a history of abdominal ulcers related to spontaneous keloids since 2001 which were open for nearly 15 years then have healed and reopened a few times since then. She has a history of the areas being surgically excised and injected with steroids with no improvement. She has significant bilateral lower leg lymphedema and has lymphedema pumps and Velcro compression wraps to manage her edema.  PuraPly AM applied to abdomen ulcers on 19, 19, 1/15/2020, and 2020. Wound/Ulcer Pain Timing/Severity: moderate  Quality of pain: aching, burning, tender  Severity:  8 / 10   Modifying Factors: Pain worsens with dressing changes  Associated Signs/Symptoms: drainage and pain    Interval History:   Patient presents today for follow up on wound/ulcer's progression. The patient is currently not on antibiotics. Current dressing care includes:    Left abdomen: Applied collagen and hydrated with saline.  Secured with tincture of benzoin, wound veil, and Steri-Strips.  Covered with hydrogel and silicone bordered foam.        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     during hospital visit    Bronchitis     [facility]estive [facility]) ([facility] [address]     Elbow fracture 10/09/14    left    Irritable bowel syndrome Irritable bowel    Lymph edema 2017    Obesity     Conde disease     Schizophrenia ([facility] [address]     Seasonal allergies        PAST SURGICAL HISTORY    Past Surgical History:   Procedure Laterality Date    LAYER WOUND CLOSURE      TOE SURGERY  09/2019    TOENAIL EXCISION      TONSILLECTOMY AND ADENOIDECTOMY         FAMILY HISTORY    Family History   Problem Relation Age of Onset    Diabetes Father     Cancer Maternal Grandmother     Cancer Paternal Grandmother        SOCIAL HISTORY    Social History     Tobacco Use    Smoking status: Never Smoker    Smokeless tobacco: Never Used   Substance Use Topics    Alcohol use: No    Drug use: No       ALLERGIES    Allergies   Allergen Reactions    Latex Rash    Cat Hair Extract     Seasonal        MEDICATIONS    Current Outpatient Medications on File Prior to Encounter   Medication Sig Dispense Refill    fluocinonide (LIDEX) 0.05 % cream Apply topically daily. 60 g 3    Dextromethorphan-guaiFENesin (CORICIDIN HBP CONGESTION/COUGH)  MG CAPS Take 1 capsule by mouth 2 times daily as needed (cough/congestion) 60 capsule 0    metoprolol succinate (TOPROL XL) 25 MG extended release tablet take 1 tablet by mouth once daily 90 tablet 1    furosemide (LASIX) 20 MG tablet take 1 tablet by mouth once daily 90 tablet 1    albuterol sulfate HFA (PROAIR HFA) 108 (90 Base) MCG/ACT inhaler Inhale 2 puffs into the lungs every 4 hours as needed for Wheezing or Shortness of Breath 1 Inhaler 0    fluticasone (FLONASE) 50 MCG/ACT nasal spray 1 spray by Nasal route daily for 7 days 1 Bottle 0    acetaminophen (TYLENOL) 500 MG tablet Take 1,000 mg by mouth every 6 hours as needed for Pain      Cholecalciferol (VITAMIN D3) 5000 units TABS Take 2,000 Units by mouth       MedroxyPROGESTERone Acetate (DEPO-PROVERA IM) Inject into the muscle      IRON PO Take 65 mg by mouth daily       miconazole (MICOTIN) 2 % powder Apply topically 2 times to 3 times  daily.  45 g 1    Dicyclomine HCl (BENTYL PO)   Take 20 mg by mouth 2 times daily       QUEtiapine Fumarate (SEROQUEL PO) Take 400 mg by mouth 2 times daily.  atomoxetine (STRATTERA) 40 MG capsule Take 40 mg by mouth 2 times daily.  aripiprazole (ABILIFY) 10 MG tablet Take 10 mg by mouth 2 times daily       ACYCLOVIR 400 mg by Does not apply route three times a week        No current facility-administered medications on file prior to encounter.         REVIEW OF SYSTEMS    Constitutional: negative for chills, fatigue and fevers  Eyes: negative for irritation and redness  Ears, nose, mouth, throat, and face: negative for hearing loss and hoarseness   Respiratory: negative for cough and shortness of breath  Cardiovascular: negative for chest pain, positive for bilateral lower leg edema  Integument/breast: positive for abdominal keloids and left abdomen ulcer  Neurological: negative for dizziness and gait problems  Behavioral/Psych: positive for learning difficulty    Objective:      BP (!) 143/70   Pulse 95   Temp 98.5 °F (36.9 °C) (Oral)   Resp 18   Wt (!) 313 lb 8 oz (142.2 kg)   SpO2 99%   BMI 49.10 kg/m²     Wt Readings from Last 3 Encounters:   02/13/20 (!) 313 lb 8 oz (142.2 kg)   02/06/20 (!) 318 lb 11.2 oz (144.6 kg)   01/30/20 (!) 329 lb 2 oz (149.3 kg)       PHYSICAL EXAM    General Appearance: alert and oriented to person, place and time  Skin: warm and dry; fungal rash noted to groin and lower abdomen folds  Head: normocephalic and atraumatic  Eyes: conjunctivae normal  ENT: hearing grossly normal bilaterally  Pulmonary/Chest: clear to auscultation bilaterally- normal air movement, no respiratory distress  Cardiovascular: normal rate, regular rhythm  Abdomen: soft, non-tender and bowel sounds normal  Neurologic: gait and coordination normal, speech normal and developmentally delayed  Extremities: 2+ edema to bilateral lower legs with scarred fibrotic tissue noted managed with velcro compression wraps   Abdomen, left lower: Decline, measurements are larger. Ulcer bed is red. Draining moderate amount of serosanguinous drainage. Periulcer skin is scarred and inflammed.  No warmth or induration noted. Left lower abdomen    Wound 07/11/19 Abdomen Left; Lower #2 (Active)   Wound Image   2/13/2020  9:33 AM   Dressing Status Intact; Old drainage; Changed 2/13/2020  9:33 AM   Dressing Changed Changed/New 2/13/2020  9:33 AM   Dressing/Treatment Dry dressing 2/13/2020  9:33 AM   Wound Cleansed Rinsed/Irrigated with saline 2/13/2020  9:33 AM   Wound Length (cm) 2.4 cm 2/13/2020  9:33 AM   Wound Width (cm) 1.4 cm 2/13/2020  9:33 AM   Wound Depth (cm) 0.05 cm 2/13/2020  9:33 AM   Wound Surface Area (cm^2) 3.36 cm^2 2/13/2020  9:33 AM   Change in Wound Size % (l*w) 53.66 2/13/2020  9:33 AM   Wound Volume (cm^3) 0.17 cm^3 2/13/2020  9:33 AM   Wound Healing % 53 2/13/2020  9:33 AM   Wound Assessment Red;Yellow 2/13/2020  9:33 AM   Drainage Amount Moderate 2/13/2020  9:33 AM   Drainage Description Serosanguinous; Yellow 2/13/2020  9:33 AM   Odor None 2/13/2020  9:33 AM   Margins Attached edges 2/13/2020  9:33 AM   Gwen-wound Assessment Pink;Dry 2/13/2020  9:33 AM   Red%Wound Bed 40 2/13/2020  9:33 AM   Yellow%Wound Bed 60 2/13/2020  9:33 AM   Number of days: 216       LABS      CBC:   Lab Results   Component Value Date    WBC 6.8 09/19/2019    HGB 13.7 09/19/2019    HCT 43.2 09/19/2019    MCV 93.5 09/19/2019     09/19/2019     BMP:   Lab Results   Component Value Date     11/14/2019    K 3.9 11/14/2019     11/14/2019    CO2 22 11/14/2019    BUN 10 11/14/2019    CREATININE 0.7 11/14/2019     PT/INR: No results found for: PROTIME, INR  Prealbumin: No results found for: PREALBUMIN  Albumin:No results found for: LABALBU  Sed Rate:No results found for: SEDRATE  CRP: No results found for: CRP  Micro: No results found for: BC   Hemoglobin A1C: No results found for: LABA1C    Assessment:     Skin ulcer of abdominal wall, limited to breakdown of skin  Keloids     Contributing Factors: obesity and history of keloids    Patient Active Problem List   Diagnosis Code    Keloid L91.0    MRSA (methicillin resistant staph aureus) culture positive Z22.322    Skin ulcer of abdominal wall ([facility] [address] L98.499    POTS (postural orthostatic tachycardia syndrome) R00.0, I95.1    CHF (congestive heart failure) ([facility]) chronic diastolic P64.1    Morbid obesity ([facility]) E66.01    Bilateral leg edema =LYMPHEDEMA- ON f/U WITH LYMPHEDEMA CLINIC R60.0    Lymphedema of both lower extremities I89.0    SOB (shortness of breath) on exertion R06.02    Candidiasis, intertriginous B37.2    Dermatitis L30.9       Procedure Note  Indications:  Based on my examination of this patient's wound(s)/ulcer(s) today, debridement is not required to promote healing and evaluate the extent healing. Wound/Ulcer Descriptions are listed under Physical Exam above. Plan:     Patient examined and evaluated. The left abdomen wound is larger and painful. She has significant inflammation noted, does not appear to be infected. Left side Abdomen wound-  Apply Lidex cream. Apply ABD to wound. Cover with wound vac drape. Change every other day. Treatment:   Orders Placed This Encounter   Procedures    Apply dressing     Apply ABD to abdominal wound and cover with tegaderm. Standing Status:   Standing     Number of Occurrences:   1    Wound dressing     Apply ABD to wound. Cover with wound vac drape. Change every other day. Standing Status:   Standing     Number of Occurrences:   1         Antibiotics: No    Follow up: 1 week    Please see attached Discharge Instructions    Written patient dismissal instructions given to patient and signed by patient or POA.          Discharge Instructions       Visit Discharge/Physician Orders:  -  Clean abdomen twice a day with soap and water apply Desenex to folds an apply a pillow case or soft cloth to separate

## 2020-02-20 ENCOUNTER — HOSPITAL ENCOUNTER (OUTPATIENT)
Dept: WOUND CARE | Age: 45
Discharge: HOME | End: 2020-02-20
Payer: MEDICARE

## 2020-02-20 VITALS
RESPIRATION RATE: 20 BRPM | SYSTOLIC BLOOD PRESSURE: 152 MMHG | OXYGEN SATURATION: 98 % | HEART RATE: 106 BPM | WEIGHT: 293 LBS | TEMPERATURE: 98.7 F | DIASTOLIC BLOOD PRESSURE: 87 MMHG | BODY MASS INDEX: 49.9 KG/M2

## 2020-02-20 PROCEDURE — 2709999900 HC NON-CHARGEABLE SUPPLY

## 2020-02-20 PROCEDURE — 99212 OFFICE O/P EST SF 10 MIN: CPT

## 2020-02-20 PROCEDURE — 99213 OFFICE O/P EST LOW 20 MIN: CPT | Performed by: NURSE PRACTITIONER

## 2020-02-20 ASSESSMENT — PAIN DESCRIPTION - ORIENTATION: ORIENTATION: RIGHT

## 2020-02-20 ASSESSMENT — PAIN DESCRIPTION - PAIN TYPE: TYPE: CHRONIC PAIN

## 2020-02-20 ASSESSMENT — PAIN DESCRIPTION - LOCATION: LOCATION: ABDOMEN

## 2020-02-20 ASSESSMENT — PAIN SCALES - GENERAL: PAINLEVEL_OUTOF10: 8

## 2020-02-20 NOTE — PROGRESS NOTES
QUEtiapine Fumarate (SEROQUEL PO) Take 400 mg by mouth 2 times daily.  atomoxetine (STRATTERA) 40 MG capsule Take 40 mg by mouth 2 times daily.  aripiprazole (ABILIFY) 10 MG tablet Take 10 mg by mouth 2 times daily       ACYCLOVIR 400 mg by Does not apply route three times a week        No current facility-administered medications on file prior to encounter. REVIEW OF SYSTEMS    Constitutional: negative for chills, fatigue and fevers  Eyes: negative for irritation and redness  Ears, nose, mouth, throat, and face: negative for hearing loss and hoarseness   Respiratory: negative for cough and shortness of breath  Cardiovascular: negative for chest pain, positive for bilateral lower leg edema  Integument/breast: positive for abdominal keloids and left abdomen ulcer  Neurological: negative for dizziness and gait problems  Behavioral/Psych: positive for learning difficulty    Objective:      BP (!) 152/87   Pulse 106   Temp 98.7 °F (37.1 °C) (Oral)   Resp 20   Wt (!) 318 lb 9.6 oz (144.5 kg)   SpO2 98%   BMI 49.90 kg/m²     Wt Readings from Last 3 Encounters:   02/20/20 (!) 318 lb 9.6 oz (144.5 kg)   02/13/20 (!) 313 lb 8 oz (142.2 kg)   02/06/20 (!) 318 lb 11.2 oz (144.6 kg)       PHYSICAL EXAM    General Appearance: alert and oriented to person, place and time  Skin: warm and dry  Head: normocephalic and atraumatic  Eyes: conjunctivae normal  ENT: hearing grossly normal bilaterally  Pulmonary/Chest: clear to auscultation bilaterally- normal air movement, no respiratory distress  Cardiovascular: normal rate, regular rhythm  Abdomen: soft, non-tender and bowel sounds normal  Neurologic: gait and coordination normal, speech normal and developmentally delayed  Extremities: 2+ edema to bilateral lower legs with scarred fibrotic tissue noted managed with velcro compression wraps   Abdomen, left lower: measurements are larger. Ulcer bed is red.  Draining moderate amount of serosanguinous drainage. Periulcer skin is scarred and intact.  No warmth or induration noted. Left lower abdomen    Wound 07/11/19 Abdomen Left; Lower #2 (Active)   Wound Image   2/20/2020  2:09 PM   Dressing Status Intact; Old drainage; Changed 2/20/2020  2:09 PM   Dressing Changed Changed/New 2/20/2020  2:09 PM   Dressing/Treatment Dry dressing 2/13/2020  9:33 AM   Wound Cleansed Rinsed/Irrigated with saline 2/20/2020  2:09 PM   Wound Length (cm) 3 cm 2/20/2020  2:09 PM   Wound Width (cm) 2 cm 2/20/2020  2:09 PM   Wound Depth (cm) 0.05 cm 2/20/2020  2:09 PM   Wound Surface Area (cm^2) 6 cm^2 2/20/2020  2:09 PM   Change in Wound Size % (l*w) 17.24 2/20/2020  2:09 PM   Wound Volume (cm^3) 0.3 cm^3 2/20/2020  2:09 PM   Wound Healing % 17 2/20/2020  2:09 PM   Wound Assessment Pink;Yellow 2/20/2020  2:09 PM   Drainage Amount Small 2/20/2020  2:09 PM   Drainage Description Serosanguinous 2/20/2020  2:09 PM   Odor None 2/20/2020  2:09 PM   Margins Attached edges 2/20/2020  2:09 PM   Gwen-wound Assessment Dry 2/20/2020  2:09 PM   [place]%Wound Bed 80 2/20/2020  2:09 PM   Red%Wound Bed 20 2/20/2020  2:09 PM   Yellow%Wound Bed 60 2/13/2020  9:33 AM   Number of days: 224       LABS      CBC:   Lab Results   Component Value Date    WBC 6.8 09/19/2019    HGB 13.7 09/19/2019    HCT 43.2 09/19/2019    MCV 93.5 09/19/2019     09/19/2019     BMP:   Lab Results   Component Value Date     11/14/2019    K 3.9 11/14/2019     11/14/2019    CO2 22 11/14/2019    BUN 10 11/14/2019    CREATININE 0.7 11/14/2019     PT/INR: No results found for: PROTIME, INR  Prealbumin: No results found for: PREALBUMIN  Albumin:No results found for: LABALBU  Sed Rate:No results found for: SEDRATE  CRP: No results found for: CRP  Micro: No results found for: BC   Hemoglobin A1C: No results found for: LABA1C    Assessment:     Skin ulcer of abdominal wall, limited to breakdown of skin  Keloids     Contributing Factors: obesity and history of Apply steroid cream to upper and lower abdomen healed areas once a day  - Apply Aquaphor or Vaseline to upper and lower healed abdomen wound areas that are dry. Do not use fragrance or alcohol lotions. Apply daily. - wash under abdominal folds twice a day with gold Dial soap and thoughly dry and apply antifungal powder .        Wound Location: Left lower abdomen      Dressing orders:      Left side Abdomen wound-  Change silicone bordered foam on Sunday February 23rd. Do not change wound veil and steri strips. Apply moisturizer to scrred area twice daily. Do not use steroid cream at this time. Follow up visit: Thursday February 27th at 9:45 am                             Keep next scheduled appointment. Please give 24 hour notice if unable to keep appointment. [phone]     If you experience any of the following, please call the Wound Care Service during business hours: Monday through Friday 8:00 am - 4:30 pm  ([phone]).               *Increase in pain              *Temperature over 101              *Increase in drainage from your wound or a foul odor              *Uncontrolled swelling              *Need for compression bandage changes due to slippage, breakthrough drainage     If you need medical attention outside of business hours, please contact your Primary Care Doctor or go to the nearest emergency room.           Electronically signed by VIRGIE Gamino CNP on 2/20/2020 at 2:58 PM

## 2020-02-20 NOTE — PLAN OF CARE
Problem: Skin Integrity:  Goal: Will show no infection signs and symptoms  Description  Will show no infection signs and symptoms  Outcome: Ongoing     Patient seen at wound clinic for abdominal wound. No s/s of infection noted. Wound measuring larger at this time. See AVS for discharge instructions. Follow up visit: Thursday February 27th at 9:45 am with Dr. Xenia Daly. Care plan reviewed with patient and mother  Patient and mother verbalize understanding of the plan of care and contribute to goal setting.

## 2020-02-27 ENCOUNTER — HOSPITAL ENCOUNTER (OUTPATIENT)
Dept: WOUND CARE | Age: 45
Discharge: HOME | End: 2020-02-27
Payer: MEDICARE

## 2020-02-27 VITALS
SYSTOLIC BLOOD PRESSURE: 167 MMHG | BODY MASS INDEX: 49.76 KG/M2 | HEART RATE: 118 BPM | RESPIRATION RATE: 20 BRPM | WEIGHT: 293 LBS | OXYGEN SATURATION: 99 % | DIASTOLIC BLOOD PRESSURE: 86 MMHG | TEMPERATURE: 97.7 F

## 2020-02-27 PROCEDURE — 2709999900 HC NON-CHARGEABLE SUPPLY

## 2020-02-27 PROCEDURE — 99212 OFFICE O/P EST SF 10 MIN: CPT

## 2020-02-27 ASSESSMENT — PAIN DESCRIPTION - PAIN TYPE: TYPE: CHRONIC PAIN

## 2020-02-27 ASSESSMENT — PAIN DESCRIPTION - FREQUENCY: FREQUENCY: CONTINUOUS

## 2020-02-27 ASSESSMENT — PAIN DESCRIPTION - LOCATION: LOCATION: ABDOMEN

## 2020-02-27 ASSESSMENT — PAIN SCALES - GENERAL: PAINLEVEL_OUTOF10: 8

## 2020-02-27 NOTE — PROGRESS NOTES
[facility]          Progress Note and Procedure Note      Gladis Goodwin  MEDICAL RECORD NUMBER:  [id number]  AGE: 45 y.o. GENDER: female  : 1975  EPISODE DATE:  2020    Subjective:     SUBJECTIVE     Chief Complaint   Patient presents with    Wound Check     abdomen           HISTORY OF PRESENT ILLNESS   She is a 73-year-old female patient who has been following with Kathleenkenyattaady Schaeferpoly for a nonhealing abdominal wound unfortunately she has history of keloid formation and has been dealing for over 15 years she has an open wound that recently got bigger. She has been getting collagen dressing. She is from home she has ADHD and tell illness including schizophrenia she was accompanied by her mom her history was reviewed with her mom had excision of her abdominal wound in the past by plastic surgeon she had also followed at the [facility] clinic.     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     during hospital visit    Bronchitis     CHF (congestive heart failure) ([address]     Elbow fracture 10/09/14    left    Irritable bowel syndrome     Irritable bowel    Lymph edema 2017    Obesity     Conde disease     Schizophrenia [facility] [address]     Seasonal allergies          PAST SURGICAL HISTORY     Past Surgical History:   Procedure Laterality Date    LAYER WOUND CLOSURE      TOE SURGERY  2019    TOENAIL EXCISION      TONSILLECTOMY AND ADENOIDECTOMY       FAMILY HISTORY         Family History   Problem Relation Age of Onset    Diabetes Father     Cancer Maternal Grandmother     Cancer Paternal Grandmother      SOCIAL HISTORY       Social History     Tobacco Use    Smoking status: Never Smoker    Smokeless tobacco: Never Used   Substance Use Topics    Alcohol use: No    Drug use: No     ALLERGIES         Allergies   Allergen Reactions    Latex Rash    Cat Hair Extract     Seasonal      MEDICATIONS         Current Outpatient Medications on File Prior to Encounter   Medication Sig Dispense Refill    fluocinonide (LIDEX) 0.05 % cream Apply topically daily. 60 g 3    Dextromethorphan-guaiFENesin (CORICIDIN HBP CONGESTION/COUGH)  MG CAPS Take 1 capsule by mouth 2 times daily as needed (cough/congestion) 60 capsule 0    metoprolol succinate (TOPROL XL) 25 MG extended release tablet take 1 tablet by mouth once daily 90 tablet 1    furosemide (LASIX) 20 MG tablet take 1 tablet by mouth once daily 90 tablet 1    albuterol sulfate HFA (PROAIR HFA) 108 (90 Base) MCG/ACT inhaler Inhale 2 puffs into the lungs every 4 hours as needed for Wheezing or Shortness of Breath 1 Inhaler 0    acetaminophen (TYLENOL) 500 MG tablet Take 1,000 mg by mouth every 6 hours as needed for Pain      Cholecalciferol (VITAMIN D3) 5000 units TABS Take 2,000 Units by mouth       IRON PO Take 65 mg by mouth daily       miconazole (MICOTIN) 2 % powder Apply topically 2 times to 3 times  daily. 45 g 1    Dicyclomine HCl (BENTYL PO)   Take 20 mg by mouth 2 times daily       QUEtiapine Fumarate (SEROQUEL PO) Take 400 mg by mouth 2 times daily.  atomoxetine (STRATTERA) 40 MG capsule Take 40 mg by mouth 2 times daily.  aripiprazole (ABILIFY) 10 MG tablet Take 10 mg by mouth 2 times daily       ACYCLOVIR 400 mg by Does not apply route three times a week       fluticasone (FLONASE) 50 MCG/ACT nasal spray 1 spray by Nasal route daily for 7 days 1 Bottle 0    MedroxyPROGESTERone Acetate (DEPO-PROVERA IM) Inject into the muscle       No current facility-administered medications on file prior to encounter. REVIEW OF SYSTEMS:     Constitutional: no fever, no night sweats, no fatigue. Head: no head ache , no head injury, no migranes. Eye: no blurring of vision, no double vision.   Ears: no hearing difficulty, no tinnitus  Mouth/throat: no ulceration, dental caries , dysphagia  Lungs: no cough, no shortness of breath, no wheeze  CVS: no palpitation, no chest pain, no shortness of breath  GI: +abdominal pain, no nausea , no vomiting, no constipation  FRANCOISE: no dysuria, frequency and urgency, no hematuria, no kidney stones  Musculoskeletal: no joint pain, swelling , stiffness,  Endocrine: no polyuria, polydipsia, no cold or heat intolerance  Hematology: no anemia, no easy brusing or bleeding, no hx of clotting disorder  Dermatology: no skin rash, no eczema, no pruritis,  Psychiatry: History of schizophrenia and a ADHD        PHYSICAL EXAM      weight is 317 lb 11.2 oz (144.1 kg) (abnormal). Her oral temperature is 97.7 °F (36.5 °C). Her blood pressure is 167/86 (abnormal) and her pulse is 118. Her respiration is 20 and oxygen saturation is 99%. Wt Readings from Last 3 Encounters:   02/27/20 (!) 317 lb 11.2 oz (144.1 kg)   02/20/20 (!) 318 lb 9.6 oz (144.5 kg)   02/13/20 (!) 313 lb 8 oz (142.2 kg)     Obese nontender General Appearance  Awake, alert, oriented,  not  In acute distress  HEENT - normocephalic, atraumatic, pink conjunctiva,  anicteric sclera  Neck - Supple, no mass  Lungs -  Bilateral good air entry, no rhonchi, no wheeze  Cardiovascular - Heart sounds are normal.  Regular rate and rhythm without murmur, gallop or rub. Abdomen - soft,non tender. She has a wound on her left side of mid abdomen, there is an old surgical scar, wound measurment was noted  Neurologic - oriented  Skin - No bruising or bleeding  Extremities - wrapped legs. Wound 07/11/19 Abdomen Left; Lower #2 (Active)   Wound Image   2/27/2020  9:47 AM   Dressing Status Intact; Old drainage 2/27/2020  9:47 AM   Dressing Changed Changed/New 2/27/2020  9:47 AM   Dressing/Treatment Silicone border 4/30/0201  2:09 PM   Wound Cleansed Rinsed/Irrigated with saline 2/27/2020  9:47 AM   Wound Length (cm) 2.7 cm 2/27/2020  9:47 AM   Wound Width (cm) 2.3 cm 2/27/2020  9:47 AM   Wound Depth (cm) 0.1 cm 2/27/2020 9:47 AM   Wound Surface Area (cm^2) 6.21 cm^2 2/27/2020  9:47 AM   Change in Wound Size % (l*w) 14.34 2/27/2020  9:47 AM   Wound Volume (cm^3) 0.62 cm^3 2/27/2020  9:47 AM   Wound Healing % -72 2/27/2020  9:47 AM   Wound Assessment Red;Pink 2/27/2020  9:47 AM   Drainage Amount Moderate 2/27/2020  9:47 AM   Drainage Description Yellow 2/27/2020  9:47 AM   Odor None 2/27/2020  9:47 AM   Margins Attached edges 2/27/2020  9:47 AM   Gwen-wound Assessment Dry 2/27/2020  9:47 AM   [place]%Wound Bed 80 2/20/2020  2:09 PM   Red%Wound Bed 90 2/27/2020  9:47 AM   Yellow%Wound Bed 10 2/27/2020  9:47 AM   Number of days: 230          Assessment:       Skin ulcer of abdominal wall, limited to breakdown of skin  Keloids  Will arrange for collagen dressing   Continue foam dressing     Patient Active Problem List   Diagnosis Code    Keloid L91.0    MRSA (methicillin resistant staph aureus) culture positive Z22.322    Skin ulcer of abdominal wall ([facility]) L98.499    POTS (postural orthostatic tachycardia syndrome) R00.0, I95.1    CHF (congestive heart failure) ([facility]) chronic diastolic O19.2    Morbid obesity ([facility]) E66.01    Bilateral leg edema =LYMPHEDEMA- ON f/U WITH LYMPHEDEMA CLINIC R60.0    Lymphedema of both lower extremities I89.0    SOB (shortness of breath) on exertion R06.02    Candidiasis, intertriginous B37.2    Dermatitis L30.9          Plan:     Patient examined and evaluated   Please see attached Discharge Instructions    Written patient dismissal instructions given to patient and signed by patient or POA.          Discharge Instructions       Visit Discharge/Physician Orders:  -  Clean abdomen twice a day with soap and water apply Desenex to folds an apply a pillow case or soft cloth to separate the folds all day long    -  Use a new clean cloth every time and apply more powder  -  Use lymphedema pumps twice daily for 1 hour each time  - Continue Juxta Lite Wraps (apply in morning and remove at night before bedtime)  - Call [facility] for supplies  [phone]  - Apply steroid cream to upper and lower abdomen healed areas once a day  - Apply Aquaphor or Vaseline to upper and lower healed abdomen wound areas that are dry. Do not use fragrance or alcohol lotions. Apply daily. - wash under abdominal folds twice a day with gold Dial soap and thoughly dry and apply antifungal powder .   - we will order puraply AM to be applied at the next visit     Wound Location: Left lower abdomen      Dressing orders:      Left side Abdomen wound-  Cleanse wound with normal saline or wound cleanser and gauze. Pat dry with clean gauze. Apply a silicone bordered foam to the wound and change twice a week.     Follow up visit: Thursday March                               Keep next scheduled appointment. Please give 24 hour notice if unable to keep appointment. [phone]     If you experience any of the following, please call the Wound Care Service during business hours: Monday through Friday 8:00 am - 4:30 pm  ([phone]).               *Increase in pain              *Temperature over 101              *Increase in drainage from your wound or a foul odor              *Uncontrolled swelling              *Need for compression bandage changes due to slippage, breakthrough drainage     If you need medical attention outside of business hours, please contact your Primary Care Doctor or go to the nearest emergency room.               Electronically signed by Krista Shah MD on 2/27/2020 at 10:46 AM

## 2020-03-11 ENCOUNTER — HOSPITAL ENCOUNTER (OUTPATIENT)
Dept: WOUND CARE | Age: 45
Discharge: HOME | End: 2020-03-11
Payer: MEDICARE

## 2020-03-11 VITALS
DIASTOLIC BLOOD PRESSURE: 80 MMHG | RESPIRATION RATE: 18 BRPM | TEMPERATURE: 98.7 F | SYSTOLIC BLOOD PRESSURE: 132 MMHG | OXYGEN SATURATION: 97 % | WEIGHT: 293 LBS | HEART RATE: 102 BPM | BODY MASS INDEX: 51.11 KG/M2

## 2020-03-11 PROCEDURE — 6370000000 HC RX 637 (ALT 250 FOR IP): Performed by: INTERNAL MEDICINE

## 2020-03-11 PROCEDURE — 2709999900 HC NON-CHARGEABLE SUPPLY

## 2020-03-11 PROCEDURE — 87070 CULTURE OTHR SPECIMN AEROBIC: CPT

## 2020-03-11 PROCEDURE — 87147 CULTURE TYPE IMMUNOLOGIC: CPT

## 2020-03-11 PROCEDURE — 99212 OFFICE O/P EST SF 10 MIN: CPT

## 2020-03-11 PROCEDURE — 87077 CULTURE AEROBIC IDENTIFY: CPT

## 2020-03-11 PROCEDURE — 87186 SC STD MICRODIL/AGAR DIL: CPT

## 2020-03-11 PROCEDURE — 87205 SMEAR GRAM STAIN: CPT

## 2020-03-11 RX ORDER — GENTAMICIN SULFATE 1 MG/G
OINTMENT TOPICAL ONCE
Status: COMPLETED | OUTPATIENT
Start: 2020-03-11 | End: 2020-03-11

## 2020-03-11 RX ADMIN — GENTAMICIN SULFATE: 1 OINTMENT TOPICAL at 12:14

## 2020-03-11 ASSESSMENT — PAIN SCALES - GENERAL: PAINLEVEL_OUTOF10: 8

## 2020-03-13 ENCOUNTER — TELEPHONE (OUTPATIENT)
Dept: WOUND CARE | Age: 45
End: 2020-03-13

## 2020-03-13 LAB
AEROBIC CULTURE: ABNORMAL
GRAM STAIN RESULT: ABNORMAL
ORGANISM: ABNORMAL

## 2020-03-17 RX ORDER — METOPROLOL SUCCINATE 25 MG/1
TABLET, EXTENDED RELEASE ORAL
Qty: 90 TABLET | Refills: 1 | Status: SHIPPED | OUTPATIENT
Start: 2020-03-17 | End: 2020-09-04

## 2020-03-17 RX ORDER — FUROSEMIDE 20 MG/1
TABLET ORAL
Qty: 120 TABLET | Refills: 1 | Status: SHIPPED | OUTPATIENT
Start: 2020-03-17 | End: 2020-06-05 | Stop reason: SDUPTHER

## 2020-03-26 ENCOUNTER — HOSPITAL ENCOUNTER (OUTPATIENT)
Dept: WOUND CARE | Age: 45
Discharge: HOME | End: 2020-03-26
Payer: MEDICARE

## 2020-03-26 ENCOUNTER — TELEPHONE (OUTPATIENT)
Dept: WOUND CARE | Age: 45
End: 2020-03-26

## 2020-03-26 VITALS
WEIGHT: 293 LBS | OXYGEN SATURATION: 96 % | HEART RATE: 98 BPM | TEMPERATURE: 98.3 F | BODY MASS INDEX: 49.3 KG/M2 | DIASTOLIC BLOOD PRESSURE: 74 MMHG | SYSTOLIC BLOOD PRESSURE: 127 MMHG | RESPIRATION RATE: 18 BRPM

## 2020-03-26 PROCEDURE — 2709999900 HC NON-CHARGEABLE SUPPLY

## 2020-03-26 PROCEDURE — 15271 SKIN SUB GRAFT TRNK/ARM/LEG: CPT

## 2020-03-26 ASSESSMENT — PAIN DESCRIPTION - PAIN TYPE: TYPE: CHRONIC PAIN

## 2020-03-26 ASSESSMENT — PAIN DESCRIPTION - ORIENTATION: ORIENTATION: RIGHT

## 2020-03-26 ASSESSMENT — PAIN DESCRIPTION - LOCATION: LOCATION: ABDOMEN

## 2020-03-26 NOTE — PROGRESS NOTES
PuraPly AMTreatment Note    NAME:  Betsey Bryant  YOB: 1975  MEDICAL RECORD NUMBER:  [id number]  DATE:  3/26/2020    Goal:  Patient will receive safe and proper application of skin substitute. Patient will comply with caring for dressing, and reporting complications. Expiration date checked immediately prior to use. Package intact prior to use and no damage noted. Transport temperature controlled and acceptable. PuraPly AM was removed from protective sterile packaging by provider and applied to prepared ulcer bed. PuraPly AM was hydrated with sterile normal saline per provider. PuraPly AM was applied to abdomen and affixed with steri-strips by the provider. PuraPly AM was covered with non-adherent ulcer dressing. Applied silicone bordered foam  over non-adherent. Patient/caregiver was instructed not to remove dressing and to keep it clean and dry. Pt/family/caregiver was instructed on signs and symptoms of complications to report such as draining through dressing, dressing falling down/slipping, getting wet, or severe pain or tingling. Date of first application of PuraPly for this current wound is December 12, 2019 .     Guidelines followed    Puraply AM 2cm x 4cm (8 sqcm - all 8 sq cm used on patient today) Lot # [id number] expiration date 06/24/2022    Electronically signed by Hussain Rodríguez RN on 3/26/2020 at 10:31 AM

## 2020-03-26 NOTE — PROGRESS NOTES
tablet take 1 tablet by mouth once daily 90 tablet 1    furosemide (LASIX) 20 MG tablet take 1 tablet by mouth once daily, Take 2 tablets Monday, Wednesday and Friday. 120 tablet 1    fluocinonide (LIDEX) 0.05 % cream Apply topically daily. 60 g 3    Dextromethorphan-guaiFENesin (CORICIDIN HBP CONGESTION/COUGH)  MG CAPS Take 1 capsule by mouth 2 times daily as needed (cough/congestion) 60 capsule 0    albuterol sulfate HFA (PROAIR HFA) 108 (90 Base) MCG/ACT inhaler Inhale 2 puffs into the lungs every 4 hours as needed for Wheezing or Shortness of Breath 1 Inhaler 0    fluticasone (FLONASE) 50 MCG/ACT nasal spray 1 spray by Nasal route daily for 7 days 1 Bottle 0    acetaminophen (TYLENOL) 500 MG tablet Take 1,000 mg by mouth every 6 hours as needed for Pain      Cholecalciferol (VITAMIN D3) 5000 units TABS Take 2,000 Units by mouth       MedroxyPROGESTERone Acetate (DEPO-PROVERA IM) Inject into the muscle      IRON PO Take 65 mg by mouth daily       miconazole (MICOTIN) 2 % powder Apply topically 2 times to 3 times  daily. 45 g 1    Dicyclomine HCl (BENTYL PO)   Take 20 mg by mouth 2 times daily       QUEtiapine Fumarate (SEROQUEL PO) Take 400 mg by mouth 2 times daily.  atomoxetine (STRATTERA) 40 MG capsule Take 40 mg by mouth 2 times daily.  aripiprazole (ABILIFY) 10 MG tablet Take 10 mg by mouth 2 times daily       ACYCLOVIR 400 mg by Does not apply route three times a week        No current facility-administered medications on file prior to encounter. REVIEW OF SYSTEMS:     Constitutional: no fever, no night sweats, no fatigue. Head: no head ache , no head injury, no migranes. Eye: no blurring of vision, no double vision.   Ears: no hearing difficulty, no tinnitus  Mouth/throat: no ulceration, dental caries , dysphagia  Lungs: no cough, no shortness of breath, no wheeze  CVS: no palpitation, no chest pain, no shortness of breath  GI: no abdominal pain, no nausea , no vomiting, no constipation  FRANCOISE: no dysuria, frequency and urgency, no hematuria, no kidney stones  Musculoskeletal: no joint pain, swelling , stiffness,  Endocrine: no polyuria, polydipsia, no cold or heat intolerance  Hematology: no anemia, no easy brusing or bleeding, no hx of clotting disorder  Dermatology: no skin rash, no eczema, no pruritis,  Psychiatry: no depression. Has MRDD      PHYSICAL EXAM      weight is 314 lb 12.8 oz (142.8 kg) (abnormal). Her oral temperature is 98.3 °F (36.8 °C). Her blood pressure is 127/74 and her pulse is 98. Her respiration is 18 and oxygen saturation is 96%. Wt Readings from Last 3 Encounters:   03/26/20 (!) 314 lb 12.8 oz (142.8 kg)   03/11/20 (!) 326 lb 4.8 oz (148 kg)   02/27/20 (!) 317 lb 11.2 oz (144.1 kg)      General Appearance  Awake, alert, oriented,  not  In acute distress  HEENT - normocephalic, atraumatic, pink conjunctiva,  anicteric sclera  Neck - Supple, no mass  Lungs -  Bilateral good air entry, no rhonchi, no wheeze  Cardiovascular - Heart sounds are normal.  Regular rate and rhythm without murmur, gallop or rub. Abdomen - soft, not distended, nontender, no organomegally,  Neurologic - oriented. Skin - No bruising or bleeding  Extremities - No edema, no cyanosis, clubbing       Wound 07/11/19 Abdomen Left; Lower #2 (Active)   Wound Image   3/11/2020 11:42 AM   Dressing Status Intact; Old drainage; Changed 3/26/2020  9:50 AM   Dressing Changed Changed/New 3/26/2020  9:50 AM   Dressing/Treatment Silicone border 3/21/7109  2:09 PM   Wound Cleansed Rinsed/Irrigated with saline 3/26/2020  9:50 AM   Wound Length (cm) 3.5 cm 3/26/2020  9:50 AM   Wound Width (cm) 4.5 cm 3/26/2020  9:50 AM   Wound Depth (cm) 0.05 cm 3/26/2020  9:50 AM   Wound Surface Area (cm^2) 15.75 cm^2 3/26/2020  9:50 AM   Change in Wound Size % (l*w) -117.24 3/26/2020  9:50 AM   Wound Volume (cm^3) 0.79 cm^3 3/26/2020  9:50 AM   Wound Healing % -119 3/26/2020  9:50 AM   Wound Assessment Epithelialization;Yellow;Red 3/26/2020  9:50 AM   Drainage Amount Moderate 3/26/2020  9:50 AM   Drainage Description Serosanguinous 3/26/2020  9:50 AM   Odor None 3/26/2020  9:50 AM   Margins Attached edges 3/26/2020  9:50 AM   Gwen-wound Assessment Denuded;Red 3/26/2020  9:50 AM   [place]%Wound Bed 80 2/20/2020  2:09 PM   Red%Wound Bed 50 3/26/2020  9:50 AM   Yellow%Wound Bed 10 3/26/2020  9:50 AM   Other%Wound Bed 40 epi 3/26/2020  9:50 AM   Number of days: 061       LABS      No results found for: BC    Assessment:     Non healing abdominal wound  Will apply collagen dressing  Continue to lose weight  Will discuss with plastic surgeon if the wound is not healing  Patient Active Problem List   Diagnosis Code    Keloid L91.0    MRSA (methicillin resistant staph aureus) culture positive Z22.322    Skin ulcer of abdominal wall ([facility] [address] L98.499    POTS (postural orthostatic tachycardia syndrome) R00.0, I95.1    CHF (congestive heart failure) ([facility]) chronic diastolic I45.9    Morbid obesity ([facility]) E66.01    Bilateral leg edema =LYMPHEDEMA- ON f/U WITH LYMPHEDEMA CLINIC R60.0    Lymphedema of both lower extremities I89.0    SOB (shortness of breath) on exertion R06.02    Candidiasis, intertriginous B37.2    Dermatitis L30.9       Procedure Note   collagen dressing Puraply dressing  Area was cleaned, the puraply was moistened with saline  Applied to the wound , non stick dressing used with steristrips  Foam dressing applied    Plan:     Patient examined and evaluated    Treatment: No orders of the defined types were placed in this encounter. Please see attached Discharge Instructions    Written patient dismissal instructions given to patient and signed by patient or POA.          Discharge Instructions       Visit Discharge/Physician Orders:  -  Clean abdomen twice a day with soap and water apply Desenex to folds an apply a pillow case or soft cloth to separate the folds all day long    -  Use a new clean cloth every time and apply more powder  -  Use lymphedema pumps twice daily for 1 hour each time  - Continue Juxta Lite Wraps (apply in morning and remove at night before bedtime)  - Call [facility] for supplies  [phone]  - Apply steroid cream to upper and lower abdomen healed areas once a day  - Apply Aquaphor or Vaseline to upper and lower healed abdomen wound areas that are dry. Do not use fragrance or alcohol lotions. Apply daily. - wash under abdominal folds twice a day with gold Dial soap and thoughly dry and apply antifungal powder . - Puraply graft applied today. Wound Location: Left lower abdomen      Dressing orders:      Left side Abdomen wound-  Change silicone bordered foam ONLY every 4 days. Leave wound veil and steri strips intact until next visit with Dr. Ilona Fothergill. Okay to moisten graft through the wound veil if needed with dressing changes. Apply desenex to bridger wound for rash.      Follow up visit: 2 weeks April 8th at 10:45 am      Keep next scheduled appointment. Please give 24 hour notice if unable to keep appointment. [phone]     If you experience any of the following, please call the Wound Care Service during business hours: Monday through Friday 8:00 am - 4:30 pm  ([phone]).             *Increase in pain              *Temperature over 101              *Increase in drainage from your wound or a foul odor              *Uncontrolled swelling              *Need for compression bandage changes due to slippage, breakthrough drainage     If you need medical attention outside of business hours, please contact your Primary Care Doctor or go to the nearest emergency room.         Electronically signed by Prashanth Roberts MD on 3/26/2020 at 10:18 AM

## 2020-03-26 NOTE — TELEPHONE ENCOUNTER
Spoke to [facility] to reorder silicone bordered foams for patient.  Updated shipping address also per mothers request.

## 2020-04-07 ENCOUNTER — TELEPHONE (OUTPATIENT)
Dept: WOUND CARE | Age: 45
End: 2020-04-07

## 2020-04-08 ENCOUNTER — HOSPITAL ENCOUNTER (OUTPATIENT)
Dept: WOUND CARE | Age: 45
Discharge: HOME | End: 2020-04-08
Payer: MEDICARE

## 2020-04-08 VITALS
OXYGEN SATURATION: 94 % | TEMPERATURE: 97.7 F | SYSTOLIC BLOOD PRESSURE: 129 MMHG | RESPIRATION RATE: 18 BRPM | BODY MASS INDEX: 48.24 KG/M2 | WEIGHT: 293 LBS | HEART RATE: 93 BPM | DIASTOLIC BLOOD PRESSURE: 87 MMHG

## 2020-04-08 PROCEDURE — 99213 OFFICE O/P EST LOW 20 MIN: CPT

## 2020-04-08 PROCEDURE — 2709999900 HC NON-CHARGEABLE SUPPLY

## 2020-04-08 ASSESSMENT — PAIN SCALES - GENERAL: PAINLEVEL_OUTOF10: 8

## 2020-04-08 ASSESSMENT — PAIN DESCRIPTION - PAIN TYPE: TYPE: CHRONIC PAIN

## 2020-04-08 ASSESSMENT — PAIN DESCRIPTION - LOCATION: LOCATION: ABDOMEN

## 2020-04-08 NOTE — PROGRESS NOTES
tablet take 1 tablet by mouth once daily 90 tablet 1    furosemide (LASIX) 20 MG tablet take 1 tablet by mouth once daily, Take 2 tablets Monday, Wednesday and Friday. 120 tablet 1    fluocinonide (LIDEX) 0.05 % cream Apply topically daily. 60 g 3    Dextromethorphan-guaiFENesin (CORICIDIN HBP CONGESTION/COUGH)  MG CAPS Take 1 capsule by mouth 2 times daily as needed (cough/congestion) 60 capsule 0    albuterol sulfate HFA (PROAIR HFA) 108 (90 Base) MCG/ACT inhaler Inhale 2 puffs into the lungs every 4 hours as needed for Wheezing or Shortness of Breath 1 Inhaler 0    fluticasone (FLONASE) 50 MCG/ACT nasal spray 1 spray by Nasal route daily for 7 days 1 Bottle 0    acetaminophen (TYLENOL) 500 MG tablet Take 1,000 mg by mouth every 6 hours as needed for Pain      Cholecalciferol (VITAMIN D3) 5000 units TABS Take 2,000 Units by mouth       MedroxyPROGESTERone Acetate (DEPO-PROVERA IM) Inject into the muscle      IRON PO Take 65 mg by mouth daily       miconazole (MICOTIN) 2 % powder Apply topically 2 times to 3 times  daily. 45 g 1    Dicyclomine HCl (BENTYL PO)   Take 20 mg by mouth 2 times daily       QUEtiapine Fumarate (SEROQUEL PO) Take 400 mg by mouth 2 times daily.  atomoxetine (STRATTERA) 40 MG capsule Take 40 mg by mouth 2 times daily.  aripiprazole (ABILIFY) 10 MG tablet Take 10 mg by mouth 2 times daily       ACYCLOVIR 400 mg by Does not apply route three times a week        No current facility-administered medications on file prior to encounter. REVIEW OF SYSTEMS:     Constitutional: no fever, no night sweats, no fatigue. Head: no head ache , no head injury, no migranes. Eye: no blurring of vision, no double vision.   Ears: no hearing difficulty, no tinnitus  Mouth/throat: no ulceration, dental caries , dysphagia  Lungs: no cough, no shortness of breath, no wheeze  CVS: no palpitation, no chest pain, no shortness of breath  GI: no abdominal pain, no nausea , no vomiting, no constipation  FRANCOISE: no dysuria, frequency and urgency, no hematuria, no kidney stones  Musculoskeletal: no joint pain, swelling , stiffness,  Endocrine: no polyuria, polydipsia, no cold or heat intolerance  Hematology: no anemia, no easy brusing or bleeding, no hx of clotting disorder  Dermatology: no skin rash, no eczema, no pruritis,  Psychiatry: no depression, no anxiety,no panic attacks, no suicide ideation        PHYSICAL EXAM      weight is 308 lb (139.7 kg) (abnormal). Her oral temperature is 97.7 °F (36.5 °C). Her blood pressure is 129/87 and her pulse is 93. Her respiration is 18 and oxygen saturation is 94%. Wt Readings from Last 3 Encounters:   04/08/20 (!) 308 lb (139.7 kg)   03/26/20 (!) 314 lb 12.8 oz (142.8 kg)   03/11/20 (!) 326 lb 4.8 oz (148 kg)      General Appearance  Awake, alert, oriented,  not  In acute distress  HEENT - normocephalic, atraumatic, pink conjunctiva,  anicteric sclera  Neck - Supple, no mass   Abdomen - soft, not distended, nontender, obese, she has open wound. over the mid abdomen, there is an old scar on the mid abdomen  Neurologic - oriented  Skin - No bruising or bleeding  Extremities - +edema, no cyanosis, clubbing       Wound 07/11/19 Abdomen Left; Lower #2 (Active)   Wound Image   4/8/2020  9:54 AM   Dressing Status Intact; Old drainage; Changed 4/8/2020  9:54 AM   Dressing Changed Changed/New 4/8/2020  9:54 AM   Wound Cleansed Rinsed/Irrigated with saline 4/8/2020  9:54 AM   Wound Length (cm) 2.7 cm 4/8/2020  9:54 AM   Wound Width (cm) 4 cm 4/8/2020  9:54 AM   Wound Depth (cm) 0 cm 4/8/2020  9:54 AM   Wound Surface Area (cm^2) 10.8 cm^2 4/8/2020  9:54 AM   Change in Wound Size % (l*w) -48.97 4/8/2020  9:54 AM   Wound Volume (cm^3) 0 cm^3 4/8/2020  9:54 AM   Wound Healing % 100 4/8/2020  9:54 AM   Wound Assessment Hyper granulation tissue; Yellow;Red 4/8/2020  9:54 AM   Drainage Amount Moderate 4/8/2020  9:54 AM   Drainage Description Serosanguinous 4/8/2020 9:54 AM   Odor None 4/8/2020  9:54 AM   Margins Attached edges 4/8/2020  9:54 AM   Gwen-wound Assessment Dry 4/8/2020  9:54 AM   [place]%Wound Bed 80 2/20/2020  2:09 PM   Red%Wound Bed 30 4/8/2020  9:54 AM   Yellow%Wound Bed 70 4/8/2020  9:54 AM   Other%Wound Bed 40 epi 3/26/2020  9:50 AM   Number of days: 271       Wound 04/08/20 Abdomen Mid near umbilicus (Active)   Wound Image   4/8/2020  9:54 AM   Dressing Status Intact; Old drainage; Changed 4/8/2020  9:54 AM   Dressing Changed Changed/New 4/8/2020  9:54 AM   Wound Cleansed Rinsed/Irrigated with saline 4/8/2020  9:54 AM   Wound Length (cm) 1.3 cm 4/8/2020  9:54 AM   Wound Width (cm) 1.5 cm 4/8/2020  9:54 AM   Wound Depth (cm) 0 cm 4/8/2020  9:54 AM   Wound Surface Area (cm^2) 1.95 cm^2 4/8/2020  9:54 AM   Wound Volume (cm^3) 0 cm^3 4/8/2020  9:54 AM   Wound Assessment Yellow;Red;Hyper granulation tissue 4/8/2020  9:54 AM   Drainage Amount Moderate 4/8/2020  9:54 AM   Drainage Description Serosanguinous 4/8/2020  9:54 AM   Odor None 4/8/2020  9:54 AM   Margins Attached edges 4/8/2020  9:54 AM   Gwen-wound Assessment Dry;Pink 4/8/2020  9:54 AM   Red%Wound Bed 10 4/8/2020  9:54 AM   Yellow%Wound Bed 90 4/8/2020  9:54 AM   Number of days: 0       LABS      No results found for: BC    Assessment:    Non healing abdominal wound  Continue foam dressing and silver alginate  Follow up in two wks  Will apply collagen dressing  Patient Active Problem List   Diagnosis Code    Keloid L91.0    MRSA (methicillin resistant staph aureus) culture positive Z22.322    Skin ulcer of abdominal wall ([facility] [address] L98.499    POTS (postural orthostatic tachycardia syndrome) R00.0, I95.1    CHF (congestive heart failure) ([facility]) chronic diastolic H04.0    Morbid obesity ([facility]) E66.01    Bilateral leg edema =LYMPHEDEMA- ON f/U WITH LYMPHEDEMA CLINIC R60.0    Lymphedema of both lower extremities I89.0    SOB (shortness of breath) on exertion R06.02    Candidiasis, intertriginous B37.2    Dermatitis L30.9            Plan:     Patient examined and evaluated    Treatment: No orders of the defined types were placed in this encounter. Please see attached Discharge Instructions    Written patient dismissal instructions given to patient and signed by patient or POA. Discharge Instructions       Visit Discharge/Physician Orders:  - Sesay Jose abdomen twice a day with soap and water apply Desenex to folds an apply a pillow case or soft cloth to separate the folds all day long    -  Use a new clean cloth every time and apply more powder  -  Use lymphedema pumps twice daily for 1 hour each time  - Continue Juxta Lite Wraps (apply in morning and remove at night before bedtime)  - Call [facility] for supplies  [phone]  - Apply steroid cream to upper and lower abdomen healed areas once a day  - Apply Aquaphor or Vaseline to upper and lower healed abdomen wound areas that are dry. Do not use fragrance or alcohol lotions. Apply daily. - wash under abdominal folds twice a day with gold Dial soap and thoughly dry and apply antifungal powder .     Wound Location: Left lower abdomen      Dressing orders:      Left side Abdomen wound-  Change silicone bordered foam ONLY every 4 days. Leave wound veil and steri strips intact until next visit with Dr. Shena Prasad. Okay to moisten graft through the wound veil if needed with dressing changes. Apply desenex to bridger wound for rash.      Follow up visit: 2 weeks      Keep next scheduled appointment. Please give 24 hour notice if unable to keep appointment. [phone]     If you experience any of the following, please call the Wound Care Service during business hours: Monday through Friday 8:00 am - 4:30 pm  ([phone]).               *Increase in pain              *Temperature over 101              *Increase in drainage from your wound or a foul odor              *Uncontrolled swelling              *Need for compression bandage changes due to slippage, breakthrough drainage     If you need medical attention outside of business hours, please contact your Primary Care Doctor or go to the nearest emergency room.         Electronically signed by Bettina Robb MD on 4/8/2020 at 10:03 AM

## 2020-04-23 ENCOUNTER — HOSPITAL ENCOUNTER (OUTPATIENT)
Dept: WOUND CARE | Age: 45
Discharge: HOME | End: 2020-04-23
Payer: MEDICARE

## 2020-04-23 VITALS
TEMPERATURE: 97.7 F | OXYGEN SATURATION: 98 % | BODY MASS INDEX: 48.18 KG/M2 | SYSTOLIC BLOOD PRESSURE: 142 MMHG | WEIGHT: 293 LBS | HEART RATE: 106 BPM | DIASTOLIC BLOOD PRESSURE: 65 MMHG | RESPIRATION RATE: 18 BRPM

## 2020-04-23 PROCEDURE — 17250 CHEM CAUT OF GRANLTJ TISSUE: CPT

## 2020-04-23 PROCEDURE — 6370000000 HC RX 637 (ALT 250 FOR IP): Performed by: INTERNAL MEDICINE

## 2020-04-23 RX ADMIN — SILVER NITRATE APPLICATORS 2 EACH: 25; 75 STICK TOPICAL at 10:00

## 2020-04-23 ASSESSMENT — PAIN SCALES - GENERAL: PAINLEVEL_OUTOF10: 9

## 2020-04-23 ASSESSMENT — PAIN DESCRIPTION - PAIN TYPE: TYPE: CHRONIC PAIN

## 2020-04-23 ASSESSMENT — PAIN DESCRIPTION - LOCATION: LOCATION: ABDOMEN

## 2020-04-23 NOTE — PROGRESS NOTES
=LYMPHEDEMA- ON f/U WITH LYMPHEDEMA CLINIC R60.0    Lymphedema of both lower extremities I89.0    SOB (shortness of breath) on exertion R06.02    Candidiasis, intertriginous B37.2    Dermatitis L30.9        Plan:     Patient examined and evaluated    Treatment: No orders of the defined types were placed in this encounter. Written patient dismissal instructions given to patient and signed by patient or POA. Discharge Instructions       Visit Discharge/Physician Orders:  - Silver nitrate applied to abdominal wounds. -  Clean abdomen twice a day with soap and water apply Desenex to folds an apply a pillow case or soft cloth to separate the folds all day long    -  Use a new clean cloth every time and apply more powder  -  Use lymphedema pumps twice daily for 1 hour each time  - Continue Juxta Lite Wraps (apply in morning and remove at night before bedtime)  - Call [facility] for supplies  [phone]. New order faxed today 4/8/20  - Apply steroid cream to upper and lower abdomen healed areas once a day  - Apply Aquaphor or Vaseline to upper and lower healed abdomen wound areas that are dry. Do not use fragrance or alcohol lotions. Apply daily. - wash under abdominal folds twice a day with gold Dial soap and thoughly dry and apply antifungal powder . - Continue working on weight loss. Wound Location: Left lower abdomen, mid abdomen      Dressing orders:      Abdomen- Apply Silver alginate then silicone bordered foam to both abdominal wounds. Change every 3 days.      Follow up visit: 1 weeks on Wednesday April 29th at 9:45 am      Keep next scheduled appointment. Please give 24 hour notice if unable to keep appointment. [phone]     If you experience any of the following, please call the Wound Care Service during business hours: Monday through Friday 8:00 am - 4:30 pm  ([phone]).               *Increase in pain              *Temperature over 101              *Increase in drainage from your wound or a foul odor              *Uncontrolled swelling              *Need for compression bandage changes due to slippage, breakthrough drainage     If you need medical attention outside of business hours, please contact your Primary Care Doctor or go to the nearest emergency room.         Electronically signed by Anju Newton MD on 4/23/2020 at 10:03 AM

## 2020-04-28 ENCOUNTER — TELEPHONE (OUTPATIENT)
Dept: WOUND CARE | Age: 45
End: 2020-04-28

## 2020-04-29 ENCOUNTER — HOSPITAL ENCOUNTER (OUTPATIENT)
Dept: WOUND CARE | Age: 45
Discharge: HOME | End: 2020-04-29
Payer: MEDICARE

## 2020-04-29 VITALS
SYSTOLIC BLOOD PRESSURE: 117 MMHG | TEMPERATURE: 98.3 F | DIASTOLIC BLOOD PRESSURE: 67 MMHG | OXYGEN SATURATION: 99 % | HEART RATE: 96 BPM | WEIGHT: 293 LBS | RESPIRATION RATE: 18 BRPM | HEIGHT: 67 IN | BODY MASS INDEX: 45.99 KG/M2

## 2020-04-29 PROCEDURE — 15271 SKIN SUB GRAFT TRNK/ARM/LEG: CPT

## 2020-04-29 ASSESSMENT — PAIN DESCRIPTION - PAIN TYPE: TYPE: ACUTE PAIN

## 2020-04-29 ASSESSMENT — PAIN SCALES - GENERAL: PAINLEVEL_OUTOF10: 9

## 2020-04-29 ASSESSMENT — PAIN DESCRIPTION - LOCATION: LOCATION: ABDOMEN

## 2020-04-29 NOTE — PROGRESS NOTES
(TOPROL XL) 25 MG extended release tablet take 1 tablet by mouth once daily 90 tablet 1    furosemide (LASIX) 20 MG tablet take 1 tablet by mouth once daily, Take 2 tablets Monday, Wednesday and Friday. 120 tablet 1    acetaminophen (TYLENOL) 500 MG tablet Take 1,000 mg by mouth every 6 hours as needed for Pain      Cholecalciferol (VITAMIN D3) 5000 units TABS Take 2,000 Units by mouth       MedroxyPROGESTERone Acetate (DEPO-PROVERA IM) Inject into the muscle      IRON PO Take 65 mg by mouth daily       Dicyclomine HCl (BENTYL PO)   Take 20 mg by mouth 2 times daily       QUEtiapine Fumarate (SEROQUEL PO) Take 400 mg by mouth 2 times daily.  atomoxetine (STRATTERA) 40 MG capsule Take 40 mg by mouth 2 times daily.  aripiprazole (ABILIFY) 10 MG tablet Take 10 mg by mouth 2 times daily       ACYCLOVIR 400 mg by Does not apply route three times a week       fluocinonide (LIDEX) 0.05 % cream Apply topically daily. 60 g 3    Dextromethorphan-guaiFENesin (CORICIDIN HBP CONGESTION/COUGH)  MG CAPS Take 1 capsule by mouth 2 times daily as needed (cough/congestion) 60 capsule 0    fluticasone (FLONASE) 50 MCG/ACT nasal spray 1 spray by Nasal route daily for 7 days 1 Bottle 0    miconazole (MICOTIN) 2 % powder Apply topically 2 times to 3 times  daily. 45 g 1     No current facility-administered medications on file prior to encounter. REVIEW OF SYSTEMS:     Constitutional: no fever, no night sweats, no fatigue. Head: no head ache , no head injury, no migranes. Eye: no blurring of vision, no double vision.   Ears: no hearing difficulty, no tinnitus  Mouth/throat: no ulceration, dental caries , dysphagia  Lungs: no cough, no shortness of breath, no wheeze  CVS: no palpitation, no chest pain, no shortness of breath  GI: no abdominal pain, no nausea , no vomiting, no constipation  FRANCOISE: no dysuria, frequency and urgency, no hematuria, no kidney stones  Musculoskeletal: no joint pain, swelling , stiffness,  Endocrine: no polyuria, polydipsia, no cold or heat intolerance  Hematology: no anemia, no easy brusing or bleeding, no hx of clotting disorder  Dermatology: no skin rash, no eczema, no pruritis,  Psychiatry: no depression, no anxiety,no panic attacks, no suicide ideation        PHYSICAL EXAM      height is 5' 7\" (1.702 m) and weight is 307 lb 3.2 oz (139.3 kg) (abnormal). Her oral temperature is 98.3 °F (36.8 °C). Her blood pressure is 117/67 and her pulse is 96. Her respiration is 18 and oxygen saturation is 99%. Wt Readings from Last 3 Encounters:   04/29/20 (!) 307 lb 3.2 oz (139.3 kg)   04/23/20 (!) 307 lb 9.6 oz (139.5 kg)   04/08/20 (!) 308 lb (139.7 kg)      General Appearance  Awake, alert, oriented,  not  In acute distress  HEENT - normocephalic, atraumatic, pink conjunctiva,  anicteric sclera  Neck - Supple, no mass    Abdomen - soft, not distended, nontender, obese, open wound around the umblical area as noted below, no draiange  Neurologic - oriented  Skin - No bruising or bleeding  Extremities - No edema, no cyanosis, clubbing       Wound 07/11/19 Abdomen Left; Lower #2 (Active)   Wound Image   4/29/2020 10:01 AM   Dressing Status Intact 4/29/2020 10:01 AM   Dressing Changed Changed/New 4/29/2020 10:01 AM   Dressing/Treatment Alginate with Ag;Silicone border 5/29/8989  9:45 AM   Wound Cleansed Rinsed/Irrigated with saline 4/29/2020 10:01 AM   Wound Length (cm) 3 cm 4/29/2020 10:01 AM   Wound Width (cm) 5 cm 4/29/2020 10:01 AM   Wound Depth (cm) 0.05 cm 4/29/2020 10:01 AM   Wound Surface Area (cm^2) 15 cm^2 4/29/2020 10:01 AM   Change in Wound Size % (l*w) -106.9 4/29/2020 10:01 AM   Wound Volume (cm^3) 0.75 cm^3 4/29/2020 10:01 AM   Wound Healing % -108 4/29/2020 10:01 AM   Wound Assessment Yellow;Red;Epithelialization 4/29/2020 10:01 AM   Drainage Amount Small 4/29/2020 10:01 AM   Drainage Description Serosanguinous 4/29/2020 10:01 AM   Odor None 4/29/2020 10:01 AM Margins Attached edges 4/29/2020 10:01 AM   Gwen-wound Assessment Dry;Pink 4/29/2020 10:01 AM   [place]%Wound Bed 80 2/20/2020  2:09 PM   Red%Wound Bed 80 4/29/2020 10:01 AM   Yellow%Wound Bed 10 4/29/2020 10:01 AM   Other%Wound Bed 10 epi 4/29/2020 10:01 AM   Number of days: 292       Wound 04/08/20 Abdomen Mid near umbilicus (Active)   Dressing Status Intact 4/29/2020 10:01 AM   Dressing Changed Changed/New 4/29/2020 10:01 AM   Dressing/Treatment Alginate with Ag;Silicone border 4/54/5402  9:45 AM   Wound Cleansed Rinsed/Irrigated with saline 4/29/2020 10:01 AM   Wound Length (cm) 1 cm 4/29/2020 10:01 AM   Wound Width (cm) 0.2 cm 4/29/2020 10:01 AM   Wound Depth (cm) 0.05 cm 4/29/2020 10:01 AM   Wound Surface Area (cm^2) 0.2 cm^2 4/29/2020 10:01 AM   Change in Wound Size % (l*w) 89.74 4/29/2020 10:01 AM   Wound Volume (cm^3) 0.01 cm^3 4/29/2020 10:01 AM   Wound Assessment Red 4/29/2020 10:01 AM   Drainage Amount Small 4/29/2020 10:01 AM   Drainage Description Serosanguinous 4/29/2020 10:01 AM   Odor None 4/29/2020 10:01 AM   Margins Attached edges 4/29/2020 10:01 AM   Gwen-wound Assessment Dry;Red 4/29/2020 10:01 AM   Red%Wound Bed 100 4/29/2020 10:01 AM   Yellow%Wound Bed 50 4/23/2020  9:45 AM   Number of days: 21       Wound 04/29/20 Abdomen Left; Lower;Distal (Active)   Dressing Status Intact 4/29/2020 10:01 AM   Dressing Changed Changed/New 4/29/2020 10:01 AM   Wound Cleansed Rinsed/Irrigated with saline 4/29/2020 10:01 AM   Wound Length (cm) 1.2 cm 4/29/2020 10:01 AM   Wound Width (cm) 0.5 cm 4/29/2020 10:01 AM   Wound Depth (cm) 0.05 cm 4/29/2020 10:01 AM   Wound Surface Area (cm^2) 0.6 cm^2 4/29/2020 10:01 AM   Wound Volume (cm^3) 0.03 cm^3 4/29/2020 10:01 AM   Wound Assessment Yellow 4/29/2020 10:01 AM   Drainage Amount Small 4/29/2020 10:01 AM   Drainage Description Serosanguinous 4/29/2020 10:01 AM   Odor None 4/29/2020 10:01 AM   Margins Attached edges 4/29/2020 10:01 AM   Gwen-wound Assessment Dry;Red 4/29/2020 10:01 AM   Yellow%Wound Bed 100 4/29/2020 10:01 AM   Number of days: 0       LABS      No results found for: BC    Assessment:     Non healing abdominal wound  purply applied to the abdominal wound, steristrip used to fix the dressing over the veil  Follow up will be scheduled  Will schedule with plast surgery for excision of the scar tissue              Patient Active Problem List   Diagnosis Code    Keloid L91.0    MRSA (methicillin resistant staph aureus) culture positive Z22.322    Skin ulcer of abdominal wall ([address] L98.499    POTS (postural orthostatic tachycardia syndrome) R00.0, I95.1    CHF (congestive heart failure) ([facility]) chronic diastolic Y01.2    Morbid obesity ([facility]) E66.01    Bilateral leg edema =LYMPHEDEMA- ON f/U WITH LYMPHEDEMA CLINIC R60.0    Lymphedema of both lower extremities I89.0    SOB (shortness of breath) on exertion R06.02    Candidiasis, intertriginous B37.2    Dermatitis L30.9          Plan:     Patient examined and evaluated    Treatment: No orders of the defined types were placed in this encounter. Please see attached Discharge Instructions    Written patient dismissal instructions given to patient and signed by patient or POA. Discharge Instructions       Visit Discharge/Physician Orders:  - Silver nitrate applied to abdominal wounds. -  Clean abdomen twice a day with soap and water apply Desenex to folds an apply a pillow case or soft cloth to separate the folds all day long    -  Use a new clean cloth every time and apply more powder  -  Use lymphedema pumps twice daily for 1 hour each time  - Continue Juxta Lite Wraps (apply in morning and remove at night before bedtime)  - Call [facility] for supplies  [phone]. New order faxed today 4/8/20  - Apply steroid cream to upper and lower abdomen healed areas once a day  - Apply Aquaphor or Vaseline to upper and lower healed abdomen wound areas that are dry. Do not use fragrance or alcohol lotions.  Apply

## 2020-04-29 NOTE — PROGRESS NOTES
PuraPly AMTreatment Note    NAME:  Betsey Bryant  YOB: 1975  MEDICAL RECORD NUMBER:  [id number]  DATE:  4/29/2020    Goal:  Patient will receive safe and proper application of skin substitute. Patient will comply with caring for dressing, and reporting complications. Expiration date checked immediately prior to use. Package intact prior to use and no damage noted. Transport temperature controlled and acceptable. PuraPly AM was removed from protective sterile packaging by provider and applied to prepared ulcer bed. PuraPly AM was hydrated with sterile normal saline per provider. PuraPly AM was applied to abdomen wounds and affixed with steri-strips by the provider. PuraPly AM was covered with non-adherent ulcer dressing. Applied bordered foam over non-adherent. Patient/caregiver was instructed not to remove dressing and to keep it clean and dry. Pt/family/caregiver was instructed on signs and symptoms of complications to report such as draining through dressing, dressing falling down/slipping, getting wet, or severe pain or tingling. PuraPly AM may be applied a total of 10 times per wound over a 12 week period.     Guidelines followed    Electronically signed by Patsy Kemp RN on 4/29/2020 at 11:50 AM

## 2020-05-06 ENCOUNTER — HOSPITAL ENCOUNTER (OUTPATIENT)
Dept: WOUND CARE | Age: 45
Discharge: HOME | End: 2020-05-06
Payer: MEDICARE

## 2020-05-06 VITALS
HEIGHT: 67 IN | OXYGEN SATURATION: 97 % | RESPIRATION RATE: 18 BRPM | TEMPERATURE: 98.2 F | WEIGHT: 293 LBS | BODY MASS INDEX: 45.99 KG/M2 | DIASTOLIC BLOOD PRESSURE: 62 MMHG | SYSTOLIC BLOOD PRESSURE: 120 MMHG | HEART RATE: 94 BPM

## 2020-05-06 PROCEDURE — 99213 OFFICE O/P EST LOW 20 MIN: CPT

## 2020-05-06 ASSESSMENT — PAIN SCALES - GENERAL: PAINLEVEL_OUTOF10: 8

## 2020-05-06 ASSESSMENT — PAIN DESCRIPTION - LOCATION: LOCATION: ABDOMEN

## 2020-05-06 ASSESSMENT — PAIN DESCRIPTION - PAIN TYPE: TYPE: ACUTE PAIN

## 2020-05-06 NOTE — PROGRESS NOTES
[facility]          Progress Note and Procedure Note      Beena Kruse  MEDICAL RECORD NUMBER:  [id number]  AGE: 39 y.o. GENDER: female  : 1975  EPISODE DATE:  2020    Subjective:     SUBJECTIVE     Chief Complaint   Patient presents with    Wound Check     Abdomen           HISTORY OF PRESENT ILLNESS   She is a 39 yr old female seen for follow up visit. She use to follow with [facility] for a non healing wound. she has history of keloid formation and has been dealing for over 15 years she has an open wound on abdomen  She has been getting collagen dressing. Madai Rubi has ADHD and schizophrenia she was accompanied by her mom no new complaints. She has no new complaints she continues to lose weight. She has lost 5 pounds since her last visit.   She is at home with her parents.     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     during hospital visit    Bronchitis     CHF (congestive heart failure) ([address]     Elbow fracture 10/09/14    left    Irritable bowel syndrome     Irritable bowel    Lymph edema 2017    Obesity     Conde disease     [facility]     Seasonal allergies          PAST SURGICAL HISTORY     Past Surgical History:   Procedure Laterality Date    LAYER WOUND CLOSURE      TOE SURGERY  2019    TOENAIL EXCISION      TONSILLECTOMY AND ADENOIDECTOMY       FAMILY HISTORY         Family History   Problem Relation Age of Onset    Diabetes Father     Cancer Maternal Grandmother     Cancer Paternal Grandmother      SOCIAL HISTORY       Social History     Tobacco Use    Smoking status: Never Smoker    Smokeless tobacco: Never Used   Substance Use Topics    Alcohol use: No    Drug use: No     ALLERGIES         Allergies   Allergen Reactions    Latex Rash    Cat Hair Extract     Seasonal      MEDICATIONS         Current Outpatient Medications on File Prior to Encounter   Medication Sig Dispense Refill    metoprolol succinate (TOPROL XL) 25 MG extended release tablet take 1 tablet by mouth once daily 90 tablet 1    furosemide (LASIX) 20 MG tablet take 1 tablet by mouth once daily, Take 2 tablets Monday, Wednesday and Friday. 120 tablet 1    Cholecalciferol (VITAMIN D3) 5000 units TABS Take 2,000 Units by mouth       IRON PO Take 65 mg by mouth daily       Dicyclomine HCl (BENTYL PO)   Take 20 mg by mouth 2 times daily       QUEtiapine Fumarate (SEROQUEL PO) Take 400 mg by mouth 2 times daily.  atomoxetine (STRATTERA) 40 MG capsule Take 40 mg by mouth 2 times daily.  aripiprazole (ABILIFY) 10 MG tablet Take 10 mg by mouth 2 times daily       ACYCLOVIR 400 mg by Does not apply route three times a week       fluocinonide (LIDEX) 0.05 % cream Apply topically daily. 60 g 3    Dextromethorphan-guaiFENesin (CORICIDIN HBP CONGESTION/COUGH)  MG CAPS Take 1 capsule by mouth 2 times daily as needed (cough/congestion) 60 capsule 0    fluticasone (FLONASE) 50 MCG/ACT nasal spray 1 spray by Nasal route daily for 7 days 1 Bottle 0    acetaminophen (TYLENOL) 500 MG tablet Take 1,000 mg by mouth every 6 hours as needed for Pain      MedroxyPROGESTERone Acetate (DEPO-PROVERA IM) Inject into the muscle      miconazole (MICOTIN) 2 % powder Apply topically 2 times to 3 times  daily. 45 g 1     No current facility-administered medications on file prior to encounter. REVIEW OF SYSTEMS:     Constitutional: no fever, no night sweats, no fatigue. Head: no head ache , no head injury, no migranes. Eye: no blurring of vision, no double vision.   Ears: no hearing difficulty, no tinnitus  Mouth/throat: no ulceration, dental caries , dysphagia  Lungs: no cough, no shortness of breath, no wheeze  CVS: no palpitation, no chest pain, no shortness of breath  GI: no abdominal pain, no nausea , no vomiting, no 4/29/2020 10:01 AM   Odor None 4/29/2020 10:01 AM   Margins Attached edges 4/29/2020 10:01 AM   Gwen-wound Assessment Dry;Pink 4/29/2020 10:01 AM   [place]%Wound Bed 80 2/20/2020  2:09 PM   Red%Wound Bed 80 4/29/2020 10:01 AM   Yellow%Wound Bed 10 4/29/2020 10:01 AM   Other%Wound Bed 10 epi 4/29/2020 10:01 AM   Number of days: 299       Wound 04/08/20 Abdomen Mid near umbilicus (Active)   Dressing Status Intact 4/29/2020 10:01 AM   Dressing Changed Changed/New 4/29/2020 10:01 AM   Wound Cleansed Rinsed/Irrigated with saline 4/29/2020 10:01 AM   Wound Length (cm) 1 cm 4/29/2020 10:01 AM   Wound Width (cm) 0.2 cm 4/29/2020 10:01 AM   Wound Depth (cm) 0.05 cm 4/29/2020 10:01 AM   Wound Surface Area (cm^2) 0.2 cm^2 4/29/2020 10:01 AM   Change in Wound Size % (l*w) 89.74 4/29/2020 10:01 AM   Wound Volume (cm^3) 0.01 cm^3 4/29/2020 10:01 AM   Wound Assessment Red 4/29/2020 10:01 AM   Drainage Amount Small 4/29/2020 10:01 AM   Drainage Description Serosanguinous 4/29/2020 10:01 AM   Odor None 4/29/2020 10:01 AM   Margins Attached edges 4/29/2020 10:01 AM   Gwen-wound Assessment Dry;Red 4/29/2020 10:01 AM   Red%Wound Bed 100 4/29/2020 10:01 AM   Yellow%Wound Bed 50 4/23/2020  9:45 AM   Number of days: 27       Wound 04/29/20 Abdomen Left; Lower;Distal (Active)   Dressing Status Intact 4/29/2020 10:01 AM   Dressing Changed Changed/New 4/29/2020 10:01 AM   Wound Cleansed Rinsed/Irrigated with saline 4/29/2020 10:01 AM   Wound Length (cm) 1.2 cm 4/29/2020 10:01 AM   Wound Width (cm) 0.5 cm 4/29/2020 10:01 AM   Wound Depth (cm) 0.05 cm 4/29/2020 10:01 AM   Wound Surface Area (cm^2) 0.6 cm^2 4/29/2020 10:01 AM   Wound Volume (cm^3) 0.03 cm^3 4/29/2020 10:01 AM   Wound Assessment Yellow 4/29/2020 10:01 AM   Drainage Amount Small 4/29/2020 10:01 AM   Drainage Description Serosanguinous 4/29/2020 10:01 AM   Odor None 4/29/2020 10:01 AM   Margins Attached edges 4/29/2020 10:01 AM   Gwen-wound Assessment Dry;Red 4/29/2020 10:01 AM or go to the nearest emergency room.           Electronically signed by Reena Yañez MD on 5/6/2020 at 9:52 AM

## 2020-06-05 ENCOUNTER — OFFICE VISIT (OUTPATIENT)
Dept: CARDIOLOGY CLINIC | Age: 45
End: 2020-06-05
Payer: MEDICARE

## 2020-06-05 ENCOUNTER — HOSPITAL ENCOUNTER (OUTPATIENT)
Age: 45
Discharge: HOME | End: 2020-06-05
Payer: MEDICARE

## 2020-06-05 VITALS
HEIGHT: 67 IN | HEART RATE: 88 BPM | WEIGHT: 293 LBS | SYSTOLIC BLOOD PRESSURE: 125 MMHG | DIASTOLIC BLOOD PRESSURE: 84 MMHG | BODY MASS INDEX: 45.99 KG/M2

## 2020-06-05 LAB
ANION GAP SERPL CALCULATED.3IONS-SCNC: 13 MEQ/L (ref 8–16)
BUN BLDV-MCNC: 10 MG/DL (ref 7–22)
CALCIUM SERPL-MCNC: 9.7 MG/DL (ref 8.5–10.5)
CHLORIDE BLD-SCNC: 105 MEQ/L (ref 98–111)
CO2: 22 MEQ/L (ref 23–33)
CREAT SERPL-MCNC: 0.7 MG/DL (ref 0.4–1.2)
GFR SERPL CREATININE-BSD FRML MDRD: > 90 ML/MIN/1.73M2
GLUCOSE BLD-MCNC: 92 MG/DL (ref 70–108)
MAGNESIUM: 2.2 MG/DL (ref 1.6–2.4)
POTASSIUM SERPL-SCNC: 3.9 MEQ/L (ref 3.5–5.2)
SODIUM BLD-SCNC: 140 MEQ/L (ref 135–145)

## 2020-06-05 PROCEDURE — 93000 ELECTROCARDIOGRAM COMPLETE: CPT | Performed by: INTERNAL MEDICINE

## 2020-06-05 PROCEDURE — 36415 COLL VENOUS BLD VENIPUNCTURE: CPT

## 2020-06-05 PROCEDURE — 1036F TOBACCO NON-USER: CPT | Performed by: INTERNAL MEDICINE

## 2020-06-05 PROCEDURE — 80048 BASIC METABOLIC PNL TOTAL CA: CPT

## 2020-06-05 PROCEDURE — G8428 CUR MEDS NOT DOCUMENT: HCPCS | Performed by: INTERNAL MEDICINE

## 2020-06-05 PROCEDURE — G8417 CALC BMI ABV UP PARAM F/U: HCPCS | Performed by: INTERNAL MEDICINE

## 2020-06-05 PROCEDURE — 83735 ASSAY OF MAGNESIUM: CPT

## 2020-06-05 PROCEDURE — 99214 OFFICE O/P EST MOD 30 MIN: CPT | Performed by: INTERNAL MEDICINE

## 2020-06-05 RX ORDER — FUROSEMIDE 20 MG/1
TABLET ORAL
Qty: 120 TABLET | Refills: 5 | Status: SHIPPED | OUTPATIENT
Start: 2020-06-05 | End: 2020-12-11 | Stop reason: SDUPTHER

## 2020-06-05 NOTE — PROGRESS NOTES
Single     Spouse name: Not on file    Number of children: Not on file    Years of education: Not on file    Highest education level: Not on file   Occupational History    Not on file   Social Needs    Financial resource strain: Not on file    Food insecurity     Worry: Not on file     Inability: Not on file    Transportation needs     Medical: Not on file     Non-medical: Not on file   Tobacco Use    Smoking status: Never Smoker    Smokeless tobacco: Never Used   Substance and Sexual Activity    Alcohol use: No    Drug use: No    Sexual activity: Not on file   Lifestyle    Physical activity     Days per week: Not on file     Minutes per session: Not on file    Stress: Not on file   Relationships    Social connections     Talks on phone: Not on file     Gets together: Not on file     Attends [employer]ism service: Not on file     Active member of club or organization: Not on file     Attends meetings of clubs or organizations: Not on file     Relationship status: Not on file    Intimate partner violence     Fear of current or ex partner: Not on file     Emotionally abused: Not on file     Physically abused: Not on file     Forced sexual activity: Not on file   Other Topics Concern    Not on file   Social History Narrative    Not on file       Current Outpatient Medications   Medication Sig Dispense Refill    metoprolol succinate (TOPROL XL) 25 MG extended release tablet take 1 tablet by mouth once daily 90 tablet 1    furosemide (LASIX) 20 MG tablet take 1 tablet by mouth once daily, Take 2 tablets Monday, Wednesday and Friday. 120 tablet 1    fluocinonide (LIDEX) 0.05 % cream Apply topically daily.  60 g 3    Dextromethorphan-guaiFENesin (CORICIDIN HBP CONGESTION/COUGH)  MG CAPS Take 1 capsule by mouth 2 times daily as needed (cough/congestion) 60 capsule 0    fluticasone (FLONASE) 50 MCG/ACT nasal spray 1 spray by Nasal route daily for 7 days 1 Bottle 0    acetaminophen (TYLENOL) 500 MG

## 2020-06-10 ENCOUNTER — HOSPITAL ENCOUNTER (OUTPATIENT)
Dept: WOUND CARE | Age: 45
Discharge: HOME | End: 2020-06-10
Payer: MEDICARE

## 2020-06-10 VITALS
SYSTOLIC BLOOD PRESSURE: 114 MMHG | HEART RATE: 92 BPM | OXYGEN SATURATION: 99 % | TEMPERATURE: 99.1 F | WEIGHT: 293 LBS | DIASTOLIC BLOOD PRESSURE: 72 MMHG | RESPIRATION RATE: 18 BRPM | BODY MASS INDEX: 46.66 KG/M2

## 2020-06-10 PROCEDURE — 99213 OFFICE O/P EST LOW 20 MIN: CPT

## 2020-06-10 ASSESSMENT — PAIN DESCRIPTION - PAIN TYPE: TYPE: CHRONIC PAIN

## 2020-06-10 ASSESSMENT — PAIN SCALES - GENERAL: PAINLEVEL_OUTOF10: 8

## 2020-06-10 ASSESSMENT — PAIN DESCRIPTION - LOCATION: LOCATION: ABDOMEN

## 2020-06-10 NOTE — PROGRESS NOTES
[facility]          Progress Note and Procedure Note      [facility]  MEDICAL RECORD NUMBER:  [id number]  AGE: 39 y.o. GENDER: female  : 1975  EPISODE DATE:  6/10/2020    Subjective:     SUBJECTIVE     Chief Complaint   Patient presents with    Wound Check     abdomen           HISTORY OF PRESENT ILLNESS    She is a 39 yr old female seen for follow up visit she has history of keloid formation and has been dealing for over 15 years she has an open wound on abdomen  She has been getting collagen dressing. Loida Dinero has ADHD and schizophrenia she was accompanied by her mom no new complaints. she follows with cardiology for CHF  She has no new complaints she continues to lose weight  She is at home with her parents.   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     during hospital visit    Bronchitis     CHF (congestive heart failure) ([address]     Elbow fracture 10/09/14    left    Irritable bowel syndrome     Irritable bowel    Lymph edema 2017    Obesity     Conde disease     Schizophrenia ([address]     Seasonal allergies          PAST SURGICAL HISTORY     Past Surgical History:   Procedure Laterality Date    LAYER WOUND CLOSURE      TOE SURGERY  2019    TOENAIL EXCISION      TONSILLECTOMY AND ADENOIDECTOMY       FAMILY HISTORY         Family History   Problem Relation Age of Onset    Diabetes Father     Cancer Maternal Grandmother     Cancer Paternal Grandmother      SOCIAL HISTORY       Social History     Tobacco Use    Smoking status: Never Smoker    Smokeless tobacco: Never Used   Substance Use Topics    Alcohol use: No    Drug use: No     ALLERGIES         Allergies   Allergen Reactions    Latex Rash    Cat Hair Extract     Seasonal      MEDICATIONS         Current Outpatient Medications on File Prior to Encounter   Medication Sig no joint pain, swelling , stiffness,  Endocrine: no polyuria, polydipsia, no cold or heat intolerance  Hematology: no anemia, no easy brusing or bleeding, no hx of clotting disorder  Dermatology: no skin rash, no eczema, no pruritis,       PHYSICAL EXAM      weight is 297 lb 14.4 oz (135.1 kg). Wt Readings from Last 3 Encounters:   06/10/20 297 lb 14.4 oz (135.1 kg)   06/05/20 296 lb 3.2 oz (134.4 kg)   05/06/20 (!) 302 lb 11.2 oz (137.3 kg)          General Appearance  Awake, alert, oriented,  not  In acute distress  HEENT - normocephalic, atraumatic, pink conjunctiva,  anicteric sclera  Neck - Supple, no mass   Abdomen - soft, not distended, nontender,open wound around the mid abdomen  Neurologic - oriented  Skin - No bruising or bleeding  Extremities - No edema, no cyanosis, clubbing       Wound 07/11/19 Abdomen Left; Lower #2 (Active)   Wound Image   5/6/2020 10:01 AM   Dressing Status Intact 5/6/2020 10:01 AM   Dressing Changed Changed/New 5/6/2020 10:01 AM   Wound Cleansed Rinsed/Irrigated with saline 5/6/2020 10:01 AM   Wound Length (cm) 2.5 cm 5/6/2020 10:01 AM   Wound Width (cm) 4.7 cm 5/6/2020 10:01 AM   Wound Depth (cm) 0.05 cm 5/6/2020 10:01 AM   Wound Surface Area (cm^2) 11.75 cm^2 5/6/2020 10:01 AM   Change in Wound Size % (l*w) -62.07 5/6/2020 10:01 AM   Wound Volume (cm^3) 0.59 cm^3 5/6/2020 10:01 AM   Wound Healing % -64 5/6/2020 10:01 AM   Wound Assessment Yellow;Red;Epithelialization 5/6/2020 10:01 AM   Drainage Amount Moderate 5/6/2020 10:01 AM   Drainage Description Serosanguinous 5/6/2020 10:01 AM   Odor None 5/6/2020 10:01 AM   Margins Attached edges 5/6/2020 10:01 AM   Gwen-wound Assessment Dry;Pink 5/6/2020 10:01 AM   [place]%Wound Bed 80 2/20/2020  2:09 PM   Red%Wound Bed 70 5/6/2020 10:01 AM   Yellow%Wound Bed 20 5/6/2020 10:01 AM   Other%Wound Bed 10 epi 5/6/2020 10:01 AM   Number of days: 334       Wound 04/08/20 Abdomen Mid near umbilicus (Active)   Wound Image   5/6/2020 10:01 AM Dressing Status Intact 5/6/2020 10:01 AM   Dressing Changed Changed/New 5/6/2020 10:01 AM   Wound Cleansed Rinsed/Irrigated with saline 5/6/2020 10:01 AM   Wound Length (cm) 1.2 cm 5/6/2020 10:01 AM   Wound Width (cm) 1.2 cm 5/6/2020 10:01 AM   Wound Depth (cm) 0.05 cm 5/6/2020 10:01 AM   Wound Surface Area (cm^2) 1.44 cm^2 5/6/2020 10:01 AM   Change in Wound Size % (l*w) 26.15 5/6/2020 10:01 AM   Wound Volume (cm^3) 0.07 cm^3 5/6/2020 10:01 AM   Wound Assessment Yellow; Red 5/6/2020 10:01 AM   Drainage Amount Moderate 5/6/2020 10:01 AM   Drainage Description Serosanguinous 5/6/2020 10:01 AM   Odor None 5/6/2020 10:01 AM   Margins Attached edges 5/6/2020 10:01 AM   Gwen-wound Assessment Dry;Red 5/6/2020 10:01 AM   Red%Wound Bed 80 5/6/2020 10:01 AM   Yellow%Wound Bed 20 5/6/2020 10:01 AM   Number of days: 62          Assessment:    non healing abdominal wound: continue local wound care  Encouraged on the weight loss    Patient Active Problem List   Diagnosis Code    Keloid L91.0    MRSA (methicillin resistant staph aureus) culture positive Z22.322    Skin ulcer of abdominal wall ([address] L98.499    POTS (postural orthostatic tachycardia syndrome) R00.0, I95.1    CHF (congestive heart failure) ([facility]) chronic diastolic S77.3    Morbid obesity ([facility]) E66.01    Bilateral leg edema =LYMPHEDEMA- ON f/U WITH LYMPHEDEMA CLINIC R60.0    Lymphedema of both lower extremities I89.0    SOB (shortness of breath) on exertion R06.02    Candidiasis, intertriginous B37.2    Dermatitis L30.9          Plan:     Patient examined and evaluated    Please see attached Discharge Instructions    Written patient dismissal instructions given to patient and signed by patient or POA.              Electronically signed by Uyen Doty MD on 6/10/2020 at 9:48 AM

## 2020-07-20 ENCOUNTER — HOSPITAL ENCOUNTER (EMERGENCY)
Age: 45
Discharge: HOME | End: 2020-07-21
Attending: EMERGENCY MEDICINE
Payer: MEDICARE

## 2020-07-20 PROCEDURE — 99283 EMERGENCY DEPT VISIT LOW MDM: CPT

## 2020-07-20 ASSESSMENT — PAIN DESCRIPTION - ONSET: ONSET: ON-GOING

## 2020-07-20 ASSESSMENT — PAIN DESCRIPTION - PAIN TYPE: TYPE: ACUTE PAIN

## 2020-07-20 ASSESSMENT — PAIN DESCRIPTION - PROGRESSION: CLINICAL_PROGRESSION: NOT CHANGED

## 2020-07-20 ASSESSMENT — PAIN DESCRIPTION - FREQUENCY: FREQUENCY: CONTINUOUS

## 2020-07-20 ASSESSMENT — PAIN DESCRIPTION - LOCATION: LOCATION: KNEE

## 2020-07-20 ASSESSMENT — PAIN DESCRIPTION - ORIENTATION: ORIENTATION: RIGHT

## 2020-07-20 ASSESSMENT — PAIN DESCRIPTION - DESCRIPTORS: DESCRIPTORS: ACHING

## 2020-07-20 ASSESSMENT — PAIN SCALES - GENERAL: PAINLEVEL_OUTOF10: 9

## 2020-07-21 ENCOUNTER — APPOINTMENT (OUTPATIENT)
Dept: GENERAL RADIOLOGY | Age: 45
End: 2020-07-21
Payer: MEDICARE

## 2020-07-21 VITALS
HEART RATE: 100 BPM | WEIGHT: 293 LBS | OXYGEN SATURATION: 100 % | BODY MASS INDEX: 47.77 KG/M2 | RESPIRATION RATE: 18 BRPM | SYSTOLIC BLOOD PRESSURE: 106 MMHG | TEMPERATURE: 99.1 F | DIASTOLIC BLOOD PRESSURE: 66 MMHG

## 2020-07-21 PROCEDURE — 73564 X-RAY EXAM KNEE 4 OR MORE: CPT

## 2020-07-21 PROCEDURE — 6370000000 HC RX 637 (ALT 250 FOR IP): Performed by: EMERGENCY MEDICINE

## 2020-07-21 RX ORDER — ACETAMINOPHEN 500 MG
1000 TABLET ORAL ONCE
Status: COMPLETED | OUTPATIENT
Start: 2020-07-21 | End: 2020-07-21

## 2020-07-21 RX ADMIN — ACETAMINOPHEN 1000 MG: 500 TABLET ORAL at 00:51

## 2020-07-21 ASSESSMENT — ENCOUNTER SYMPTOMS
ABDOMINAL PAIN: 0
NAUSEA: 0

## 2020-07-21 NOTE — ED NOTES
Patient presents to the ED with complaints of a fall and right knee pain. Patient states that she was walking with her walker at home when she had a sharp pain in her right knee when she fell forward causing her to injure her right knee. She states that this occurred a few hours ago. She states that she had her mother bring her to  The ED. She denies any other injuries. Patient resting in wheelchair with easy and unlabored respirations. Mother at bedside.         Allegra Jorgensen RN  07/21/20 0003

## 2020-07-21 NOTE — ED PROVIDER NOTES
[address] [facility] [address] [address] EMERGENCY DEPT  EMERGENCY DEPARTMENT     Pt Name: Facundo Cruz  MRN: [id number]  Ar[place]urt 1975  Date of evaluation: 7/20/2020  Provider: Agata Lujan DO,     [facility]       Chief Complaint   Patient presents with    Knee Pain    Fall       HISTORY OF PRESENT ILLNESS    Facundo Cruz is a 39 y.o. female who presents to the emergency department from home for evaluation of right knee painpatient with. Patient with pain to the right anterior knee. Patient states she was ambulating with her walker. She states her leg twisted and she fell down onto the anterior aspect of her right knee. She was able to ambulate afterwards. She reports bruising and point tenderness to the bruise over her right patella. No numbness no tingling no knee instability. Triage notes and Nursing notes were reviewed by myself. Any discrepancies are addressed above. PAST MEDICAL HISTORY     Past Medical History:   Diagnosis Date    ADHD (attention deficit hyperactivity disorder)     Artificial skin graft or decellularized allodermis mechanical complic     during hospital visit    Bronchitis     CHF (congestive heart failure) ([address]     Elbow fracture 10/09/14    left    Irritable bowel syndrome     Irritable bowel    Lymph edema 2017    Obesity     Conde disease     Schizophrenia ([facility] [facility]     Seasonal allergies        SURGICAL HISTORY       Past Surgical History:   Procedure Laterality Date    LAYER WOUND CLOSURE      TOE SURGERY  09/2019    TOENAIL EXCISION      TONSILLECTOMY AND ADENOIDECTOMY         CURRENT MEDICATIONS       Previous Medications    ACETAMINOPHEN (TYLENOL) 500 MG TABLET    Take 1,000 mg by mouth every 6 hours as needed for Pain    ACYCLOVIR    400 mg by Does not apply route three times a week     ARIPIPRAZOLE (ABILIFY) 10 MG TABLET    Take 10 mg by mouth 2 times daily     ATOMOXETINE (STRATTERA) 40 MG CAPSULE    Take 40 mg by mouth 2 times daily. CHOLECALCIFEROL (VITAMIN D3) 5000 UNITS TABS    Take 2,000 Units by mouth     DEXTROMETHORPHAN-GUAIFENESIN (CORICIDIN HBP CONGESTION/COUGH)  MG CAPS    Take 1 capsule by mouth 2 times daily as needed (cough/congestion)    DICYCLOMINE HCL (BENTYL PO)      Take 20 mg by mouth 2 times daily     FLUOCINONIDE (LIDEX) 0.05 % CREAM    Apply topically daily. FLUTICASONE (FLONASE) 50 MCG/ACT NASAL SPRAY    1 spray by Nasal route daily for 7 days    FUROSEMIDE (LASIX) 20 MG TABLET    TAKE 1 TABLET EVERY OTHER DAY. TAKE 2 TABLETS ON OTHER DAYS    IRON PO    Take 65 mg by mouth daily     MEDROXYPROGESTERONE ACETATE (DEPO-PROVERA IM)    Inject into the muscle    METOPROLOL SUCCINATE (TOPROL XL) 25 MG EXTENDED RELEASE TABLET    take 1 tablet by mouth once daily    MICONAZOLE (MICOTIN) 2 % POWDER    Apply topically 2 times to 3 times  daily. QUETIAPINE FUMARATE (SEROQUEL PO)    Take 400 mg by mouth 2 times daily. ALLERGIES     Latex;  Cat hair extract; and Seasonal    FAMILY HISTORY       Family History   Problem Relation Age of Onset    Diabetes Father     Cancer Maternal Grandmother     Cancer Paternal Grandmother         SOCIAL HISTORY       Social History     Socioeconomic History    Marital status: Single     Spouse name: None    Number of children: None    Years of education: None    Highest education level: None   Occupational History    None   Social Needs    Financial resource strain: None    Food insecurity     Worry: None     Inability: None    Transportation needs     Medical: None     Non-medical: None   Tobacco Use    Smoking status: Never Smoker    Smokeless tobacco: Never Used   Substance and Sexual Activity    Alcohol use: No    Drug use: No    Sexual activity: None   Lifestyle    Physical activity     Days per week: None     Minutes per session: None    Stress: None   Relationships    Social connections     Talks on phone: None     Gets together: None     Attends Christian service: None     Active member of club or organization: None     Attends meetings of clubs or organizations: None     Relationship status: None    Intimate partner violence     Fear of current or ex partner: None     Emotionally abused: None     Physically abused: None     Forced sexual activity: None   Other Topics Concern    None   Social History Narrative    None       REVIEW OF SYSTEMS     Review of Systems   Constitutional: Negative for chills and fever. Gastrointestinal: Negative for abdominal pain and nausea. Musculoskeletal: Negative for joint swelling. Hematological: Negative for adenopathy. Does not bruise/bleed easily. Except as noted above the remainder of the review of systems was reviewed and is. PHYSICAL EXAM    (up to 7 for level 4, 8 or more for level 5)     ED Triage Vitals [07/20/20 8997]   BP Temp Temp Source Pulse Resp SpO2 Height Weight   112/67 99.1 °F (37.3 °C) Oral 98 18 99 % -- (!) 305 lb (138.3 kg)       Physical Exam  Vitals signs reviewed. Constitutional:       General: She is not in acute distress. Appearance: She is well-developed. She is obese. She is not ill-appearing or diaphoretic. HENT:      Head: Normocephalic and atraumatic. Eyes:      General:         Right eye: No discharge. Left eye: No discharge. Conjunctiva/sclera: Conjunctivae normal.      Pupils: Pupils are equal, round, and reactive to light. Neck:      Musculoskeletal: Normal range of motion and neck supple. Thyroid: No thyromegaly. Vascular: No JVD. Cardiovascular:      Rate and Rhythm: Normal rate and regular rhythm. Pulmonary:      Effort: No respiratory distress. Abdominal:      General: There is no distension. Tenderness: There is no abdominal tenderness. Musculoskeletal: Normal range of motion. General: Swelling (anterior patella ) present. No tenderness.       Right hip: She exhibits normal range of motion, normal strength, no tenderness and follow-up instructions with patient's mother    ED Medications administered this visit:    Medications   acetaminophen (TYLENOL) tablet 1,000 mg (has no administration in time range)         Procedures: (None if blank)       CLINICAL       1. Contusion of right knee, initial encounter    2.  Suprapatellar effusion of knee          DISPOSITION/PLAN   DISPOSITION    home    PATIENT REFERRED TO:  [address]  [address] [facility] [phone]  Schedule an appointment as soon as possible for a visit in 2 days        DISCHARGE MEDICATIONS:  New Prescriptions    No medications on file              (Please note that portions of this note were completed with a voice recognition program.  Efforts were made to edit the dictations but occasionallywords are mis-transcribed.)      Jose Skaggs DO, (electronically signed)  Attending Physician, Emergency Department        Jose Skaggs DO  07/21/20 Ina Schaffer DO  07/21/20 0136

## 2020-07-21 NOTE — ED NOTES
ED nurse-to-nurse bedside report    Chief Complaint   Patient presents with    Knee Pain    Fall      LOC: alert and orientated to name, place, date  Vital signs   Vitals:    07/20/20 2357 07/21/20 0052   BP: 112/67 106/66   Pulse: 98 100   Resp: 18 18   Temp: 99.1 °F (37.3 °C)    TempSrc: Oral    SpO2: 99% 100%   Weight: (!) 305 lb (138.3 kg)       Pain:    Pain Interventions: See MAR   Pain Goal: 4/10  Oxygen: No    Current needs required none   Telemetry: No  LDAs:    Continuous Infusions:   Mobility: Requires assistance * 1  Jerez Fall Risk Score: No flowsheet data found. Fall Interventions: None  Report given to:  Prashanth Grigsby, [address] [place] [address]  07/21/20 8814

## 2020-07-21 NOTE — ED NOTES
Pt resting with call light in reach and family at bedside at this time. Pt and family state no further needs, vital signs stable, will continue to monitor.        [facility]  07/21/20 4099

## 2020-07-27 ENCOUNTER — TELEPHONE (OUTPATIENT)
Dept: WOUND CARE | Age: 45
End: 2020-07-27

## 2020-07-27 NOTE — TELEPHONE ENCOUNTER
----- Message from Thelma Dang sent at 7/27/2020 10:30 AM EDT -----  Regarding: Sooner appointment  Mother, Lupe Son, called asking if Betsey could be seen this Wednesday? One wound is not looking good at all and feels can't/shouldn't wait til next week.  Please call [phone]    >Electronically signed by Thelma Dang on 7/27/2020 at 10:33 AM

## 2020-07-29 ENCOUNTER — HOSPITAL ENCOUNTER (OUTPATIENT)
Dept: WOUND CARE | Age: 45
Discharge: HOME | End: 2020-07-29
Payer: MEDICARE

## 2020-07-29 VITALS
HEIGHT: 67 IN | WEIGHT: 293 LBS | HEART RATE: 100 BPM | TEMPERATURE: 99.2 F | SYSTOLIC BLOOD PRESSURE: 130 MMHG | DIASTOLIC BLOOD PRESSURE: 81 MMHG | OXYGEN SATURATION: 96 % | BODY MASS INDEX: 45.99 KG/M2 | RESPIRATION RATE: 16 BRPM

## 2020-07-29 PROCEDURE — 99213 OFFICE O/P EST LOW 20 MIN: CPT | Performed by: NURSE PRACTITIONER

## 2020-07-29 PROCEDURE — 99213 OFFICE O/P EST LOW 20 MIN: CPT

## 2020-07-29 ASSESSMENT — PAIN SCALES - GENERAL: PAINLEVEL_OUTOF10: 9

## 2020-07-29 ASSESSMENT — PAIN DESCRIPTION - LOCATION: LOCATION: ABDOMEN

## 2020-07-29 ASSESSMENT — PAIN DESCRIPTION - PAIN TYPE: TYPE: CHRONIC PAIN

## 2020-07-29 NOTE — PROGRESS NOTES
[address]fer [address] f: [phone] f: [phone] p: [phone] [email or web address]      Ordering Center:     [facility] [address] [id number]  [phone]  WOUND CARE Dept: [phone]   [facility] NUMBER [phone]    Patient Information:      Krishna Funk  [address] [place] [address]  Ronni Altamirano [address]   [phone]   : 1975  AGE: 39 y.o. GENDER: female   TODAYS DATE:  2020    Insurance:      PRIMARY INSURANCE:  Plan: MEDICARE PART A AND B  Coverage: MEDICARE  Effective Date: 2015  Group Number: [unfilled]  Subscriber Number: [id number] - (Medicare)    SECONDARY INSURANCE:  Plan: MEDICAID [address] [place] [address] DEPT OF JOB  Coverage: [place]  Effective Date: 2018  [unfilled]    [unfilled]   [unfilled]     Patient Wound Information:      Problem List Items Addressed This Visit     None          WOUNDS REQUIRING DRESSING SUPPLIES:     Wound 19 Abdomen Left; Lower #2 (Active)   Wound Image   20 1041   Wound Other 20 1041   Dressing Status Intact; Old drainage 20 1041   Dressing Changed Changed/New 20 1041   Dressing/Treatment Alginate with Ag;Silicone border 93/50/99 0955   Wound Cleansed Rinsed/Irrigated with saline 20 1041   Wound Length (cm) 3.5 cm 20 1041   Wound Width (cm) 5.5 cm 20 1041   Wound Depth (cm) 0.05 cm 20 1041   Wound Surface Area (cm^2) 19.25 cm^2 20 1041   Change in Wound Size % (l*w) -165.52 20 1041   Wound Volume (cm^3) 0.96 cm^3 20 1041   Wound Healing % -167 20 1041   Wound Assessment Epithelialization;Pink 20 1041   Drainage Amount Moderate 20 1041   Drainage Description Serosanguinous 20 1041   Odor None 20 1041   Margins Attached edges 20 1041   Gwen-wound Assessment Dry;Pink 20 1041   Pink%Wound Bed 90 20 1041   Red%Wound Bed 15 06/10/20 0955   Yellow%Wound Bed 20 05/06/20 1001   Other%Wound Bed 10 epi 07/29/20 1041   Number of days: 383       Wound 07/29/20 Abdomen Right #3 (Active)   Wound Other 07/29/20 1048   Dressing Status Intact; Old drainage 07/29/20 1048   Dressing Changed Changed/New 07/29/20 1048   Wound Cleansed Rinsed/Irrigated with saline 07/29/20 1048   Wound Length (cm) 1.8 cm 07/29/20 1048   Wound Width (cm) 2.7 cm 07/29/20 1048   Wound Depth (cm) 0.05 cm 07/29/20 1048   Wound Surface Area (cm^2) 4.86 cm^2 07/29/20 1048   Wound Volume (cm^3) 0.24 cm^3 07/29/20 1048   Wound Assessment Yellow; Red 07/29/20 1048   Drainage Amount Moderate 07/29/20 1048   Drainage Description Serosanguinous 07/29/20 1048   Odor None 07/29/20 1048   Margins Attached edges 07/29/20 1048   Gwen-wound Assessment Dry 07/29/20 1048   Red%Wound Bed 10 07/29/20 1048   Yellow%Wound Bed 90 07/29/20 1048   Number of days: 0      Both wounds are full thickness    Supplies Requested :      WOUND #: 1   PRIMARY DRESSING:  Alginate with silver pad 4x4 inch   Cover and Secure with: Other Silicone bordered foam 4x4 inch     FREQUENCY OF DRESSING CHANGES:  Other Every 3 days       WOUND #: 2   PRIMARY DRESSING:  Alginate with silver pad 4x4 inch   Cover and Secure with:  Other Silicone bordered foam 4x4 inch     FREQUENCY OF DRESSING CHANGES:  Other Change every 3 days     ADDITIONAL ITEMS:  [] Gloves Small  [x] Gloves Medium [] Gloves Large [] Gloves XLarge  [] Tape 1\" [] Tape 2\" [] Tape 3\"  [] Medipore Tape  [x] Saline  [] Skin Prep   [] Adhesive Remover   [] Cotton Tip Applicators   [x] Other: 4x4 gauze to cleanse wound with    Patient Wound(s) Debrided: [x] Yes if yes please add date 7/29/2020   [] No    Debribement Type: Autolytic    Patient currently being seen by Home Health: [] Yes   [x] No    Duration for needed supplies:  []15  []30  []60  [x]90 Days    Provider Information:      PROVIDER'S NAMEClaretta Able RUPERT    NPI: [id number]

## 2020-07-29 NOTE — PROGRESS NOTES
[facility] and Procedure Note      Olena Eubanks  MEDICAL RECORD NUMBER:  [id number]  AGE: 39 y.o. GENDER: female  : 1975  EPISODE DATE:  2020    SUBJECTIVE:     Chief Complaint   Patient presents with    Wound Check     Abdomen         HISTORY OF PRESENT ILLNESS      Olena Eubanks is a 39 y.o. female who presents today for wound/ulcer evaluation. Betsey has a 15+ year history of keloid formation and resultant wounds on her abdomen. Mother is with her at visit today. She reports that Betsey recently developed second open area to her abdomen, prompting today's visit. She has been applying silver alginate with bordered foam dressings to area with reported improvement. Mom reports that they have recently run out of the foam dressing. She has been applying gauze with tape to area as an alternative which has led to irritation of her skin. She continues to loose weight, has been going to the mall several times a week to walk. She is now living in an apartment by herself. She denies any further concerns. Denies fever, chills, purulent or foul smelling drainage from wounds.        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     during hospital visit    Bronchitis     CHF (congestive heart failure) ([address]     Elbow fracture 10/09/14    left    Irritable bowel syndrome     Irritable bowel    Lymph edema 2017    Obesity     Conde disease     [facility]     Seasonal allergies        PAST SURGICAL HISTORY     Past Surgical History:   Procedure Laterality Date    LAYER WOUND CLOSURE      TOE SURGERY  2019    TOENAIL EXCISION      TONSILLECTOMY AND ADENOIDECTOMY         FAMILY HISTORY     Family History   Problem Relation Age of Onset    Diabetes Father     Cancer Maternal Grandmother     Cancer Paternal exertion  Cardiovascular:Denies chest pain, palpitations, edema  Gastrointestinal: Denies nausea, vomiting, constipation, diarrhea, abdominal pain   Musculoskeletal: Denies joint pain, swelling , stiffness,  Endocrine: Denies polyuria, polydipsia, cold or heat intolerance  Hematology: Denies easy brusing or bleeding, hx of clotting disorder  Dermatology: Denies skin rash, eczema, pruritis    PHYSICAL EXAM:     /81   Pulse 100   Temp 99.2 °F (37.3 °C) (Tympanic)   Resp 16   Ht 5' 7\" (1.702 m)   Wt (!) 303 lb 12.8 oz (137.8 kg)   SpO2 96%   BMI 47.58 kg/m²   Wt Readings from Last 3 Encounters:   07/29/20 (!) 303 lb 12.8 oz (137.8 kg)   07/20/20 (!) 305 lb (138.3 kg)   06/10/20 297 lb 14.4 oz (135.1 kg)       General:  Awake, alert, no apparent distress. Appears stated age  [de-identified]: conjuctivae are clear without exudate or hemorrhage, anicteric sclera, moist oral mucosa. Chest:  Respirations regular, non-labored. Chest rise and fall equal bilaterally. Lungs clear to auscultation throughout all fields  Cardiovascular:  RRR,S1S2, no murmur or gallop. Abdomen:  Soft, non tender to palpation. Active bowel sounds. Tape burns to area surrounding wounds. Neurological: Awake, alert, oriented x4  Psychiatric:  Appropriate mood and affect  Skin:  Warm and dry    Wound #2:     Location: abdomen , lower left    Classification/stage: non-healing/non-surgical   Tunneling/undermining: Not Present   Wound bed: pink, epithelialization   Exudate:  moderate, serosanguinous   Gwen-wound:sclerotic   Complications[de-identified] uncomplicated   Pain:Pain worsens with touch, improved with rest.   -wound margins intact and healing well. No signs of infection.     Wound:     Location: abdomen , right   Classification/stage: non-healing/non-surgical   Tunneling/undermining: Not Present   Wound bed: yellow   Exudate:  moderate, serosanguinous   Gwen-wound:sclerotic   Complications[de-identified] uncomplicated   Pain:Pain worsens with touch/movement, improves with rest   -Wound margins  Intact. No signs of infection       Wound 07/11/19 Abdomen Left; Lower #2 (Active)   Wound Image   07/29/20 1041   Wound Other 07/29/20 1041   Dressing Status Intact; Old drainage 07/29/20 1041   Dressing Changed Changed/New 07/29/20 1041   Dressing/Treatment Alginate with Ag;Silicone border 57/74/55 0955   Wound Cleansed Rinsed/Irrigated with saline 07/29/20 1041   Wound Length (cm) 3.5 cm 07/29/20 1041   Wound Width (cm) 5.5 cm 07/29/20 1041   Wound Depth (cm) 0.05 cm 07/29/20 1041   Wound Surface Area (cm^2) 19.25 cm^2 07/29/20 1041   Change in Wound Size % (l*w) -165.52 07/29/20 1041   Wound Volume (cm^3) 0.96 cm^3 07/29/20 1041   Wound Healing % -167 07/29/20 1041   Wound Assessment Epithelialization;Pink 07/29/20 1041   Drainage Amount Moderate 07/29/20 1041   Drainage Description Serosanguinous 07/29/20 1041   Odor None 07/29/20 1041   Margins Attached edges 07/29/20 1041   Gwen-wound Assessment Dry;Pink 07/29/20 1041   Pink%Wound Bed 90 07/29/20 1041   Red%Wound Bed 15 06/10/20 0955   Yellow%Wound Bed 20 05/06/20 1001   Other%Wound Bed 10 epi 07/29/20 1041   Number of days: 383       Wound 07/29/20 Abdomen Right #3 (Active)   Wound Other 07/29/20 1048   Dressing Status Intact; Old drainage 07/29/20 1048   Dressing Changed Changed/New 07/29/20 1048   Wound Cleansed Rinsed/Irrigated with saline 07/29/20 1048   Wound Length (cm) 1.8 cm 07/29/20 1048   Wound Width (cm) 2.7 cm 07/29/20 1048   Wound Depth (cm) 0.05 cm 07/29/20 1048   Wound Surface Area (cm^2) 4.86 cm^2 07/29/20 1048   Wound Volume (cm^3) 0.24 cm^3 07/29/20 1048   Wound Assessment Yellow; Red 07/29/20 1048   Drainage Amount Moderate 07/29/20 1048   Drainage Description Serosanguinous 07/29/20 1048   Odor None 07/29/20 1048   Margins Attached edges 07/29/20 1048   Gwen-wound Assessment Dry 07/29/20 1048   Red%Wound Bed 10 07/29/20 1048   Yellow%Wound Bed 90 07/29/20 1048   Number of days: 0         LABS     No days.      Follow up visit: 1 month on Wednesday August 26th at 10:30 am with Dr. Damian Baird next scheduled appointment. Please give 24 hour notice if unable to keep appointment. [phone]     If you experience any of the following, please call the Wound Care Service during business hours: Monday through Friday 8:00 am - 4:30 pm  ([phone]).             *Increase in pain              *Temperature over 101              *Increase in drainage from your wound or a foul odor              *Uncontrolled swelling              *Need for compression bandage changes due to slippage, breakthrough drainage     If you need medical attention outside of business hours, please contact your Primary Care Doctor or go to the nearest emergency room.         Electronically signed by VIRGIE Moya CNP on 7/29/2020 at 1:08 PM

## 2020-07-29 NOTE — PLAN OF CARE
Problem: Wound:  Goal: Will show signs of wound healing; wound closure and no evidence of infection  Description: Will show signs of wound healing; wound closure and no evidence of infection  Outcome: Ongoing   Patient presents to wound clinic for follow up of abdomen wounds, new wound to right abdomen. No s/s of infection. Patient afebrile. Dressing supplies ordered through [employer]. Abdomen wounds- Apply silver alginate to wounds and cover with bordered foam dressings. Change every 3 days. Follow up visit: 1 month on Wednesday August 26th at 10:30 am with Dr. Demar Florez. Care plan reviewed with patient and mother. Patient and mother verbalize understanding of the plan of care and contribute to goal setting.

## 2020-08-13 ENCOUNTER — TELEPHONE (OUTPATIENT)
Dept: WOUND CARE | Age: 45
End: 2020-08-13

## 2020-08-13 NOTE — TELEPHONE ENCOUNTER
Spoke to [employer] who did not receive fax that was sent on 7/29/20. Order re faxed. Will reach out to [employer] to confirm receipt of fax later today.

## 2020-08-13 NOTE — TELEPHONE ENCOUNTER
----- Message from Anjel Bray sent at 8/13/2020  9:29 AM EDT -----  Pt's mother,Stephy, called about supplies. She states supplies should have been ordered 2 weeks ago. She called [facility] today, and [facility] said they never received an order for the pt. Please call Ailene Shone back when the order is placed. She states that pt is very low on supplies. Her #[phone]. Thanks!

## 2020-08-26 ENCOUNTER — HOSPITAL ENCOUNTER (OUTPATIENT)
Dept: WOUND CARE | Age: 45
Discharge: HOME | End: 2020-08-26
Payer: MEDICARE

## 2020-08-26 VITALS
DIASTOLIC BLOOD PRESSURE: 78 MMHG | RESPIRATION RATE: 18 BRPM | TEMPERATURE: 98.1 F | HEART RATE: 103 BPM | SYSTOLIC BLOOD PRESSURE: 121 MMHG | OXYGEN SATURATION: 97 %

## 2020-08-26 PROCEDURE — 99213 OFFICE O/P EST LOW 20 MIN: CPT

## 2020-08-26 ASSESSMENT — PAIN DESCRIPTION - LOCATION: LOCATION: ABDOMEN

## 2020-08-26 ASSESSMENT — PAIN SCALES - GENERAL: PAINLEVEL_OUTOF10: 8

## 2020-08-26 ASSESSMENT — PAIN DESCRIPTION - PAIN TYPE: TYPE: CHRONIC PAIN

## 2020-08-26 ASSESSMENT — PAIN DESCRIPTION - DESCRIPTORS: DESCRIPTORS: BURNING

## 2020-08-26 NOTE — PLAN OF CARE
Problem: Wound:  Goal: Will show signs of wound healing; wound closure and no evidence of infection  Description: Will show signs of wound healing; wound closure and no evidence of infection  Outcome: Ongoing   Pt. Seen today for abdomen wounds see [facility]S for new orders. Follow up in 1 month. Care plan reviewed with patient and mother. Patient and mother verbalize understanding of the plan of care and contribute to goal setting.

## 2020-08-26 NOTE — PROGRESS NOTES
[facility]          Progress Note and Procedure Note      Jeff Vizcaino  MEDICAL RECORD NUMBER:  [id number]  AGE: 39 y.o. GENDER: female  : 1975  EPISODE DATE:  2020    Subjective:     SUBJECTIVE     Follow up visit. HISTORY OF PRESENT ILLNESS    She is a 39 yr old female seen for follow up visit she has history of keloid formation and has been dealing for over 15 years she has an open wound on abdomen  She has been getting collagen dressing. Avtar Lu has ADHD and schizophrenia she was accompanied by her mom .she follows with cardiology for CHF. She has been loosing weight due to diet control . 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     during hospital visit    Bronchitis     CHF (congestive heart failure) ([address]     Elbow fracture 10/09/14    left    Irritable bowel syndrome     Irritable bowel    Lymph edema 2017    Obesity     Conde disease     Sch[facility]ia [facility]     Seasonal allergies          PAST SURGICAL HISTORY     Past Surgical History:   Procedure Laterality Date    LAYER WOUND CLOSURE      TOE SURGERY  2019    TOENAIL EXCISION      TONSILLECTOMY AND ADENOIDECTOMY       FAMILY HISTORY         Family History   Problem Relation Age of Onset    Diabetes Father     Cancer Maternal Grandmother     Cancer Paternal Grandmother      SOCIAL HISTORY       Social History     Tobacco Use    Smoking status: Never Smoker    Smokeless tobacco: Never Used   Substance Use Topics    Alcohol use: No    Drug use: No     ALLERGIES         Allergies   Allergen Reactions    Latex Rash    Cat Hair Extract     Seasonal      MEDICATIONS         Current Outpatient Medications on File Prior to Encounter   Medication Sig Dispense Refill    furosemide (LASIX) 20 MG tablet TAKE 1 TABLET EVERY OTHER DAY.  TAKE 2 TABLETS ON OTHER DAYS 120 tablet 5    metoprolol succinate (TOPROL XL) 25 MG extended release tablet take 1 tablet by mouth once daily 90 tablet 1    fluocinonide (LIDEX) 0.05 % cream Apply topically daily. 60 g 3    Dextromethorphan-guaiFENesin (CORICIDIN HBP CONGESTION/COUGH)  MG CAPS Take 1 capsule by mouth 2 times daily as needed (cough/congestion) 60 capsule 0    acetaminophen (TYLENOL) 500 MG tablet Take 1,000 mg by mouth every 6 hours as needed for Pain      Cholecalciferol (VITAMIN D3) 5000 units TABS Take 2,000 Units by mouth       MedroxyPROGESTERone Acetate (DEPO-PROVERA IM) Inject into the muscle      IRON PO Take 65 mg by mouth daily       miconazole (MICOTIN) 2 % powder Apply topically 2 times to 3 times  daily. 45 g 1    Dicyclomine HCl (BENTYL PO)   Take 20 mg by mouth 2 times daily       QUEtiapine Fumarate (SEROQUEL PO) Take 400 mg by mouth 2 times daily.  atomoxetine (STRATTERA) 40 MG capsule Take 40 mg by mouth 2 times daily.  aripiprazole (ABILIFY) 10 MG tablet Take 10 mg by mouth 2 times daily       ACYCLOVIR 400 mg by Does not apply route three times a week        No current facility-administered medications on file prior to encounter. REVIEW OF SYSTEMS:     Constitutional: no fever, no night sweats, no fatigue. Head: no head ache , no head injury, no migranes. Eye: no blurring of vision, no double vision.   Ears: no hearing difficulty, no tinnitus  Mouth/throat: no ulceration, dental caries , dysphagia  Lungs: no cough, no shortness of breath, no wheeze  CVS: no palpitation, no chest pain, no shortness of breath  GI: no abdominal pain, no nausea , no vomiting, no constipation  FRANCOISE: no dysuria, frequency and urgency, no hematuria, no kidney stones  Musculoskeletal: no joint pain, swelling , stiffness,  Endocrine: no polyuria, polydipsia, no cold or heat intolerance  Hematology: no anemia, no easy brusing or bleeding, no hx of clotting 1048   Wound Width (cm) 2.7 cm 07/29/20 1048   Wound Depth (cm) 0.05 cm 07/29/20 1048   Wound Surface Area (cm^2) 4.86 cm^2 07/29/20 1048   Wound Volume (cm^3) 0.24 cm^3 07/29/20 1048   Wound Assessment Yellow; Red 07/29/20 1048   Drainage Amount Moderate 07/29/20 1048   Drainage Description Serosanguinous 07/29/20 1048   Odor None 07/29/20 1048   Margins Attached edges 07/29/20 1048   Gwen-wound Assessment Dry 07/29/20 1048   Red%Wound Bed 10 07/29/20 1048   Yellow%Wound Bed 90 07/29/20 1048   Number of days: 27          Assessment:    Non-healing abdominal wound: her pannicula is contributing to delayed healing. Will ask Dr Blank Noe to see her. She may benefit with panniculectomy  -Obesity  Hx of keloid formation    Patient Active Problem List   Diagnosis Code    Keloid L91.0    MRSA (methicillin resistant staph aureus) culture positive Z22.322    Skin ulcer of abdominal wall ([facility] L98.499    POTS (postural orthostatic tachycardia syndrome) R00.0, I95.1    CHF (congestive heart failure) ([facility] chronic diastolic K68.6    Morbid obesity ([facility]) E66.01    Bilateral leg edema =LYMPHEDEMA- ON f/U WITH LYMPHEDEMA CLINIC R60.0    Lymphedema of both lower extremities I89.0    SOB (shortness of breath) on exertion R06.02    Candidiasis, intertriginous B37.2    Dermatitis L30.9          Plan:        refer to plastic surgery  Continue local wound care.       Electronically signed by Glendy Santana MD on 8/26/2020 at 10:29 AM

## 2020-08-31 NOTE — PROGRESS NOTES
[address]fer [address] f: [phone] f: [phone] p: [phone] [email or web address]      Ordering Center:     [facility] [address] [facility] [address]  [phone]  WOUND CARE Dept: [phone]   [address] [place] [address] NUMBER     Patient Information:      Magi Staton  [address] [address]  [address] [place] [address] [phone]   : 1975  AGE: 39 y.o. GENDER: female   EPISODE DATE: 2020    Insurance:      PRIMARY INSURANCE:  Plan: MEDICARE PART A AND B  Coverage: MEDICARE  Effective Date: 2015  Group Number: [unfilled]  Subscriber Number: [id number] - (Medicare)    SECONDARY INSURANCE:  Plan: MEDICAID [facility] DEPT OF JOB  Coverage: [place]  Effective Date: 2018  [unfilled]    [unfilled]   [unfilled]     Patient Wound Information:      Problem List Items Addressed This Visit     None          WOUNDS REQUIRING DRESSING SUPPLIES:     Wound 19 Abdomen Left; Lower #2 (Active)   Wound Image   20 1041   Wound Other 20 1051   Dressing Status Intact; Old drainage 20 1051   Dressing Changed Changed/New 20 1051   Dressing/Treatment Alginate with Ag;Silicone border  0955   Wound Cleansed Rinsed/Irrigated with saline 20 1051   Wound Length (cm) 3.3 cm 20 1051   Wound Width (cm) 6 cm 20 1051   Wound Depth (cm) 0.05 cm 20 1051   Wound Surface Area (cm^2) 19.8 cm^2 20 1051   Change in Wound Size % (l*w) -173.1 20 1051   Wound Volume (cm^3) 0.99 cm^3 20 1051   Wound Healing % -175 20 1051   Wound Assessment White;Red 20 1051   Drainage Amount Moderate 20 1051   Drainage Description Serosanguinous; Yellow 20 1051   Odor None 20 1051   Margins Attached edges 20 1051   Gwen-wound Assessment Dry;Pink 20 1051   Pink%Wound Bed 90 20 1041   Red%Wound Bed 70 20 1051 Yellow%Wound Bed 20 05/06/20 1001   Other%Wound Bed 30 WHITE 08/26/20 1051   Number of days: 416       Wound 07/29/20 Abdomen Right #3 (Active)   Wound Image   08/26/20 1051   Wound Other 08/26/20 1051   Dressing Status Intact; Old drainage 08/26/20 1051   Dressing Changed Changed/New 08/26/20 1051   Wound Cleansed Rinsed/Irrigated with saline 08/26/20 1051   Wound Length (cm) 2.3 cm 08/26/20 1051   Wound Width (cm) 3.1 cm 08/26/20 1051   Wound Depth (cm) 0.05 cm 08/26/20 1051   Wound Surface Area (cm^2) 7.13 cm^2 08/26/20 1051   Change in Wound Size % (l*w) -46.71 08/26/20 1051   Wound Volume (cm^3) 0.36 cm^3 08/26/20 1051   Wound Healing % -50 08/26/20 1051   Wound Assessment Yellow;Pink 08/26/20 1051   Drainage Amount Moderate 08/26/20 1051   Drainage Description Serosanguinous 08/26/20 1051   Odor None 08/26/20 1051   Margins Attached edges 08/26/20 1051   Gwen-wound Assessment Dry 08/26/20 1051   Pink%Wound Bed 30 08/26/20 1051   Red%Wound Bed 10 07/29/20 1048   Yellow%Wound Bed 70 08/26/20 1051   Number of days: 32       Wound 08/26/20 Abdomen Medial;Lower #3 (Active)   Wound Other 08/26/20 1051   Dressing Status Intact; Old drainage 08/26/20 1051   Dressing Changed Changed/New 08/26/20 1051   Wound Cleansed Rinsed/Irrigated with saline 08/26/20 1051   Wound Length (cm) 1.7 cm 08/26/20 1051   Wound Width (cm) 1.7 cm 08/26/20 1051   Wound Depth (cm) 0.05 cm 08/26/20 1051   Wound Surface Area (cm^2) 2.89 cm^2 08/26/20 1051   Wound Volume (cm^3) 0.14 cm^3 08/26/20 1051   Wound Assessment Yellow; Red 08/26/20 1051   Drainage Amount Moderate 08/26/20 1051   Drainage Description Serosanguinous; Yellow 08/26/20 1051   Odor None 08/26/20 1051   Margins Attached edges 08/26/20 1051   Gwen-wound Assessment Dry;Pink 08/26/20 1051   Red%Wound Bed 20 08/26/20 1051   Yellow%Wound Bed 80 08/26/20 1051   Number of days: 4          Supplies Requested :      WOUND #: 1   PRIMARY DRESSING:  Alginate with silver pad   Cover and

## 2020-09-04 RX ORDER — METOPROLOL SUCCINATE 25 MG/1
TABLET, EXTENDED RELEASE ORAL
Qty: 90 TABLET | Refills: 1 | Status: SHIPPED | OUTPATIENT
Start: 2020-09-04 | End: 2020-12-11 | Stop reason: SDUPTHER

## 2020-09-23 ENCOUNTER — HOSPITAL ENCOUNTER (OUTPATIENT)
Dept: WOUND CARE | Age: 45
Discharge: HOME | End: 2020-09-23
Payer: MEDICARE

## 2020-09-23 VITALS
BODY MASS INDEX: 45.99 KG/M2 | WEIGHT: 293 LBS | DIASTOLIC BLOOD PRESSURE: 67 MMHG | HEART RATE: 105 BPM | TEMPERATURE: 99.3 F | OXYGEN SATURATION: 97 % | RESPIRATION RATE: 18 BRPM | SYSTOLIC BLOOD PRESSURE: 127 MMHG | HEIGHT: 67 IN

## 2020-09-23 PROCEDURE — 99213 OFFICE O/P EST LOW 20 MIN: CPT

## 2020-09-23 ASSESSMENT — PAIN SCALES - GENERAL: PAINLEVEL_OUTOF10: 8

## 2020-09-23 ASSESSMENT — PAIN DESCRIPTION - LOCATION: LOCATION: ABDOMEN

## 2020-09-23 ASSESSMENT — PAIN DESCRIPTION - PAIN TYPE: TYPE: CHRONIC PAIN

## 2020-09-23 NOTE — PROGRESS NOTES
[address] f: [phone] f: [phone] p: [phone] [email or web address]      Ordering Center:     [facility] [address] [facility] [address]  [phone]  WOUND CARE Dept: [phone]   [facility] NUMBER [phone]    Patient Information:      Bess Hawkins  [address]   Marcie Ponce   [phone]   : 1975  AGE: 39 y.o. GENDER: female   EPISODE DATE: 2020    Insurance:      PRIMARY INSURANCE:  Plan: MEDICARE PART A AND B  Coverage: MEDICARE  Effective Date: 2015  Group Number: [unfilled]  Subscriber Number: [id number] - (Medicare)    SECONDARY INSURANCE:  Plan:  [place] [place] [employer]  Coverage: [place]  Effective Date: 2018  [unfilled]    [unfilled]   [unfilled]     Patient Wound Information:      Problem List Items Addressed This Visit     Skin ulcer of abdominal wall [facility]) - Primary    Relevant Orders    Nava Cagle MD, Plastic Surgery, [facility] 80:     Wound 19 Abdomen Left; Lower #2 (Active)   Wound Image   20 1041   Wound Other 20 1102   Dressing Status Intact 20 1102   Dressing Changed Changed/New 20 1102   Dressing/Treatment Protective barrier;Alginate with Ag;ABD;Tape change 20 1102   Wound Cleansed Rinsed/Irrigated with saline 20 1102   Wound Length (cm) 4 cm 20 1102   Wound Width (cm) 6 cm 20 1102   Wound Depth (cm) 0.1 cm 20 1102   Wound Surface Area (cm^2) 24 cm^2 20 1102   Change in Wound Size % (l*w) -231.03 20 1102   Wound Volume (cm^3) 2.4 cm^3 20 1102   Wound Healing % -567 20 1102   Wound Assessment Epithelialization;Yellow; Red 20 1102   Drainage Amount Moderate 20 1102   Drainage Description Serosanguinous 20 1102   Odor None 20 1102   Margins Attached edges 09/23/20 1102   Gwen-wound Assessment Dry;Pink; White; Maceration 09/23/20 1102   [place]%Wound Bed 90 07/29/20 1041   Red%Wound Bed 80 09/23/20 1102   Yellow%Wound Bed 10 09/23/20 1102   Other%Wound Bed 10 epi 09/23/20 1102   Number of days: 439       Wound 07/29/20 Abdomen Right;Upper #3 (Active)   Wound Image   08/26/20 1051   Wound Other 09/23/20 1102   Dressing Status Intact 09/23/20 1102   Dressing Changed Changed/New 09/23/20 1102   Dressing/Treatment Protective barrier;Alginate with Ag;ABD;Tape change 09/23/20 1102   Wound Cleansed Rinsed/Irrigated with saline 09/23/20 1102   Wound Length (cm) 2.7 cm 09/23/20 1102   Wound Width (cm) 6 cm 09/23/20 1102   Wound Depth (cm) 0.2 cm 09/23/20 1102   Wound Surface Area (cm^2) 16.2 cm^2 09/23/20 1102   Change in Wound Size % (l*w) -233.33 09/23/20 1102   Wound Volume (cm^3) 3.24 cm^3 09/23/20 1102   Wound Healing % -1250 09/23/20 1102   Wound Assessment Epithelialization;Yellow; Red 09/23/20 1102   Drainage Amount Moderate 09/23/20 1102   Drainage Description Serosanguinous 09/23/20 1102   Odor None 09/23/20 1102   Margins Attached edges 09/23/20 1102   Gwen-wound Assessment Dry;Red 09/23/20 1102   [place]%Wound Bed 30 08/26/20 1051   Red%Wound Bed 90 09/23/20 1102   Yellow%Wound Bed 10 09/23/20 1102   Other%Wound Bed 20 epi 09/23/20 1102   Number of days: 56       Wound 08/26/20 Abdomen Medial;Lower #3 (Active)   Wound Image   09/23/20 1100   Wound Other 08/26/20 1051   Dressing Status Intact 09/23/20 1102   Dressing Changed Changed/New 09/23/20 1102   Dressing/Treatment Protective barrier;Alginate with Ag;ABD;Tape change 09/23/20 1102   Wound Cleansed Rinsed/Irrigated with saline 09/23/20 1102   Wound Length (cm) 2 cm 09/23/20 1102   Wound Width (cm) 1.7 cm 09/23/20 1102   Wound Depth (cm) 0.1 cm 09/23/20 1102   Wound Surface Area (cm^2) 3.4 cm^2 09/23/20 1102   Change in Wound Size % (l*w) -17.65 09/23/20 1102   Wound Volume (cm^3) 0.34 cm^3 09/23/20 1102   Wound Healing % -143 09/23/20 1102   Wound Assessment Red;Yellow 09/23/20 1102   Drainage Amount Moderate 09/23/20 1102   Drainage Description Serosanguinous 09/23/20 1102   Odor None 09/23/20 1102   Margins Attached edges 09/23/20 1102   Gwen-wound Assessment Dry;Pink 09/23/20 1102   Red%Wound Bed 70 09/23/20 1102   Yellow%Wound Bed 80 08/26/20 1051   Black%Wound Bed 30 09/23/20 1102   Number of days: 28       Wound 09/23/20 Abdomen Left;Distal (Active)   Wound Other 09/23/20 1106   Dressing Status Intact 09/23/20 1106   Dressing Changed Changed/New 09/23/20 1106   Dressing/Treatment Protective barrier;Alginate with Ag;ABD;Tape change 09/23/20 1106   Wound Cleansed Rinsed/Irrigated with saline 09/23/20 1106   Wound Length (cm) 1.4 cm 09/23/20 1106   Wound Width (cm) 1.9 cm 09/23/20 1106   Wound Depth (cm) 0.1 cm 09/23/20 1106   Wound Surface Area (cm^2) 2.66 cm^2 09/23/20 1106   Wound Volume (cm^3) 0.27 cm^3 09/23/20 1106   Wound Assessment Red;Black 09/23/20 1106   Drainage Amount Moderate 09/23/20 1106   Drainage Description Serosanguinous 09/23/20 1106   Odor None 09/23/20 1106   Margins Attached edges 09/23/20 1106   Gwen-wound Assessment Dry 09/23/20 1106   Red%Wound Bed 80 09/23/20 1106   Black%Wound Bed 20 09/23/20 1106   Number of days: 0          Supplies Requested :      WOUND #: 1   PRIMARY DRESSING:  Alginate with silver pad   Cover and Secure with: Other Hydrofera blue ready, steri strips,  ABD pad, 2 inch medipore tape     FREQUENCY OF DRESSING CHANGES:  Other Every 3 days       WOUND #: 2   PRIMARY DRESSING:  Alginate with silver pad   Cover and Secure with: Other Hydrofera blue ready, steri strips, ABD pad, 2 inch medipore tape     FREQUENCY OF DRESSING CHANGES:  Other Every 3 days       WOUND #: 3   PRIMARY DRESSING:  Alginate with silver pad   Cover and Secure with:  Other Hydrofera blue ready, steri strips, ABD pad     FREQUENCY OF DRESSING CHANGES:  Other Every 3 days       WOUND #: 4   PRIMARY

## 2020-09-23 NOTE — PROGRESS NOTES
[address] [facility]          Progress Note and Procedure Note      Krishna Funk  MEDICAL RECORD NUMBER:  [id number]  AGE: 39 y.o. GENDER: female  : 1975  EPISODE DATE:  2020    Subjective:     SUBJECTIVE     Chief Complaint   Patient presents with    Wound Check     Abdomen           HISTORY OF PRESENT ILLNESS     She is a 39 yr old female seen for follow up visit she has history of keloid formation and has been dealing with a wound on the lower abdomen  for over 15 years . she has ADHD and schizophrenia she was accompanied by her mom .she follows with cardiology for CHF. She has no new issues. The skin is getting irritated due to the tape.  She has been using steristrips  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     during hospital visit    Bronchitis     CHF (congestive heart failure) ([address]     Elbow fracture 10/09/14    left    Irritable bowel syndrome     Irritable bowel    Lymph edema 2017    Obesity     Conde disease     Schizophrenia [facility]     Seasonal allergies          PAST SURGICAL HISTORY     Past Surgical History:   Procedure Laterality Date    LAYER WOUND CLOSURE      TOE SURGERY  2019    TOENAIL EXCISION      TONSILLECTOMY AND ADENOIDECTOMY       FAMILY HISTORY         Family History   Problem Relation Age of Onset    Diabetes Father     Cancer Maternal Grandmother     Cancer Paternal Grandmother      SOCIAL HISTORY       Social History     Tobacco Use    Smoking status: Never Smoker    Smokeless tobacco: Never Used   Substance Use Topics    Alcohol use: No    Drug use: No     ALLERGIES         Allergies   Allergen Reactions    Latex Rash    Cat Hair Extract     Seasonal      MEDICATIONS         Current Outpatient Medications on File Prior to Encounter   Medication Sig Dispense Refill    metoprolol succinate ([place] XL) 25 MG extended release tablet take 1 tablet by mouth once daily 90 tablet 1    furosemide (LASIX) 20 MG tablet TAKE 1 TABLET EVERY OTHER DAY. TAKE 2 TABLETS ON OTHER DAYS 120 tablet 5    fluocinonide (LIDEX) 0.05 % cream Apply topically daily. 60 g 3    Dextromethorphan-guaiFENesin (CORICIDIN HBP CONGESTION/COUGH)  MG CAPS Take 1 capsule by mouth 2 times daily as needed (cough/congestion) 60 capsule 0    acetaminophen (TYLENOL) 500 MG tablet Take 1,000 mg by mouth every 6 hours as needed for Pain      Cholecalciferol (VITAMIN D3) 5000 units TABS Take 2,000 Units by mouth       MedroxyPROGESTERone Acetate (DEPO-PROVERA IM) Inject into the muscle      IRON PO Take 65 mg by mouth daily       miconazole (MICOTIN) 2 % powder Apply topically 2 times to 3 times  daily. 45 g 1    Dicyclomine HCl (BENTYL PO)   Take 20 mg by mouth 2 times daily       QUEtiapine Fumarate (SEROQUEL PO) Take 400 mg by mouth 2 times daily.  atomoxetine (STRATTERA) 40 MG capsule Take 40 mg by mouth 2 times daily.  aripiprazole (ABILIFY) 10 MG tablet Take 10 mg by mouth 2 times daily       ACYCLOVIR 400 mg by Does not apply route three times a week        No current facility-administered medications on file prior to encounter. REVIEW OF SYSTEMS:     Constitutional: no fever, no night sweats, no fatigue. Head: no head ache , no head injury, no migranes. Eye: no blurring of vision, no double vision.   Ears: no hearing difficulty, no tinnitus  Mouth/throat: no ulceration, dental caries , dysphagia  Lungs: no cough, no shortness of breath, no wheeze  CVS: no palpitation, no chest pain, no shortness of breath  GI: no abdominal pain, no nausea , no vomiting, no constipation  FRANCOISE: no dysuria, frequency and urgency, no hematuria, no kidney stones  Musculoskeletal: no joint pain, swelling , stiffness,  Endocrine: no polyuria, polydipsia, no cold or heat intolerance  Hematology: no anemia, no easy brusing or bleeding, no hx of clotting disorder  Dermatology: no skin rash, no eczema, no pruritis,  Psychiatry: no depression, no anxiety,no panic attacks, no suicide ideation        PHYSICAL EXAM      height is 5' 7\" (1.702 m) and weight is 302 lb 6.4 oz (137.2 kg) (abnormal). Her tympanic temperature is 99.3 °F (37.4 °C). Her blood pressure is 127/67 and her pulse is 105. Her respiration is 18 and oxygen saturation is 97%. Wt Readings from Last 3 Encounters:   09/23/20 (!) 302 lb 6.4 oz (137.2 kg)   07/29/20 (!) 303 lb 12.8 oz (137.8 kg)   07/20/20 (!) 305 lb (138.3 kg)          General Appearance  Awake, alert, oriented,  not  In acute distress  HEENT - normocephalic, atraumatic, pink conjunctiva,  anicteric sclera  Neck - Supple, no mass     Abdomen - open wound over the abdomen, no signifcant chagne, there is excoriation of skin around the wound  Neurologic - oriented  Skin - No bruising or bleeding  Extremities - No edema, no cyanosis, clubbing       Wound 07/11/19 Abdomen Left; Lower #2 (Active)   Wound Image   07/29/20 1041   Wound Other 08/26/20 1051   Dressing Status Intact; Old drainage 08/26/20 1051   Dressing Changed Changed/New 08/26/20 1051   Dressing/Treatment Alginate with Ag;Silicone border 37/23/25 0955   Wound Cleansed Rinsed/Irrigated with saline 08/26/20 1051   Wound Length (cm) 3.3 cm 08/26/20 1051   Wound Width (cm) 6 cm 08/26/20 1051   Wound Depth (cm) 0.05 cm 08/26/20 1051   Wound Surface Area (cm^2) 19.8 cm^2 08/26/20 1051   Change in Wound Size % (l*w) -173.1 08/26/20 1051   Wound Volume (cm^3) 0.99 cm^3 08/26/20 1051   Wound Healing % -175 08/26/20 1051   Wound Assessment White;Red 08/26/20 1051   Drainage Amount Moderate 08/26/20 1051   Drainage Description Serosanguinous; Yellow 08/26/20 1051   Odor None 08/26/20 1051   Margins Attached edges 08/26/20 1051   Gwen-wound Assessment Dry;Pink 08/26/20 1051   Pink%Wound Bed 90 07/29/20 1041   Red%Wound Bed 70 08/26/20 1051   Yellow%Wound Bed 20 Blank Noe, she is known to him  -Obesity  Hx of keloid formation  Patient Active Problem List   Diagnosis Code    Keloid L91.0    MRSA (methicillin resistant staph aureus) culture positive Z22.322    Skin ulcer of abdominal wall ([address] L98.499    POTS (postural orthostatic tachycardia syndrome) R00.0, I95.1    CHF (congestive heart failure) ([facility]) chronic diastolic J78.5    Morbid obesity ([facility]) E66.01    Bilateral leg edema =LYMPHEDEMA- ON f/U WITH LYMPHEDEMA CLINIC R60.0    Lymphedema of both lower extremities I89.0    SOB (shortness of breath) on exertion R06.02    Candidiasis, intertriginous B37.2    Dermatitis L30.9        Plan:     Patient examined and evaluated      Please see attached Discharge Instructions    Written patient dismissal instructions given to patient and signed by patient or POA.              Electronically signed by Glendy Santana MD on 9/23/2020 at 10:56 AM

## 2020-10-09 ENCOUNTER — HOSPITAL ENCOUNTER (EMERGENCY)
Age: 45
Discharge: HOME | End: 2020-10-09
Payer: MEDICARE

## 2020-10-09 VITALS
HEIGHT: 66 IN | TEMPERATURE: 98.2 F | WEIGHT: 293 LBS | BODY MASS INDEX: 47.09 KG/M2 | HEART RATE: 90 BPM | RESPIRATION RATE: 16 BRPM | OXYGEN SATURATION: 97 % | SYSTOLIC BLOOD PRESSURE: 134 MMHG | DIASTOLIC BLOOD PRESSURE: 74 MMHG

## 2020-10-09 PROCEDURE — 99212 OFFICE O/P EST SF 10 MIN: CPT

## 2020-10-09 PROCEDURE — 99213 OFFICE O/P EST LOW 20 MIN: CPT | Performed by: NURSE PRACTITIONER

## 2020-10-09 RX ORDER — AMOXICILLIN AND CLAVULANATE POTASSIUM 875; 125 MG/1; MG/1
1 TABLET, FILM COATED ORAL 2 TIMES DAILY
Qty: 20 TABLET | Refills: 0 | Status: SHIPPED | OUTPATIENT
Start: 2020-10-09 | End: 2020-10-19

## 2020-10-09 ASSESSMENT — ENCOUNTER SYMPTOMS
CHEST TIGHTNESS: 0
DIARRHEA: 0
VOMITING: 0
COUGH: 1
ABDOMINAL PAIN: 0
SHORTNESS OF BREATH: 0
STRIDOR: 0
WHEEZING: 0
SORE THROAT: 1
NAUSEA: 0
SINUS PRESSURE: 1

## 2020-10-09 ASSESSMENT — PAIN DESCRIPTION - LOCATION: LOCATION: THROAT;EAR

## 2020-10-09 ASSESSMENT — PAIN DESCRIPTION - PAIN TYPE: TYPE: ACUTE PAIN

## 2020-10-09 ASSESSMENT — PAIN SCALES - WONG BAKER: WONGBAKER_NUMERICALRESPONSE: 6

## 2020-10-09 NOTE — ED NOTES
Pt states she thinks she has a sinus infection. She has had sore throat ear pain and intermittent dizziness for approx 3-4 days.       Brittani Dick, HOLDEN  10/09/20 6702

## 2020-10-09 NOTE — ED PROVIDER NOTES
[facility]       CHIEF COMPLAINT       Chief Complaint   Patient presents with    Headache    Otalgia    Pharyngitis    Dizziness    Facial Pain       Nurses Notes reviewed and I agree except as noted in the HPI. HISTORY OF PRESENT ILLNESS   Thee Corley is a 39 y.o. female who presents     Patient is present in the urgent care today with complaints of headaches, bilateral ear fullness, sore throat, facial pressure, and intermittent episodes of dizziness. Patient has been brought in by mother today. Patient and mother state that her symptoms have been lingering for approximately 4 days, mother states that they have not began any over-the-counter medications, as mother states \" they never work in the past, so why bother starting them\". Mother denies patient having any fevers. REVIEW OF SYSTEMS     Review of Systems   Constitutional: Negative for chills, fatigue and fever. HENT: Positive for congestion, ear pain (bilateral fullness), postnasal drip, sinus pressure and sore throat. Respiratory: Positive for cough (intermittent, non-productive). Negative for chest tightness, shortness of breath, wheezing and stridor. Cardiovascular: Negative for chest pain. Gastrointestinal: Negative for abdominal pain, diarrhea, nausea and vomiting. Musculoskeletal: Negative for myalgias. Skin: Negative for rash. Allergic/Immunologic: Negative for environmental allergies, food allergies and immunocompromised state. Neurological: Positive for dizziness (intermittent, since onset of sinus congestion) and headaches (intermittent, since onset of sinus congestion).        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     during hospital visit    Bronchitis     [facility]) [facility]     Elbow fracture 10/09/14    left    Irritable bowel syndrome     Irritable bowel    Lymph edema 2017    Obesity     Conde disease     Schizophrenia ([facility]     Seasonal allergies        SURGICALHISTORY     Patient  has a past surgical history that includes Tonsillectomy and adenoidectomy; layer wound closure; toenail excision; and Toe Surgery (09/2019). CURRENT MEDICATIONS       Discharge Medication List as of 10/9/2020 11:47 AM      CONTINUE these medications which have NOT CHANGED    Details   metoprolol succinate (TOPROL XL) 25 MG extended release tablet take 1 tablet by mouth once daily, Disp-90 tablet,R-1Normal      furosemide (LASIX) 20 MG tablet TAKE 1 TABLET EVERY OTHER DAY. TAKE 2 TABLETS ON OTHER DAYS, Disp-120 tablet, R-5Normal      fluocinonide (LIDEX) 0.05 % cream Apply topically daily. , Disp-60 g, R-3, Normal      Dextromethorphan-guaiFENesin (CORICIDIN HBP CONGESTION/COUGH)  MG CAPS Take 1 capsule by mouth 2 times daily as needed (cough/congestion), Disp-60 capsule, R-0Normal      acetaminophen (TYLENOL) 500 MG tablet Take 1,000 mg by mouth every 6 hours as needed for PainHistorical Med      Cholecalciferol (VITAMIN D3) 5000 units TABS Take 2,000 Units by mouth Historical Med      MedroxyPROGESTERone Acetate (DEPO-PROVERA IM) Inject into the muscle      IRON PO Take 65 mg by mouth daily Historical Med      miconazole (MICOTIN) 2 % powder Apply topically 2 times to 3 times  daily. , Disp-45 g, R-1, NO PRINT      Dicyclomine HCl (BENTYL PO)   Take 20 mg by mouth 2 times daily       QUEtiapine Fumarate (SEROQUEL PO) Take 400 mg by mouth 2 times daily. atomoxetine (STRATTERA) 40 MG capsule Take 40 mg by mouth 2 times daily.         aripiprazole (ABILIFY) 10 MG tablet Take 10 mg by mouth 2 times daily Historical Med      ACYCLOVIR 400 mg by Does not apply route three times a week Historical Med             ALLERGIES     Patient is is allergic to latex; cat hair extract; and seasonal.    Patients   There is no immunization history on file for this patient. FAMILY HISTORY     Patient's family history includes Cancer in her maternal grandmother and paternal grandmother; Diabetes in her father. SOCIAL HISTORY     Patient  reports that she has never smoked. She has never used smokeless tobacco. She reports that she does not drink alcohol or use drugs. PHYSICAL EXAM     ED TRIAGE VITALS  BP: 134/74, Temp: 98.2 °F (36.8 °C), Pulse: 90, Resp: 16, SpO2: 97 %,Estimated body mass index is 48.42 kg/m² as calculated from the following:    Height as of this encounter: 5' 6\" (1.676 m). Weight as of this encounter: 300 lb (136.1 kg). ,No LMP recorded. Patient has had an injection. Physical Exam  Constitutional:       General: She is not in acute distress. Appearance: She is well-developed. She is not ill-appearing, toxic-appearing or diaphoretic. HENT:      Nose: Congestion present. Mouth/Throat:      Mouth: Mucous membranes are moist. No oral lesions. Pharynx: No pharyngeal swelling, oropharyngeal exudate, posterior oropharyngeal erythema or uvula swelling. Pulmonary:      Effort: Pulmonary effort is normal. No respiratory distress. Breath sounds: Normal breath sounds. No stridor. No wheezing, rhonchi or rales. Chest:      Chest wall: No tenderness. Lymphadenopathy:      Head:      Right side of head: Submental, submandibular and tonsillar adenopathy present. No preauricular, posterior auricular or occipital adenopathy. Left side of head: Submental, submandibular and tonsillar adenopathy present. No preauricular, posterior auricular or occipital adenopathy. Skin:     Capillary Refill: Capillary refill takes less than 2 seconds. Neurological:      General: No focal deficit present. Mental Status: She is alert and oriented to person, place, and time.    Psychiatric:         Mood and Affect: Mood normal.         Behavior: Behavior normal.         DIAGNOSTIC RESULTS     Labs:No results found for this visit on 10/09/20. IMAGING:    No orders to display     URGENT CARE COURSE:     Vitals:    10/09/20 1134   BP: 134/74   Pulse: 90   Resp: 16   Temp: 98.2 °F (36.8 °C)   SpO2: 97%   Weight: 300 lb (136.1 kg)   Height: 5' 6\" (1.676 m)       Medications - No data to display         PROCEDURES:  None    FINAL IMPRESSION      1. Acute sinusitis, recurrence not specified, unspecified location    2. Acute pharyngitis, unspecified etiology          DISPOSITION/ PLAN   Patient is discharged home with prescription for Augmentin for suspected sinusitis. Discussed with patient and mother that she may return to work within the next 1 to 2 days as long as there are no fevers, vomiting, or diarrhea. Patient and mother are informed that if symptoms continue and do not improve within the next 2 to 4 days she may need to follow-up with primary care provider for further evaluation. Do recommend use of over-the-counter decongestant, without pseudoephedrine, and adequate fluid hydration.         PATIENT REFERRED TO:  Aiden Vargas MD  [facility] [address] / [place] [id number]      DISCHARGE MEDICATIONS:  Discharge Medication List as of 10/9/2020 11:47 AM      START taking these medications    Details   amoxicillin-clavulanate (AUGMENTIN) 875-125 MG per tablet Take 1 tablet by mouth 2 times daily for 10 days, Disp-20 tablet,R-0Print             Discharge Medication List as of 10/9/2020 11:47 AM          Discharge Medication List as of 10/9/2020 11:47 AM          VIRGIE Alcazar NP    (Please note that portions of this note were completed with a voice recognition program. Efforts were made to edit the dictations but occasionally words are mis-transcribed.)         VIRGIE Easley NP  10/09/20 [id number]

## 2020-10-10 ENCOUNTER — CARE COORDINATION (OUTPATIENT)
Dept: CARE COORDINATION | Age: 45
End: 2020-10-10

## 2020-10-10 NOTE — CARE COORDINATION
Patient contacted regarding recent discharge and COVID-19 risk. Discussed COVID-19 related testing which was not done at this time. Test results were not done. Patient informed of results, if available? N/A      Care Transition Nurse/ Ambulatory Care Manager contacted the parent by telephone to perform post discharge assessment. Verified name and  with parent as identifiers. Patient has following risk factors of: heart failure and asthma. CTN/ACM reviewed discharge instructions, medical action plan and red flags related to discharge diagnosis. Reviewed and educated them on any new and changed medications related to discharge diagnosis. Advised obtaining a 90-day supply of all daily and as-needed medications. Education provided regarding infection prevention, and signs and symptoms of COVID-19 and when to seek medical attention with parent who verbalized understanding. Discussed exposure protocols and quarantine from [address] you at higher risk for severe illness  and given an opportunity for questions and concerns. The parent agrees to contact the COVID-19 hotline 131-914-5008 or PCP office for questions related to their healthcare. CTN/ACM provided contact information for future reference. From CDC: Are you at higher risk for severe illness?  Wash your hands often.  Avoid close contact (6 feet, which is about two arm lengths) with people who are sick.  Put distance between yourself and other people if COVID-19 is spreading in your community.  Clean and disinfect frequently touched surfaces.  Avoid all cruise travel and non-essential air travel.  Call your healthcare professional if you have concerns about COVID-19 and your underlying condition or if you are sick. For more information on steps you can take to protect yourself, see CDC's How to Protect Yourself    no further calls scheduled     Spoke to patient's mother.  No new or worsening symptoms  Prescriptions filled and taking as prescribed  Denies questions or concerns   Encouraged follow up with providers and to contact sooner if no improvement, or with changes/questions/concerns             Advance Care Planning  People with COVID-19 may have no symptoms, mild symptoms, such as fever, cough, and shortness of breath or they may have more severe illness, developing severe and fatal pneumonia. As a result, Advance Care Planning with attention to naming a health care decision maker (someone you trust to make healthcare decisions for you if you could not speak for yourself) and sharing other health care preferences is important BEFORE a possible health crisis. Please contact your Primary Care Provider to discuss Advance Care Planning. Preventing the Spread of Coronavirus Disease 2019 in Homes and Residential Communities  For the most recent information go to [email or web address]    Prevention steps for People with confirmed or suspected COVID-19 (including persons under investigation) who do not need to be hospitalized  and   People with confirmed COVID-19 who were hospitalized and determined to be medically stable to go home    Your healthcare provider and public health staff will evaluate whether you can be cared for at home. If it is determined that you do not need to be hospitalized and can be isolated at home, you will be monitored by staff from your local or state health department. You should follow the prevention steps below until a healthcare provider or local or state health department says you can return to your normal activities. Stay home except to get medical care  People who are mildly ill with COVID-19 are able to isolate at home during their illness. You should restrict activities outside your home, except for getting medical care. Do not go to work, school, or public areas. Avoid using public transportation, ride-sharing, or taxis.   Separate yourself from other people and animals in your home  People: As much as possible, you should stay in a specific room and away from other people in your home. Also, you should use a separate bathroom, if available. Animals: You should restrict contact with pets and other animals while you are sick with COVID-19, just like you would around other people. Although there have not been reports of pets or other animals becoming sick with COVID-19, it is still recommended that people sick with COVID-19 limit contact with animals until more information is known about the virus. When possible, have another member of your household care for your animals while you are sick. If you are sick with COVID-19, avoid contact with your pet, including petting, snuggling, being kissed or licked, and sharing food. If you must care for your pet or be around animals while you are sick, wash your hands before and after you interact with pets and wear a facemask. Call ahead before visiting your doctor  If you have a medical appointment, call the healthcare provider and tell them that you have or may have COVID-19. This will help the healthcare providers office take steps to keep other people from getting infected or exposed. Wear a facemask  You should wear a facemask when you are around other people (e.g., sharing a room or vehicle) or pets and before you enter a healthcare providers office. If you are not able to wear a facemask (for example, because it causes trouble breathing), then people who live with you should not stay in the same room with you, or they should wear a facemask if they enter your room. Cover your coughs and sneezes  Cover your mouth and nose with a tissue when you cough or sneeze. Throw used tissues in a lined trash can.  Immediately wash your hands with soap and water for at least 20 seconds or, if soap and water are not available, clean your hands with an alcohol-based hand  that contains at least 60% alcohol. Clean your hands often  Wash your hands often with soap and water for at least 20 seconds, especially after blowing your nose, coughing, or sneezing; going to the bathroom; and before eating or preparing food. If soap and water are not readily available, use an alcohol-based hand  with at least 60% alcohol, covering all surfaces of your hands and rubbing them together until they feel dry. Soap and water are the best option if hands are visibly dirty. Avoid touching your eyes, nose, and mouth with unwashed hands. Avoid sharing personal household items  You should not share dishes, drinking glasses, cups, eating utensils, towels, or bedding with other people or pets in your home. After using these items, they should be washed thoroughly with soap and water. Clean all high-touch surfaces everyday  High touch surfaces include counters, tabletops, doorknobs, bathroom fixtures, toilets, phones, keyboards, tablets, and bedside tables. Also, clean any surfaces that may have blood, stool, or body fluids on them. Use a household cleaning spray or wipe, according to the label instructions. Labels contain instructions for safe and effective use of the cleaning product including precautions you should take when applying the product, such as wearing gloves and making sure you have good ventilation during use of the product. Monitor your symptoms  Seek prompt medical attention if your illness is worsening (e.g., difficulty breathing). Before seeking care, call your healthcare provider and tell them that you have, or are being evaluated for, COVID-19. Put on a facemask before you enter the facility. These steps will help the healthcare providers office to keep other people in the office or waiting room from getting infected or exposed. Ask your healthcare provider to call the local or state health department.  Persons who are placed under active monitoring or facilitated self-monitoring should follow instructions provided by their local health department or occupational health professionals, as appropriate. When working with your local health department check their available hours. If you have a medical emergency and need to call 911, notify the dispatch personnel that you have, or are being evaluated for COVID-19. If possible, put on a facemask before emergency medical services arrive. Discontinuing home isolation  Patients with confirmed COVID-19 should remain under home isolation precautions until the risk of secondary transmission to others is thought to be low. The decision to discontinue home isolation precautions should be made on a case-by-case basis, in consultation with healthcare providers and state and local health departments.

## 2020-10-13 ENCOUNTER — HOSPITAL ENCOUNTER (EMERGENCY)
Age: 45
Discharge: HOME | End: 2020-10-13
Payer: MEDICARE

## 2020-10-13 ENCOUNTER — APPOINTMENT (OUTPATIENT)
Dept: GENERAL RADIOLOGY | Age: 45
End: 2020-10-13
Payer: MEDICARE

## 2020-10-13 VITALS
WEIGHT: 293 LBS | OXYGEN SATURATION: 100 % | HEART RATE: 84 BPM | TEMPERATURE: 98 F | BODY MASS INDEX: 48.42 KG/M2 | DIASTOLIC BLOOD PRESSURE: 90 MMHG | RESPIRATION RATE: 20 BRPM | SYSTOLIC BLOOD PRESSURE: 141 MMHG

## 2020-10-13 LAB
ALBUMIN SERPL-MCNC: 4.1 G/DL (ref 3.5–5.1)
ALP BLD-CCNC: 85 U/L (ref 38–126)
ALT SERPL-CCNC: 15 U/L (ref 11–66)
ANION GAP SERPL CALCULATED.3IONS-SCNC: 14 MEQ/L (ref 8–16)
AST SERPL-CCNC: 14 U/L (ref 5–40)
BASOPHILS # BLD: 0.5 %
BASOPHILS ABSOLUTE: 0 THOU/MM3 (ref 0–0.1)
BILIRUB SERPL-MCNC: 0.4 MG/DL (ref 0.3–1.2)
BILIRUBIN DIRECT: < 0.2 MG/DL (ref 0–0.3)
BUN BLDV-MCNC: 11 MG/DL (ref 7–22)
CALCIUM SERPL-MCNC: 9.5 MG/DL (ref 8.5–10.5)
CHLORIDE BLD-SCNC: 106 MEQ/L (ref 98–111)
CO2: 22 MEQ/L (ref 23–33)
CREAT SERPL-MCNC: 0.7 MG/DL (ref 0.4–1.2)
EKG ATRIAL RATE: 91 BPM
EKG P AXIS: 61 DEGREES
EKG P-R INTERVAL: 138 MS
EKG Q-T INTERVAL: 368 MS
EKG QRS DURATION: 82 MS
EKG QTC CALCULATION (BAZETT): 452 MS
EKG R AXIS: 92 DEGREES
EKG T AXIS: 4 DEGREES
EKG VENTRICULAR RATE: 91 BPM
EOSINOPHIL # BLD: 3.9 %
EOSINOPHILS ABSOLUTE: 0.3 THOU/MM3 (ref 0–0.4)
ERYTHROCYTE [DISTWIDTH] IN BLOOD BY AUTOMATED COUNT: 13.6 % (ref 11.5–14.5)
ERYTHROCYTE [DISTWIDTH] IN BLOOD BY AUTOMATED COUNT: 46.3 FL (ref 35–45)
FLU A ANTIGEN: NEGATIVE
FLU B ANTIGEN: NEGATIVE
GFR SERPL CREATININE-BSD FRML MDRD: > 90 ML/MIN/1.73M2
GLUCOSE BLD-MCNC: 98 MG/DL (ref 70–108)
HCT VFR BLD CALC: 46.5 % (ref 37–47)
HEMOGLOBIN: 15.1 GM/DL (ref 12–16)
IMMATURE GRANS (ABS): 0.02 THOU/MM3 (ref 0–0.07)
IMMATURE GRANULOCYTES: 0.3 %
LACTIC ACID: 1.4 MMOL/L (ref 0.5–2.2)
LIPASE: 35 U/L (ref 5.6–51.3)
LYMPHOCYTES # BLD: 36.7 %
LYMPHOCYTES ABSOLUTE: 2.8 THOU/MM3 (ref 1–4.8)
MCH RBC QN AUTO: 30.3 PG (ref 26–33)
MCHC RBC AUTO-ENTMCNC: 32.5 GM/DL (ref 32.2–35.5)
MCV RBC AUTO: 93.2 FL (ref 81–99)
MONOCYTES # BLD: 6.6 %
MONOCYTES ABSOLUTE: 0.5 THOU/MM3 (ref 0.4–1.3)
NUCLEATED RED BLOOD CELLS: 0 /100 WBC
OSMOLALITY CALCULATION: 282.5 MOSMOL/KG (ref 275–300)
PLATELET # BLD: 291 THOU/MM3 (ref 130–400)
PMV BLD AUTO: 10.4 FL (ref 9.4–12.4)
POTASSIUM SERPL-SCNC: 4.4 MEQ/L (ref 3.5–5.2)
PROCALCITONIN: < 0.02 NG/ML (ref 0.01–0.09)
RBC # BLD: 4.99 MILL/MM3 (ref 4.2–5.4)
SEG NEUTROPHILS: 52 %
SEGMENTED NEUTROPHILS ABSOLUTE COUNT: 4 THOU/MM3 (ref 1.8–7.7)
SODIUM BLD-SCNC: 142 MEQ/L (ref 135–145)
TOTAL PROTEIN: 7.7 G/DL (ref 6.1–8)
TROPONIN T: < 0.01 NG/ML
WBC # BLD: 7.7 THOU/MM3 (ref 4.8–10.8)

## 2020-10-13 PROCEDURE — 6360000002 HC RX W HCPCS: Performed by: PHYSICIAN ASSISTANT

## 2020-10-13 PROCEDURE — 71045 X-RAY EXAM CHEST 1 VIEW: CPT

## 2020-10-13 PROCEDURE — 80053 COMPREHEN METABOLIC PANEL: CPT

## 2020-10-13 PROCEDURE — 93005 ELECTROCARDIOGRAM TRACING: CPT | Performed by: PHYSICIAN ASSISTANT

## 2020-10-13 PROCEDURE — U0003 INFECTIOUS AGENT DETECTION BY NUCLEIC ACID (DNA OR RNA); SEVERE ACUTE RESPIRATORY SYNDROME CORONAVIRUS 2 (SARS-COV-2) (CORONAVIRUS DISEASE [COVID-19]), AMPLIFIED PROBE TECHNIQUE, MAKING USE OF HIGH THROUGHPUT TECHNOLOGIES AS DESCRIBED BY CMS-2020-01-R: HCPCS

## 2020-10-13 PROCEDURE — 96375 TX/PRO/DX INJ NEW DRUG ADDON: CPT

## 2020-10-13 PROCEDURE — 87804 INFLUENZA ASSAY W/OPTIC: CPT

## 2020-10-13 PROCEDURE — 87040 BLOOD CULTURE FOR BACTERIA: CPT

## 2020-10-13 PROCEDURE — 83605 ASSAY OF LACTIC ACID: CPT

## 2020-10-13 PROCEDURE — 83690 ASSAY OF LIPASE: CPT

## 2020-10-13 PROCEDURE — 99284 EMERGENCY DEPT VISIT MOD MDM: CPT

## 2020-10-13 PROCEDURE — 85025 COMPLETE CBC W/AUTO DIFF WBC: CPT

## 2020-10-13 PROCEDURE — 84145 PROCALCITONIN (PCT): CPT

## 2020-10-13 PROCEDURE — 82248 BILIRUBIN DIRECT: CPT

## 2020-10-13 PROCEDURE — 84484 ASSAY OF TROPONIN QUANT: CPT

## 2020-10-13 PROCEDURE — 6370000000 HC RX 637 (ALT 250 FOR IP): Performed by: PHYSICIAN ASSISTANT

## 2020-10-13 PROCEDURE — 96374 THER/PROPH/DIAG INJ IV PUSH: CPT

## 2020-10-13 PROCEDURE — 36415 COLL VENOUS BLD VENIPUNCTURE: CPT

## 2020-10-13 RX ORDER — ONDANSETRON 2 MG/ML
4 INJECTION INTRAMUSCULAR; INTRAVENOUS ONCE
Status: COMPLETED | OUTPATIENT
Start: 2020-10-13 | End: 2020-10-13

## 2020-10-13 RX ORDER — FLUTICASONE PROPIONATE 50 MCG
1 SPRAY, SUSPENSION (ML) NASAL DAILY
Qty: 1 BOTTLE | Refills: 0 | Status: SHIPPED | OUTPATIENT
Start: 2020-10-13 | End: 2021-07-07 | Stop reason: ALTCHOICE

## 2020-10-13 RX ORDER — IBUPROFEN 800 MG/1
800 TABLET ORAL EVERY 8 HOURS PRN
Qty: 15 TABLET | Refills: 0 | Status: SHIPPED | OUTPATIENT
Start: 2020-10-13

## 2020-10-13 RX ORDER — KETOROLAC TROMETHAMINE 30 MG/ML
30 INJECTION, SOLUTION INTRAMUSCULAR; INTRAVENOUS ONCE
Status: COMPLETED | OUTPATIENT
Start: 2020-10-13 | End: 2020-10-13

## 2020-10-13 RX ORDER — ERYTHROMYCIN 5 MG/G
OINTMENT OPHTHALMIC ONCE
Status: COMPLETED | OUTPATIENT
Start: 2020-10-13 | End: 2020-10-13

## 2020-10-13 RX ORDER — ONDANSETRON 4 MG/1
4 TABLET, ORALLY DISINTEGRATING ORAL EVERY 8 HOURS PRN
Qty: 10 TABLET | Refills: 0 | Status: SHIPPED | OUTPATIENT
Start: 2020-10-13 | End: 2021-05-04

## 2020-10-13 RX ORDER — GUAIFENESIN AND PSEUDOEPHEDRINE HCL 1200; 120 MG/1; MG/1
1 TABLET, EXTENDED RELEASE ORAL 2 TIMES DAILY
Qty: 14 TABLET | Refills: 0 | Status: SHIPPED | OUTPATIENT
Start: 2020-10-13 | End: 2021-05-04

## 2020-10-13 RX ADMIN — ONDANSETRON 4 MG: 2 INJECTION INTRAMUSCULAR; INTRAVENOUS at 10:22

## 2020-10-13 RX ADMIN — KETOROLAC TROMETHAMINE 30 MG: 30 INJECTION, SOLUTION INTRAMUSCULAR; INTRAVENOUS at 10:22

## 2020-10-13 RX ADMIN — ERYTHROMYCIN: 5 OINTMENT OPHTHALMIC at 10:06

## 2020-10-13 ASSESSMENT — ENCOUNTER SYMPTOMS
SINUS PRESSURE: 1
EYE DISCHARGE: 1
TROUBLE SWALLOWING: 0
SORE THROAT: 1
RHINORRHEA: 1
EYE PAIN: 1
SHORTNESS OF BREATH: 1
COUGH: 1
VOMITING: 1
NAUSEA: 1
DIARRHEA: 1
EYE ITCHING: 0
ABDOMINAL PAIN: 0

## 2020-10-13 ASSESSMENT — PAIN SCALES - GENERAL
PAINLEVEL_OUTOF10: 3
PAINLEVEL_OUTOF10: 8

## 2020-10-13 ASSESSMENT — VISUAL ACUITY: OU: 1

## 2020-10-13 ASSESSMENT — PAIN DESCRIPTION - PAIN TYPE: TYPE: ACUTE PAIN

## 2020-10-13 ASSESSMENT — PAIN DESCRIPTION - DESCRIPTORS: DESCRIPTORS: PRESSURE

## 2020-10-13 NOTE — ED NOTES
PT ambulated. O2 sat was 100% entire time. PT does complain of some dizziness and chest pressure during walking. PT back in bed. PT in no distress.       Ajith Ellis RN  10/13/20 0255

## 2020-10-13 NOTE — ED PROVIDER NOTES
light-headedness and headaches. Hematological: Negative for adenopathy. Psychiatric/Behavioral: Negative for confusion and sleep disturbance. 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 SURGICAL HISTORY      has a past surgical history that includes Tonsillectomy and adenoidectomy; layer wound closure; toenail excision; and Toe Surgery (09/2019). CURRENT MEDICATIONS       Discharge Medication List as of 10/13/2020 12:18 PM      CONTINUE these medications which have NOT CHANGED    Details   amoxicillin-clavulanate (AUGMENTIN) 875-125 MG per tablet Take 1 tablet by mouth 2 times daily for 10 days, Disp-20 tablet,R-0Print      metoprolol succinate (TOPROL XL) 25 MG extended release tablet take 1 tablet by mouth once daily, Disp-90 tablet,R-1Normal      furosemide (LASIX) 20 MG tablet TAKE 1 TABLET EVERY OTHER DAY. TAKE 2 TABLETS ON OTHER DAYS, Disp-120 tablet, R-5Normal      fluocinonide (LIDEX) 0.05 % cream Apply topically daily. , Disp-60 g, R-3, Normal      acetaminophen (TYLENOL) 500 MG tablet Take 1,000 mg by mouth every 6 hours as needed for PainHistorical Med      Cholecalciferol (VITAMIN D3) 5000 units TABS Take 2,000 Units by mouth Historical Med      MedroxyPROGESTERone Acetate (DEPO-PROVERA IM) Inject into the muscle      IRON PO Take 65 mg by mouth daily Historical Med      miconazole (MICOTIN) 2 % powder Apply topically 2 times to 3 times  daily. , Disp-45 g, R-1, NO PRINT      Dicyclomine HCl (BENTYL PO)   Take 20 mg by mouth 2 times daily       QUEtiapine Fumarate (SEROQUEL PO) Take 400 mg by mouth 2 times daily. atomoxetine (STRATTERA) 40 MG capsule Take 40 mg by mouth 2 times daily. aripiprazole (ABILIFY) 10 MG tablet Take 10 mg by mouth 2 times daily Historical Med      ACYCLOVIR 400 mg by Does not apply route three times a week Historical Med             ALLERGIES     is allergic to latex; cat hair extract; and seasonal.    FAMILY HISTORY     She indicated that her mother is alive. She indicated that her father is alive. She indicated that the status of her maternal grandmother is unknown. She indicated that the status of her paternal grandmother is unknown.   family history includes Cancer in her maternal grandmother and paternal grandmother; Diabetes in her father. SOCIAL HISTORY    reports that she has never smoked. She has never used smokeless tobacco. She reports that she does not drink alcohol or use drugs. PHYSICAL EXAM     INITIAL VITALS:  weight is 300 lb (136.1 kg). Her temperature is 98 °F (36.7 °C). Her blood pressure is 141/90 (abnormal) and her pulse is 84. Her respiration is 20 and oxygen saturation is 100%. Physical Exam  Vitals signs and nursing note reviewed. Constitutional:       General: She is not in acute distress. Appearance: She is well-developed. She is morbidly obese. She is not toxic-appearing or diaphoretic. HENT:      Head: Normocephalic and atraumatic. Jaw: No trismus. Right Ear: Tympanic membrane and ear canal normal. No drainage. Left Ear: Tympanic membrane and ear canal normal. No drainage. Nose: Nose normal. No rhinorrhea. Mouth/Throat:      Lips: Pink. Mouth: Mucous membranes are moist.      Pharynx: Uvula midline. No oropharyngeal exudate or posterior oropharyngeal erythema. Tonsils: No tonsillar exudate or tonsillar abscesses. Eyes:      General: Lids are normal. Vision grossly intact. Right eye: Discharge present. Left eye: No discharge. Extraocular Movements: Extraocular movements intact.       Conjunctiva/sclera: Conjunctivae normal.      Right eye: Right conjunctiva is not injected. No hemorrhage. Pupils: Pupils are equal, round, and reactive to light. Comments: Mild infraorbital edema   Neck:      Musculoskeletal: Normal range of motion. No neck rigidity. Trachea: Trachea normal.   Cardiovascular:      Rate and Rhythm: Normal rate and regular rhythm. Heart sounds: Normal heart sounds. Pulmonary:      Effort: Pulmonary effort is normal. No respiratory distress. Breath sounds: Normal breath sounds. No decreased breath sounds, wheezing, rhonchi or rales. Abdominal:      General: There is no distension. Palpations: Abdomen is soft. Abdomen is not rigid. Tenderness: There is no abdominal tenderness. Musculoskeletal:      Comments: Well perfused; no signs of DVT; movement normal as observed. Lymphadenopathy:      Head:      Right side of head: No submental, submandibular, preauricular or posterior auricular adenopathy. Left side of head: No submental, submandibular, preauricular or posterior auricular adenopathy. Cervical: No cervical adenopathy. Skin:     General: Skin is warm and dry. Coloration: Skin is not pale. Findings: No rash. Neurological:      Mental Status: She is alert and oriented to person, place, and time. GCS: GCS eye subscore is 4. GCS verbal subscore is 5. GCS motor subscore is 6. Gait: Gait normal.   Psychiatric:         Speech: Speech normal.         Behavior: Behavior normal. Behavior is cooperative. Thought Content: Thought content normal.         DIFFERENTIAL DIAGNOSIS:   Including but not limited to: Sinusitis, influenza, Covid, dehydration, less likely but considered ACS, CHF    DIAGNOSTIC RESULTS     EKG: All E[facility]'s are [facility] who either signs or Co-signs this chart in the absence of a cardiologist.  Ventricular rate 91 bpm  AL interval 138 ms  QRS duration 82 ms  QTc interval 452 ms  P-R-T axes 61, 92, and 4  Normal sinus rhythm.   No STEMI. RADIOLOGY: non-plain film images(s) such as CT,Ultrasound and MRI are read by the radiologist.  Plain radiographic images are visualized and preliminarily interpreted by the emergency physician unless otherwise stated below. XR CHEST PORTABLE   Final Result   1. Unremarkable AP portable chest radiograph. **This report has been created using voice recognition software. It may contain minor errors which are inherent in voice recognition technology. **      Final report electronically signed by Dr. Michele Pierson on 10/13/2020 10:45 AM          LABS:   Labs Reviewed   CBC WITH AUTO DIFFERENTIAL - Abnormal; Notable for the following components:       Result Value    RDW-SD 46.3 (*)     All other components within normal limits   BASIC METABOLIC PANEL - Abnormal; Notable for the following components:    CO2 22 (*)     All other components within normal limits   CULTURE, BLOOD 1    Narrative:     Source: blood-Adult-suboptimal <5.5oz./set volume       Site: (single bottle)Peripheral;            Current Antibiotics: not stated   RAPID INFLUENZA A/B ANTIGENS   TROPONIN   LACTIC ACID, PLASMA   HEPATIC FUNCTION PANEL   PROCALCITONIN   LIPASE   ANION GAP   GLOMERULAR FILTRATION RATE, ESTIMATED   OSMOLALITY   COVID-19 AMBULATORY       EMERGENCY DEPARTMENT COURSE:   Vitals:    Vitals:    10/13/20 0903 10/13/20 0906 10/13/20 0954 10/13/20 1144   BP:  (!) 161/80 90/60 (!) 141/90   Pulse: 89  83 84   Resp: 20      Temp: 98 °F (36.7 °C)      SpO2: 100%  97% 100%   Weight: 300 lb (136.1 kg)          MDM:  The patient was seen and evaluated within the ED today for URI symptoms not improved on Augmentin. On exam, I appreciated right eye discharge with infraorbital edema. The patient was non-toxic appearing. Lungs were CTAB. Old records were reviewed. Within the department, I observed the patient's vital signs to be within acceptable range. Radiological studies within the department revealed NAD.  Laboratory work was reassuring. Within the department, the patient was treated with saline, zofran, and toradol. I observed the patient's condition to modestly improve during the duration of their stay. The patient was ambulated with a pulse ox and remained % on RA. The patient has been observed. The patient remained stable in the ER with no respiratory distress noted. On reexam, respiratory effort remained normal and lungs were CTAB. The patient was nontoxic appearing with good vital signs. I have considered  Pneumonia, COVID, hypoxia and this patient's presentation is not consistent with such entities and therefore, no further testing was warranted. The patient and mother were comfortable with the plan of discharge home and to follow up with Dr. Crystal Daniels. Anticipatory guidance was given. The patient was instructed to return to the emergency department for any worsening of their symptoms. Patient was discharged from the emergency department in good condition with all questions answered. See disposition below. I have given the patient and mother strict written and verbal instructions about care at home, follow-up, and signs and symptoms of worsening of condition and they did verbalize understanding. CRITICAL CARE:   None    CONSULTS:  None    PROCEDURES:  None    FINAL IMPRESSION      1. Chest pain, unspecified type    2. Dyspnea and respiratory abnormalities    3. Acute non-recurrent maxillary sinusitis    4. Blepharoconjunctivitis of right eye, unspecified blepharoconjunctivitis type          DISPOSITION/PLAN     1. Chest pain, unspecified type    2. Dyspnea and respiratory abnormalities    3. Acute non-recurrent maxillary sinusitis    4.  Blepharoconjunctivitis of right eye, unspecified blepharoconjunctivitis type        PATIENT REFERRED TO:  Matilda Caceres[address]  [phone]    Schedule an appointment as soon as possible for a visit in 1 week  Sooner if worse      DISCHARGE MEDICATIONS:  Discharge Medication List as of 10/13/2020 12:18 PM      START taking these medications    Details   Pseudoephedrine-guaiFENesin 120-1200 MG TB12 Take 1 tablet by mouth 2 times daily, Disp-14 tablet,R-0Print      fluticasone (FLONASE) 50 MCG/ACT nasal spray 1 spray by Each Nostril route daily, Disp-1 Bottle,R-0Print      ibuprofen (ADVIL;MOTRIN) 800 MG tablet Take 1 tablet by mouth every 8 hours as needed for Pain, Disp-15 tablet,R-0Print      ondansetron (ZOFRAN ODT) 4 MG disintegrating tablet Take 1 tablet by mouth every 8 hours as needed for Nausea, Disp-10 tablet,R-0Print             (Please note that portions of this note were completed with a voice recognition program.  Efforts were made to edit the dictations but occasionally words are mis-transcribed.)    Aubree Jordan PA-C 10/15/20 9:26 AM    JONNATHAN Garces PA-C  10/15/20 [id number]

## 2020-10-13 NOTE — ED NOTES
Pt resting in bed with side rails up 2x2 and call light in reach. Vital signs assessed and stable. IV site checked. Pt updated on plan of care. Pt voiced understanding. Pt verbalized no other needs or concerns at this time.         Brianne Yoder RN  10/13/20 5482

## 2020-10-13 NOTE — ED TRIAGE NOTES
Pt presents to the ED for chest pain and SOB x5 days. Pt states she was seen at [facility] for the same complaint and they stated it was a sinus infection. Pt denies taking medication for the pain.

## 2020-10-14 ENCOUNTER — CARE COORDINATION (OUTPATIENT)
Dept: CARE COORDINATION | Age: 45
End: 2020-10-14

## 2020-10-14 LAB — SARS-COV-2: NOT DETECTED

## 2020-10-18 LAB — BLOOD CULTURE, ROUTINE: NORMAL

## 2020-10-23 ENCOUNTER — CARE COORDINATION (OUTPATIENT)
Dept: CARE COORDINATION | Age: 45
End: 2020-10-23

## 2020-10-23 NOTE — CARE COORDINATION
The Care Transitions episode from 10/10/2020 will be resolved this date, 10/23/2020. Discussed COVID-19 related testing which was available at this time. Test results were negative. Patient informed of results, if available? Mother already aware of test results as was informed by PCP. Patient/family has been provided the following resources and education related to COVID-19:                         Signs, symptoms and red flags related to COVID-19            CDC exposure and quarantine guidelines            [facility] exposure contact - [phone]            Contact for their local Department of Health                 Patient currently reports that the following symptoms have improved:  chest pain, , shortness of breath and no new/worsening symptoms. Patient was not available at the time of my call but her mother, James Mcintosh, stated she is feeling better and previous symptoms have resolved. James Arnaldo reported patient was able to f/u with PCP. COVID-19 education was reviewed and James Mcintosh verbalized understanding. James Mcintosh denied any other questions, concerns, or needs and she was encouraged to call with any that may develop. No further outreach scheduled with this CTN/ACM. Episode of Care resolved. Patient has this CTN/ACM contact information if future needs arise.

## 2020-11-04 ENCOUNTER — HOSPITAL ENCOUNTER (OUTPATIENT)
Dept: WOUND CARE | Age: 45
Discharge: HOME | End: 2020-11-04
Payer: MEDICARE

## 2020-11-04 VITALS
SYSTOLIC BLOOD PRESSURE: 133 MMHG | BODY MASS INDEX: 48.26 KG/M2 | TEMPERATURE: 96.9 F | HEART RATE: 101 BPM | OXYGEN SATURATION: 97 % | WEIGHT: 293 LBS | DIASTOLIC BLOOD PRESSURE: 66 MMHG

## 2020-11-04 PROCEDURE — 99213 OFFICE O/P EST LOW 20 MIN: CPT

## 2020-11-04 ASSESSMENT — PAIN DESCRIPTION - LOCATION: LOCATION: ABDOMEN

## 2020-11-04 ASSESSMENT — PAIN SCALES - GENERAL: PAINLEVEL_OUTOF10: 7

## 2020-11-04 ASSESSMENT — PAIN DESCRIPTION - PAIN TYPE: TYPE: CHRONIC PAIN

## 2020-11-04 NOTE — PROGRESS NOTES
[facility]          Progress Note and Procedure Note      Ora Blancas  MEDICAL RECORD NUMBER:  [id number]  AGE: 39 y.o. GENDER: female  : 1975  EPISODE DATE:  2020    Subjective:     SUBJECTIVE     Chief Complaint   Patient presents with    Wound Check     Abdomen           HISTORY OF PRESENT ILLNESS     She is a 39 yr old female seen for follow up visit she has history of keloid formation and has been dealing with a wound on the lower abdomen  for over 15 years . she has ADHD and schizophrenia she was accompanied by her mom .she follows with cardiology for CHF. She has no new issues. The wound has not changed much.   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     during hospital visit    Bronchitis     CHF (congestive heart failure) ([address]     Elbow fracture 10/09/14    left    Irritable bowel syndrome     Irritable bowel    Lymph edema 2017    Obesity     Conde disease     Schizophrenia ([facility]     Seasonal allergies          PAST SURGICAL HISTORY     Past Surgical History:   Procedure Laterality Date    LAYER WOUND CLOSURE      TOE SURGERY  2019    TOENAIL EXCISION      TONSILLECTOMY AND ADENOIDECTOMY       FAMILY HISTORY         Family History   Problem Relation Age of Onset    Diabetes Father     Cancer Maternal Grandmother     Cancer Paternal Grandmother      SOCIAL HISTORY       Social History     Tobacco Use    Smoking status: Never Smoker    Smokeless tobacco: Never Used   Substance Use Topics    Alcohol use: No    Drug use: No     ALLERGIES         Allergies   Allergen Reactions    Latex Rash    Cat Hair Extract     Seasonal      MEDICATIONS         Current Outpatient Medications on File Prior to Encounter   Medication Sig Dispense Refill    Pseudoephedrine-guaiFENesin 120-1200 MG TB12 Take 1 tablet by mouth 2 times daily 14 tablet 0    fluticasone (FLONASE) 50 MCG/ACT nasal spray 1 spray by Each Nostril route daily 1 Bottle 0    ibuprofen (ADVIL;MOTRIN) 800 MG tablet Take 1 tablet by mouth every 8 hours as needed for Pain 15 tablet 0    ondansetron (ZOFRAN ODT) 4 MG disintegrating tablet Take 1 tablet by mouth every 8 hours as needed for Nausea 10 tablet 0    metoprolol succinate (TOPROL XL) 25 MG extended release tablet take 1 tablet by mouth once daily 90 tablet 1    furosemide (LASIX) 20 MG tablet TAKE 1 TABLET EVERY OTHER DAY. TAKE 2 TABLETS ON OTHER DAYS 120 tablet 5    fluocinonide (LIDEX) 0.05 % cream Apply topically daily. 60 g 3    acetaminophen (TYLENOL) 500 MG tablet Take 1,000 mg by mouth every 6 hours as needed for Pain      Cholecalciferol (VITAMIN D3) 5000 units TABS Take 2,000 Units by mouth       MedroxyPROGESTERone Acetate (DEPO-PROVERA IM) Inject into the muscle      IRON PO Take 65 mg by mouth daily       miconazole (MICOTIN) 2 % powder Apply topically 2 times to 3 times  daily. 45 g 1    Dicyclomine HCl (BENTYL PO)   Take 20 mg by mouth 2 times daily       QUEtiapine Fumarate (SEROQUEL PO) Take 400 mg by mouth 2 times daily.  atomoxetine (STRATTERA) 40 MG capsule Take 40 mg by mouth 2 times daily.  aripiprazole (ABILIFY) 10 MG tablet Take 10 mg by mouth 2 times daily       ACYCLOVIR 400 mg by Does not apply route three times a week        No current facility-administered medications on file prior to encounter. REVIEW OF SYSTEMS:     Constitutional: no fever, no night sweats, no fatigue. Head: no head ache , no head injury, no migranes. Eye: no blurring of vision, no double vision.   Ears: no hearing difficulty, no tinnitus  Mouth/throat: no ulceration, dental caries , dysphagia  Lungs: no cough, no shortness of breath, no wheeze  CVS: no palpitation, no chest pain, no shortness of breath  GI: no abdominal pain, no nausea , no vomiting, no constipation  FRANCOISE: no dysuria, frequency and urgency, no hematuria, no kidney stones  Musculoskeletal: no joint pain, swelling , stiffness,  Endocrine: no polyuria, polydipsia, no cold or heat intolerance  Hematology: no anemia, no easy brusing or bleeding, no hx of clotting disorder  Dermatology: +skin rash, no eczema, no pruritis,  Psychiatry: no depression, no anxiety,no panic attacks, no suicide ideation        PHYSICAL EXAM      weight is 299 lb (135.6 kg). Her tympanic temperature is 96.9 °F (36.1 °C). Her blood pressure is 133/66 and her pulse is 101. Her oxygen saturation is 97%. Wt Readings from Last 3 Encounters:   11/04/20 299 lb (135.6 kg)   10/13/20 300 lb (136.1 kg)   10/09/20 300 lb (136.1 kg)          General Appearance  Awake, alert, oriented,  not  In acute distress  HEENT - normocephalic, atraumatic, pink conjunctiva,  anicteric sclera  Neck - Supple, no mass  Lungs -  Bilateralair entry, no rhonchi, no wheeze  Cardiovascular - Heart sounds are normal.  Regular rate and rhythm without murmur, gallop or rub. Abdomen - soft,obese, open wound on the lower abdomen. measurment noted  Neurologic - oriented to person place and time  Skin - No bruising or bleeding  Extremities - No edema, no cyanosis, clubbing       Wound 07/11/19 Abdomen Left; Lower #2 (Active)   Wound Image   07/29/20 1041   Wound Etiology Other 11/04/20 1029   Wound Cleansed Cleansed with saline 11/04/20 1029   Dressing/Treatment Protective barrier;Alginate with Ag;ABD;Tape change 09/23/20 1102   Wound Length (cm) 1.2 cm 11/04/20 1029   Wound Width (cm) 1.3 cm 11/04/20 1029   Wound Depth (cm) 0.1 cm 11/04/20 1029   Wound Surface Area (cm^2) 1.56 cm^2 11/04/20 1029   Change in Wound Size % (l*w) 78.48 11/04/20 1029   Wound Volume (cm^3) 0.16 cm^3 11/04/20 1029   Wound Healing % 56 11/04/20 1029   Wound Assessment Slough;Granulation tissue 11/04/20 1029   Drainage Amount Moderate 11/04/20 1029   Drainage Description Serosanguinous 11/04/20 1029   Odor Attached edges 11/04/20 1029   Wound Thickness Description not for Pressure Injury Full thickness 11/04/20 1029   Number of days: 42       LABS      Lab Results   Component Value Date    BC No growth-preliminary No growth  10/13/2020       Assessment:    Non-healing abdominal wound: she will see plastic surgeon. Known to Dr Virgen Temple. I contacted him . He will see her and make recommendation  -Obesity  Patient Active Problem List   Diagnosis Code    Keloid L91.0    MRSA (methicillin resistant staph aureus) culture positive Z22.322    Skin ulcer of abdominal wall ([facility] [address] [facility] L98.499    POTS (postural orthostatic tachycardia syndrome) I49.8    CHF (congestive heart failure) ([facility]) chronic diastolic A93.8    Morbid obesity ([facility]) E66.01    Bilateral leg edema =LYMPHEDEMA- ON f/U WITH LYMPHEDEMA CLINIC R60.0    Lymphedema of both lower extremities I89.0    SOB (shortness of breath) on exertion R06.02    Candidiasis, intertriginous B37.2    Dermatitis L30.9         Plan:     Patient examined and evaluated      Please see attached Discharge Instructions    Written patient dismissal instructions given to patient and signed by patient or POA. Discharge Instructions        Visit Discharge/Physician Orders:  - [facility] for dressing supplies- [phone]  - Continue working on weight loss. - No shower, keep wound dressings dry. - We will schedule appointment with Dr. Virgen Temple for surgical consult.     Wound Location: Left lower abdomen, Right lower abdomen     Dressing orders:      Abdomen wounds- Apply silver alginate to wounds and cover with hydrofera blue ready foam and secure with steri strips then cover with ABD pad and secure with tape. Change every 3 days.      Follow up visit: 1 month on Wednesday 10/21/2020 at 10:45 am for Virtual Visit     Keep next scheduled appointment. Please give 24 hour notice if unable to keep appointment.  [phone]     If you experience any of the following, please call the Wound Care Service during business hours: Monday through Friday 8:00 am - 4:30 pm  ([phone]).             *Increase in pain              *Temperature over 101              *Increase in drainage from your wound or a foul odor              *Uncontrolled swelling              *Need for compression bandage changes due to slippage, breakthrough drainage     If you need medical attention outside of business hours, please contact your Primary Care Doctor or go to the nearest emergency room.         Electronically signed by Lucia Millan MD on 11/4/2020 at 11:13 AM

## 2020-11-04 NOTE — PLAN OF CARE
Problem: Wound:  Goal: Will show signs of wound healing; wound closure and no evidence of infection  Description: Will show signs of wound healing; wound closure and no evidence of infection  Outcome: Ongoing   Pt. Seen today for abdomen wounds see AVS for new orders. Follow up in 9 weeks. Care plan reviewed with patient and mother. Patient and mother verbalize understanding of the plan of care and contribute to goal setting.

## 2020-12-01 ENCOUNTER — TELEPHONE (OUTPATIENT)
Dept: WOUND CARE | Age: 45
End: 2020-12-01

## 2020-12-01 NOTE — TELEPHONE ENCOUNTER
Wound 08/26/20 Abdomen Medial;Lower #3 (Active)   Wound Image   11/04/20 1029   Wound Etiology Other 11/04/20 1029   Wound Cleansed Cleansed with saline 11/04/20 1029   Dressing/Treatment Protective barrier;Alginate with Ag;ABD;Tape change 09/23/20 1102   Wound Length (cm) 3.2 cm 11/04/20 1029   Wound Width (cm) 12.5 cm 11/04/20 1029   Wound Depth (cm) 0.1 cm 11/04/20 1029   Wound Surface Area (cm^2) 40 cm^2 11/04/20 1029   Change in Wound Size % (l*w) -1284.08 11/04/20 1029   Wound Volume (cm^3) 4 cm^3 11/04/20 1029   Wound Healing % -2757 11/04/20 1029   Wound Assessment Granulation tissue;Slough 11/04/20 1029   Drainage Amount Moderate 11/04/20 1029   Drainage Description Serosanguinous 11/04/20 1029   Odor None 11/04/20 1029   Gwen-wound Assessment Intact; Hyperpigmented 11/04/20 1029   Margins Attached edges 11/04/20 1029   Wound Thickness Description not for Pressure Injury Full thickness 11/04/20 1029   Number of days: 70       Wound 09/23/20 Abdomen Left;Distal (Active)   Wound Etiology Other 11/04/20 1029   Wound Cleansed Cleansed with saline 11/04/20 1029   Dressing/Treatment Protective barrier;Alginate with Ag;ABD;Tape change 09/23/20 1106   Wound Length (cm) 1.2 cm 11/04/20 1029   Wound Width (cm) 1.3 cm 11/04/20 1029   Wound Depth (cm) 0.1 cm 11/04/20 1029   Wound Surface Area (cm^2) 1.56 cm^2 11/04/20 1029   Change in Wound Size % (l*w) 41.35 11/04/20 1029   Wound Volume (cm^3) 0.16 cm^3 11/04/20 1029   Wound Healing % 41 11/04/20 1029   Wound Assessment Slough;Granulation tissue 11/04/20 1029   Drainage Amount Moderate 11/04/20 1029   Drainage Description Serosanguinous 11/04/20 1029   Odor None 11/04/20 1029   Gwen-wound Assessment Intact; Hyperpigmented 11/04/20 1029   Margins Attached edges 11/04/20 1029   Wound Thickness Description not for Pressure Injury Full thickness 11/04/20 1029   Number of days: 42           Problem List Items Addressed This Visit     None          WOUNDS REQUIRING DRESSING SUPPLIES:     @LDAREF(16:0:5)@  @LDAREF(30:0:5)@    Supplies Requested :      WOUND #: 1   PRIMARY DRESSING:  Other: 4x4 silver alginate   Cover and Secure with: ABD pad  Other hyroferra blue read, steri strips, 2 inch medipore tape     FREQUENCY OF DRESSING CHANGES:  Three times per week       WOUND #: 2   PRIMARY DRESSING:  Other: 4x4 silver alginate   Cover and Secure with: ABD pad  Other hydrofeerera ;ue reay, steri strips, 2 inch medipore tape     FREQUENCY OF DRESSING CHANGES:  Three times per week       WOUND #: 3   PRIMARY DRESSING:  Other: Alginate with silver pad   Cover and Secure with:  Other Hydrofera blue ready, steri strips, ABD pad     FREQUENCY OF DRESSING CHANGES:  Three times per week     ADDITIONAL ITEMS:  [] Gloves Small  [x] Gloves Medium [] Gloves Large [] Gloves XLarge  [] Tape 1\" [x] Tape 2\" [] Tape 3\"  [] Medipore Tape  [x] Saline  [] Skin Prep   [] Adhesive Remover   [] Cotton Tip Applicators   [] Other:    Patient Wound(s) Debrided: [x] Yes if yes please add date 11/04/2020  [] No    Debribement Type: Mechanical     Patient currently being seen by Home Health: [] Yes   [x] No    Duration for needed supplies:  []15  [x]30  []60  []90 Days    Electronically signed by Bettina Castrejon LPN on 34/0/1882 at 3:19 PM     Provider Information:   PROVIDER'S NAME: Sam Garcia MD    NPI: [id number]

## 2020-12-02 ENCOUNTER — TELEPHONE (OUTPATIENT)
Dept: WOUND CARE | Age: 45
End: 2020-12-02

## 2020-12-02 NOTE — TELEPHONE ENCOUNTER
----- Message from Demetria Rice sent at 12/1/2020  9:08 AM EST -----  The pt's mother, Julian Griffith, called concerning this pt. She needs an order sent to [facility] for Calcium Alginate 4X4. [facility] is sending 2X2 and pt's wound is 10\". Call Julian Griffith at [phone] with any questions.

## 2020-12-02 NOTE — TELEPHONE ENCOUNTER
Faxed over new supply order to [facility], Called patients mother Glory Gusman and let her know that I specifically ordered the 4x4 silver alginate. She stated understanding.

## 2020-12-11 ENCOUNTER — OFFICE VISIT (OUTPATIENT)
Dept: CARDIOLOGY CLINIC | Age: 45
End: 2020-12-11
Payer: MEDICARE

## 2020-12-11 VITALS
HEART RATE: 80 BPM | HEIGHT: 66 IN | DIASTOLIC BLOOD PRESSURE: 76 MMHG | SYSTOLIC BLOOD PRESSURE: 113 MMHG | WEIGHT: 293 LBS | BODY MASS INDEX: 47.09 KG/M2

## 2020-12-11 PROCEDURE — G8427 DOCREV CUR MEDS BY ELIG CLIN: HCPCS | Performed by: INTERNAL MEDICINE

## 2020-12-11 PROCEDURE — G8484 FLU IMMUNIZE NO ADMIN: HCPCS | Performed by: INTERNAL MEDICINE

## 2020-12-11 PROCEDURE — 1036F TOBACCO NON-USER: CPT | Performed by: INTERNAL MEDICINE

## 2020-12-11 PROCEDURE — G8417 CALC BMI ABV UP PARAM F/U: HCPCS | Performed by: INTERNAL MEDICINE

## 2020-12-11 PROCEDURE — 99214 OFFICE O/P EST MOD 30 MIN: CPT | Performed by: INTERNAL MEDICINE

## 2020-12-11 RX ORDER — FUROSEMIDE 20 MG/1
TABLET ORAL
Qty: 120 TABLET | Refills: 1 | Status: SHIPPED | OUTPATIENT
Start: 2020-12-11 | End: 2021-06-07

## 2020-12-11 RX ORDER — METOPROLOL SUCCINATE 25 MG/1
TABLET, EXTENDED RELEASE ORAL
Qty: 90 TABLET | Refills: 1 | Status: SHIPPED | OUTPATIENT
Start: 2020-12-11 | End: 2021-06-08 | Stop reason: SDUPTHER

## 2020-12-11 NOTE — PROGRESS NOTES
Chief Complaint   Patient presents with    6 Month Follow-Up    Congestive Heart Failure   Originally Pt is a f/u from [address] [place] [address] for POTS syndrome  She has not had any issues until today because it is so hot outside  Since she has been put on medications she is feeling better  She does have chest pressure at night time  Has to sleep in a recliner  Sob and lt headed   She was dizzy off and on prior to going to [facility]  Dr Mike Raymond started her on lasix and they would like to know if you want her to remain on this??        Pt. Here for a 6 month follow up. Gained 10 lb in the last 6 months    EKG done today.     Lasix  taking 20 po qd    Denied cp,  OR PALPITATION    Chronic dizziness if get too fast up    Sob on exertion    Dizziness only on getting too fast    Orthopnea for few yrs and sleep on recliner for over 2 yrs    Leg edema  better    Continues with TOMAS bilateral       Patient Active Problem List   Diagnosis    Keloid    MRSA (methicillin resistant staph aureus) culture positive    Skin ulcer of abdominal wall (HCC)    POTS (postural orthostatic tachycardia syndrome)    CHF (congestive heart failure) (HCC) chronic diastolic    Morbid obesity (HCC)    Bilateral leg edema =LYMPHEDEMA- ON f/U WITH LYMPHEDEMA CLINIC    Lymphedema of both lower extremities    SOB (shortness of breath) on exertion    Candidiasis, intertriginous    Dermatitis       Past Surgical History:   Procedure Laterality Date    LAYER WOUND CLOSURE      TOE SURGERY  09/2019    TOENAIL EXCISION      TONSILLECTOMY AND ADENOIDECTOMY         Allergies   Allergen Reactions    Latex Rash    Cat Hair Extract     Seasonal         Family History   Problem Relation Age of Onset    Diabetes Father     Cancer Maternal Grandmother     Cancer Paternal Grandmother         Social History     Socioeconomic History    Marital status: Single     Spouse name: Not on file    Number of children: Not on file    Years of education: Not on file    Highest education level: Not on file   Occupational History    Not on file   Social Needs    Financial resource strain: Not on file    Food insecurity     Worry: Not on file     Inability: Not on file    Transportation needs     Medical: Not on file     Non-medical: Not on file   Tobacco Use    Smoking status: Never Smoker    Smokeless tobacco: Never Used   Substance and Sexual Activity    Alcohol use: No    Drug use: No    Sexual activity: Not on file   Lifestyle    Physical activity     Days per week: Not on file     Minutes per session: Not on file    Stress: Not on file   Relationships    Social connections     Talks on phone: Not on file     Gets together: Not on file     Attends Bahai service: Not on file     Active member of club or organization: Not on file     Attends meetings of clubs or organizations: Not on file     Relationship status: Not on file    Intimate partner violence     Fear of current or ex partner: Not on file     Emotionally abused: Not on file     Physically abused: Not on file     Forced sexual activity: Not on file   Other Topics Concern    Not on file   Social History Narrative    Not on file       Current Outpatient Medications   Medication Sig Dispense Refill    furosemide (LASIX) 20 MG tablet TAKE 1 TABLET EVERY OTHER DAY. TAKE 2 TABLETS ON OTHER DAYS 120 tablet 1    metoprolol succinate (TOPROL XL) 25 MG extended release tablet Take 1 tablet by mouth daily 90 tablet 1    Pseudoephedrine-guaiFENesin 120-1200 MG TB12 Take 1 tablet by mouth 2 times daily 14 tablet 0    fluticasone (FLONASE) 50 MCG/ACT nasal spray 1 spray by Each Nostril route daily 1 Bottle 0    ibuprofen (ADVIL;MOTRIN) 800 MG tablet Take 1 tablet by mouth every 8 hours as needed for Pain 15 tablet 0    ondansetron (ZOFRAN ODT) 4 MG disintegrating tablet Take 1 tablet by mouth every 8 hours as needed for Nausea 10 tablet 0    fluocinonide (LIDEX) 0.05 % cream Apply topically daily.  61 g 3    acetaminophen (TYLENOL) 500 MG tablet Take 1,000 mg by mouth every 6 hours as needed for Pain      Cholecalciferol (VITAMIN D3) 5000 units TABS Take 2,000 Units by mouth       MedroxyPROGESTERone Acetate (DEPO-PROVERA IM) Inject into the muscle      IRON PO Take 65 mg by mouth daily       miconazole (MICOTIN) 2 % powder Apply topically 2 times to 3 times  daily. 45 g 1    Dicyclomine HCl (BENTYL PO)   Take 20 mg by mouth 2 times daily       QUEtiapine Fumarate (SEROQUEL PO) Take 400 mg by mouth 2 times daily.  atomoxetine (STRATTERA) 40 MG capsule Take 40 mg by mouth 2 times daily.  aripiprazole (ABILIFY) 10 MG tablet Take 10 mg by mouth 2 times daily       ACYCLOVIR 400 mg by Does not apply route three times a week        No current facility-administered medications for this visit. Review of Systems -     General ROS: negative  Psychological ROS: negative  Hematological and Lymphatic ROS: No history of blood clots or bleeding disorder. Respiratory ROS: no cough, shortness of breath, or wheezing  Cardiovascular ROS: no chest pain or dyspnea on exertion  Gastrointestinal ROS: negative  Genito-Urinary ROS: no dysuria, trouble voiding, or hematuria  Musculoskeletal ROS: negative  Neurological ROS: no TIA or stroke symptoms  Dermatological ROS: negative      Blood pressure 113/76, pulse 80, height 5' 6\" (1.676 m), weight (!) 307 lb (139.3 kg), not currently breastfeeding.         Physical Examination:    General appearance - alert, well appearing, and in no distress  Mental status - alert, oriented to person, place, and time  Neck - supple, no significant adenopathy, no JVD, or carotid bruits  Chest - clear to auscultation, no wheezes, rales or rhonchi, symmetric air entry  Heart - normal rate, regular rhythm, normal S1, S2, no murmurs, rubs, clicks or gallops  Abdomen - soft, nontender, nondistended, no masses or organomegaly  Neurological - alert, oriented, normal speech, no focal findings or movement disorder noted  Musculoskeletal - no joint tenderness, deformity or swelling  Extremities - peripheral pulses normal, no pedal edema, no clubbing or cyanosis  Skin - normal coloration and turgor, no rashes, no suspicious skin lesions noted    Lab  No results for input(s): CKTOTAL, CKMB, CKMBINDEX, TROPONINI in the last 72 hours. CBC:   Lab Results   Component Value Date    WBC 7.7 10/13/2020     BMP:    Lab Results   Component Value Date     10/13/2020    K 4.4 10/13/2020     10/13/2020    CO2 22 10/13/2020    BUN 11 10/13/2020    LABALBU 4.1 10/13/2020    CREATININE 0.7 10/13/2020    CALCIUM 9.5 10/13/2020    LABGLOM >90 10/13/2020    GLUCOSE 98 10/13/2020     Hepatic Function Panel:    Lab Results   Component Value Date    ALKPHOS 85 10/13/2020     Magnesium:    Lab Results   Component Value Date    MG 2.2 06/05/2020     Warfarin PT/INR:    No components found for: PTPATWAR,  PTINRWAR  HgBA1c:    No results found for: LABA1C  FLP:    No components found for: CHLPL,  TRIG,  HDL,  LDLCALC,  LDLDIRECT,  LABVLDL  TSH:    No results found for: TSH  EKG 5/7/15-Sinus  Rhythm   Low voltage in precordial leads. ABNORMAL     Nuc stress test negative    IMPRESSION:      1. CT coronary artery calcium score of zero indicates low risk for coronary disease. 2. Diffusely small coronary arteries. In particular the LAD and right coronary artery are very small in their middle and distal thirds. These segments cannot be assessed. Occlusion of the distal segments cannot be excluded. 3. Normal cardiac function. Final report electronically signed by Dr. Rayne Lundborg on 5/15/2015 4:50 PM         EKG 8/8/16  Sinus  Rhythm   Low voltage in precordial leads. ABNORMAL       CONCLUSION:       1. This tilt table test is an abnormal study.        2.   It is consistent with postural orthostatic tachycardia syndrome, in             view of the increment of the heart rate up to 130 from 92 and the             difference of 39 beats per minute. Blood pressure has been well             maintained all the way until the end, and at the end the blood             pressure and the heart rate came down, suggesting it was inducing             vasovagal reaction. 3.   POTS induced vasovagal response. 4.   Marked dizziness throughout the tilting in view of the tachycardia. 5.   Marked chest pressure and short of breath throughout tilting,             related to the tachycardia. Once the patient assumed supine             position, the symptoms resolved. 6.   Nonspecific ST segment change during the assumption of standing. RECOMMENDATION:        1. At this level, the patient needs appropriate hydration and liberal             salt intake. Avoid prolonged standing, physical therapy. further clinical correlation. 2.   The patient may need functional study if clinically indicated, like             Lexiscan nuclear stress test in view of the nonspecific ST segment             changes on standing. 3.   Avoid prolonged standing in view of prolonged standing induced             vasovagal response. 4.   I discussed with the patient. Erum Santizo M.D.        D: 03/20/2015 13:46     ekg 6/8/18  NSR, NO ACUTE ABN    ekg 6/5/2020  Sinus tachy no acute abn      Assessment    Grandmother had CAD in her 66's   Diagnosis Orders   1. Chronic diastolic congestive heart failure (HCC)  Basic Metabolic Panel    Magnesium   2. Bilateral leg edema =LYMPHEDEMA- ON f/U WITH LYMPHEDEMA CLINIC  Basic Metabolic Panel    Magnesium   3. POTS (postural orthostatic tachycardia syndrome)  Basic Metabolic Panel    Magnesium   4. SOB (shortness of breath) on exertion  Basic Metabolic Panel    Magnesium   5.  Morbid obesity ([address]  Basic Metabolic Panel    Magnesium       Plan     THE  Current Labs and meds reviewed    Leg edema - LYMPHEDEMA ON MECHICAL RX  leg elevation  Ace wrap  Not possible due to leg anatomy      Continue the current treatment and with constant vigilance to changes in symptoms and also any potential side effects. Return for care or seek medical attention immediately if symptoms got worse and/or develop new symptoms. Congestive heart failure: no evidence of fluid overload today, no recent hospitalization for CHF  Lymphedema  CONT LASIX 20 po qd and may increase for 3 days a week if wt gain > 5 lb and leg edema  Taking more bannana and tomato  BMP and MG today and prior to next visit    Post syndrome   Dizziness chronic  Cont hydration    Coronary CTA result and stress test result d/w the pat    Morbid obesity  Losing wt  Advised to lose wt  Advised complaince with F/u    D/w the pat and mother the details plan of care    Discussed use, benefit, and side effects of prescribed medications. All patient questions answered. Pt voiced understanding. Instructed to continue current medications, diet and exercise. Continue risk factor modification and medical management. Patient agreed with treatment plan. Follow up as directed.     Stable and doing well      RTC IN 6 months    Farzaneh Dumont [facility]

## 2021-01-06 ENCOUNTER — HOSPITAL ENCOUNTER (OUTPATIENT)
Dept: WOUND CARE | Age: 46
Discharge: HOME | End: 2021-01-06
Payer: MEDICARE

## 2021-01-06 ENCOUNTER — TELEPHONE (OUTPATIENT)
Dept: WOUND CARE | Age: 46
End: 2021-01-06

## 2021-01-06 VITALS
WEIGHT: 293 LBS | DIASTOLIC BLOOD PRESSURE: 71 MMHG | SYSTOLIC BLOOD PRESSURE: 123 MMHG | TEMPERATURE: 97.9 F | RESPIRATION RATE: 18 BRPM | BODY MASS INDEX: 48.44 KG/M2 | OXYGEN SATURATION: 98 % | HEART RATE: 104 BPM

## 2021-01-06 PROCEDURE — 99213 OFFICE O/P EST LOW 20 MIN: CPT

## 2021-01-06 ASSESSMENT — PAIN SCALES - GENERAL: PAINLEVEL_OUTOF10: 7

## 2021-01-06 ASSESSMENT — PAIN DESCRIPTION - PAIN TYPE: TYPE: CHRONIC PAIN

## 2021-01-06 NOTE — PLAN OF CARE
Problem: Wound:  Goal: Will show signs of wound healing; wound closure and no evidence of infection  Description: Will show signs of wound healing; wound closure and no evidence of infection  Outcome: Ongoing     Patient presents to wound clinic for abdominal wounds. No s/s of infection noted. See AVS for discharge instructions. Follow up visit: 3 months on Wednesday April 7th at 10:30 am     Care plan reviewed with patient and mother. Patient and mother verbalize understanding of the plan of care and contribute to goal setting.

## 2021-01-06 NOTE — TELEPHONE ENCOUNTER
Called [facility] to refill silver alginate for patient. They will be sending a larger size for wound for patient. The order to be processed later today.

## 2021-01-06 NOTE — PROGRESS NOTES
[facility]          Progress Note and Procedure Note      Malachi Valverde  MEDICAL RECORD NUMBER:  [id number]  AGE: 39 y.o. GENDER: female  : 1975  EPISODE DATE:  2021    Subjective:     SUBJECTIVE     Chief Complaint   Patient presents with    Wound Check     abdomen           HISTORY OF PRESENT ILLNESS     She is a 39 yr old female seen for follow up visit she has history of keloid formation and has been dealing with a wound on the lower abdomen  for over 15 years . she has ADHD and schizophrenia she was accompanied by her mom .she follows with cardiology for CHF. She was referred to plastic surgeon locally however he suggested for patient to be seen at the tertiary center. At the present time, family do not want to go to [facility] clinic. They would prefer to find a surgeon locally. The wound has not changed significantly.   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     during hospital visit    Bronchitis     CHF (congestive heart failure) ([address]     Elbow fracture 10/09/14    left    Irritable bowel syndrome     Irritable bowel    Lymph edema 2017    Obesity     Conde disease     Schizophrenia ([facility] [address]     Seasonal allergies          PAST SURGICAL HISTORY     Past Surgical History:   Procedure Laterality Date    LAYER WOUND CLOSURE      TOE SURGERY  2019    TOENAIL EXCISION      TONSILLECTOMY AND ADENOIDECTOMY       FAMILY HISTORY         Family History   Problem Relation Age of Onset    Diabetes Father     Cancer Maternal Grandmother     Cancer Paternal Grandmother      SOCIAL HISTORY       Social History     Tobacco Use    Smoking status: Never Smoker    Smokeless tobacco: Never Used   Substance Use Topics    Alcohol use: No    Drug use: No     ALLERGIES         Allergies   Allergen Reactions    Latex Rash    Cat Hair Extract     Seasonal      MEDICATIONS         Current Outpatient Medications on File Prior to Encounter   Medication Sig Dispense Refill    furosemide (LASIX) 20 MG tablet TAKE 1 TABLET EVERY OTHER DAY. TAKE 2 TABLETS ON OTHER DAYS 120 tablet 1    metoprolol succinate (TOPROL XL) 25 MG extended release tablet Take 1 tablet by mouth daily 90 tablet 1    Pseudoephedrine-guaiFENesin 120-1200 MG TB12 Take 1 tablet by mouth 2 times daily 14 tablet 0    fluticasone (FLONASE) 50 MCG/ACT nasal spray 1 spray by Each Nostril route daily 1 Bottle 0    ibuprofen (ADVIL;MOTRIN) 800 MG tablet Take 1 tablet by mouth every 8 hours as needed for Pain 15 tablet 0    ondansetron (ZOFRAN ODT) 4 MG disintegrating tablet Take 1 tablet by mouth every 8 hours as needed for Nausea 10 tablet 0    fluocinonide (LIDEX) 0.05 % cream Apply topically daily. 60 g 3    acetaminophen (TYLENOL) 500 MG tablet Take 1,000 mg by mouth every 6 hours as needed for Pain      Cholecalciferol (VITAMIN D3) 5000 units TABS Take 2,000 Units by mouth       MedroxyPROGESTERone Acetate (DEPO-PROVERA IM) Inject into the muscle      IRON PO Take 65 mg by mouth daily       miconazole (MICOTIN) 2 % powder Apply topically 2 times to 3 times  daily. 45 g 1    Dicyclomine HCl (BENTYL PO)   Take 20 mg by mouth 2 times daily       QUEtiapine Fumarate (SEROQUEL PO) Take 400 mg by mouth 2 times daily.  atomoxetine (STRATTERA) 40 MG capsule Take 40 mg by mouth 2 times daily.  aripiprazole (ABILIFY) 10 MG tablet Take 10 mg by mouth 2 times daily       ACYCLOVIR 400 mg by Does not apply route three times a week        No current facility-administered medications on file prior to encounter. REVIEW OF SYSTEMS:     Constitutional: no fever, no night sweats, no fatigue. Head: no head ache , no head injury, no migranes. Eye: no blurring of vision, no double vision.   Ears: no hearing difficulty, no tinnitus  Mouth/throat: no ulceration, dental Assessment Granulation tissue 01/06/21 1059   Drainage Amount Small 01/06/21 1059   Drainage Description Serosanguinous 01/06/21 1059   Odor None 01/06/21 1059   Gwen-wound Assessment Dry/flaky 01/06/21 1059   Margins Attached edges 01/06/21 1059   Wound Thickness Description not for Pressure Injury Full thickness 01/06/21 1059   Number of days: 544       Wound 08/26/20 Abdomen Mid #3 (Active)   Wound Image   11/04/20 1029   Wound Etiology Other 11/04/20 1029   Dressing Status Intact; Old drainage noted;New dressing applied 01/06/21 1059   Wound Cleansed Cleansed with saline 01/06/21 1059   Dressing/Treatment Protective barrier;Alginate with Ag;ABD;Tape change 09/23/20 1102   Offloading for Diabetic Foot Ulcers No offloading required 01/06/21 1059   Wound Length (cm) 3 cm 01/06/21 1059   Wound Width (cm) 12 cm 01/06/21 1059   Wound Depth (cm) 0.1 cm 01/06/21 1059   Wound Surface Area (cm^2) 36 cm^2 01/06/21 1059   Change in Wound Size % (l*w) -1145.67 01/06/21 1059   Wound Volume (cm^3) 3.6 cm^3 01/06/21 1059   Wound Healing % -2471 01/06/21 1059   Wound Assessment Fibrinous;Pale granulation tissue; Purple/maroon 01/06/21 1059   Drainage Amount Moderate 01/06/21 1059   Drainage Description Serosanguinous 01/06/21 1059   Odor None 01/06/21 1059   Gwen-wound Assessment Dry/flaky; Other (Comment) 01/06/21 1059   Margins Attached edges 01/06/21 1059   Wound Thickness Description not for Pressure Injury Full thickness 01/06/21 1059   Number of days: 133       Wound 09/23/20 Abdomen Left;Lateral (Active)   Wound Etiology Other 11/04/20 1029   Wound Cleansed Cleansed with saline 11/04/20 1029   Dressing/Treatment Protective barrier;Alginate with Ag;ABD;Tape change 09/23/20 1106   Wound Length (cm) 1.5 cm 01/06/21 1059   Wound Width (cm) 1.5 cm 01/06/21 1059   Wound Depth (cm) 0.1 cm 01/06/21 1059   Wound Surface Area (cm^2) 2.25 cm^2 01/06/21 1059   Change in Wound Size % (l*w) 15.41 01/06/21 1059   Wound Volume (cm^3) 0.22 cm^3 01/06/21 1059   Wound Healing % 19 01/06/21 1059   Wound Assessment Slough;Granulation tissue;Eschar dry 01/06/21 1059   Drainage Amount Moderate 01/06/21 1059   Drainage Description Serosanguinous 01/06/21 1059   Odor None 01/06/21 1059   Gwen-wound Assessment Intact 01/06/21 1059   Margins Attached edges 01/06/21 1059   Wound Thickness Description not for Pressure Injury Full thickness 01/06/21 1059   Number of days: 105       LABS      Lab Results   Component Value Date    BC No growth-preliminary No growth  10/13/2020       Assessment:    Non-healing abdominal wound: continue local wound care,  Will refer to G surgeon once the COIVD-19 out break slows down  Patient Active Problem List   Diagnosis Code    Keloid L91.0    MRSA (methicillin resistant staph aureus) culture positive Z22.322    Skin ulcer of abdominal wall ([facility] [address] L98.499    POTS (postural orthostatic tachycardia syndrome) I49.8    CHF (congestive heart failure) ([facility]) chronic diastolic R22.5    Morbid obesity ([facility]) E66.01    Bilateral leg edema =LYMPHEDEMA- ON f/U WITH LYMPHEDEMA CLINIC R60.0    Lymphedema of both lower extremities I89.0    SOB (shortness of breath) on exertion R06.02    Candidiasis, intertriginous B37.2    Dermatitis L30.9         Plan:     Patient examined and evaluated      Please see attached Discharge Instructions    Written patient dismissal instructions given to patient and signed by patient or POA. Discharge Instructions       Visit Discharge/Physician Orders:  - [facility] for dressing supplies- [phone]  - Continue working on weight loss. - No shower, keep wound dressings dry.  - Dr. Anjali Ward will call general surgery and speak to them about surgery.      Wound Location: Left lower abdomen, Right lower abdomen     Dressing orders:      Abdomen wounds- Apply silver alginate to wounds and cover with hydrofera blue ready foam and secure with bandaids then cover with ABD pad and secure with tape.  Change every 3 days.      Follow up visit: 3 months on       Keep next scheduled appointment. Please give 24 hour notice if unable to keep appointment. [phone]     If you experience any of the following, please call the Wound Care Service during business hours: Monday through Friday 8:00 am - 4:30 pm  ([phone]).             *Increase in pain              *Temperature over 101              *Increase in drainage from your wound or a foul odor              *Uncontrolled swelling              *Need for compression bandage changes due to slippage, breakthrough drainage     If you need medical attention outside of business hours, please contact your Primary Care Doctor or go to the nearest emergency room.         Electronically signed by Rm Traylor MD on 1/6/2021 at 11:35 AM

## 2021-01-22 ENCOUNTER — HOSPITAL ENCOUNTER (OUTPATIENT)
Age: 46
Discharge: HOME | End: 2021-01-22
Payer: MEDICARE

## 2021-01-22 PROCEDURE — U0003 INFECTIOUS AGENT DETECTION BY NUCLEIC ACID (DNA OR RNA); SEVERE ACUTE RESPIRATORY SYNDROME CORONAVIRUS 2 (SARS-COV-2) (CORONAVIRUS DISEASE [COVID-19]), AMPLIFIED PROBE TECHNIQUE, MAKING USE OF HIGH THROUGHPUT TECHNOLOGIES AS DESCRIBED BY CMS-2020-01-R: HCPCS

## 2021-01-23 LAB — SARS-COV-2: DETECTED

## 2021-04-07 ENCOUNTER — HOSPITAL ENCOUNTER (OUTPATIENT)
Dept: WOUND CARE | Age: 46
Discharge: HOME | End: 2021-04-07
Payer: MEDICARE

## 2021-04-07 VITALS
BODY MASS INDEX: 50.6 KG/M2 | RESPIRATION RATE: 18 BRPM | SYSTOLIC BLOOD PRESSURE: 124 MMHG | DIASTOLIC BLOOD PRESSURE: 66 MMHG | WEIGHT: 293 LBS | TEMPERATURE: 97.5 F | HEART RATE: 110 BPM | OXYGEN SATURATION: 97 %

## 2021-04-07 DIAGNOSIS — L98.491 ULCER OF ABDOMEN WALL, LIMITED TO BREAKDOWN OF SKIN (HCC): Primary | ICD-10-CM

## 2021-04-07 PROCEDURE — 99213 OFFICE O/P EST LOW 20 MIN: CPT

## 2021-04-07 ASSESSMENT — PAIN DESCRIPTION - PAIN TYPE: TYPE: CHRONIC PAIN

## 2021-04-07 ASSESSMENT — PAIN SCALES - GENERAL: PAINLEVEL_OUTOF10: 10

## 2021-04-07 NOTE — PROGRESS NOTES
[facility]          Progress Note and Procedure Note      Karo Farah  MEDICAL RECORD NUMBER:  [id number]  AGE: 42 y.o. GENDER: female  : 1975  EPISODE DATE:  2021    Subjective:     SUBJECTIVE     Chief Complaint   Patient presents with    Wound Check     abdomen           HISTORY OF PRESENT ILLNESS     She is a 39 yr old female seen for follow up visit she has history of keloid formation and has been dealing with a wound on the lower abdomen  for over 15 years . she has ADHD and schizophrenia she was accompanied by her mom .she follows with cardiology for CHF. 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     during hospital visit    Bronchitis     CHF (congestive heart failure) ([address]     Elbow fracture 10/09/14    left    Irritable bowel syndrome     Irritable bowel    Lymph edema 2017    Obesity     Conde disease     Schizophrenia ([facility] [address]     Seasonal allergies          PAST SURGICAL HISTORY     Past Surgical History:   Procedure Laterality Date    LAYER WOUND CLOSURE      TOE SURGERY  2019    TOENAIL EXCISION      TONSILLECTOMY AND ADENOIDECTOMY       FAMILY HISTORY         Family History   Problem Relation Age of Onset    Diabetes Father     Cancer Maternal Grandmother     Cancer Paternal Grandmother      SOCIAL HISTORY       Social History     Tobacco Use    Smoking status: Never Smoker    Smokeless tobacco: Never Used   Substance Use Topics    Alcohol use: No    Drug use: No     ALLERGIES         Allergies   Allergen Reactions    Latex Rash    Cat Hair Extract     Seasonal      MEDICATIONS         Current Outpatient Medications on File Prior to Encounter   Medication Sig Dispense Refill    furosemide (LASIX) 20 MG tablet TAKE 1 TABLET EVERY OTHER DAY.  TAKE 2 Attached edges 04/07/21 1045   Wound Thickness Description not for Pressure Injury Full thickness 04/07/21 1045   Number of days: 635       Wound 08/26/20 Abdomen Mid #3 (Active)   Wound Image   01/06/21 1059   Wound Etiology Other 11/04/20 1029   Dressing Status Intact; Old drainage noted;New dressing applied 04/07/21 1045   Wound Cleansed Cleansed with saline 01/06/21 1059   Dressing/Treatment Alginate with Ag;ABD 01/06/21 1059   Offloading for Diabetic Foot Ulcers No offloading required 01/06/21 1059   Wound Length (cm) 3.4 cm 04/07/21 1045   Wound Width (cm) 12 cm 04/07/21 1045   Wound Depth (cm) 0 cm 04/07/21 1045   Wound Surface Area (cm^2) 40.8 cm^2 04/07/21 1045   Change in Wound Size % (l*w) -1311.76 04/07/21 1045   Wound Volume (cm^3) 0 cm^3 04/07/21 1045   Wound Healing % 100 04/07/21 1045   Wound Assessment Hyper granulation tissue 04/07/21 1045   Drainage Amount Moderate 04/07/21 1045   Drainage Description Serosanguinous;Brown 04/07/21 1045   Odor None 04/07/21 1045   Gwen-wound Assessment Dry/flaky; Other (Comment) 04/07/21 1045   Margins Attached edges 04/07/21 1045   Wound Thickness Description not for Pressure Injury Full thickness 04/07/21 1045   Number of days: 224       Wound 09/23/20 Abdomen Left;Lateral (Active)   Wound Etiology Other 11/04/20 1029   Wound Cleansed Cleansed with saline 11/04/20 1029   Dressing/Treatment ABD; Alginate with Ag 01/06/21 1059   Wound Length (cm) 1.6 cm 04/07/21 1045   Wound Width (cm) 1.5 cm 04/07/21 1045   Wound Depth (cm) 0 cm 04/07/21 1045   Wound Surface Area (cm^2) 2.4 cm^2 04/07/21 1045   Change in Wound Size % (l*w) 9.77 04/07/21 1045   Wound Volume (cm^3) 0 cm^3 04/07/21 1045   Wound Healing % 100 04/07/21 1045   Wound Assessment Hyper granulation tissue 04/07/21 1045   Drainage Amount Small 04/07/21 1045   Drainage Description Serosanguinous 04/07/21 1045   Odor None 04/07/21 1045   Gwen-wound Assessment Blanchable erythema;Dry/flaky 04/07/21 1045   Margins Attached edges 04/07/21 1045   Wound Thickness Description not for Pressure Injury Full thickness 04/07/21 1045   Number of days: 195       LABS      Lab Results   Component Value Date    BC No growth-preliminary No growth  10/13/2020       Assessment:    Non-healing abdominal wound: continue local wound care,   Will refer to G surgeon    Patient Active Problem List   Diagnosis Code    Keloid L91.0    MRSA (methicillin resistant staph aureus) culture positive Z22.322    Skin ulcer of abdominal wall ([facility] [address] L98.499    POTS (postural orthostatic tachycardia syndrome) I49.8    CHF (congest[facility] failure) ([facility]) chronic diastolic R90.1    Morbid obesity ([facility]) E66.01    Bilateral leg edema =LYMPHEDEMA- ON f/U WITH LYMPHEDEMA CLINIC R60.0    Lymphedema of both lower extremities I89.0    SOB (shortness of breath) on exertion R06.02    Candidiasis, intertriginous B37.2    Dermatitis L30.9         Plan:     Patient examined and evaluated      Please see attached Discharge Instructions    Written patient dismissal instructions given to patient and signed by patient or POA. Discharge Instructions       Visit Discharge/Physician Orders:  - [facility] for dressing supplies- [phone]  - Continue working on weight loss. - No shower, keep wound dressings dry     Wound Location: Left lower abdomen, Right lower abdomen     Dressing orders:      Abdomen wounds- Apply silver alginate to wounds and cover with hydrofera blue ready foam and then cover with ABD pad and secure with tape. Change three times weekly      Follow up visit: 3 months on Wednesday      Keep next scheduled appointment. Please give 24 hour notice if unable to keep appointment. [phone]     If you experience any of the following, please call the Wound Care Service during business hours: Monday through Friday 8:00 am - 4:30 pm  ([phone]).               *Increase in pain              *Temperature over 101              *Increase in drainage from your wound or a foul odor              *Uncontrolled swelling              *Need for compression bandage changes due to slippage, breakthrough drainage     If you need medical attention outside of business hours, please contact your Primary Care Doctor or go to the nearest emergency room.         Electronically signed by Rosa Solorzano MD on 4/7/2021 at 10:54 AM

## 2021-04-07 NOTE — PLAN OF CARE
Problem: Wound:  Goal: Will show signs of wound healing; wound closure and no evidence of infection  Description: Will show signs of wound healing; wound closure and no evidence of infection  Outcome: Ongoing     Patient presents to wound clinic for abdominal wound. No s/s of infection noted. See AVS for discharge instructions. Dr Joe Mccollum office notified of new consult and to call patients mother to schedule appointment. They voice understanding. Follow up visit: 3 months on Wednesday July 7th at 10:30 am    Care plan reviewed with patient and wife. Patient and wife verbalize understanding of the plan of care and contribute to goal setting.

## 2021-04-28 ENCOUNTER — OFFICE VISIT (OUTPATIENT)
Dept: SURGERY | Age: 46
End: 2021-04-28
Payer: MEDICARE

## 2021-04-28 VITALS
RESPIRATION RATE: 17 BRPM | BODY MASS INDEX: 45.99 KG/M2 | HEART RATE: 100 BPM | TEMPERATURE: 97.8 F | OXYGEN SATURATION: 98 % | HEIGHT: 67 IN | WEIGHT: 293 LBS | DIASTOLIC BLOOD PRESSURE: 78 MMHG | SYSTOLIC BLOOD PRESSURE: 125 MMHG

## 2021-04-28 DIAGNOSIS — L98.491 ULCER OF ABDOMEN WALL, LIMITED TO BREAKDOWN OF SKIN (HCC): Primary | ICD-10-CM

## 2021-04-28 PROCEDURE — G8427 DOCREV CUR MEDS BY ELIG CLIN: HCPCS | Performed by: SURGERY

## 2021-04-28 PROCEDURE — 99203 OFFICE O/P NEW LOW 30 MIN: CPT | Performed by: SURGERY

## 2021-04-28 PROCEDURE — 1036F TOBACCO NON-USER: CPT | Performed by: SURGERY

## 2021-04-28 PROCEDURE — G8417 CALC BMI ABV UP PARAM F/U: HCPCS | Performed by: SURGERY

## 2021-04-28 RX ORDER — TRAZODONE HYDROCHLORIDE 50 MG/1
25 TABLET ORAL NIGHTLY
COMMUNITY
Start: 2021-02-15 | End: 2021-07-07

## 2021-04-29 ASSESSMENT — ENCOUNTER SYMPTOMS
EYE REDNESS: 0
RHINORRHEA: 0
ABDOMINAL DISTENTION: 0
FACIAL SWELLING: 0
EYE ITCHING: 0
VOMITING: 0
SHORTNESS OF BREATH: 0
COLOR CHANGE: 0
SORE THROAT: 0
EYE DISCHARGE: 0
PHOTOPHOBIA: 0
RECTAL PAIN: 0
DIARRHEA: 0
EYE PAIN: 0
WHEEZING: 0
CHOKING: 0
SINUS PRESSURE: 0
COUGH: 0
ANAL BLEEDING: 0
TROUBLE SWALLOWING: 0
VOICE CHANGE: 0
ABDOMINAL PAIN: 0
CONSTIPATION: 0
BLOOD IN STOOL: 0
APNEA: 0
NAUSEA: 0
CHEST TIGHTNESS: 0
BACK PAIN: 0
ALLERGIC/IMMUNOLOGIC NEGATIVE: 1
STRIDOR: 0

## 2021-04-29 NOTE — PROGRESS NOTES
diaphoresis, fatigue, fever and unexpected weight change. HENT: Negative for congestion, dental problem, drooling, ear discharge, ear pain, facial swelling, hearing loss, mouth sores, nosebleeds, postnasal drip, rhinorrhea, sinus pressure, sneezing, sore throat, tinnitus, trouble swallowing and voice change. Eyes: Negative for photophobia, pain, discharge, redness, itching and visual disturbance. Respiratory: Negative for apnea, cough, choking, chest tightness, shortness of breath, wheezing and stridor. Cardiovascular: Negative for chest pain, palpitations and leg swelling. Gastrointestinal: Negative for abdominal distention, abdominal pain, anal bleeding, blood in stool, constipation, diarrhea, nausea, rectal pain and vomiting. Endocrine: Negative. Genitourinary: Negative for decreased urine volume, difficulty urinating, dyspareunia, dysuria, enuresis, flank pain, frequency, genital sores, hematuria, menstrual problem, pelvic pain, urgency, vaginal bleeding, vaginal discharge and vaginal pain. Musculoskeletal: Negative for arthralgias, back pain, gait problem, joint swelling, myalgias, neck pain and neck stiffness. Skin: Positive for wound (abdomen). Negative for color change, pallor and rash. Allergic/Immunologic: Negative. Neurological: Negative for dizziness, tremors, seizures, syncope, facial asymmetry, speech difficulty, weakness, light-headedness, numbness and headaches. Hematological: Negative for adenopathy. Does not bruise/bleed easily. Psychiatric/Behavioral: Negative for agitation, behavioral problems, confusion, decreased concentration, dysphoric mood, hallucinations, self-injury, sleep disturbance and suicidal ideas. The patient is not nervous/anxious and is not hyperactive.         Past Medical History:   Diagnosis Date    ADHD (attention deficit hyperactivity disorder)     Artificial skin graft or decellularized allodermis mechanical complic     during hospital visit (ZOFRAN ODT) 4 MG disintegrating tablet Take 1 tablet by mouth every 8 hours as needed for Nausea (Patient not taking: Reported on 4/28/2021) 10 tablet 0    fluocinonide (LIDEX) 0.05 % cream Apply topically daily. (Patient not taking: Reported on 4/28/2021) 60 g 3    acetaminophen (TYLENOL) 500 MG tablet Take 1,000 mg by mouth every 6 hours as needed for Pain       No current facility-administered medications for this visit.         Allergies   Allergen Reactions    Latex Rash    Cat Hair Extract     Seasonal        Family History   Problem Relation Age of Onset    Diabetes Father     Cancer Maternal Grandmother     Cancer Paternal Grandmother        Social History     Socioeconomic History    Marital status: Single     Spouse name: Not on file    Number of children: Not on file    Years of education: Not on file    Highest education level: Not on file   Occupational History    Not on file   Social Needs    Financial resource strain: Not on file    Food insecurity     Worry: Not on file     Inability: Not on file    Transportation needs     Medical: Not on file     Non-medical: Not on file   Tobacco Use    Smoking status: Never Smoker    Smokeless tobacco: Never Used   Substance and Sexual Activity    Alcohol use: No    Drug use: No    Sexual activity: Not on file   Lifestyle    Physical activity     Days per week: Not on file     Minutes per session: Not on file    Stress: Not on file   Relationships    Social connections     Talks on phone: Not on file     Gets together: Not on file     Attends Faith service: Not on file     Active member of club or organization: Not on file     Attends meetings of clubs or organizations: Not on file     Relationship status: Not on file    Intimate partner violence     Fear of current or ex partner: Not on file     Emotionally abused: Not on file     Physically abused: Not on file     Forced sexual activity: Not on file   Other Topics Concern    Not on file   Social History Narrative    Not on file     Vitals:    04/28/21 1331   BP: 125/78   Site: Left Lower Arm   Position: Sitting   Cuff Size: Medium Adult   Pulse: 100   Resp: 17   Temp: 97.8 °F (36.6 °C)   TempSrc: Tympanic   SpO2: 98%   Weight: (!) 313 lb (142 kg)   Height: 5' 7\" (1.702 m)     Body mass index is 49.02 kg/m². Wt Readings from Last 3 Encounters:   04/28/21 (!) 313 lb (142 kg)   04/07/21 (!) 313 lb 8 oz (142.2 kg)   01/06/21 (!) 300 lb 1.6 oz (136.1 kg)     Physical Exam  Vitals signs reviewed. Constitutional:       General: She is not in acute distress. Appearance: She is well-developed. She is not diaphoretic. HENT:      Head: Normocephalic and atraumatic. Right Ear: External ear normal.      Left Ear: External ear normal.      Nose: Nose normal.   Eyes:      General: No scleral icterus. Right eye: No discharge. Left eye: No discharge. Conjunctiva/sclera: Conjunctivae normal.   Neck:      Musculoskeletal: Normal range of motion and neck supple. Cardiovascular:      Rate and Rhythm: Normal rate and regular rhythm. Heart sounds: Normal heart sounds. Pulmonary:      Effort: Pulmonary effort is normal. No respiratory distress. Breath sounds: Normal breath sounds. No wheezing or rales. Chest:      Chest wall: No tenderness. Abdominal:      General: Bowel sounds are normal. There is no distension. Palpations: Abdomen is soft. There is no mass. Tenderness: There is abdominal tenderness (open wound/ulcer). There is no guarding or rebound. Musculoskeletal: Normal range of motion. General: No tenderness. Skin:     General: Skin is warm and dry. Coloration: Skin is not pale. Findings: No erythema or rash. Neurological:      Mental Status: She is alert and oriented to person, place, and time. Cranial Nerves: No cranial nerve deficit.    Psychiatric:         Behavior: Behavior normal.         Thought Content: Thought content normal.         Judgment: Judgment normal.       Lab Results   Component Value Date     10/13/2020    K 4.4 10/13/2020     10/13/2020    CO2 22 (L) 10/13/2020     Lab Results   Component Value Date    CREATININE 0.7 10/13/2020     Lab Results   Component Value Date    WBC 7.7 10/13/2020    HGB 15.1 10/13/2020    HCT 46.5 10/13/2020    MCV 93.2 10/13/2020     10/13/2020     Lab Results   Component Value Date    ALT 15 10/13/2020    AST 14 10/13/2020    ALKPHOS 85 10/13/2020    BILITOT 0.4 10/13/2020     Imaging - none    Patient Active Problem List   Diagnosis    Keloid    MRSA (methicillin resistant staph aureus) culture positive    Skin ulcer of abdominal wall (HCC)    POTS (postural orthostatic tachycardia syndrome)    CHF (congestive heart failure) (HCC) chronic diastolic    Morbid obesity (HCC)    Bilateral leg edema =LYMPHEDEMA- ON f/U WITH LYMPHEDEMA CLINIC    Lymphedema of both lower extremities    SOB (shortness of breath) on exertion    Candidiasis, intertriginous    Dermatitis     An electronic signature was used to authenticate this note.     --Anabel Hernandez MD

## 2021-05-04 ENCOUNTER — OFFICE VISIT (OUTPATIENT)
Dept: SURGERY | Age: 46
End: 2021-05-04
Payer: MEDICARE

## 2021-05-04 VITALS
HEIGHT: 66 IN | WEIGHT: 293 LBS | DIASTOLIC BLOOD PRESSURE: 73 MMHG | TEMPERATURE: 97 F | SYSTOLIC BLOOD PRESSURE: 129 MMHG | BODY MASS INDEX: 47.09 KG/M2 | HEART RATE: 80 BPM

## 2021-05-04 DIAGNOSIS — L08.9 CHRONIC WOUND INFECTION OF ABDOMEN, INITIAL ENCOUNTER: ICD-10-CM

## 2021-05-04 DIAGNOSIS — L98.491 ULCER OF ABDOMEN WALL, LIMITED TO BREAKDOWN OF SKIN (HCC): ICD-10-CM

## 2021-05-04 DIAGNOSIS — E66.01 MORBID OBESITY (HCC): Primary | ICD-10-CM

## 2021-05-04 DIAGNOSIS — S31.109A CHRONIC WOUND INFECTION OF ABDOMEN, INITIAL ENCOUNTER: ICD-10-CM

## 2021-05-04 PROCEDURE — 1036F TOBACCO NON-USER: CPT | Performed by: SURGERY

## 2021-05-04 PROCEDURE — G8417 CALC BMI ABV UP PARAM F/U: HCPCS | Performed by: SURGERY

## 2021-05-04 PROCEDURE — 99204 OFFICE O/P NEW MOD 45 MIN: CPT | Performed by: SURGERY

## 2021-05-04 PROCEDURE — G8427 DOCREV CUR MEDS BY ELIG CLIN: HCPCS | Performed by: SURGERY

## 2021-05-04 NOTE — PROGRESS NOTES
HCl (BENTYL PO),  Take 20 mg by mouth 2 times daily , Disp: , Rfl:     QUEtiapine Fumarate (SEROQUEL PO), Take 400 mg by mouth 2 times daily. , Disp: , Rfl:     atomoxetine (STRATTERA) 40 MG capsule, Take 40 mg by mouth 2 times daily. , Disp: , Rfl:     aripiprazole (ABILIFY) 10 MG tablet, Take 15 mg by mouth 2 times daily , Disp: , Rfl:     ACYCLOVIR, 400 mg by Does not apply route three times a week , Disp: , Rfl:     fluocinonide (LIDEX) 0.05 % cream, Apply topically daily. (Patient not taking: Reported on 4/28/2021), Disp: 60 g, Rfl: 3    Allergies:  is allergic to latex; cat hair extract; and seasonal.     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 Past Surgical History   has a past surgical history that includes Tonsillectomy and adenoidectomy; layer wound closure; toenail excision; and Toe Surgery (09/2019). Family History  family history includes Cancer in her maternal grandmother and paternal grandmother; Diabetes in her father. Social History   reports that she has never smoked. She has never used smokeless tobacco. She reports that she does not drink alcohol or use drugs.     Health Maintenance:    Health Maintenance   Topic Date Due    Hepatitis C screen  Never done    Pneumococcal 0-64 years Vaccine (1 of 1 - PPSV23) Never done    HIV screen  Never done    COVID-19 Vaccine (1) Never done    Cervical cancer screen  Never done    Lipid screen  Never done    Annual Wellness Visit (AWV)  Never done    Flu vaccine (Season Ended) 09/01/2021    Potassium monitoring  10/13/2021    Creatinine monitoring  10/13/2021    DTaP/Tdap/Td vaccine (2 - Td) 05/11/2030    Hepatitis A vaccine  Aged Out    Hepatitis B vaccine  Aged Out    Hib vaccine  Aged Out    Meningococcal (ACWY) vaccine  Aged Out       Subjective:      Review of Systems   Constitutional: Negative for chills, fever and unexpected weight change. HENT: Negative for dental problem, facial swelling, nosebleeds, sinus pressure, sinus pain and trouble swallowing. Eyes: Negative for photophobia and visual disturbance. Respiratory: Negative for cough and shortness of breath. Cardiovascular: Negative for chest pain and leg swelling. Gastrointestinal: Negative for abdominal pain. Endocrine: Negative for cold intolerance and heat intolerance. Musculoskeletal: Negative for neck pain and neck stiffness. Skin: Positive for wound. Negative for color change, pallor and rash. Neurological: Negative for dizziness, facial asymmetry, light-headedness, numbness and headaches. Hematological: Does not bruise/bleed easily. Psychiatric/Behavioral: Positive for behavioral problems and self-injury. The patient is nervous/anxious. Objective:     /73   Pulse 80   Temp 97 °F (36.1 °C)   Ht 5' 6\" (1.676 m)   Wt (!) 310 lb 3.2 oz (140.7 kg)   BMI 50.07 kg/m²     Physical Exam  Vitals signs and nursing note reviewed. Exam conducted with a chaperone present. Constitutional:       General: She is awake. She is not in acute distress. Appearance: She is morbidly obese. HENT:      Head: Normocephalic and atraumatic. Jaw: There is normal jaw occlusion. Right Ear: Hearing and external ear normal.      Left Ear: Hearing and external ear normal.      Nose: Nose normal.      Mouth/Throat:      Lips: Pink. Mouth: Mucous membranes are moist.      Pharynx: Oropharynx is clear. Eyes:      General: Lids are normal. Vision grossly intact. Right eye: No discharge. Left eye: No discharge. Extraocular Movements: Extraocular movements intact.       Conjunctiva/sclera: Conjunctivae normal.      Pupils: Pupils are equal, round, and reactive to light. Neck:      Musculoskeletal: Full passive range of motion without pain, normal range of motion and neck supple. Thyroid: No thyromegaly. Trachea: Trachea and phonation normal. No tracheal deviation. Cardiovascular:      Rate and Rhythm: Normal rate. Pulses: Normal pulses. No decreased pulses. Pulmonary:      Effort: Pulmonary effort is normal. No respiratory distress. Abdominal:      General: There is no distension. Palpations: Abdomen is soft. Tenderness: There is no abdominal tenderness. Musculoskeletal: Normal range of motion. General: No deformity. Skin:     General: Skin is warm. Capillary Refill: Capillary refill takes less than 2 seconds. Findings: No erythema or rash. Neurological:      General: No focal deficit present. Mental Status: She is alert and oriented to person, place, and time. Mental status is at baseline. Sensory: No sensory deficit. Psychiatric:         Mood and Affect: Mood normal.         Behavior: Behavior normal. Behavior is cooperative. Thought Content: Thought content normal.         Judgment: Judgment normal.       Derm Physical Exam   Media Information       Document Information    Wound   Abdomen    04/07/2021 10:45   Attached To: Hospital Encounter on 4/7/21 with Yannick Parker MD   Source Information    [facility]         Assessment/Plan:   Assessment:  Chronic lower abdominal wound that has been present for several years in a patient with underlying psychiatric issues with the likelihood that the patient is also self inflicting. Plan:  Patient is scheduled to be taken the operating room by Dr. Dontae Garza. I discussed the case with him and feel that she definitely is not a patient that I would recommend any type of skin grafting as she may self-inflicted further on this area as well as the donor site.   Debridement and primary closure may provide her with long-term relief. There were no encounter diagnoses. No orders of the defined types were placed in this encounter. No follow-ups on file. Patient given educational materials - see patient instructions. Discussed use, benefit, and side effects of prescribed medications. All patient questions answered. Pt voiced understanding. Reviewed health maintenance.        Electronically signed Azra Hernandez RN on 5/4/2021 at 2:11 PM EDT

## 2021-05-05 ASSESSMENT — ENCOUNTER SYMPTOMS
SHORTNESS OF BREATH: 0
FACIAL SWELLING: 0
SINUS PRESSURE: 0
SINUS PAIN: 0
TROUBLE SWALLOWING: 0
ABDOMINAL PAIN: 0
COLOR CHANGE: 0
PHOTOPHOBIA: 0
COUGH: 0

## 2021-05-05 ASSESSMENT — VISUAL ACUITY: OU: 1

## 2021-05-10 ENCOUNTER — TELEPHONE (OUTPATIENT)
Dept: SURGERY | Age: 46
End: 2021-05-10

## 2021-05-10 DIAGNOSIS — Z01.818 PRE-OP TESTING: ICD-10-CM

## 2021-05-10 DIAGNOSIS — L98.491 ULCER OF ABDOMEN WALL, LIMITED TO BREAKDOWN OF SKIN (HCC): Primary | ICD-10-CM

## 2021-05-26 ENCOUNTER — PREP FOR PROCEDURE (OUTPATIENT)
Dept: SURGERY | Age: 46
End: 2021-05-26

## 2021-05-26 RX ORDER — SODIUM CHLORIDE 9 MG/ML
INJECTION, SOLUTION INTRAVENOUS CONTINUOUS
Status: CANCELLED | OUTPATIENT
Start: 2021-05-26

## 2021-05-29 ENCOUNTER — HOSPITAL ENCOUNTER (OUTPATIENT)
Age: 46
Discharge: HOME | End: 2021-05-29
Payer: MEDICARE

## 2021-05-29 DIAGNOSIS — R06.02 SOB (SHORTNESS OF BREATH) ON EXERTION: ICD-10-CM

## 2021-05-29 DIAGNOSIS — L98.491 ULCER OF ABDOMEN WALL, LIMITED TO BREAKDOWN OF SKIN (HCC): ICD-10-CM

## 2021-05-29 DIAGNOSIS — E66.01 MORBID OBESITY (HCC): ICD-10-CM

## 2021-05-29 DIAGNOSIS — R60.0 BILATERAL LEG EDEMA: ICD-10-CM

## 2021-05-29 DIAGNOSIS — G90.A POTS (POSTURAL ORTHOSTATIC TACHYCARDIA SYNDROME): ICD-10-CM

## 2021-05-29 DIAGNOSIS — Z01.818 PRE-OP TESTING: ICD-10-CM

## 2021-05-29 DIAGNOSIS — I50.32 CHRONIC DIASTOLIC CONGESTIVE HEART FAILURE (HCC): ICD-10-CM

## 2021-05-29 LAB
ANION GAP SERPL CALCULATED.3IONS-SCNC: 12 MEQ/L (ref 8–16)
BUN BLDV-MCNC: 10 MG/DL (ref 7–22)
CALCIUM SERPL-MCNC: 9.1 MG/DL (ref 8.5–10.5)
CHLORIDE BLD-SCNC: 102 MEQ/L (ref 98–111)
CO2: 23 MEQ/L (ref 23–33)
CREAT SERPL-MCNC: 0.7 MG/DL (ref 0.4–1.2)
GFR SERPL CREATININE-BSD FRML MDRD: > 90 ML/MIN/1.73M2
GLUCOSE BLD-MCNC: 84 MG/DL (ref 70–108)
HCT VFR BLD CALC: 46.4 % (ref 37–47)
HEMOGLOBIN: 14.5 GM/DL (ref 12–16)
MAGNESIUM: 1.6 MG/DL (ref 1.6–2.4)
POTASSIUM SERPL-SCNC: 4.3 MEQ/L (ref 3.5–5.2)
SODIUM BLD-SCNC: 137 MEQ/L (ref 135–145)

## 2021-05-29 PROCEDURE — 83735 ASSAY OF MAGNESIUM: CPT

## 2021-05-29 PROCEDURE — 36415 COLL VENOUS BLD VENIPUNCTURE: CPT

## 2021-05-29 PROCEDURE — 85014 HEMATOCRIT: CPT

## 2021-05-29 PROCEDURE — 80048 BASIC METABOLIC PNL TOTAL CA: CPT

## 2021-05-29 PROCEDURE — 85018 HEMOGLOBIN: CPT

## 2021-06-04 NOTE — H&P
Betsey Bryant (:  1975)      ASSESSMENT:  1. Nonhealing abdominal wall wound/ulcer  2. Morbid obesity (BMI 49)     PLAN:  1. Discussed the pros and cons of surgical debridement/excision with closure as well as possible wound VAC placement with patient and family. All questions answered  2. Recommended evaluation by Dr. Jo Morrissey of plastic surgery prior to debridement to see if any further opinion/recommendations due to patient's long history of nonhealing wounds. 3.  Patient/family does not wish to go back to [facility] at this time  4. Pending Dr. Jo Morrissey recommendation we will likely proceed with excisional debridement at that time.     SUBJECTIVE/OBJECTIVE:          Chief Complaint   Patient presents with    Surgical Consult       New patient-referred by Dr Dora Lechuga wound on abdomen      HPI  Betsey is a 19-year-old female who presents with family secondary to nonhealing abdominal wall wound/ulcer. She has a long history of this. She has seen multiple surgeons both general and plastic as well as tertiary center such as [facility] for this. She has had multiple wound treatments in the past for various incisions/wounds on her abdominal wall. Many of these have eventually been able to be healed except for the last one on the lower abdomen on the left side. It is broad-based. Very tender. Mild amount of drainage. No acute infection. Patient and family not sure why she does not heal.  Admits they have tried to primarily close these as well as use wound vacs. Patient with ADHD and history of schizophrenia. Independent living. No chest pain or shortness of breath. No fever, chills or sweats. Tolerating diet. Normal bowel function. No other real complaints except the wound and the tenderness/pain that it is causing.   Has been being seen by Dr. Jossy Johnson and the wound clinic.     Review of Systems   Constitutional: Negative for activity change, appetite change, chills, diaphoresis, fatigue, fever and unexpected weight change. HENT: Negative for congestion, dental problem, drooling, ear discharge, ear pain, facial swelling, hearing loss, mouth sores, nosebleeds, postnasal drip, rhinorrhea, sinus pressure, sneezing, sore throat, tinnitus, trouble swallowing and voice change. Eyes: Negative for photophobia, pain, discharge, redness, itching and visual disturbance. Respiratory: Negative for apnea, cough, choking, chest tightness, shortness of breath, wheezing and stridor. Cardiovascular: Negative for chest pain, palpitations and leg swelling. Gastrointestinal: Negative for abdominal distention, abdominal pain, anal bleeding, blood in stool, constipation, diarrhea, nausea, rectal pain and vomiting. Endocrine: Negative. Genitourinary: Negative for decreased urine volume, difficulty urinating, dyspareunia, dysuria, enuresis, flank pain, frequency, genital sores, hematuria, menstrual problem, pelvic pain, urgency, vaginal bleeding, vaginal discharge and vaginal pain. Musculoskeletal: Negative for arthralgias, back pain, gait problem, joint swelling, myalgias, neck pain and neck stiffness. Skin: Positive for wound (abdomen). Negative for color change, pallor and rash. Allergic/Immunologic: Negative. Neurological: Negative for dizziness, tremors, seizures, syncope, facial asymmetry, speech difficulty, weakness, light-headedness, numbness and headaches. Hematological: Negative for adenopathy. Does not bruise/bleed easily. Psychiatric/Behavioral: Negative for agitation, behavioral problems, confusion, decreased concentration, dysphoric mood, hallucinations, self-injury, sleep disturbance and suicidal ideas.  The patient is not nervous/anxious and is not hyperactive.          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 2000- Dr. Mabry Nails 2014    TOE SURGERY   09/2019     Dr. Sendy Paz TOENAIL EXCISION        TONSILLECTOMY AND ADENOIDECTOMY                Current Facility-Administered Medications          Current Outpatient Medications   Medication Sig Dispense Refill    traZODone (DESYREL) 50 MG tablet Take 25 mg by mouth nightly        furosemide (LASIX) 20 MG tablet TAKE 1 TABLET EVERY OTHER DAY. TAKE 2 TABLETS ON OTHER DAYS 120 tablet 1    metoprolol succinate (TOPROL XL) 25 MG extended release tablet Take 1 tablet by mouth daily 90 tablet 1    Cholecalciferol (VITAMIN D3) 5000 units TABS Take 2,000 Units by mouth         MedroxyPROGESTERone Acetate (DEPO-PROVERA IM) Inject into the muscle        IRON PO Take 65 mg by mouth daily         miconazole (MICOTIN) 2 % powder Apply topically 2 times to 3 times  daily.  45 g 1    Dicyclomine HCl (BENTYL PO)    Take 20 mg by mouth 2 times daily         QUEtiapine Fumarate (SEROQUEL PO) Take 400 mg by mouth 2 times daily.          atomoxetine (STRATTERA) 40 MG capsule Take 40 mg by mouth 2 times daily.          aripiprazole (ABILIFY) 10 MG tablet Take 15 mg by mouth 2 times daily         ACYCLOVIR 400 mg by Does not apply route three times a week         Pseudoephedrine-guaiFENesin 120-1200 MG TB12 Take 1 tablet by mouth 2 times daily (Patient not taking: Reported on 4/28/2021) 14 tablet 0    fluticasone (FLONASE) 50 MCG/ACT nasal spray 1 spray by Each Nostril route daily (Patient not taking: Reported on 4/28/2021) 1 Bottle 0    ibuprofen (ADVIL;MOTRIN) 800 MG tablet Take 1 tablet by mouth every 8 hours as needed for Pain (Patient not taking: Reported on 4/28/2021) 15 tablet 0    ondansetron (ZOFRAN ODT) 4 MG disintegrating tablet Take 1 tablet by mouth every 8 hours as needed for Nausea (Patient not taking: Reported on 4/28/2021) 10 tablet 0    fluocinonide (LIDEX) 0.05 % cream Apply topically daily.  (Patient not taking: Reported on 4/28/2021) 60 g 3    acetaminophen (TYLENOL) 500 MG tablet Take 1,000 mg by mouth every 6 hours as needed for Pain          No current facility-administered medications for this visit.                  Allergies   Allergen Reactions    Latex Rash    Cat Hair Extract      Seasonal           Family History         Family History   Problem Relation Age of Onset    Diabetes Father      Cancer Maternal Grandmother      Cancer Paternal Grandmother              Social History               Socioeconomic History    Marital status: Single       Spouse name: Not on file    Number of children: Not on file    Years of education: Not on file    Highest education level: Not on file   Occupational History    Not on file   Social Needs    Financial resource strain: Not on file    Food insecurity       Worry: Not on file       Inability: Not on file    Transportation needs       Medical: Not on file       Non-medical: Not on file   Tobacco Use    Smoking status: Never Smoker    Smokeless tobacco: Never Used   Substance and Sexual Activity    Alcohol use: No    Drug use: No    Sexual activity: Not on file   Lifestyle    Physical activity       Days per week: Not on file       Minutes per session: Not on file    Stress: Not on file   Relationships    Social connections       Talks on phone: Not on file       Gets together: Not on file       Attends Sabianist service: Not on file       Active member of club or organization: Not on file       Attends meetings of clubs or organizations: Not on is warm and dry. Coloration: Skin is not pale. Findings: No erythema or rash. Neurological:      Mental Status: She is alert and oriented to person, place, and time. Cranial Nerves: No cranial nerve deficit. Psychiatric:         Behavior: Behavior normal.         Thought Content:  Thought content normal.         Judgment: Judgment normal.               Lab Results   Component Value Date      10/13/2020     K 4.4 10/13/2020      10/13/2020     CO2 22 (L) 10/13/2020            Lab Results   Component Value Date     CREATININE 0.7 10/13/2020            Lab Results   Component Value Date     WBC 7.7 10/13/2020     HGB 15.1 10/13/2020     HCT 46.5 10/13/2020     MCV 93.2 10/13/2020      10/13/2020            Lab Results   Component Value Date     ALT 15 10/13/2020     AST 14 10/13/2020     ALKPHOS 85 10/13/2020     BILITOT 0.4 10/13/2020      Imaging - none         Patient Active Problem List   Diagnosis    Keloid    MRSA (methicillin resistant staph aureus) culture positive    [facility] ulcer [facility] abdominal wall ([facility])    POTS (postural orthostatic tachycardia syndrome)    CHF (congestive heart failure) (HCC) chronic diastolic    Morbid obesity (HCC)    Bilateral leg edema =LYMPHEDEMA- ON f/U WITH LYMPHEDEMA CLINIC    Lymphedema of both lower extremities    SOB (shortness of breath) on exertion    Candidiasis, intertriginous    Dermatitis      An electronic signature was used to authenticate this note.     --Ramos Huber MD

## 2021-06-07 RX ORDER — FUROSEMIDE 20 MG/1
TABLET ORAL
Qty: 120 TABLET | Refills: 0 | Status: SHIPPED | OUTPATIENT
Start: 2021-06-07 | End: 2021-06-08 | Stop reason: SDUPTHER

## 2021-06-08 ENCOUNTER — OFFICE VISIT (OUTPATIENT)
Dept: CARDIOLOGY CLINIC | Age: 46
End: 2021-06-08
Payer: MEDICARE

## 2021-06-08 VITALS
WEIGHT: 293 LBS | SYSTOLIC BLOOD PRESSURE: 110 MMHG | DIASTOLIC BLOOD PRESSURE: 60 MMHG | HEART RATE: 90 BPM | HEIGHT: 66 IN | BODY MASS INDEX: 47.09 KG/M2

## 2021-06-08 DIAGNOSIS — I50.32 CHRONIC DIASTOLIC CONGESTIVE HEART FAILURE (HCC): Primary | ICD-10-CM

## 2021-06-08 DIAGNOSIS — G90.A POTS (POSTURAL ORTHOSTATIC TACHYCARDIA SYNDROME): ICD-10-CM

## 2021-06-08 DIAGNOSIS — I89.0 LYMPHEDEMA OF BOTH LOWER EXTREMITIES: ICD-10-CM

## 2021-06-08 DIAGNOSIS — E66.01 MORBID OBESITY (HCC): ICD-10-CM

## 2021-06-08 DIAGNOSIS — R60.0 BILATERAL LEG EDEMA: ICD-10-CM

## 2021-06-08 DIAGNOSIS — R42 ORTHOSTATIC DIZZINESS: ICD-10-CM

## 2021-06-08 DIAGNOSIS — R06.02 SOB (SHORTNESS OF BREATH) ON EXERTION: ICD-10-CM
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
## 2021-06-08 PROCEDURE — 1036F TOBACCO NON-USER: CPT | Performed by: INTERNAL MEDICINE

## 2021-06-08 PROCEDURE — G8427 DOCREV CUR MEDS BY ELIG CLIN: HCPCS | Performed by: INTERNAL MEDICINE

## 2021-06-08 PROCEDURE — 99214 OFFICE O/P EST MOD 30 MIN: CPT | Performed by: INTERNAL MEDICINE

## 2021-06-08 PROCEDURE — G8417 CALC BMI ABV UP PARAM F/U: HCPCS | Performed by: INTERNAL MEDICINE

## 2021-06-08 RX ORDER — MIDODRINE HYDROCHLORIDE 5 MG/1
5 TABLET ORAL 3 TIMES DAILY
Qty: 90 TABLET | Refills: 3 | Status: SHIPPED | OUTPATIENT
Start: 2021-06-08 | End: 2021-09-22 | Stop reason: SDUPTHER

## 2021-06-08 RX ORDER — MAGNESIUM OXIDE 400 MG/1
400 TABLET ORAL DAILY
Qty: 30 TABLET | Refills: 1 | Status: SHIPPED | OUTPATIENT
Start: 2021-06-08 | End: 2021-08-09 | Stop reason: SDUPTHER

## 2021-06-08 RX ORDER — METOPROLOL SUCCINATE 25 MG/1
TABLET, EXTENDED RELEASE ORAL
Qty: 90 TABLET | Refills: 3 | Status: SHIPPED | OUTPATIENT
Start: 2021-06-08 | End: 2021-09-17

## 2021-06-08 RX ORDER — FUROSEMIDE 20 MG/1
TABLET ORAL
Qty: 120 TABLET | Refills: 3 | Status: SHIPPED | OUTPATIENT
Start: 2021-06-08 | End: 2021-12-02 | Stop reason: SDUPTHER

## 2021-06-08 NOTE — PROGRESS NOTES
No chief complaint on file. Originally Pt is a f/u from [address] [place] [address] for POTS syndrome  She has not had any issues until today because it is so hot outside  Since she has been put on medications she is feeling better  She does have chest pressure at night time  Has to sleep in a recliner  Sob and lt headed   She [facility] diz[facility]  Dr Gaudencio Mora started her on lasix and they would like to know if you want her to remain on this? ?      6 month follow up. EKG done 10-.     Falling more frequent note after covid in jan 2021 and med changes for worsening of schizophrenia    Fall daily from standing fall back mason  Feel blurred of vision  No warning symptom  No LOC    Denied cp,  OR PALPITATION    Hx of Chronic dizziness if get too fast up    Sob on exertion-    Dizziness only on getting too fast    Orthopnea for few yrs and sleep on recliner for over 2 yrs    Leg edema  better    Continues with TOMAS bilateral       Patient Active Problem List   Diagnosis    Keloid    MRSA (methicillin resistant staph aureus) culture positive    Skin ulcer of abdominal wall (HCC)    POTS (postural orthostatic tachycardia syndrome)    Orthostatic dizziness- occasional- not worse    CHF (congestive heart failure) (HCC) chronic diastolic    Morbid obesity (HCC)    Bilateral leg edema =LYMPHEDEMA- ON f/U WITH LYMPHEDEMA CLINIC    Lymphedema of both lower extremities    SOB (shortness of breath) on exertion    Candidiasis, intertriginous    Dermatitis    Fall- freq with no LOC       Past Surgical History:   Procedure Laterality Date    LAYER WOUND [place] 2000- Dr. Kelly Monson 2014    TOE SURGERY  09/2019    Dr. Boris Scott         Allergies   Allergen Reactions    Latex Rash    Cat Hair Extract     Seasonal         Family History   Problem Relation Age of Onset    Diabetes Father     Cancer Maternal Grandmother     Cancer Paternal Grandmother         Social History     Socioeconomic History    Marital status: Single     Spouse name: Not on file    Number of children: Not on file    Years of education: Not on file    Highest education level: Not on file   Occupational History    Not on file   Tobacco Use    Smoking status: Never Smoker    Smokeless tobacco: Never Used   Vaping Use    Vaping Use: Never used   Substance and Sexual Activity    Alcohol use: No    Drug use: No    Sexual activity: Not on file   Other Topics Concern    Not on file   Social History Narrative    Not on file     Social Determinants of Health     Financial Resource Strain:     Difficulty of Paying Living Expenses:    Food Insecurity:     Worried About Running Out of Food in the Last Year:     [address] in the Last Year:    Transportation Needs:     Lack of Transportation (Medical):      Lack of Transportation (Non-Medical):    Physical Activity:     Days of Exercise per Week:     Minutes of Exercise per Session:    Stress:     Feeling of Stress :    Social Connections:     Frequency of Communication with Friends and Family:     Frequency of Social Gatherings with Friends and Family:     Attends [employer] Services:     Active Member of Clubs or Organizations:     Attends Club or Organization Meetings:     Marital Status:    Intimate Partner Violence:     Fear of Current or Ex-Partner:     Emotionally Abused:     Physically Abused:     Sexually Abused:        Current Outpatient Medications   Medication Sig Dispense Refill    magnesium oxide (MAG-OX) 400 MG tablet Take 1 tablet by mouth daily 30 tablet 1    midodrine (PROAMATINE) 5 MG tablet Take 1 tablet by mouth 3 times daily 90 tablet 3    furosemide (LASIX) 20 MG tablet ALTERNATE 1 TABLET EVERY OTHER DAY WITH 2 TABLETS EVERY OTHER  tablet 0    traZODone (DESYREL) 50 MG tablet Take 25 mg by mouth nightly      metoprolol succinate (TOPROL XL) 25 MG extended release tablet Take 1 tablet by mouth daily 90 tablet 1    fluticasone (FLONASE) 50 MCG/ACT nasal spray 1 spray by Each Nostril route daily 1 Bottle 0    ibuprofen (ADVIL;MOTRIN) 800 MG tablet Take 1 tablet by mouth every 8 hours as needed for Pain 15 tablet 0    fluocinonide (LIDEX) 0.05 % cream Apply topically daily. 60 g 3    acetaminophen (TYLENOL) 500 MG tablet Take 1,000 mg by mouth every 6 hours as needed for Pain      Cholecalciferol (VITAMIN D3) 5000 units TABS Take 2,000 Units by mouth       MedroxyPROGESTERone Acetate (DEPO-PROVERA IM) Inject into the muscle      miconazole (MICOTIN) 2 % powder Apply topically 2 times to 3 times  daily. 45 g 1    Dicyclomine HCl (BENTYL PO)   Take 20 mg by mouth 2 times daily       QUEtiapine Fumarate (SEROQUEL PO) Take 400 mg by mouth 2 times daily.  atomoxetine (STRATTERA) 40 MG capsule Take 40 mg by mouth 2 times daily.  aripiprazole (ABILIFY) 10 MG tablet Take 15 mg by mouth 2 times daily       ACYCLOVIR 400 mg by Does not apply route three times a week        No current facility-administered medications for this visit. Review of Systems -     General ROS: negative  Psychological ROS: negative  Hematological and Lymphatic ROS: No history of blood clots or bleeding disorder. Respiratory ROS: no cough, shortness of breath, or wheezing  Cardiovascular ROS: no chest pain or dyspnea on exertion  Gastrointestinal ROS: negative  Genito-Urinary ROS: no dysuria, trouble voiding, or hematuria  Musculoskeletal ROS: negative  Neurological ROS: no TIA or stroke symptoms  Dermatological ROS: negative      Blood pressure 137/88, pulse 100, height 5' 6\" (1.676 m), weight (!) 302 lb 6.4 oz (137.2 kg), not currently breastfeeding.         Physical Examination:    General appearance - alert, well appearing, and in no distress  Mental status - alert, oriented to person, place, and time  Neck - supple, no significant adenopathy, no JVD, or carotid bruits  Chest - clear to auscultation, no wheezes, rales or rhonchi, symmetric air entry  Heart - normal rate, regular rhythm, normal S1, S2, no murmurs, rubs, clicks or gallops  Abdomen - soft, nontender, nondistended, no masses or organomegaly  Neurological - alert, oriented, normal speech, no focal findings or movement disorder noted  Musculoskeletal - no joint tenderness, deformity or swelling  Extremities - peripheral pulses normal, no pedal edema, no clubbing or cyanosis  Skin - normal coloration and turgor, no rashes, no suspicious skin lesions noted    Lab  No results for input(s): CKTOTAL, CKMB, CKMBINDEX, TROPONINI in the last 72 hours. CBC:   Lab Results   Component Value Date    WBC 7.7 10/13/2020     BMP:    Lab Results   Component Value Date     05/29/2021    K 4.3 05/29/2021     05/29/2021    CO2 23 05/29/2021    BUN 10 05/29/2021    LABALBU 4.1 10/13/2020    CREATININE 0.7 05/29/2021    CALCIUM 9.1 05/29/2021    LABGLOM >90 05/29/2021    GLUCOSE 84 05/29/2021     Hepatic Function Panel:    Lab Results   Component Value Date    ALKPHOS 85 10/13/2020     Magnesium:    Lab Results   Component Value Date    MG 1.6 05/29/2021     Warfarin PT/INR:    No components found for: PTPATWAR,  PTINRWAR  HgBA1c:    No results found for: LABA1C  FLP:    No components found for: CHLPL,  TRIG,  HDL,  LDLCALC,  LDLDIRECT,  LABVLDL  TSH:    No results found for: TSH  EKG 5/7/15-Sinus  Rhythm   Low voltage in precordial leads. ABNORMAL     Nuc stress test negative    IMPRESSION:      1. CT coronary artery calcium score of zero indicates low risk for coronary disease. 2. Diffusely small coronary arteries. In particular the LAD and right coronary artery are very small in their middle and distal thirds. These segments cannot be assessed. Occlusion of the distal segments cannot be excluded. 3. Normal cardiac function.                   Final report electronically signed by Dr. Crystal Braxton on 5/15/2015 4:50 PM EKG 8/8/16  Sinus  Rhythm   Low voltage in precordial leads. ABNORMAL       CONCLUSION:       1. This tilt table test is an abnormal study. 2.   It is consistent with postural orthostatic tachycardia syndrome, in             view of the increment of the heart rate up to 130 from 92 and the             difference of 39 beats per minute. Blood pressure has been well             maintained all the way until the end, and at the end the blood             pressure and the heart rate came down, suggesting it was inducing             vasovagal reaction. 3.   POTS induced vasovagal response. 4.   Marked dizziness throughout the tilting in view of the tachycardia. 5.   Marked chest pressure and short of breath throughout tilting,             related to the tachycardia. Once the patient assumed supine             position, the symptoms resolved. 6.   Nonspecific ST segment change during the assumption of standing. RECOMMENDATION:        1. At this level, the patient needs appropriate hydration and liberal             salt intake. Avoid prolonged standing, physical therapy. further clinical correlation. 2.   The patient may need functional study if clinically indicated, like             Lexiscan nuclear stress test in view of the nonspecific ST segment             changes on standing. 3.   Avoid prolonged standing in view of prolonged standing induced             vasovagal response. 4.   I discussed with the patient. Erum Isaacs M.D.        D: 03/20/2015 13:46     ekg 6/8/18  NSR, NO ACUTE ABN    ekg 6/5/2020  Sinus tachy no acute abn      Assessment    Grandmother had CAD in her 66's   Diagnosis Orders   1. Chronic diastolic congestive heart failure ([address]     2. POTS (postural orthostatic tachycardia syndrome)     3. Fall, subsequent encounter     4. Morbid obesity ([address]     5. Orthostatic dizziness- occasional- not worse     6.  SOB (shortness of breath) on exertion     7. Bilateral leg edema =LYMPHEDEMA- ON f/U WITH LYMPHEDEMA CLINIC     8. Lymphedema of both lower extremities         Plan     THE  Current Labs and meds reviewed    Leg edema - LYMPHEDEMA ON MECHICAL RX  leg elevation  Ace wrap  Not possible due to leg anatomy      Continue the current treatment and with constant vigilance to changes in symptoms and also any potential side effects. Return for care or seek medical attention immediately if symptoms got worse and/or develop new symptoms. Congestive heart failure: no evidence of fluid overload today, no recent hospitalization for CHF  Lymphedema  CONT LASIX 20 po qd and may increase for 3 days a week if wt gain > 5 lb and leg edema  Taking more bannana and tomato  BMP and MG today and prior to next visit  Lost 5 lb in 5 months    Post syndrome Dxed 2015 on TTT  Dizziness chronic  Cont hydration  Recent freq fall  Bedside orthostatic eval- negative  Start midodrine 5 mg po bid and see response  Avoid prolonged standing    Hx of Coronary CTA result and stress test result d/w the pat    Morbid obesity  Losing wt  Advised to lose wt  Advised complaince with F/u    D/w the pat and mother the details plan of care    Mag 1.6  Need replacement  Start mag oxide 400 mg po qd for 2 month    Discussed use, benefit, and side effects of prescribed medications. All patient questions answered. Pt voiced understanding. Instructed to continue current medications, diet and exercise. Continue risk factor modification and medical management. Patient agreed with treatment plan. Follow up as directed.       RTC IN 2 months    Shiloh Miller, [facility]

## 2021-06-10 ENCOUNTER — ANESTHESIA (OUTPATIENT)
Dept: OPERATING ROOM | Age: 46
End: 2021-06-10
Payer: MEDICARE

## 2021-06-10 ENCOUNTER — HOSPITAL ENCOUNTER (OUTPATIENT)
Age: 46
Setting detail: OUTPATIENT SURGERY
Discharge: HOME | End: 2021-06-10
Attending: SURGERY | Admitting: SURGERY
Payer: MEDICARE

## 2021-06-10 ENCOUNTER — ANESTHESIA EVENT (OUTPATIENT)
Dept: OPERATING ROOM | Age: 46
End: 2021-06-10
Payer: MEDICARE

## 2021-06-10 VITALS — TEMPERATURE: 96.4 F | DIASTOLIC BLOOD PRESSURE: 64 MMHG | OXYGEN SATURATION: 100 % | SYSTOLIC BLOOD PRESSURE: 132 MMHG

## 2021-06-10 VITALS
SYSTOLIC BLOOD PRESSURE: 108 MMHG | RESPIRATION RATE: 16 BRPM | DIASTOLIC BLOOD PRESSURE: 58 MMHG | TEMPERATURE: 97.6 F | OXYGEN SATURATION: 99 % | HEART RATE: 83 BPM

## 2021-06-10 DIAGNOSIS — S31.109A OPEN WOUND OF ABDOMINAL WALL, INITIAL ENCOUNTER: Primary | ICD-10-CM

## 2021-06-10 LAB — POTASSIUM SERPL-SCNC: 3.8 MEQ/L (ref 3.5–5.2)

## 2021-06-10 PROCEDURE — 11045 DBRDMT SUBQ TISS EACH ADDL: CPT | Performed by: SURGERY

## 2021-06-10 PROCEDURE — 2709999900 HC NON-CHARGEABLE SUPPLY: Performed by: SURGERY

## 2021-06-10 PROCEDURE — 88304 TISSUE EXAM BY PATHOLOGIST: CPT

## 2021-06-10 PROCEDURE — 87205 SMEAR GRAM STAIN: CPT

## 2021-06-10 PROCEDURE — 7100000000 HC PACU RECOVERY - FIRST 15 MIN: Performed by: SURGERY

## 2021-06-10 PROCEDURE — 3700000001 HC ADD 15 MINUTES (ANESTHESIA): Performed by: SURGERY

## 2021-06-10 PROCEDURE — 2580000003 HC RX 258

## 2021-06-10 PROCEDURE — 6360000002 HC RX W HCPCS: Performed by: NURSE ANESTHETIST, CERTIFIED REGISTERED

## 2021-06-10 PROCEDURE — 87077 CULTURE AEROBIC IDENTIFY: CPT

## 2021-06-10 PROCEDURE — 6370000000 HC RX 637 (ALT 250 FOR IP): Performed by: SURGERY

## 2021-06-10 PROCEDURE — 7100000010 HC PHASE II RECOVERY - FIRST 15 MIN: Performed by: SURGERY

## 2021-06-10 PROCEDURE — 11042 DBRDMT SUBQ TIS 1ST 20SQCM/<: CPT | Performed by: SURGERY

## 2021-06-10 PROCEDURE — 3600000003 HC SURGERY LEVEL 3 BASE: Performed by: SURGERY

## 2021-06-10 PROCEDURE — 87186 SC STD MICRODIL/AGAR DIL: CPT

## 2021-06-10 PROCEDURE — 3700000000 HC ANESTHESIA ATTENDED CARE: Performed by: SURGERY

## 2021-06-10 PROCEDURE — 84132 ASSAY OF SERUM POTASSIUM: CPT

## 2021-06-10 PROCEDURE — 2500000003 HC RX 250 WO HCPCS: Performed by: NURSE ANESTHETIST, CERTIFIED REGISTERED

## 2021-06-10 PROCEDURE — 87147 CULTURE TYPE IMMUNOLOGIC: CPT

## 2021-06-10 PROCEDURE — 36415 COLL VENOUS BLD VENIPUNCTURE: CPT

## 2021-06-10 PROCEDURE — 87075 CULTR BACTERIA EXCEPT BLOOD: CPT

## 2021-06-10 PROCEDURE — 87102 FUNGUS ISOLATION CULTURE: CPT

## 2021-06-10 PROCEDURE — 7100000001 HC PACU RECOVERY - ADDTL 15 MIN: Performed by: SURGERY

## 2021-06-10 PROCEDURE — 6360000002 HC RX W HCPCS: Performed by: SURGERY

## 2021-06-10 PROCEDURE — 3600000013 HC SURGERY LEVEL 3 ADDTL 15MIN: Performed by: SURGERY

## 2021-06-10 PROCEDURE — 87070 CULTURE OTHR SPECIMN AEROBIC: CPT

## 2021-06-10 PROCEDURE — 7100000011 HC PHASE II RECOVERY - ADDTL 15 MIN: Performed by: SURGERY

## 2021-06-10 RX ORDER — MEPERIDINE HYDROCHLORIDE 25 MG/ML
12.5 INJECTION INTRAMUSCULAR; INTRAVENOUS; SUBCUTANEOUS EVERY 5 MIN PRN
Status: DISCONTINUED | OUTPATIENT
Start: 2021-06-10 | End: 2021-06-10 | Stop reason: HOSPADM

## 2021-06-10 RX ORDER — LIDOCAINE HCL/PF 100 MG/5ML
SYRINGE (ML) INJECTION PRN
Status: DISCONTINUED | OUTPATIENT
Start: 2021-06-10 | End: 2021-06-10 | Stop reason: SDUPTHER

## 2021-06-10 RX ORDER — MORPHINE SULFATE 2 MG/ML
2 INJECTION, SOLUTION INTRAMUSCULAR; INTRAVENOUS
Status: DISCONTINUED | OUTPATIENT
Start: 2021-06-10 | End: 2021-06-10 | Stop reason: HOSPADM

## 2021-06-10 RX ORDER — ONDANSETRON 2 MG/ML
4 INJECTION INTRAMUSCULAR; INTRAVENOUS EVERY 6 HOURS PRN
Status: DISCONTINUED | OUTPATIENT
Start: 2021-06-10 | End: 2021-06-10 | Stop reason: HOSPADM

## 2021-06-10 RX ORDER — OXYCODONE HYDROCHLORIDE 5 MG/1
10 TABLET ORAL EVERY 4 HOURS PRN
Status: DISCONTINUED | OUTPATIENT
Start: 2021-06-10 | End: 2021-06-10 | Stop reason: HOSPADM

## 2021-06-10 RX ORDER — SODIUM CHLORIDE 9 MG/ML
INJECTION, SOLUTION INTRAVENOUS CONTINUOUS
Status: DISCONTINUED | OUTPATIENT
Start: 2021-06-10 | End: 2021-06-10 | Stop reason: HOSPADM

## 2021-06-10 RX ORDER — ONDANSETRON 2 MG/ML
4 INJECTION INTRAMUSCULAR; INTRAVENOUS
Status: DISCONTINUED | OUTPATIENT
Start: 2021-06-10 | End: 2021-06-10 | Stop reason: HOSPADM

## 2021-06-10 RX ORDER — OXYCODONE HYDROCHLORIDE 5 MG/1
5 TABLET ORAL EVERY 4 HOURS PRN
Status: DISCONTINUED | OUTPATIENT
Start: 2021-06-10 | End: 2021-06-10 | Stop reason: HOSPADM

## 2021-06-10 RX ORDER — SODIUM CHLORIDE 0.9 % (FLUSH) 0.9 %
5-40 SYRINGE (ML) INJECTION PRN
Status: DISCONTINUED | OUTPATIENT
Start: 2021-06-10 | End: 2021-06-10 | Stop reason: HOSPADM

## 2021-06-10 RX ORDER — FENTANYL CITRATE 50 UG/ML
50 INJECTION, SOLUTION INTRAMUSCULAR; INTRAVENOUS EVERY 5 MIN PRN
Status: DISCONTINUED | OUTPATIENT
Start: 2021-06-10 | End: 2021-06-10 | Stop reason: HOSPADM

## 2021-06-10 RX ORDER — SODIUM CHLORIDE 9 MG/ML
25 INJECTION, SOLUTION INTRAVENOUS PRN
Status: DISCONTINUED | OUTPATIENT
Start: 2021-06-10 | End: 2021-06-10 | Stop reason: HOSPADM

## 2021-06-10 RX ORDER — HYDROCODONE BITARTRATE AND ACETAMINOPHEN 5; 325 MG/1; MG/1
1-2 TABLET ORAL EVERY 6 HOURS PRN
Qty: 20 TABLET | Refills: 0 | Status: SHIPPED | OUTPATIENT
Start: 2021-06-10 | End: 2021-06-13

## 2021-06-10 RX ORDER — ONDANSETRON 2 MG/ML
INJECTION INTRAMUSCULAR; INTRAVENOUS PRN
Status: DISCONTINUED | OUTPATIENT
Start: 2021-06-10 | End: 2021-06-10 | Stop reason: SDUPTHER

## 2021-06-10 RX ORDER — LABETALOL 20 MG/4 ML (5 MG/ML) INTRAVENOUS SYRINGE
5 EVERY 10 MIN PRN
Status: DISCONTINUED | OUTPATIENT
Start: 2021-06-10 | End: 2021-06-10 | Stop reason: HOSPADM

## 2021-06-10 RX ORDER — KETOROLAC TROMETHAMINE 10 MG/1
10 TABLET, FILM COATED ORAL EVERY 8 HOURS PRN
Qty: 15 TABLET | Refills: 0 | Status: SHIPPED | OUTPATIENT
Start: 2021-06-10 | End: 2022-03-09 | Stop reason: ALTCHOICE

## 2021-06-10 RX ORDER — ONDANSETRON 4 MG/1
4 TABLET, ORALLY DISINTEGRATING ORAL EVERY 8 HOURS PRN
Status: DISCONTINUED | OUTPATIENT
Start: 2021-06-10 | End: 2021-06-10 | Stop reason: HOSPADM

## 2021-06-10 RX ORDER — SUCCINYLCHOLINE/SOD CL,ISO/PF 200MG/10ML
SYRINGE (ML) INTRAVENOUS PRN
Status: DISCONTINUED | OUTPATIENT
Start: 2021-06-10 | End: 2021-06-10 | Stop reason: SDUPTHER

## 2021-06-10 RX ORDER — SODIUM CHLORIDE 0.9 % (FLUSH) 0.9 %
5-40 SYRINGE (ML) INJECTION EVERY 12 HOURS SCHEDULED
Status: DISCONTINUED | OUTPATIENT
Start: 2021-06-10 | End: 2021-06-10 | Stop reason: HOSPADM

## 2021-06-10 RX ORDER — MIDAZOLAM HYDROCHLORIDE 1 MG/ML
INJECTION INTRAMUSCULAR; INTRAVENOUS PRN
Status: DISCONTINUED | OUTPATIENT
Start: 2021-06-10 | End: 2021-06-10 | Stop reason: SDUPTHER

## 2021-06-10 RX ORDER — MORPHINE SULFATE 2 MG/ML
4 INJECTION, SOLUTION INTRAMUSCULAR; INTRAVENOUS
Status: DISCONTINUED | OUTPATIENT
Start: 2021-06-10 | End: 2021-06-10 | Stop reason: HOSPADM

## 2021-06-10 RX ORDER — PROPOFOL 10 MG/ML
INJECTION, EMULSION INTRAVENOUS PRN
Status: DISCONTINUED | OUTPATIENT
Start: 2021-06-10 | End: 2021-06-10 | Stop reason: SDUPTHER

## 2021-06-10 RX ORDER — FENTANYL CITRATE 50 UG/ML
INJECTION, SOLUTION INTRAMUSCULAR; INTRAVENOUS PRN
Status: DISCONTINUED | OUTPATIENT
Start: 2021-06-10 | End: 2021-06-10 | Stop reason: SDUPTHER

## 2021-06-10 RX ORDER — DEXAMETHASONE SODIUM PHOSPHATE 10 MG/ML
INJECTION, EMULSION INTRAMUSCULAR; INTRAVENOUS PRN
Status: DISCONTINUED | OUTPATIENT
Start: 2021-06-10 | End: 2021-06-10 | Stop reason: SDUPTHER

## 2021-06-10 RX ORDER — ROCURONIUM BROMIDE 10 MG/ML
INJECTION, SOLUTION INTRAVENOUS PRN
Status: DISCONTINUED | OUTPATIENT
Start: 2021-06-10 | End: 2021-06-10 | Stop reason: SDUPTHER

## 2021-06-10 RX ADMIN — DEXAMETHASONE SODIUM PHOSPHATE 4 MG: 10 INJECTION, EMULSION INTRAMUSCULAR; INTRAVENOUS at 11:04

## 2021-06-10 RX ADMIN — FENTANYL CITRATE 50 MCG: 50 INJECTION, SOLUTION INTRAMUSCULAR; INTRAVENOUS at 11:17

## 2021-06-10 RX ADMIN — Medication 80 MG: at 10:57

## 2021-06-10 RX ADMIN — PROPOFOL 150 MG: 10 INJECTION, EMULSION INTRAVENOUS at 10:57

## 2021-06-10 RX ADMIN — Medication 3000 MG: at 11:06

## 2021-06-10 RX ADMIN — ROCURONIUM BROMIDE 5 MG: 10 INJECTION INTRAVENOUS at 10:57

## 2021-06-10 RX ADMIN — Medication 140 MG: at 10:57

## 2021-06-10 RX ADMIN — MIDAZOLAM 2 MG: 1 INJECTION INTRAMUSCULAR; INTRAVENOUS at 10:47

## 2021-06-10 RX ADMIN — SODIUM CHLORIDE: 9 INJECTION, SOLUTION INTRAVENOUS at 10:12

## 2021-06-10 RX ADMIN — FENTANYL CITRATE 100 MCG: 50 INJECTION, SOLUTION INTRAMUSCULAR; INTRAVENOUS at 10:57

## 2021-06-10 RX ADMIN — ONDANSETRON HYDROCHLORIDE 4 MG: 4 INJECTION, SOLUTION INTRAMUSCULAR; INTRAVENOUS at 11:35

## 2021-06-10 RX ADMIN — FENTANYL CITRATE 50 MCG: 50 INJECTION, SOLUTION INTRAMUSCULAR; INTRAVENOUS at 11:12

## 2021-06-10 RX ADMIN — SODIUM CHLORIDE: 9 INJECTION, SOLUTION INTRAVENOUS at 11:35

## 2021-06-10 RX ADMIN — OXYCODONE 5 MG: 5 TABLET ORAL at 13:37

## 2021-06-10 ASSESSMENT — PULMONARY FUNCTION TESTS
PIF_VALUE: 1
PIF_VALUE: 1
PIF_VALUE: 22
PIF_VALUE: 24
PIF_VALUE: 24
PIF_VALUE: 22
PIF_VALUE: 1
PIF_VALUE: 20
PIF_VALUE: 21
PIF_VALUE: 22
PIF_VALUE: 21
PIF_VALUE: 20
PIF_VALUE: 21
PIF_VALUE: 7
PIF_VALUE: 20
PIF_VALUE: 21
PIF_VALUE: 5
PIF_VALUE: 24
PIF_VALUE: 20
PIF_VALUE: 23
PIF_VALUE: 1
PIF_VALUE: 35
PIF_VALUE: 24
PIF_VALUE: 8
PIF_VALUE: 1
PIF_VALUE: 22
PIF_VALUE: 21
PIF_VALUE: 23
PIF_VALUE: 7
PIF_VALUE: 23
PIF_VALUE: 23
PIF_VALUE: 24
PIF_VALUE: 21
PIF_VALUE: 25
PIF_VALUE: 5
PIF_VALUE: 23
PIF_VALUE: 23
PIF_VALUE: 3
PIF_VALUE: 2
PIF_VALUE: 27
PIF_VALUE: 22
PIF_VALUE: 21
PIF_VALUE: 18
PIF_VALUE: 1
PIF_VALUE: 1
PIF_VALUE: 20
PIF_VALUE: 8
PIF_VALUE: 21
PIF_VALUE: 0
PIF_VALUE: 21
PIF_VALUE: 25
PIF_VALUE: 23
PIF_VALUE: 22
PIF_VALUE: 4
PIF_VALUE: 22
PIF_VALUE: 24
PIF_VALUE: 21
PIF_VALUE: 24
PIF_VALUE: 22
PIF_VALUE: 22
PIF_VALUE: 8
PIF_VALUE: 29
PIF_VALUE: 24
PIF_VALUE: 22
PIF_VALUE: 24
PIF_VALUE: 1
PIF_VALUE: 21

## 2021-06-10 ASSESSMENT — PAIN SCALES - GENERAL
PAINLEVEL_OUTOF10: 6
PAINLEVEL_OUTOF10: 8
PAINLEVEL_OUTOF10: 6

## 2021-06-10 ASSESSMENT — ENCOUNTER SYMPTOMS: SHORTNESS OF BREATH: 1

## 2021-06-10 NOTE — PROGRESS NOTES
Patient admitted to [facility] room 18 with mother at bedside. Bed in low position side rails up call light in reach. Patient denies questions at this time.

## 2021-06-10 NOTE — ANESTHESIA POSTPROCEDURE EVALUATION
Department of Anesthesiology  Postprocedure Note    Patient: Mary   MRN: [id number]  YOB: 1975  Date of evaluation: 6/10/2021  Time:  2:01 PM     Procedure Summary     Date: 06/10/21 Room / Location: [address] [place]    Anesthesia Start: 1048 Anesthesia Stop: 9064    Procedure: EXCISIONAL DEBRIDEMENT AND CLOSURE OF CHRONIC ABDOMINAL WOUND (N/A Abdomen) Diagnosis: (CHRONIC ABDOMINAL WOUND)    Surgeons: Kendra Burton MD Responsible Provider: Kylie Gr DO    Anesthesia Type: general ASA Status: 3          Anesthesia Type: general    Mata Phase I: Mata Score: 10    Mata Phase II: Mata Score: 10    Last vitals: Reviewed and per EMR flowsheets.        Anesthesia Post Evaluation    Patient location during evaluation: PACU  Patient participation: complete - patient participated  Level of consciousness: awake and alert  Pain score: 2  Airway patency: patent  Nausea & Vomiting: no nausea and no vomiting  Complications: no  Cardiovascular status: hemodynamically stable and blood pressure returned to baseline  Respiratory status: spontaneous ventilation, room air and acceptable  Hydration status: stable

## 2021-06-10 NOTE — ANESTHESIA PRE PROCEDURE
(SEROQUEL PO) Take 400 mg by mouth 2 times daily. Historical Provider, MD   atomoxetine (STRATTERA) 40 MG capsule Take 40 mg by mouth 2 times daily. Historical Provider, MD   aripiprazole (ABILIFY) 10 MG tablet Take 15 mg by mouth 2 times daily     Historical Provider, MD   ACYCLOVIR 400 mg by Does not apply route three times a week     Historical Provider, MD       Current medications:    Current Facility-Administered Medications   Medication Dose Route Frequency Provider Last Rate Last Admin    ceFAZolin (ANCEF) 3000 mg in dextrose 5 % 100 mL IVPB  3,000 mg Intravenous 30 Min Pre-Op Demar Nugent MD        0.9 % sodium chloride infusion   Intravenous Continuous Rebecca Cunningham LPN           Allergies: Allergies   Allergen Reactions    Latex Rash    Cat Hair Extract     Seasonal        Problem List:    Patient Active Problem List   Diagnosis Code    Keloid L91.0    MRSA (methicillin resistant staph aureus) culture positive Z22.322    Skin ulcer of abdominal wall ([facility] L98.499    POTS (postural orthostatic tachycardia syndrome) I49.8    Orthostatic dizziness- occasional- not worse R42    CHF (congestive heart failure) ([facility]) chronic diastolic A98.1    Morbid obesity ([facility]) E66.01    Bilateral leg edema =LYMPHEDEMA- ON f/U WITH LYMPHEDEMA CLINIC R60.0    Lymphedema of both lower extremities I89.0    SOB (shortness of breath) on exertion R06.02    Candidiasis, intertriginous B37.2    Dermatitis L30.9    Fall- freq with no LOC W19. 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 Mabry Nails 2014    TOE SURGERY  09/2019    Dr. Camille Fuller History:    Social History     Tobacco Use    Smoking status: Never Smoker    Smokeless tobacco: Never Used   Substance Use Topics    Alcohol use: No                                Counseling given: Not Answered      Vital Signs (Current): There were no vitals filed for this visit. BP Readings from Last 3 Encounters:   06/08/21 110/60   05/04/21 129/73   04/28/21 125/78       NPO Status:                                                                                 BMI:   Wt Readings from Last 3 Encounters:   06/08/21 (!) 302 lb 6.4 oz (137.2 kg)   05/04/21 (!) 310 lb 3.2 oz (140.7 kg)   04/28/21 (!) 313 lb (142 kg)     There is no height or weight on file to calculate BMI.    CBC:   Lab Results   Component Value Date    WBC 7.7 10/13/2020    RBC 4.99 10/13/2020    HGB 14.5 05/29/2021    HCT 46.4 05/29/2021    MCV 93.2 10/13/2020     10/13/2020       CMP:   Lab Results   Component Value Date     05/29/2021    K 4.3 05/29/2021     05/29/2021    CO2 23 05/29/2021    BUN 10 05/29/2021    CREATININE 0.7 05/29/2021    LABGLOM >90 05/29/2021    GLUCOSE 84 05/29/2021    PROT 7.7 10/13/2020    CALCIUM 9.1 05/29/2021    BILITOT 0.4 10/13/2020    ALKPHOS 85 10/13/2020    AST 14 10/13/2020    ALT 15 10/13/2020       POC Tests: No results for input(s): POCGLU, POCNA, POCK, POCCL, POCBUN, POCHEMO, POCHCT in the last 72 hours.     Coags: No results found for: PROTIME, INR, APTT    HCG (If Applicable): No results found for: PREGTESTUR, PREGSERUM, HCG, HCGQUANT     ABGs: No results found for: PHART, PO2ART, RPG8TWB, KUP2XVL, BEART, V0XMNNAQ     Type & Screen (If Applicable):  No results found for: Isis Li Drug/Infectious Status (If Applicable):  No results found for: HIV, HEPCAB    COVID-19 Screening (If Applicable):   Lab Results   Component Value Date    COVID19 Detected 01/22/2021           Anesthesia Evaluation  Patient summary reviewed and Nursing notes reviewed no history of anesthetic complications:   Airway: Mallampati: III  TM distance: >3 FB   Neck ROM: full  Mouth opening: > = 3 FB Dental:          Pulmonary:normal exam  breath sounds clear to auscultation  (+) shortness of breath:                             Cardiovascular:  Exercise tolerance: good (>4 METS),   (+) CHF:, PERERA:,                   Neuro/Psych:   (+) psychiatric history:            GI/Hepatic/Renal:   (+) morbid obesity          Endo/Other: Negative Endo/Other ROS             Pt had no PAT visit       Abdominal:           Vascular: negative vascular ROS. Anesthesia Plan      general     ASA 3       Induction: intravenous. MIPS: Postoperative opioids intended and Prophylactic antiemetics administered. Anesthetic plan and risks discussed with patient. Plan discussed with CRNA.                   [address],    6/10/2021

## 2021-06-10 NOTE — PROGRESS NOTES
1203  Pt. Responds to name on adm. To pacu. Pt. Coughing. Abdominal dressing intact and dry. Ice applied. 1215  Pt. Awake and oriented. Pt. Denies pain. o2 discontinued. 1235  pacu criteria met. Transfer to [facility] [place].

## 2021-06-11 ENCOUNTER — TELEPHONE (OUTPATIENT)
Dept: SURGERY | Age: 46
End: 2021-06-11

## 2021-06-11 NOTE — OP NOTE
[address] [id number]                                OPERATIVE REPORT    PATIENT NAME: Michael Parham                :        1975  MED REC NO:   [id number]                           ROOM:  ACCOUNT NO:   [de-identified]                           ADMIT DATE: 06/10/2021  PROVIDER:     Brie Martin M.D.    Marcello Menchacaerin/10/2021    PREOPERATIVE DIAGNOSIS:  Nonhealing abdominal wall wound. POSTOPERATIVE DIAGNOSIS:  Nonhealing abdominal wall wound. PROCEDURE:  Excisional debridement of nonhealing abdominal wall skin and  subcutaneous tissue wound with primary closure (18x9 cm). SURGEON:  Brie Martin MD    ASSISTANT:  Mitzi Pickering. TITO Reyna    ANESTHESIA:  General.    ESTIMATED BLOOD LOSS:  20 mL. DRAINS:  None. COMPLICATIONS:  None. DISPOSITION:  Stable to the recovery room. INDICATIONS:  The patient is a 68-year-old female who has had problems  with nonhealing abdominal wall wounds. She has had multiple surgeries  in the past and trying to get these healed. She has a large open  keloid-like wound that is in need of debridement/excision. Both  operative and nonoperative intervention plans were discussed with her  and POA. All questions were answered. They wished to proceed. DESCRIPTION OF THE PROCEDURE:  After informed consent was signed and  placed on the chart, the patient was taken back to the operating room  and placed supine on the operating room table. General anesthesia was  induced. She tolerated this well throughout the case. All pressure  points were padded. She was on preoperative antibiotics. Bilateral  lower extremity sequential compression devices were placed prior to  incision. Her abdomen and pelvis were prepped and draped in the usual  sterile standard fashion.   A timeout occurred prior to the operation  which not only identified the patient, but also the planned procedure to  be performed. At the end of the timeout, there were no questions or  concerns. I began the operation by making an elliptical excision around  the large wound. This encompassed two smaller wounds that has developed  just below this. This was then excised in its entirety using a sharp  dissection with scalpel as well as with electrocautery. This ended up  being an 18x9 cm debridement of skin and subcutaneous tissue. No  deeper muscle/fascial layers were involved. This was sent to Pathology  for cultures, but then also for permanent. Deep subcutaneous tissues  appeared to be healthy. Irrigation with Irrisept. No other  abnormalities were identified. The wound was then undermined slightly  below the skin and then there was felt to be adequate reapproximation of  the tissues with no undue tension and then at that time, we elected to  primarily close this with multiple interrupted 2-0 vertical mattress  interrupted Prolene sutures. Dry sterile dressing was applied. Sponge,  needle, and instrumentation count was correct at the end of the  procedure. The patient tolerated the procedure well with no apparent  complications and less than 20 mL of blood loss. She was able to be  brought out of general anesthesia and transferred to the postanesthesia  care unit in stable condition.         Dexter Ellis M.D.    D: 06/10/2021 12:19:45       T: 06/10/2021 14:10:24     [email or web address]  Job#: [id number]     Doc#: [id number]

## 2021-06-15 LAB
AEROBIC CULTURE: ABNORMAL
AEROBIC CULTURE: ABNORMAL
ANAEROBIC CULTURE: ABNORMAL
GRAM STAIN RESULT: ABNORMAL
ORGANISM: ABNORMAL

## 2021-06-18 ENCOUNTER — TELEPHONE (OUTPATIENT)
Dept: SURGERY | Age: 46
End: 2021-06-18

## 2021-06-18 RX ORDER — SULFAMETHOXAZOLE AND TRIMETHOPRIM 800; 160 MG/1; MG/1
1 TABLET ORAL 2 TIMES DAILY
Qty: 20 TABLET | Refills: 0 | Status: SHIPPED | OUTPATIENT
Start: 2021-06-18 | End: 2021-06-28

## 2021-06-18 NOTE — TELEPHONE ENCOUNTER
patient s/p- Excisional debridement of nonhealing abdominal wall skin and  subcutaneous tissue wound with primary closure (18x9 cm) 6/10- mother calling stating that yesterday started some drainage from the incision stated it is a thicker white/ grey in color. Slight redness around the incision, no fever or chills. Not warm to touch. Please advise.

## 2021-06-23 ENCOUNTER — OFFICE VISIT (OUTPATIENT)
Dept: SURGERY | Age: 46
End: 2021-06-23

## 2021-06-23 VITALS
HEART RATE: 81 BPM | HEIGHT: 66 IN | OXYGEN SATURATION: 96 % | SYSTOLIC BLOOD PRESSURE: 108 MMHG | BODY MASS INDEX: 47.09 KG/M2 | TEMPERATURE: 96.8 F | RESPIRATION RATE: 18 BRPM | WEIGHT: 293 LBS | DIASTOLIC BLOOD PRESSURE: 78 MMHG

## 2021-06-23 DIAGNOSIS — Z98.890 S/P EXCISIONAL DEBRIDEMENT: Primary | ICD-10-CM

## 2021-06-23 PROCEDURE — 99024 POSTOP FOLLOW-UP VISIT: CPT | Performed by: NURSE PRACTITIONER

## 2021-06-23 ASSESSMENT — ENCOUNTER SYMPTOMS
RECTAL PAIN: 0
CHEST TIGHTNESS: 0
ABDOMINAL DISTENTION: 0
COUGH: 0
SHORTNESS OF BREATH: 0
DIARRHEA: 0
EYE PAIN: 0
ABDOMINAL PAIN: 0
SORE THROAT: 0
VOICE CHANGE: 0
CONSTIPATION: 0
NAUSEA: 0
FACIAL SWELLING: 0
WHEEZING: 0
TROUBLE SWALLOWING: 0
BACK PAIN: 0
EYE DISCHARGE: 0
COLOR CHANGE: 0
ANAL BLEEDING: 0
BLOOD IN STOOL: 0
SINUS PRESSURE: 0
EYE ITCHING: 0
STRIDOR: 0
EYE REDNESS: 0
APNEA: 0
CHOKING: 0
PHOTOPHOBIA: 0
RHINORRHEA: 0
VOMITING: 0

## 2021-06-23 NOTE — PROGRESS NOTES
[address] [place] [address] Dr Craig [facility] [address]  Dept: [phone]  Dept Fax: [phone]  Loc: [phone]    Visit Date: 6/23/2021    Janna Fields is a 42 y.o. female who presents today for:  Chief Complaint   Patient presents with    Post-Op Check     s/p Excisional debridement of nonhealing abdominal wall skin and subcutaneous tissue wound with primary closure (18x9 cm)-6/10/2021       HPI:     HPI    Calvert Simmonds is a 45-year old female patient who presents for follow up status post excisional debridement of non-healing abdominal wall wound with primary closure 2 weeks ago with Dr. Akash Coelho. Presents with mother. Patient has significant history of ADHD and schizophrenia. She was started on Bactrim BID on 6/18/21 based on wound culture from surgery. Wound (+) staph aureus. Mother states wound drainage has slightly improved. The abdominal incision is well approximated with sutures. Sutures are under a lot of tension but no breakdown at this time. Minimal erythema from sutures but no other significant signs of infection. Patient is off all pain medication. She is taking tylenol as needed for soreness. Appetite and bowel function normal. No nausea or vomiting. No SOB or chest pain. She is ambulating well without issues. No fevers or chills. Living with mother right now as she gets healed up from surgery.      Past Medical History:   Diagnosis Date    ADHD (attention deficit hyperactivity disorder)     Artificial skin graft or decellularized allodermis mechanical complic     during hospital visit    Bronchitis     CHF (congestive heart failure) ([address]     Elbow fracture 10/09/2014    left    Irritable bowel syndrome     Irritable bowel    Lymph edema 2017    Obesity     Conde disease     Conde Syndrome    Schizophrenia ([facility]     Seasonal allergies       Past Surgical History:   Procedure Laterality Date    ABDOMEN SURGERY N/A 6/10/2021    EXCISIONAL DEBRIDEMENT AND CLOSURE OF CHRONIC ABDOMINAL WOUND performed by Isidro Mckeon MD at [facility] [address]- Dr. Kacey Mcleod 2014    TOE SURGERY  09/2019    Dr. Paredes Presume         Family History   Problem Relation Age of Onset    Diabetes Father     Cancer Maternal Grandmother     Cancer Paternal Grandmother        Social History     Tobacco Use    Smoking status: Never Smoker    Smokeless tobacco: Never Used   Substance Use Topics    Alcohol use: No      Current Outpatient Medications   Medication Sig Dispense Refill    sulfamethoxazole-trimethoprim (BACTRIM DS) 800-160 MG per tablet Take 1 tablet by mouth 2 times daily for 10 days 20 tablet 0    ketorolac (TORADOL) 10 MG tablet Take 1 tablet by mouth every 8 hours as needed for Pain 15 tablet 0    magnesium oxide (MAG-OX) 400 MG tablet Take 1 tablet by mouth daily 30 tablet 1    midodrine (PROAMATINE) 5 MG tablet Take 1 tablet by mouth 3 times daily 90 tablet 3    furosemide (LASIX) 20 MG tablet ALTERNATE 1 TABLET EVERY OTHER DAY WITH 2 TABLETS EVERY OTHER  tablet 3    metoprolol succinate (TOPROL XL) 25 MG extended release tablet Take 1 tablet by mouth daily 90 tablet 3    traZODone (DESYREL) 50 MG tablet Take 25 mg by mouth nightly      fluticasone (FLONASE) 50 MCG/ACT nasal spray 1 spray by Each Nostril route daily 1 Bottle 0    ibuprofen (ADVIL;MOTRIN) 800 MG tablet Take 1 tablet by mouth every 8 hours as needed for Pain 15 tablet 0    fluocinonide (LIDEX) 0.05 % cream Apply topically daily.  60 g 3    acetaminophen (TYLENOL) 500 MG tablet Take 1,000 mg by mouth every 6 hours as needed for Pain      Cholecalciferol (VITAMIN D3) 5000 units TABS Take 2,000 Units by mouth       MedroxyPROGESTERone Acetate (DEPO-PROVERA IM) Inject into the muscle      miconazole (MICOTIN) 2 % powder Apply topically 2 times to 3 times infections. Testing Performed By    [address] Name Director Address Valid Date Range   204-MK - [id number] Good Witt MD [address] [place] [id number] 08/30/17 0855-Present   Narrative  Performed by: [facility]  Source: tissue       Site: abd          Current Antibiotics: not stated   Susceptibility    Staphylococcus aureus (1)    Antibiotic Interpretation VIN Status    gentamicin Sensitive <=0.5 mcg/mL Final    oxacillin Sensitive 0.5 mcg/mL Final    clindamycin Sensitive <=0.25 mcg/mL Final    trimethoprim-sulfamethoxazole Sensitive <=10 mcg/mL Final    tetracycline Sensitive <=1 mcg/mL Final      Patient Active Problem List   Diagnosis    Keloid    MRSA (methicillin resistant staph aureus) culture positive    [facility] ulcer of abdominal wall ([facility])    POTS (postural orthostatic tachycardia syndrome)    Orthostatic dizziness- occasional- not worse    CHF (congestive heart failure) ([facility]) chronic diastolic    Morbid obesity ([facility])    Bilateral leg edema =LYMPHEDEMA- ON f/U WITH LYMPHEDEMA CLINIC    Lymphedema of both lower extremities    SOB (shortness of breath) on exertion    Candidiasis, intertriginous    Dermatitis    Fall- freq with no LOC    Open abdominal wall wound     Assessment:     1. Status post excisional debridement of non-healing abdominal wall wound with primary closure   2. Morbid obesity (BMI 48)  3. Chronic non-healing abdominal ulcers  4. ADHD  5. Schizophrenia     Plan:     1. Incision healing well without signs of infection. There is tension but no breakdown right now. Continue wound care as directed. 2. Every third suture removed from incision  3. Wound culture reviewed with mother and patient. Continue Bactrim BID until completed. 4. Appetite and bowel function normal  5. Off narcotics. Tylenol as needed for discomfort.   6. Lifting/activity restrictions discussed with patient and

## 2021-06-30 ENCOUNTER — OFFICE VISIT (OUTPATIENT)
Dept: SURGERY | Age: 46
End: 2021-06-30

## 2021-06-30 VITALS
WEIGHT: 293 LBS | HEART RATE: 110 BPM | BODY MASS INDEX: 47.09 KG/M2 | SYSTOLIC BLOOD PRESSURE: 110 MMHG | DIASTOLIC BLOOD PRESSURE: 62 MMHG | RESPIRATION RATE: 18 BRPM | TEMPERATURE: 96.5 F | OXYGEN SATURATION: 96 % | HEIGHT: 66 IN

## 2021-06-30 DIAGNOSIS — Z98.890 S/P EXCISIONAL DEBRIDEMENT: Primary | ICD-10-CM

## 2021-06-30 PROCEDURE — 99024 POSTOP FOLLOW-UP VISIT: CPT | Performed by: NURSE PRACTITIONER

## 2021-06-30 ASSESSMENT — ENCOUNTER SYMPTOMS
EYE REDNESS: 0
ANAL BLEEDING: 0
SHORTNESS OF BREATH: 0
TROUBLE SWALLOWING: 0
CONSTIPATION: 0
COLOR CHANGE: 0
NAUSEA: 0
EYE ITCHING: 0
BLOOD IN STOOL: 0
EYE DISCHARGE: 0
APNEA: 0
RECTAL PAIN: 0
EYE PAIN: 0
CHOKING: 0
CHEST TIGHTNESS: 0
PHOTOPHOBIA: 0
RHINORRHEA: 0
COUGH: 0
VOICE CHANGE: 0
DIARRHEA: 0
SORE THROAT: 0
WHEEZING: 0
ABDOMINAL DISTENTION: 0
VOMITING: 0
ABDOMINAL PAIN: 0
SINUS PRESSURE: 0
BACK PAIN: 0
STRIDOR: 0
FACIAL SWELLING: 0

## 2021-06-30 NOTE — PROGRESS NOTES
[address] [place] [address] Dr Craig E [facility] [address]  Dept: [phone]  Dept Fax: [phone]  Loc: [phone]    Visit Date: 6/30/2021    Britney James is a 42 y.o. female who presents today for:  Chief Complaint   Patient presents with    Post-Op Check     s/p Excisional debridement of nonhealing abdominal wall skin and subcutaneous tissue wound with primary closure (18x9 cm)-6/10/2021 Last seen in the office on 6/23/2021-remove sutures       HPI:     HPI    Johanna Luque is a 45-year old female patient who presents for follow up status post excisional debridement of non-healing abdominal wall wound with primary closure 3 weeks ago with Dr. Kesha Alberto. Presents with mother. Patient has history of MRDD, ADHD and schizophrenia. She was started on Bactrim BID on 6/18/21 based on wound culture from surgery and completed her last dose this morning. Wound (+) staph aureus. Mother states yesterday part of the incision dehisced. It is draining more serosanguinous fluid. More pain in that area but other all not much discomfort. The abdominal incision is still approximated with sutures to the rest of the wound. Sutures are under a lot of tension. There is some erythema from sutures but no other significant signs of infection. No narcotic use. She is taking tylenol as needed for pain. Appetite and bowel function normal. No nausea or vomiting. No SOB or chest pain. She is ambulating well without issues. No fevers or chills. Patient is going back to her own apartment today.      Past Medical History:   Diagnosis Date    ADHD (attention deficit hyperactivity disorder)     Artificial skin graft or decellularized allodermis mechanical complic     during hospital visit    Bronchitis     [facility] (congest[facility] failure) ([facility]     Elbow fracture 10/09/2014    left    Irritable bowel syndrome     Irritable bowel    Lymph edema 2017    Obesity     Conde disease     Conde Syndrome    [facility]     Seasonal allergies       Past Surgical History:   Procedure Laterality Date    ABDOMEN SURGERY N/A 6/10/2021    EXCISIONAL DEBRIDEMENT AND CLOSURE OF CHRONIC ABDOMINAL WOUND performed by Haider Duarte MD at [facility] [address]- Dr. Flor Saavedra 2014    TOE SURGERY  09/2019    Dr. Carlos Velazquez         Family History   Problem Relation Age of Onset    Diabetes Father     Cancer Maternal Grandmother     Cancer Paternal Grandmother        Social History     Tobacco Use    Smoking status: Never Smoker    Smokeless tobacco: Never Used   Substance Use Topics    Alcohol use: No      Current Outpatient Medications   Medication Sig Dispense Refill    ketorolac (TORADOL) 10 MG tablet Take 1 tablet by mouth every 8 hours as needed for Pain 15 tablet 0    magnesium oxide (MAG-OX) 400 MG tablet Take 1 tablet by mouth daily 30 tablet 1    midodrine (PROAMATINE) 5 MG tablet Take 1 tablet by mouth 3 times daily 90 tablet 3    furosemide (LASIX) 20 MG tablet ALTERNATE 1 TABLET EVERY OTHER DAY WITH 2 TABLETS EVERY OTHER  tablet 3    metoprolol succinate (TOPROL XL) 25 MG extended release tablet Take 1 tablet by mouth daily 90 tablet 3    traZODone (DESYREL) 50 MG tablet Take 25 mg by mouth nightly      fluticasone (FLONASE) 50 MCG/ACT nasal spray 1 spray by Each Nostril route daily 1 Bottle 0    ibuprofen (ADVIL;MOTRIN) 800 MG tablet Take 1 tablet by mouth every 8 hours as needed for Pain 15 tablet 0    fluocinonide (LIDEX) 0.05 % cream Apply topically daily.  60 g 3    acetaminophen (TYLENOL) 500 MG tablet Take 1,000 mg by mouth every 6 hours as needed for Pain      Cholecalciferol (VITAMIN D3) 5000 units TABS Take 2,000 Units by mouth       MedroxyPROGESTERone Acetate (DEPO-PROVERA IM) Inject into the muscle      miconazole (MICOTIN) 2 % powder Apply topically 2 times to 3 times  daily. 45 g 1    Dicyclomine HCl (BENTYL PO)   Take 20 mg by mouth 2 times daily       QUEtiapine Fumarate (SEROQUEL PO) Take 400 mg by mouth 2 times daily.  atomoxetine (STRATTERA) 40 MG capsule Take 40 mg by mouth 2 times daily.  aripiprazole (ABILIFY) 10 MG tablet Take 15 mg by mouth 2 times daily       ACYCLOVIR 400 mg by Does not apply route three times a week        No current facility-administered medications for this visit. Allergies   Allergen Reactions    Latex Rash    Cat Hair Extract     Seasonal        Subjective:     Review of Systems   Constitutional: Negative for activity change, appetite change, chills, diaphoresis, fatigue, fever and unexpected weight change. HENT: Negative for congestion, dental problem, drooling, ear discharge, ear pain, facial swelling, hearing loss, mouth sores, nosebleeds, postnasal drip, rhinorrhea, sinus pressure, sneezing, sore throat, tinnitus, trouble swallowing and voice change. Eyes: Negative for photophobia, pain, discharge, redness, itching and visual disturbance. Respiratory: Negative for apnea, cough, choking, chest tightness, shortness of breath, wheezing and stridor. Cardiovascular: Negative for chest pain, palpitations and leg swelling. Gastrointestinal: Negative for abdominal distention, abdominal pain, anal bleeding, blood in stool, constipation, diarrhea, nausea, rectal pain and vomiting. Endocrine: Negative for cold intolerance, heat intolerance, polydipsia, polyphagia and polyuria. Genitourinary: Negative for decreased urine volume, difficulty urinating, dysuria, enuresis, flank pain, frequency, genital sores, hematuria and urgency. Musculoskeletal: Positive for gait problem. Negative for arthralgias, back pain, joint swelling, myalgias, neck pain and neck stiffness. Skin: Positive for wound (left side of abdomen-sutures). Negative for color change, pallor and rash.    Allergic/Immunologic: Negative for environmental allergies, food allergies and immunocompromised state. Neurological: Negative for dizziness, tremors, seizures, syncope, facial asymmetry, speech difficulty, weakness, light-headedness, numbness and headaches. Hematological: Negative for adenopathy. Does not bruise/bleed easily. Psychiatric/Behavioral: Negative for agitation, behavioral problems, confusion, decreased concentration, dysphoric mood, hallucinations, self-injury, sleep disturbance and suicidal ideas. The patient is not nervous/anxious and is not hyperactive. History of ADHD and schizophrenia        Objective:   /62 (Site: Right Upper Arm, Position: Sitting, Cuff Size: Medium Adult)   Pulse 110   Temp 96.5 °F (35.8 °C) (Temporal)   Resp 18   Ht 5' 6\" (1.676 m)   Wt (!) 303 lb 14.4 oz (137.8 kg)   SpO2 96%   BMI 49.05 kg/m²     Physical Exam  Vitals reviewed. Constitutional:       General: She is not in acute distress. Appearance: Normal appearance. She is well-developed. She is not ill-appearing or toxic-appearing. HENT:      Head: Normocephalic and atraumatic. Right Ear: Hearing and external ear normal.      Left Ear: Hearing and external ear normal.      Nose: Nose normal.      Mouth/Throat:      Mouth: Mucous membranes are not pale, not dry and not cyanotic. Eyes:      General: Lids are normal.   Neck:      Trachea: Trachea and phonation normal.   Cardiovascular:      Rate and Rhythm: Normal rate and regular rhythm. Pulses: Normal pulses. Heart sounds: S1 normal and S2 normal.   Pulmonary:      Effort: Pulmonary effort is normal. No tachypnea, bradypnea, accessory muscle usage or respiratory distress. Breath sounds: Normal breath sounds. No decreased breath sounds, wheezing or rales. Chest:      Chest wall: No tenderness. Abdominal:      General: Bowel sounds are normal. There is no distension. Palpations: Abdomen is soft. There is no mass.       Tenderness: There is abdominal tenderness. Musculoskeletal:         General: No tenderness. Normal range of motion. Cervical back: Normal range of motion and neck supple. Skin:     General: Skin is warm and dry. Findings: No abrasion, bruising, burn, ecchymosis, erythema, laceration, lesion or rash. Neurological:      Mental Status: She is alert and oriented to person, place, and time. Motor: No tremor, atrophy or abnormal muscle tone. Coordination: Coordination normal.      Gait: Gait normal.      Deep Tendon Reflexes: Reflexes are normal and symmetric. Psychiatric:         Speech: Speech is rapid and pressured. Behavior: Behavior normal.         Thought Content: Thought content normal.         Cognition and Memory: Cognition is impaired. Judgment: Judgment is not impulsive. Patient Active Problem List   Diagnosis    Keloid    MRSA (methicillin resistant staph aureus) culture positive    [facility]    POTS (postural orthostatic tachycardia syndrome)    Orthostatic dizziness- occasional- not worse    CHF (congestive heart failure) ([facility] chronic diastolic    Morbid obesity ([address]    Bilateral leg edema =LYMPHEDEMA- ON f/U WITH LYMPHEDEMA CLINIC    Lymphedema of both lower extremities    SOB (shortness of breath) on exertion    Candidiasis, intertriginous    Dermatitis    Fall- freq with no LOC    Open abdominal wall wound     Assessment:     1. Status post excisional debridement of non-healing abdominal wall wound with primary closure   2. Wound dehiscence   3. Morbid obesity (BMI 48)  4. Chronic non-healing abdominal ulcers  5. ADHD  6. Schizophrenia     Plan:     1. Every other suture removed per Dr. Ray order. Continue to keep wound covered at all times. Wound care discussed. Dehisced area not deep enough to pack at this time. 2. Bactrim completed. No antibiotics at this time.    3. Tylenol and/or Motrin as needed for pain control   4. Activity restrictions discussed with patient. Questions answered. 5. Follow up in 1 week in wound clinic. Will see patient downstairs in that office. Signs and symptoms reviewed with patient that would be concerning and need her to return to office for re-evaluation. Patient states she will call if she has questions or concerns.       Electronically signed by VIRGIE Yuen CNP on 6/30/2021 at 11:35 AM

## 2021-07-01 DIAGNOSIS — Z98.890 S/P EXCISIONAL DEBRIDEMENT: Primary | ICD-10-CM

## 2021-07-07 ENCOUNTER — HOSPITAL ENCOUNTER (OUTPATIENT)
Dept: WOUND CARE | Age: 46
Discharge: HOME | End: 2021-07-07
Payer: MEDICARE

## 2021-07-07 VITALS
RESPIRATION RATE: 18 BRPM | OXYGEN SATURATION: 97 % | SYSTOLIC BLOOD PRESSURE: 131 MMHG | TEMPERATURE: 98 F | WEIGHT: 293 LBS | DIASTOLIC BLOOD PRESSURE: 71 MMHG | BODY MASS INDEX: 50.52 KG/M2 | HEART RATE: 102 BPM

## 2021-07-07 PROCEDURE — 99213 OFFICE O/P EST LOW 20 MIN: CPT

## 2021-07-07 NOTE — PLAN OF CARE
Problem: Wound:  Goal: Will show signs of wound healing; wound closure and no evidence of infection  Description: Will show signs of wound healing; wound closure and no evidence of infection  Outcome: Ongoing  Note: Patient has home health. Patient seen for abdominal wound. See AVS for discharge instructions follow up next week      Care plan reviewed with patient and mother. Patient and mother verbalize understanding of the plan of care and contribute to goal setting.

## 2021-07-07 NOTE — PROGRESS NOTES
[facility]          Progress Note and Procedure Note      Bridgette Willis  MEDICAL RECORD NUMBER:  [id number]  AGE: 42 y.o. GENDER: female  : 1975  EPISODE DATE:  2021    Subjective:     SUBJECTIVE     Chief Complaint   Patient presents with    Wound Check     abdomen           HISTORY OF PRESENT ILLNESS     She is a 39 yr old female seen for follow up visit she has history of keloid formation and has been dealing with a wound on the lower abdomen  for over 15 years . she has ADHD and schizophrenia she was accompanied by her mom .she follows with cardiology for CHF. She had resection of the abdominal wound by Dr. Linda Farris. She was seen today along with the nurse practitioner. The wound has partial dehiscence and the sutures were removed. She had no fever she reports of postsurgical pain.   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 Philomena Lubin     TOE SURGERY  2019    Dr. Aurora Mchugh         Family History   Problem Relation Age of Onset    Diabetes Father     Cancer Maternal Grandmother     Cancer Paternal Grandmother      SOCIAL HISTORY       Social History     Tobacco Use    Smoking status: Never Smoker  Smokeless tobacco: Never Used   Vaping Use    Vaping Use: Never used   Substance Use Topics    Alcohol use: No    Drug use: No     ALLERGIES         Allergies   Allergen Reactions    Latex Rash    Cat Hair Extract     Seasonal      MEDICATIONS         Current Outpatient Medications on File Prior to Encounter   Medication Sig Dispense Refill    magnesium oxide (MAG-OX) 400 MG tablet Take 1 tablet by mouth daily 30 tablet 1    midodrine (PROAMATINE) 5 MG tablet Take 1 tablet by mouth 3 times daily 90 tablet 3    furosemide (LASIX) 20 MG tablet ALTERNATE 1 TABLET EVERY OTHER DAY WITH 2 TABLETS EVERY OTHER  tablet 3    metoprolol succinate (TOPROL XL) 25 MG extended release tablet Take 1 tablet by mouth daily 90 tablet 3    ibuprofen (ADVIL;MOTRIN) 800 MG tablet Take 1 tablet by mouth every 8 hours as needed for Pain 15 tablet 0    acetaminophen (TYLENOL) 500 MG tablet Take 1,000 mg by mouth every 6 hours as needed for Pain      Cholecalciferol (VITAMIN D3) 5000 units TABS Take 2,000 Units by mouth       miconazole (MICOTIN) 2 % powder Apply topically 2 times to 3 times  daily. 45 g 1    Dicyclomine HCl (BENTYL PO)   Take 20 mg by mouth 2 times daily       QUEtiapine Fumarate (SEROQUEL PO) Take 400 mg by mouth 2 times daily.  atomoxetine (STRATTERA) 40 MG capsule Take 40 mg by mouth 2 times daily.  aripiprazole (ABILIFY) 10 MG tablet Take 15 mg by mouth 2 times daily       ACYCLOVIR 400 mg by Does not apply route three times a week       ketorolac (TORADOL) 10 MG tablet Take 1 tablet by mouth every 8 hours as needed for Pain 15 tablet 0    MedroxyPROGESTERone Acetate (DEPO-PROVERA IM) Inject into the muscle       No current facility-administered medications on file prior to encounter. REVIEW OF SYSTEMS:     Constitutional: no fever, no night sweats, no fatigue. Head: no head ache , no head injury, no migranes. Eye: no blurring of vision, no double vision.   Ears: no hearing difficulty, no tinnitus  Mouth/throat: no ulceration, dental caries , dysphagia  Lungs: no cough, no shortness of breath, no wheeze  CVS: no palpitation, no chest pain, no shortness of breath  GI: no abdominal pain, no nausea , no vomiting, no constipation  FRANCOISE: no dysuria, frequency and urgency, no hematuria, no kidney stones  Musculoskeletal: no joint pain, swelling , stiffness,  Endocrine: no polyuria, polydipsia, no cold or heat intolerance  Hematology: no anemia, no easy brusing or bleeding, no hx of clotting disorder  Dermatology: +skin rash, no eczema, no pruritis,  Psychiatry: no depression, no anxiety,no panic attacks, no suicide ideation        PHYSICAL EXAM      weight is 313 lb (142 kg) (abnormal). Her temperature is 98 °F (36.7 °C). Her blood pressure is 131/71 and her pulse is 102. Her respiration is 18 and oxygen saturation is 97%. Wt Readings from Last 3 Encounters:   07/07/21 (!) 313 lb (142 kg)   06/30/21 (!) 303 lb 14.4 oz (137.8 kg)   06/23/21 (!) 302 lb 9.6 oz (137.3 kg)          General Appearance  Awake, alert, oriented,  not  In acute distress  HEENT - normocephalic, atraumatic, pink conjunctiva,  anicteric sclera  Neck - Supple, no mass  Abdomen -she has midline transverse abdominal wound there was partial dehiscence of the wound and sutures were noted   There is induration of the fatty tissue   neurologic - oriented to person place and time  Skin - No bruising or bleeding  Extremities - chronic leg edema  Wound 07/11/19 Abdomen Medial;Lower #2 (Active)   Wound Image   07/29/20 1041   Wound Etiology Other 11/04/20 1029   Dressing Status Intact; Old drainage noted;New dressing applied 04/07/21 1045   Wound Cleansed Cleansed with saline 04/07/21 1045   Dressing/Treatment Alginate with Ag;ABD 01/06/21 1059   Offloading for Diabetic Foot Ulcers No offloading required 04/07/21 1045   Wound Length (cm) 1.5 cm 04/07/21 1045   Wound Width (cm) 1.2 cm 04/07/21 1045   Wound Depth (cm) 0 cm 04/07/21 1045   Wound Surface Area (cm^2) 1.8 cm^2 04/07/21 1045   Change in Wound Size % (l*w) 75.17 04/07/21 1045   Wound Volume (cm^3) 0 cm^3 04/07/21 1045   Wound Healing % 100 04/07/21 1045   Wound Assessment Hyper granulation tissue 04/07/21 1045   Drainage Amount Small 04/07/21 1045   Drainage Description Serosanguinous 04/07/21 1045   Odor None 04/07/21 1045   Gwen-wound Assessment Dry/flaky 04/07/21 1045   Margins Attached edges 04/07/21 1045   Wound Thickness Description not for Pressure Injury Full thickness 04/07/21 1045   Number of days: 726       Wound 08/26/20 Abdomen Mid #3 (Active)   Wound Image   04/07/21 1045   Wound Etiology Other 11/04/20 1029   Dressing Status Intact; Old drainage noted;New dressing applied 04/07/21 1045   Wound Cleansed Cleansed with saline 04/07/21 1045   Dressing/Treatment Alginate with Ag;ABD 01/06/21 1059   Offloading for Diabetic Foot Ulcers No offloading required 04/07/21 1045   Wound Length (cm) 3.4 cm 04/07/21 1045   Wound Width (cm) 12 cm 04/07/21 1045   Wound Depth (cm) 0 cm 04/07/21 1045   Wound Surface Area (cm^2) 40.8 cm^2 04/07/21 1045   Change in Wound Size % (l*w) -1311.76 04/07/21 1045   Wound Volume (cm^3) 0 cm^3 04/07/21 1045   Wound Healing % 100 04/07/21 1045   Wound Assessment Hyper granulation tissue 04/07/21 1045   Drainage Amount Moderate 04/07/21 1045   Drainage Description Serosanguinous;Brown 04/07/21 1045   Odor None 04/07/21 1045   Gwen-wound Assessment Dry/flaky; Other (Comment) 04/07/21 1045   Margins Attached edges 04/07/21 1045   Wound Thickness Description not for Pressure Injury Full thickness 04/07/21 1045   Number of days: 315       Wound 09/23/20 Abdomen Left;Lateral (Active)   Wound Etiology Non-Healing Surgical 04/07/21 1045   Dressing Status Intact; Old drainage noted;New dressing applied 04/07/21 1045   Wound Cleansed Cleansed with saline 04/07/21 1045   Dressing/Treatment ABD; Alginate with Ag 01/06/21 1059   Offloading for Diabetic Foot Ulcers No offloading required 04/07/21 1045   Wound Length (cm) 1.6 cm 04/07/21 1045   Wound Width (cm) 1.5 cm 04/07/21 1045   Wound Depth (cm) 0 cm 04/07/21 1045   Wound Surface Area (cm^2) 2.4 cm^2 04/07/21 1045   Change in Wound Size % (l*w) 9.77 04/07/21 1045   Wound Volume (cm^3) 0 cm^3 04/07/21 1045   Wound Healing % 100 04/07/21 1045   Wound Assessment Hyper granulation tissue 04/07/21 1045   Drainage Amount Small 04/07/21 1045   Drainage Description Serosanguinous 04/07/21 1045   Odor None 04/07/21 1045   Gwen-wound Assessment Blanchable erythema;Dry/flaky 04/07/21 1045   Margins Attached edges 04/07/21 1045   Wound Thickness Description not for Pressure Injury Full thickness 04/07/21 1045   Number of days: 286     LABS      Lab Results   Component Value Date    BC No growth-preliminary No growth  10/13/2020       Assessment:   Postsurgical wound: She had resection of the nonhealing wound. She has partial dehiscence of the wound. The sutures were removed will use moist gauze dressing for 1 week will reassess her next week and consider wound vacuum therapy. Patient Active Problem List   Diagnosis Code    Keloid L91.0    MRSA (methicillin resistant staph aureus) culture positive Z22.322    Skin ulcer of abdominal wall ([address] L98.499    POTS (postural orthostatic tachycardia syndrome) I49.8    Orthostatic dizziness- occasional- not worse R42    CHF [facility]congestive heart [facility]) [facility] chronic diastolic O71.5    Morbid obesity ([facility]) E66.01    Bilateral leg edema =LYMPHEDEMA- ON f/U WITH LYMPHEDEMA CLINIC R60.0    Lymphedema of both lower extremities I89.0    SOB (shortness of breath) on exertion R06.02    Candidiasis, intertriginous B37.2    Dermatitis L30.9    Fall- freq with no LOC W19. Jam Sickle    Open abdominal wall wound S31.109A         Plan:     Patient examined and evaluated      Please see attached Discharge Instructions    Written patient dismissal instructions given to patient and signed by patient or POA.                Electronically signed by Alondra Daugherty MD on 7/7/2021 at 10:57 AM
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
## 2021-07-12 ENCOUNTER — TELEPHONE (OUTPATIENT)
Dept: WOUND CARE | Age: 46
End: 2021-07-12

## 2021-07-12 LAB
FUNGUS IDENTIFIED: NORMAL
FUNGUS SMEAR: NORMAL

## 2021-07-12 NOTE — TELEPHONE ENCOUNTER
Home health called stating patient is having increased, foul green drainage. No other signs and symptoms of infection. Informed nurse that patient is scheduled for Wednesday at 10 am with Dr. Coughlin Shown. Nurse also states that patient had a 10 pound weight gain in 1 week along with increased shortness of breath and patient was \"unable to get rings on today\" instructed nurse that patient may need to be evaluated in ER. Nurse states she will call the patients mother to discuss. Instructed nurse I will discuss issues with Dr. Coughlin Shown and call if any new orders are given before Wednesday.

## 2021-07-14 ENCOUNTER — OFFICE VISIT (OUTPATIENT)
Dept: SURGERY | Age: 46
End: 2021-07-14

## 2021-07-14 ENCOUNTER — HOSPITAL ENCOUNTER (OUTPATIENT)
Dept: WOUND CARE | Age: 46
Discharge: HOME | End: 2021-07-14
Payer: MEDICARE

## 2021-07-14 VITALS
SYSTOLIC BLOOD PRESSURE: 124 MMHG | TEMPERATURE: 97.9 F | HEART RATE: 99 BPM | OXYGEN SATURATION: 96 % | DIASTOLIC BLOOD PRESSURE: 66 MMHG | RESPIRATION RATE: 18 BRPM

## 2021-07-14 DIAGNOSIS — Z98.890 S/P EXCISIONAL DEBRIDEMENT: Primary | ICD-10-CM

## 2021-07-14 PROCEDURE — 87077 CULTURE AEROBIC IDENTIFY: CPT

## 2021-07-14 PROCEDURE — 87070 CULTURE OTHR SPECIMN AEROBIC: CPT

## 2021-07-14 PROCEDURE — 87147 CULTURE TYPE IMMUNOLOGIC: CPT

## 2021-07-14 PROCEDURE — 87205 SMEAR GRAM STAIN: CPT

## 2021-07-14 PROCEDURE — 99024 POSTOP FOLLOW-UP VISIT: CPT | Performed by: NURSE PRACTITIONER

## 2021-07-14 PROCEDURE — 99213 OFFICE O/P EST LOW 20 MIN: CPT

## 2021-07-14 PROCEDURE — 87186 SC STD MICRODIL/AGAR DIL: CPT

## 2021-07-14 NOTE — PLAN OF CARE
Problem: Wound:  Goal: Will show signs of wound healing; wound closure and no evidence of infection  Description: Will show signs of wound healing; wound closure and no evidence of infection  Outcome: Ongoing  Note: Patient seen today for abdominal wound. patient accompanied by mother. Wound vac paperwork faxed today. Patient is current with home health. See AVS for discharge instructions. Follow up 3 weeks 8/4. Care plan reviewed with patient and mother. Patient and mother verbalize understanding of the plan of care and contribute to goal setting.

## 2021-07-14 NOTE — PROGRESS NOTES
Patient seen in wound clinic in collaboration with Dr. Magnus Bonilla. Plan to start wound vac and see her bck in 2 weeks. Agree with plan. Cultures taken.

## 2021-07-14 NOTE — PROGRESS NOTES
[facility]          Progress Note and Procedure Note      Kassandra Limb  MEDICAL RECORD NUMBER:  [id number]  AGE: 42 y.o. GENDER: female  : 1975  EPISODE DATE:  2021    Subjective:     SUBJECTIVE     Chief Complaint   Patient presents with    Wound Check     abdomen           HISTORY OF PRESENT ILLNESS     She is a 42 yr old female seen for follow up visit she has history of keloid formation and has been dealing with a wound on the lower abdomen  for over 15 years . she has ADHD and schizophrenia she was accompanied by her mom .she follows with cardiology for CHF. She had resection of the abdominal wound by Dr. Evaristo Owens.   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 Aiyana Rubi     TOE SURGERY  2019    Dr. Zuleima Piedra History   Problem Relation Age of Onset    Diabetes Father     Cancer Maternal Grandmother     Cancer Paternal Grandmother      SOCIAL HISTORY       Social History     Tobacco Use    Smoking status: Never Smoker    Smokeless tobacco: Never Used   Vaping Use    Vaping Use: Never used   Substance Use Topics    Alcohol use: No    Drug use: No     ALLERGIES         Allergies   Allergen Reactions    Latex Rash    Cat Hair Extract     Seasonal      MEDICATIONS         Current Outpatient Medications on File Prior to Encounter   Medication Sig Dispense Refill    magnesium oxide (MAG-OX) 400 MG tablet Take 1 tablet by mouth daily 30 tablet 1    midodrine (PROAMATINE) 5 MG tablet Take 1 tablet by mouth 3 times daily 90 tablet 3    furosemide (LASIX) 20 MG tablet ALTERNATE 1 TABLET EVERY OTHER DAY WITH 2 TABLETS EVERY OTHER  tablet 3    metoprolol succinate (TOPROL XL) 25 MG extended release tablet Take 1 tablet by mouth daily 90 tablet 3    Cholecalciferol (VITAMIN D3) 5000 units TABS Take 2,000 Units by mouth       MedroxyPROGESTERone Acetate (DEPO-PROVERA IM) Inject into the muscle      miconazole (MICOTIN) 2 % powder Apply topically 2 times to 3 times  daily. 45 g 1    Dicyclomine HCl (BENTYL PO)   Take 20 mg by mouth 2 times daily       QUEtiapine Fumarate (SEROQUEL PO) Take 400 mg by mouth 2 times daily.  atomoxetine (STRATTERA) 40 MG capsule Take 40 mg by mouth 2 times daily.  aripiprazole (ABILIFY) 10 MG tablet Take 15 mg by mouth 2 times daily       ACYCLOVIR 400 mg by Does not apply route three times a week       ketorolac (TORADOL) 10 MG tablet Take 1 tablet by mouth every 8 hours as needed for Pain 15 tablet 0    ibuprofen (ADVIL;MOTRIN) 800 MG tablet Take 1 tablet by mouth every 8 hours as needed for Pain 15 tablet 0    acetaminophen (TYLENOL) 500 MG tablet Take 1,000 mg by mouth every 6 hours as needed for Pain       No current facility-administered medications on file prior to encounter. REVIEW OF SYSTEMS:     Constitutional: no fever, no night sweats, no fatigue. Head: no head ache , no head injury, no migranes. Eye: no blurring of vision, no double vision.   Ears: no hearing difficulty, no tinnitus  Mouth/throat: no ulceration, dental caries , dysphagia  Lungs: no cough, no shortness of breath, no wheeze  CVS: no palpitation, no chest pain, no shortness of breath  GI: no abdominal pain, no nausea , no vomiting, no constipation  FRANCOISE: no dysuria, frequency and urgency, no hematuria, no kidney stones  Musculoskeletal: no joint pain, swelling , stiffness,  Endocrine: no polyuria, polydipsia, no cold or heat intolerance  Hematology: no anemia, no easy brusing or bleeding, no hx of clotting disorder  Dermatology: +skin rash, no eczema, no pruritis,  Psychiatry: no depression, no anxiety,no panic attacks, no suicide ideation        PHYSICAL EXAM      infrared temperature is 97.9 °F (36.6 °C). Her blood pressure is 124/66 and her pulse is 99. Her respiration is 18 and oxygen saturation is 96%. Wt Readings from Last 3 Encounters:   07/07/21 (!) 313 lb (142 kg)   06/30/21 (!) 303 lb 14.4 oz (137.8 kg)   06/23/21 (!) 302 lb 9.6 oz (137.3 kg)          General Appearance  Awake, alert, oriented,  not  In acute distress  HEENT - normocephalic, atraumatic, pink conjunctiva,  anicteric sclera  Neck - Supple, no mass  Abdomen -she has midline transverse abdominal wound there was partial dehiscence of the wound  Greenish drainage noted   Wound 07/07/21 Abdomen #1 (Active)   Wound Image   07/14/21 1009   Wound Etiology Surgical 07/14/21 1009   Dressing Status Intact; New drainage noted; Old drainage noted 07/07/21 1053   Wound Cleansed Cleansed with saline 07/07/21 1053   Dressing/Treatment Moist to dry;Dry dressing 07/07/21 1053   Wound Length (cm) 4.5 cm 07/14/21 1009   Wound Width (cm) 20 cm 07/14/21 1009   Wound Depth (cm) 0.9 cm 07/14/21 1009   Wound Surface Area (cm^2) 90 cm^2 07/14/21 1009   Change in Wound Size % (l*w) -75.61 07/14/21 1009   Wound Volume (cm^3) 81 cm^3 07/14/21 1009   Wound Healing % -58 07/14/21 1009   Wound Assessment Devitalized tissue;Granulation tissue;Slough; Epithelialization 07/14/21 1009   Drainage Amount Large 07/14/21 1009   Drainage Description Serosanguinous; Yellow;Green 07/14/21 1009   Odor Mild 07/14/21 1009   Gwen-wound Assessment Blanchable erythema 07/14/21 1009   Margins Attached edges 07/14/21 1009   Wound Thickness Description not for Pressure Injury Full thickness 07/14/21 1009   Number of days: 7       Assessment:   Postsurgical wound:will arrange for wound vac  Wound cx done  Follow up in two wks  Patient Active Problem List   Diagnosis Code    Keloid L91.0    MRSA (methicillin resistant staph aureus) culture positive Z22.322    Skin ulcer of abdominal wall ([facility] L98.499    POTS (postural orthostatic tachycardia syndrome) I49.8    Orthostatic dizziness- occasional- not worse R42    CH[facility]congestive heart failure) ([facility]) chronic diastolic V20.5    Morbid obesity ([facility]) E66.01    Bilateral leg edema =LYMPHEDEMA- ON f/U WITH LYMPHEDEMA CLINIC R60.0    Lymphedema of both lower extremities I89.0    SOB (shortness of breath) on exertion R06.02    Candidiasis, intertriginous B37.2    Dermatitis L30.9    Open abdominal wall wound S31.109A         Plan:     Patient examined and evaluated      Please see attached Discharge Instructions    Written patient dismissal instructions given to patient and signed by patient or POA.                Electronically signed by Corinne Simons MD on 7/14/2021 at 10:18 AM

## 2021-07-15 ENCOUNTER — TELEPHONE (OUTPATIENT)
Dept: WOUND CARE | Age: 46
End: 2021-07-15

## 2021-07-15 NOTE — TELEPHONE ENCOUNTER
----- Message from Lolly Chin sent at 7/15/2021 11:33 AM EDT -----  Regarding: Update  John Rousseau from [facility] called and stated Betsey requested we be informed that there is bright green drainage coming from the wound. They are waiting on the wound vac to come in.

## 2021-07-15 NOTE — TELEPHONE ENCOUNTER
Returned call to Sterling Patel and let her know that we were aware of that yesterday at her appointment and we took a culture of the drainage.

## 2021-07-17 LAB
AEROBIC CULTURE: ABNORMAL
AEROBIC CULTURE: ABNORMAL
GRAM STAIN RESULT: ABNORMAL
ORGANISM: ABNORMAL
ORGANISM: ABNORMAL

## 2021-07-19 ENCOUNTER — TELEPHONE (OUTPATIENT)
Dept: WOUND CARE | Age: 46
End: 2021-07-19

## 2021-07-19 DIAGNOSIS — L30.9 DERMATITIS: ICD-10-CM

## 2021-07-19 RX ORDER — CIPROFLOXACIN 250 MG/1
250 TABLET, FILM COATED ORAL 2 TIMES DAILY
Qty: 14 TABLET | Refills: 0 | Status: CANCELLED | OUTPATIENT
Start: 2021-07-19 | End: 2021-07-26

## 2021-07-19 RX ORDER — CIPROFLOXACIN 500 MG/1
500 TABLET, FILM COATED ORAL 2 TIMES DAILY
Qty: 20 TABLET | Refills: 0 | Status: SHIPPED | OUTPATIENT
Start: 2021-07-19 | End: 2021-07-29

## 2021-07-19 RX ORDER — CEPHALEXIN 500 MG/1
500 CAPSULE ORAL 3 TIMES DAILY
Qty: 30 CAPSULE | Refills: 0 | Status: SHIPPED | OUTPATIENT
Start: 2021-07-19 | End: 2021-07-29

## 2021-07-19 NOTE — TELEPHONE ENCOUNTER
Dr Pricila Navarro called with wound culture results. New prescriptions sent into patients desired pharmacy. Patients mother notified of new orders.  She verbalized

## 2021-07-28 ENCOUNTER — HOSPITAL ENCOUNTER (OUTPATIENT)
Dept: WOUND CARE | Age: 46
Discharge: HOME | End: 2021-07-28
Payer: MEDICARE

## 2021-07-28 VITALS
WEIGHT: 293 LBS | OXYGEN SATURATION: 99 % | DIASTOLIC BLOOD PRESSURE: 64 MMHG | RESPIRATION RATE: 16 BRPM | BODY MASS INDEX: 49.62 KG/M2 | TEMPERATURE: 97.3 F | SYSTOLIC BLOOD PRESSURE: 128 MMHG | HEART RATE: 109 BPM

## 2021-07-28 PROCEDURE — 97605 NEG PRS WND THER DME<=50SQCM: CPT

## 2021-07-28 ASSESSMENT — PAIN DESCRIPTION - PAIN TYPE: TYPE: CHRONIC PAIN

## 2021-07-28 ASSESSMENT — PAIN SCALES - GENERAL: PAINLEVEL_OUTOF10: 7

## 2021-07-28 ASSESSMENT — PAIN DESCRIPTION - LOCATION: LOCATION: ABDOMEN

## 2021-07-28 NOTE — PROGRESS NOTES
[facility]          Progress Note and Procedure Note      Kishor Murphy  MEDICAL RECORD NUMBER:  [id number]  AGE: 42 y.o. GENDER: female  : 1975  EPISODE DATE:  2021    Subjective:     SUBJECTIVE     Chief Complaint   Patient presents with    Wound Check     abdomen           HISTORY OF PRESENT ILLNESS     She is a 42 yr old female seen for follow up visit she has history of keloid formation and has been dealing with a wound on the lower abdomen  for over 15 years . she has ADHD and schizophrenia she was accompanied by her mom .she follows with cardiology for CHF. She had resection of the abdominal wound by Dr. Elizabeth Perales. She was started on wound vac. Wound cx was positive, oral antibiotic was called in.  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 Chaitanya Tomas 2014    TOE SURGERY  2019    Dr. Mary Louis History   Problem Relation Age of Onset    Diabetes Father     Cancer Maternal Grandmother     Cancer Paternal Grandmother      SOCIAL HISTORY       Social History     Tobacco Use    Smoking status: Never Smoker    Smokeless tobacco: Never Used   Vaping Use    Vaping Use: Never used   Substance Use Topics    Alcohol use: No    Drug use: No     ALLERGIES         Allergies   Allergen Reactions    Latex Rash    Cat Hair Extract     Seasonal      MEDICATIONS         Current Outpatient Medications on File Prior to Encounter   Medication Sig Dispense Refill    ciprofloxacin (CIPRO) 500 MG tablet Take 1 tablet by mouth 2 times daily for 10 days 20 tablet 0    cephALEXin (KEFLEX) 500 MG capsule Take 1 capsule by mouth 3 times daily for 10 days 30 capsule 0    ketorolac (TORADOL) 10 MG tablet Take 1 tablet by mouth every 8 hours as needed for Pain 15 tablet 0    magnesium oxide (MAG-OX) 400 MG tablet Take 1 tablet by mouth daily 30 tablet 1    midodrine (PROAMATINE) 5 MG tablet Take 1 tablet by mouth 3 times daily 90 tablet 3    furosemide (LASIX) 20 MG tablet ALTERNATE 1 TABLET EVERY OTHER DAY WITH 2 TABLETS EVERY OTHER  tablet 3    metoprolol succinate (TOPROL XL) 25 MG extended release tablet Take 1 tablet by mouth daily 90 tablet 3    ibuprofen (ADVIL;MOTRIN) 800 MG tablet Take 1 tablet by mouth every 8 hours as needed for Pain 15 tablet 0    acetaminophen (TYLENOL) 500 MG tablet Take 1,000 mg by mouth every 6 hours as needed for Pain      Cholecalciferol (VITAMIN D3) 5000 units TABS Take 2,000 Units by mouth       MedroxyPROGESTERone Acetate (DEPO-PROVERA IM) Inject into the muscle      miconazole (MICOTIN) 2 % powder Apply topically 2 times to 3 times  daily. 45 g 1    Dicyclomine HCl (BENTYL PO)   Take 20 mg by mouth 2 times daily       QUEtiapine Fumarate (SEROQUEL PO) Take 400 mg by mouth 2 times daily.  atomoxetine (STRATTERA) 40 MG capsule Take 40 mg by mouth 2 times daily.  aripiprazole (ABILIFY) 10 MG tablet Take 15 mg by mouth 2 times daily       ACYCLOVIR 400 mg by Does not apply route three times a week        No current facility-administered medications on file prior to encounter.          REVIEW OF SYSTEMS:     Constitutional: no 25 cm^2 07/28/21 0929   Change in Wound Size % (l*w) 51.22 07/28/21 0929   Wound Volume (cm^3) 7.5 cm^3 07/28/21 0929   Wound Healing % 85 07/28/21 0929   Wound Assessment Granulation tissue 07/28/21 0929   Drainage Amount Moderate 07/28/21 0929   Drainage Description Serosanguinous 07/28/21 0929   Odor None 07/28/21 0929   Bridger-wound Assessment Dry/flaky 07/28/21 0929   Margins Attached edges 07/28/21 0929   Wound Thickness Description not for Pressure Injury Full thickness 07/28/21 0929   Number of days: 20         Assessment:   Postsurgical wound:continue wound vac, will use stomawaffer around the wound   Follow up in two wks. Patient Active Problem List   Diagnosis Code    Keloid L91.0    MRSA (methicillin resistant staph aureus) culture positive Z22.322    Skin ulcer of abdominal wall ([facility] L98.499    POTS (postural orthostatic tachycardia syndrome) I49.8    Orthostatic dizziness- occasional- not worse R42    CHF (congestive heart failure) ([facility]) chronic diastolic D16.1    Morbid obesity ([facility]) E66.01    Bilateral leg edema =LYMPHEDEMA- ON f/U WITH LYMPHEDEMA CLINIC R60.0    Lymphedema of both lower extremities I89.0    SOB (shortness of breath) on exertion R06.02    Candidiasis, intertriginous B37.2    Dermatitis L30.9    Open abdominal wall wound S31.109A         Plan:     Patient examined and evaluated      Please see attached Discharge Instructions    Written patient dismissal instructions given to patient and signed by patient or POA. Discharge Instructions       Visit Discharge/Physician Orders:  - Continue working on weight loss. - No shower, keep wound dressings dry  - please bring a wound vac sponge kit and cannister to next visit      Wound [address]] [address]     Dressing orders:      Abdomen wounds- Apply zinc oxide around bridger wound if skin gets irritated.  Saline moistened ritchie to bigger part of wound on left, lay dry part of ritchie over rest of incision. Cover with ABD pad and a feminine hygeine pad and secure with tape. Change daily.      Once wound vac arrrives. Cleanse wound with normal saline or wound cleanser and gauze. Pat dry with clean gauze. Apply skin prep to bridger wound. Cut foam to fit into wound bed. Apply stoma wafer to bridger wound of entire wound,  to protect good skin. Apply foam to wound bed of both wounds. Apply drape to good skin at separation of wound and bridge foam. Cut quarter size hole in center of drape over sponge area and apply vac suction. Run wound vac at 125 mmHg continuous suction. Apply extra clear drape as needed if leaks noted. Change canister as needed when full. Change wound vac three times weekly. · Be sure to pad the tubing to prevent any pressure areas  · If the wound vac is leaking or not functioning properly vac sponge/dressing must be removed and a wet to dry dressing applied until reapplication can be done. Wound vac can not be in place non functional for more then 2 hours  · If your canister becomes full of bright red drainage within 1 hour or less, turn off the wound vac and go to the nearest ER or urgent care  · Recommend charging wound vac in the evenings while sleeping, take charge with you for long trips  · Bring canister and extra wound vac dressing kit to each visit  · Please call wound clinic if any questions.         Follow up visit: 2 weeks 8/11/21 at 1015     Keep next scheduled appointment. Please give 24 hour notice if unable to keep appointment. [phone]     If you experience any of the following, please call the Wound Care Service during business hours: Monday through Friday 8:00 am - 4:30 pm  ([phone]).               *Increase in pain              *Temperature over 101              *Increase in drainage from your wound or a foul odor              *Uncontrolled swelling              *Need for compression bandage changes due to slippage, breakthrough drainage     If you need medical attention outside of business hours, please contact your Primary Care Doctor or go to the nearest emergency room.           Electronically signed by Leonardo Lomeli MD on 7/28/2021 at 9:41 AM

## 2021-08-09 RX ORDER — MAGNESIUM OXIDE 400 MG/1
400 TABLET ORAL DAILY
Qty: 30 TABLET | Refills: 0 | Status: SHIPPED | OUTPATIENT
Start: 2021-08-09 | End: 2021-09-02 | Stop reason: SDUPTHER

## 2021-08-11 ENCOUNTER — HOSPITAL ENCOUNTER (OUTPATIENT)
Dept: WOUND CARE | Age: 46
Discharge: HOME | End: 2021-08-11
Payer: MEDICARE

## 2021-08-11 VITALS
RESPIRATION RATE: 18 BRPM | DIASTOLIC BLOOD PRESSURE: 59 MMHG | HEART RATE: 91 BPM | TEMPERATURE: 98 F | BODY MASS INDEX: 50.84 KG/M2 | OXYGEN SATURATION: 95 % | WEIGHT: 293 LBS | SYSTOLIC BLOOD PRESSURE: 117 MMHG

## 2021-08-11 PROCEDURE — 97605 NEG PRS WND THER DME<=50SQCM: CPT

## 2021-08-11 ASSESSMENT — PAIN SCALES - GENERAL: PAINLEVEL_OUTOF10: 8

## 2021-08-11 ASSESSMENT — PAIN DESCRIPTION - LOCATION: LOCATION: ABDOMEN

## 2021-08-11 ASSESSMENT — PAIN DESCRIPTION - PAIN TYPE: TYPE: CHRONIC PAIN

## 2021-08-11 NOTE — PLAN OF CARE
Problem: Wound:  Goal: Will show signs of wound healing; wound closure and no evidence of infection  Description: Will show signs of wound healing; wound closure and no evidence of infection  Outcome: Ongoing     Patient presents to wound clinic for abdominal wound. No s/s of infection noted. See AVS for discharge instructions. Follow up visit: 2 weeks on August 25th at 10:30 am.    Care plan reviewed with patient and mother. Patient and mother verbalize understanding of the plan of care and contribute to goal setting.

## 2021-08-17 ENCOUNTER — OFFICE VISIT (OUTPATIENT)
Dept: CARDIOLOGY CLINIC | Age: 46
End: 2021-08-17
Payer: MEDICARE

## 2021-08-17 VITALS
DIASTOLIC BLOOD PRESSURE: 79 MMHG | WEIGHT: 293 LBS | BODY MASS INDEX: 47.09 KG/M2 | HEART RATE: 89 BPM | SYSTOLIC BLOOD PRESSURE: 119 MMHG | HEIGHT: 66 IN

## 2021-08-17 DIAGNOSIS — G90.A POTS (POSTURAL ORTHOSTATIC TACHYCARDIA SYNDROME): ICD-10-CM

## 2021-08-17 DIAGNOSIS — R42 ORTHOSTATIC DIZZINESS: ICD-10-CM

## 2021-08-17 DIAGNOSIS — Z91.81 HISTORY OF FALL: ICD-10-CM

## 2021-08-17 DIAGNOSIS — R60.0 BILATERAL LEG EDEMA: ICD-10-CM

## 2021-08-17 DIAGNOSIS — R06.02 SOB (SHORTNESS OF BREATH) ON EXERTION: ICD-10-CM

## 2021-08-17 DIAGNOSIS — E66.01 MORBID OBESITY (HCC): ICD-10-CM

## 2021-08-17 DIAGNOSIS — I50.32 CHRONIC DIASTOLIC CONGESTIVE HEART FAILURE (HCC): Primary | ICD-10-CM

## 2021-08-17 PROCEDURE — G8427 DOCREV CUR MEDS BY ELIG CLIN: HCPCS | Performed by: INTERNAL MEDICINE

## 2021-08-17 PROCEDURE — G8417 CALC BMI ABV UP PARAM F/U: HCPCS | Performed by: INTERNAL MEDICINE

## 2021-08-17 PROCEDURE — 99214 OFFICE O/P EST MOD 30 MIN: CPT | Performed by: INTERNAL MEDICINE

## 2021-08-17 PROCEDURE — 1036F TOBACCO NON-USER: CPT | Performed by: INTERNAL MEDICINE

## 2021-08-17 NOTE — PROGRESS NOTES
by Noe Nina MD at [place] [address] 2000- Dr. Celio Stevenson 2014    TOE SURGERY  09/2019    Dr. Pio Barnes         Allergies   Allergen Reactions    Latex Rash    Cat Hair Extract     Seasonal         Family History   Problem Relation Age of Onset    Diabetes Father     Cancer Maternal Grandmother     Cancer Paternal Grandmother         Social History     Socioeconomic History    Marital status: Single     Spouse name: Not on file    Number of children: Not on file    Years of education: Not on file    Highest education level: Not on file   Occupational History    Not on file   Tobacco Use    Smoking status: Never Smoker    Smokeless tobacco: Never Used   Vaping Use    Vaping Use: Never used   Substance and Sexual Activity    Alcohol use: No    Drug use: No    Sexual activity: Not on file   Other Topics Concern    Not on file   Social History Narrative    Not on file     Social Determinants of Health     Financial Resource Strain:     Difficulty of Paying Living Expenses:    Food Insecurity:     Worried About Running Out of Food in the Last Year:     Ran Out of Food in the Last Year:    Transportation Needs:     Lack of Transportation (Medical):      Lack of Transportation (Non-Medical):    Physical Activity:     Days of Exercise per Week:     Minutes of Exercise per Session:    Stress:     Feeling of Stress :    Social Connections:     Frequency of Communication with Friends and Family:     Frequency of Social Gatherings with Friends and Family:     Attends [employer] Services:     Active Member of Clubs or Organizations:     Attends Club or Organization Meetings:     Marital Status:    Intimate Partner Violence:     Fear of Current or Ex-Partner:     Emotionally Abused:     Physically Abused:     Sexually Abused:        Current Outpatient Medications   Medication Sig Dispense Refill    traZODone (DESYREL) 25 mg TABS Take 25 mg by mouth nightly      magnesium oxide (MAG-OX) 400 MG tablet Take 1 tablet by mouth daily 30 tablet 0    ketorolac (TORADOL) 10 MG tablet Take 1 tablet by mouth every 8 hours as needed for Pain 15 tablet 0    midodrine (PROAMATINE) 5 MG tablet Take 1 tablet by mouth 3 times daily 90 tablet 3    furosemide (LASIX) 20 MG tablet ALTERNATE 1 TABLET EVERY OTHER DAY WITH 2 TABLETS EVERY OTHER  tablet 3    metoprolol succinate (TOPROL XL) 25 MG extended release tablet Take 1 tablet by mouth daily 90 tablet 3    ibuprofen (ADVIL;MOTRIN) 800 MG tablet Take 1 tablet by mouth every 8 hours as needed for Pain 15 tablet 0    acetaminophen (TYLENOL) 500 MG tablet Take 1,000 mg by mouth every 6 hours as needed for Pain      Cholecalciferol (VITAMIN D3) 5000 units TABS Take 2,000 Units by mouth       MedroxyPROGESTERone Acetate (DEPO-PROVERA IM) Inject into the muscle      miconazole (MICOTIN) 2 % powder Apply topically 2 times to 3 times  daily. 45 g 1    Dicyclomine HCl (BENTYL PO)   Take 20 mg by mouth 2 times daily       QUEtiapine Fumarate (SEROQUEL PO) Take 400 mg by mouth 2 times daily.  atomoxetine (STRATTERA) 40 MG capsule Take 40 mg by mouth 2 times daily.  aripiprazole (ABILIFY) 10 MG tablet Take 15 mg by mouth 2 times daily       ACYCLOVIR 400 mg by Does not apply route three times a week        No current facility-administered medications for this visit. Review of Systems -     General ROS: negative  Psychological ROS: negative  Hematological and Lymphatic ROS: No history of blood clots or bleeding disorder.    Respiratory ROS: no cough, shortness of breath, or wheezing  Cardiovascular ROS: no chest pain or dyspnea on exertion  Gastrointestinal ROS: negative  Genito-Urinary ROS: no dysuria, trouble voiding, or hematuria  Musculoskeletal ROS: negative  Neurological ROS: no TIA or stroke symptoms  Dermatological ROS: negative      Blood pressure 119/79, pulse 89, height 5' 6\" (1.676 m), weight (!) 309 lb (140.2 kg), not currently breastfeeding. Physical Examination:    General appearance - alert, well appearing, and in no distress  Mental status - alert, oriented to person, place, and time  Neck - supple, no significant adenopathy, no JVD, or carotid bruits  Chest - clear to auscultation, no wheezes, rales or rhonchi, symmetric air entry  Heart - normal rate, regular rhythm, normal S1, S2, no murmurs, rubs, clicks or gallops  Abdomen - soft, nontender, nondistended, no masses or organomegaly  Neurological - alert, oriented, normal speech, no focal findings or movement disorder noted  Musculoskeletal - no joint tenderness, deformity or swelling  Extremities - peripheral pulses normal, no pedal edema, no clubbing or cyanosis  Skin - normal coloration and turgor, no rashes, no suspicious skin lesions noted    Lab  No results for input(s): CKTOTAL, CKMB, CKMBINDEX, TROPONINI in the last 72 hours. CBC:   Lab Results   Component Value Date    WBC 7.7 10/13/2020     BMP:    Lab Results   Component Value Date     05/29/2021    K 3.8 06/10/2021     05/29/2021    CO2 23 05/29/2021    BUN 10 05/29/2021    LABALBU 4.1 10/13/2020    CREATININE 0.7 05/29/2021    CALCIUM 9.1 05/29/2021    LABGLOM >90 05/29/2021    GLUCOSE 84 05/29/2021     Hepatic Function Panel:    Lab Results   Component Value Date    ALKPHOS 85 10/13/2020     Magnesium:    Lab Results   Component Value Date    MG 1.6 05/29/2021     Warfarin PT/INR:    No components found for: PTPATWAR,  PTINRWAR  HgBA1c:    No results found for: LABA1C  FLP:    No components found for: CHLPL,  TRIG,  HDL,  LDLCALC,  LDLDIRECT,  LABVLDL  TSH:    No results found for: TSH  EKG 5/7/15-Sinus  Rhythm   Low voltage in precordial leads. ABNORMAL     Nuc stress test negative    IMPRESSION:      1. CT coronary artery calcium score of zero indicates low risk for coronary disease.       2. Diffusely 6/5/2020  Sinus tachy no acute abn      Assessment    Grandmother had CAD in her 70's   Diagnosis Orders   1. Chronic diastolic congestive heart failure (HCC)  ECHO Complete 2D W Doppler W Color   2. POTS (postural orthostatic tachycardia syndrome)  ECHO Complete 2D W Doppler W Color   3. Orthostatic dizziness- occasional- not worse  ECHO Complete 2D W Doppler W Color   4. Morbid obesity ([address]     5. SOB (shortness of breath) on exertion  ECHO Complete 2D W Doppler W Color   6. Bilateral leg edema =LYMPHEDEMA- ON f/U WITH LYMPHEDEMA CLINIC     7. History of fall         Plan     THE  Most nCurrent Labs and meds reviewed    Leg edema - LYMPHEDEMA ON MECHICAL RX  leg elevation  Ace wrap  Not possible due to leg anatomy      Continue the current treatment and with constant vigilance to changes in symptoms and also any potential side effects. Return for care or seek medical attention immediately if symptoms got worse and/or develop new symptoms. Congestive heart failure: no evidence of fluid overload today, no recent hospitalization for CHF  Lymphedema  CONT LASIX 20 po qd and may increase for 3 days a week if wt gain > 5 lb and leg edema  Taking more bannana and tomato  BMP and MG today and prior to next visit  No wt gain    Post syndrome Dxed 2015 on TTT  Dizziness chronic  Cont hydration  Hx of  freq fall- no rmore fall after midodrine  Bedside orthostatic eval- negative  Cont midodrine 5 mg po BID  Avoid prolonged standing    Pat doing much better after midodrine    Hx of Coronary CTA result and stress test result d/w the pat    Morbid obesity  Losing wt  Advised to lose wt  Advised complaince with F/u    D/w the pat and mother the details plan of care    Mag 1.6  Need replacement  Cont  mag oxide 400 mg po qd for 2 month  Bmp and mg    Discussed use, benefit, and side effects of prescribed medications. All patient questions answered. Pt voiced understanding.  Instructed to continue current medications, diet and exercise. Continue risk factor modification and medical management. Patient agreed with treatment plan. Follow up as directed.       RTC IN 4 months    [facility]

## 2021-08-25 ENCOUNTER — HOSPITAL ENCOUNTER (OUTPATIENT)
Dept: WOUND CARE | Age: 46
Discharge: HOME | End: 2021-08-25
Payer: MEDICARE

## 2021-08-25 VITALS
TEMPERATURE: 96.7 F | HEART RATE: 93 BPM | SYSTOLIC BLOOD PRESSURE: 113 MMHG | DIASTOLIC BLOOD PRESSURE: 59 MMHG | RESPIRATION RATE: 18 BRPM

## 2021-08-25 PROCEDURE — 99213 OFFICE O/P EST LOW 20 MIN: CPT

## 2021-08-25 NOTE — PROGRESS NOTES
[facility]          Progress Note and Procedure Note      Janay Horowitz  MEDICAL RECORD NUMBER:  [id number]  AGE: 42 y.o. GENDER: female  : 1975  EPISODE DATE:  2021    Subjective:     SUBJECTIVE     Chief Complaint   Patient presents with    Wound Check           HISTORY OF PRESENT ILLNESS     She is a 42 yr old female seen for follow up visit she has history of keloid formation and has been dealing with a wound on the lower abdomen  for over 15 years . she has ADHD and schizophrenia she was accompanied by her mom .she follows with cardiology for CHF. She had resection of the abdominal wound by Dr. Remedios Escobar. She was started on wound vac.the wound is improved. there is skin excoriation around the tape.   The measurement is  improving    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     during hospital visit    Bronchitis     CHF (congestive heart failure) ([address]     Elbow fracture 10/09/2014    left    Irritable bowel syndrome     Irritable bowel    Lymph edema 2017    Obesity     Conde disease     Conde Syndrome    Schizophrenia ([address]     Seasonal allergies          PAST SURGICAL HISTORY     Past Surgical History:   Procedure Laterality Date    ABDOMEN SURGERY N/A 6/10/2021    EXCISIONAL DEBRIDEMENT AND CLOSURE OF CHRONIC ABDOMINAL WOUND performed by Sara Juarez MD at [place] [address] - Dr. Elida Lewis     TOE SURGERY  2019    Dr. Sydnee Beck History   Problem Relation Age of Onset    Diabetes Father     Cancer Maternal Grandmother     Cancer Paternal Grandmother      SOCIAL HISTORY       Social History     Tobacco Use    Smoking status: Never Smoker    Smokeless tobacco: Never Used   Vaping Use    Vaping Use: Never used   Substance Use Topics    Alcohol use: No    Drug use: No     ALLERGIES         Allergies   Allergen Reactions    Latex Rash    Cat Hair Extract     Seasonal      MEDICATIONS         Current Outpatient Medications on File Prior to Encounter   Medication Sig Dispense Refill    traZODone (DESYREL) 25 mg TABS Take 25 mg by mouth nightly      magnesium oxide (MAG-OX) 400 MG tablet Take 1 tablet by mouth daily 30 tablet 0    ketorolac (TORADOL) 10 MG tablet Take 1 tablet by mouth every 8 hours as needed for Pain 15 tablet 0    midodrine (PROAMATINE) 5 MG tablet Take 1 tablet by mouth 3 times daily 90 tablet 3    furosemide (LASIX) 20 MG tablet ALTERNATE 1 TABLET EVERY OTHER DAY WITH 2 TABLETS EVERY OTHER  tablet 3    metoprolol succinate (TOPROL XL) 25 MG extended release tablet Take 1 tablet by mouth daily 90 tablet 3    ibuprofen (ADVIL;MOTRIN) 800 MG tablet Take 1 tablet by mouth every 8 hours as needed for Pain 15 tablet 0    acetaminophen (TYLENOL) 500 MG tablet Take 1,000 mg by mouth every 6 hours as needed for Pain      Cholecalciferol (VITAMIN D3) 5000 units TABS Take 2,000 Units by mouth       MedroxyPROGESTERone Acetate (DEPO-PROVERA IM) Inject into the muscle      miconazole (MICOTIN) 2 % powder Apply topically 2 times to 3 times  daily. 45 g 1    Dicyclomine HCl (BENTYL PO)   Take 20 mg by mouth 2 times daily       QUEtiapine Fumarate (SEROQUEL PO) Take 400 mg by mouth 2 times daily.  atomoxetine (STRATTERA) 40 MG capsule Take 40 mg by mouth 2 times daily.  aripiprazole (ABILIFY) 10 MG tablet Take 15 mg by mouth 2 times daily       ACYCLOVIR 400 mg by Does not apply route three times a week        No current facility-administered medications on file prior to encounter. REVIEW OF SYSTEMS:     Constitutional: no fever, no night sweats, no fatigue. Head: no head ache , no head injury, no migranes.   Eye: no blurring of vision, no double vision. Ears: no hearing difficulty, no tinnitus  Mouth/throat: no ulceration, dental caries , dysphagia  Lungs: no cough, no shortness of breath, no wheeze  CVS: no palpitation, no chest pain, no shortness of breath  GI: no abdominal pain, no nausea , no vomiting, no constipation  FRANCOISE: no dysuria, frequency and urgency, no hematuria, no kidney stones  Musculoskeletal: no joint pain, swelling , stiffness,  Endocrine: no polyuria, polydipsia, no cold or heat intolerance  Hematology: no anemia, no easy brusing or bleeding, no hx of clotting disorder  Dermatology: +skin rash, no eczema, no pruritis,  Psychiatry:+schizophrenia      PHYSICAL EXAM      infrared temperature is 96.7 °F (35.9 °C). Her blood pressure is 113/59 (abnormal) and her pulse is 93. Her respiration is 18.        Wt Readings from Last 3 Encounters:   08/17/21 (!) 309 lb (140.2 kg)   08/11/21 (!) 315 lb (142.9 kg)   07/28/21 (!) 307 lb 6.4 oz (139.4 kg)          General Appearance  Awake, alert, oriented,  not  In acute distress  HEENT - normocephalic, atraumatic, pink conjunctiva,  anicteric sclera  Neck - Supple, no mass  Abdomen -there is excoriation of skin around the tape  The wound is granular     Wound 07/07/21 Abdomen #1 (Active)   Wound Image   08/25/21 1046   Wound Etiology Surgical 08/25/21 1046   Dressing Status Intact 08/25/21 1046   Wound Cleansed Cleansed with saline 08/25/21 1046   Dressing/Treatment Negative pressure wound therapy 08/11/21 1036   Offloading for Diabetic Foot Ulcers No offloading required 08/25/21 1046   Wound Length (cm) 1 cm 08/25/21 1046   Wound Width (cm) 6.2 cm 08/25/21 1046   Wound Depth (cm) 0.02 cm 08/25/21 1046   Wound Surface Area (cm^2) 6.2 cm^2 08/25/21 1046   Change in Wound Size % (l*w) 87.9 08/25/21 1046   Wound Volume (cm^3) 0.124 cm^3 08/25/21 1046   Wound Healing % 100 08/25/21 1046   Wound Assessment Granulation tissue 08/25/21 1046   Drainage Amount Moderate 08/25/21 1046   Drainage Description Serosanguinous; Sanguinous 08/25/21 1046   Odor None 08/25/21 1046   Gwen-wound Assessment Dry/flaky;Denuded 08/25/21 1046   Margins Attached edges 08/25/21 1046   Wound Thickness Description not for Pressure Injury Full thickness 08/25/21 1046   Number of days: 49         Assessment:   Postsurgical wound:will give a wound vac vaction. Use silver alginate and ABD  Apply zinc oxide the the periwound area   Follow up one wk  Patient Active Problem List   Diagnosis Code    Keloid L91.0    MRSA (methicillin resistant staph aureus) culture positive Z22.322    Skin ulcer of abdominal wall ([facility] L98.499    POTS (postural orthostatic tachycardia syndrome) I49.8    Orthostatic dizziness- occasional- not worse R42    [facility]congest[facility] failure) ([facility] chronic diastolic J03.9    Morbid obesity ([facility]) E66.01    Bilateral leg edema =LYMPHEDEMA- ON f/U WITH LYMPHEDEMA CLINIC R60.0    Lymphedema of both lower extremities I89.0    SOB (shortness of breath) on exertion R06.02    Candidiasis, intertriginous B37.2    Dermatitis L30.9    Open abdominal wall wound S31.109A    History of fall Z91.81         Plan:     Patient examined and evaluated      Please see attached Discharge Instructions    Written patient dismissal instructions given to patient and signed by patient or POA.                Electronically signed by Manan Lam MD on 8/25/2021 at 10:58 AM

## 2021-08-25 NOTE — PLAN OF CARE
Problem: Wound:  Goal: Will show signs of wound healing; wound closure and no evidence of infection  Description: Will show signs of wound healing; wound closure and no evidence of infection  Outcome: Ongoing  Note: Patient has abdominal wound. Wound vac vacay today. [facility] patient case. See avs for discharge instructions. Follow up to re evaluate wound vac vacay. Care plan reviewed with patient and mother. Patient and mother verbalize understanding of the plan of care and contribute to goal setting.

## 2021-08-26 ENCOUNTER — TELEPHONE (OUTPATIENT)
Dept: WOUND CARE | Age: 46
End: 2021-08-26

## 2021-08-26 NOTE — TELEPHONE ENCOUNTER
Returned call to patients mother. I informed her, per Dr. Pricila Navarro, that the patient needs to take the week off of her wound vac and also her day clubs and she will be reevaluated at her next appointment 09/01/2021 to see if she needs the wound vac put back on or not and if she can return to the day clubs.

## 2021-09-01 ENCOUNTER — HOSPITAL ENCOUNTER (OUTPATIENT)
Dept: WOUND CARE | Age: 46
Discharge: HOME | End: 2021-09-01
Payer: MEDICARE

## 2021-09-01 VITALS
SYSTOLIC BLOOD PRESSURE: 126 MMHG | BODY MASS INDEX: 49.87 KG/M2 | RESPIRATION RATE: 18 BRPM | HEART RATE: 87 BPM | OXYGEN SATURATION: 96 % | TEMPERATURE: 97.3 F | DIASTOLIC BLOOD PRESSURE: 74 MMHG | HEIGHT: 66 IN

## 2021-09-01 DIAGNOSIS — L98.491 ULCER OF ABDOMEN WALL, LIMITED TO BREAKDOWN OF SKIN (HCC): Primary | ICD-10-CM

## 2021-09-01 PROCEDURE — 99213 OFFICE O/P EST LOW 20 MIN: CPT

## 2021-09-01 ASSESSMENT — PAIN SCALES - GENERAL: PAINLEVEL_OUTOF10: 0

## 2021-09-01 NOTE — PROGRESS NOTES
Luiz CORNEJO has reviewed and agrees with Trevon MCCORMICK's shift  Assessment.     Justa aClero RN  9/1/2021

## 2021-09-01 NOTE — PROGRESS NOTES
[facility]          Progress Note and Procedure Note      Leonor Garza  MEDICAL RECORD NUMBER:  [id number]  AGE: 42 y.o. GENDER: female  : 1975  EPISODE DATE:  2021    Subjective:     SUBJECTIVE     Chief Complaint   Patient presents with    Wound Check     abdomen           HISTORY OF PRESENT ILLNESS     She is a 42 yr old female seen for follow up visit she has history of keloid formation and has been dealing with a wound on the lower abdomen  for over 15 years . she has ADHD and schizophrenia she was accompanied by her mom .she follows with cardiology for CHF.   She had resection of the abdominal wound by Dr. Chun Valles. 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 Cira Peña     TOE SURGERY  2019    Dr. Dagoberto Fernando         Family History   Problem Relation Age of Onset    Diabetes Father     Cancer Maternal Grandmother     Cancer Paternal Grandmother      SOCIAL HISTORY       Social History     Tobacco Use    Smoking status: Never Smoker    Smokeless tobacco: Never Used   Vaping Use    Vaping Use: Never used   Substance Use Topics    Alcohol use: No    Drug use:  No ALLERGIES         Allergies   Allergen Reactions    Latex Rash    Cat Hair Extract     Seasonal      MEDICATIONS         Current Outpatient Medications on File Prior to Encounter   Medication Sig Dispense Refill    traZODone (DESYREL) 25 mg TABS Take 25 mg by mouth nightly      magnesium oxide (MAG-OX) 400 MG tablet Take 1 tablet by mouth daily 30 tablet 0    ketorolac (TORADOL) 10 MG tablet Take 1 tablet by mouth every 8 hours as needed for Pain 15 tablet 0    midodrine (PROAMATINE) 5 MG tablet Take 1 tablet by mouth 3 times daily 90 tablet 3    furosemide (LASIX) 20 MG tablet ALTERNATE 1 TABLET EVERY OTHER DAY WITH 2 TABLETS EVERY OTHER  tablet 3    metoprolol succinate (TOPROL XL) 25 MG extended release tablet Take 1 tablet by mouth daily 90 tablet 3    ibuprofen (ADVIL;MOTRIN) 800 MG tablet Take 1 tablet by mouth every 8 hours as needed for Pain 15 tablet 0    acetaminophen (TYLENOL) 500 MG tablet Take 1,000 mg by mouth every 6 hours as needed for Pain      Cholecalciferol (VITAMIN D3) 5000 units TABS Take 2,000 Units by mouth       MedroxyPROGESTERone Acetate (DEPO-PROVERA IM) Inject into the muscle      miconazole (MICOTIN) 2 % powder Apply topically 2 times to 3 times  daily. 45 g 1    Dicyclomine HCl (BENTYL PO)   Take 20 mg by mouth 2 times daily       QUEtiapine Fumarate (SEROQUEL PO) Take 400 mg by mouth 2 times daily.  atomoxetine (STRATTERA) 40 MG capsule Take 40 mg by mouth 2 times daily.  aripiprazole (ABILIFY) 10 MG tablet Take 15 mg by mouth 2 times daily       ACYCLOVIR 400 mg by Does not apply route three times a week        No current facility-administered medications on file prior to encounter. REVIEW OF SYSTEMS:     Constitutional: no fever, no night sweats, no fatigue. Head: no head ache , no head injury, no migranes. Eye: no blurring of vision, no double vision. Ears: no hearing difficulty, no tinnitus.   Mouth/throat: no ulceration, dental caries , dysphagia. Lungs: no cough, no shortness of breath, no wheeze  CVS: no palpitation, no chest pain, no shortness of breath  GI: no abdominal pain, no nausea , no vomiting, no constipation  FRANCOISE: no dysuria, frequency and urgency, no hematuria, no kidney stones  Musculoskeletal: no joint pain, swelling , stiffness,  Endocrine: no polyuria, polydipsia, no cold or heat intolerance  Hematology: no anemia, no easy brusing or bleeding, no hx of clotting disorder  Dermatology: no skin rash, no eczema, no pruritis,         PHYSICAL EXAM      height is 5' 6\" (1.676 m). Her infrared temperature is 97.3 °F (36.3 °C). Her blood pressure is 126/74 and her pulse is 87. Her respiration is 18 and oxygen saturation is 96%. Wt Readings from Last 3 Encounters:   08/17/21 (!) 309 lb (140.2 kg)   08/11/21 (!) 315 lb (142.9 kg)   07/28/21 (!) 307 lb 6.4 oz (139.4 kg)          General Appearance  Awake, alert, oriented,  not  In acute distress  HEENT - normocephalic, atraumatic, pink conjunctiva,  anicteric sclera     Abdomen - soft, not distended, nontender, no organomegally,the abdominal wound is improving, superficial.  Neurologic - oriented  Skin - No bruising or bleeding  Extremities -chronic leg edema      Wound 07/07/21 Abdomen #1 (Active)   Wound Image   09/01/21 1107   Wound Etiology Surgical 09/01/21 1107   Dressing Status Intact 09/01/21 1107   Wound Cleansed Cleansed with saline 09/01/21 1107   Dressing/Treatment Alginate with Ag;ABD 08/25/21 1046   Offloading for Diabetic Foot Ulcers No offloading required 09/01/21 1107   Wound Length (cm) 1 cm 09/01/21 1107   Wound Width (cm) 6 cm 09/01/21 1107   Wound Depth (cm) 0.01 cm 09/01/21 1107   Wound Surface Area (cm^2) 6 cm^2 09/01/21 1107   Change in Wound Size % (l*w) 88.29 09/01/21 1107   Wound Volume (cm^3) 0.06 cm^3 09/01/21 1107   Wound Healing % 100 09/01/21 1107   Wound Assessment Granulation tissue; Hyper granulation tissue 09/01/21 1107   Drainage Amount Moderate 09/01/21 1107   Drainage Description Serosanguinous 09/01/21 1107   Odor None 09/01/21 1107   Gwen-wound Assessment Dry/flaky;Denuded 09/01/21 1107   Margins Attached edges 09/01/21 1107   Wound Thickness Description not for Pressure Injury Full thickness 09/01/21 1107   Number of days: 56       LABS      Lab Results   Component Value Date    BC No growth-preliminary No growth  10/13/2020       Assessment:    Post surgical abdominal wound: improving. Will apply foam dressing  Follow up will be scheduled  Will stop wound vac. Patient Active Problem List   Diagnosis Code    Keloid L91.0    MRSA (methicillin resistant staph aureus) culture positive Z22.322    Skin ulcer of abdominal wall ([facility] L98.499    POTS (postural orthostatic tachycardia syndrome) I49.8    Orthostatic dizziness- occasional- not worse R42    CHF (congestive heart failure) ([facility]) chronic diastolic Z71.9    Morbid obesity ([facility]) E66.01    Bilateral leg edema =LYMPHEDEMA- ON f/U WITH LYMPHEDEMA CLINIC R60.0    Lymphedema of both lower extremities I89.0    SOB (shortness of breath) on exertion R06.02    Candidiasis, intertriginous B37.2    Dermatitis L30.9    Open abdominal wall wound S31.109A    History of fall Z91.81           Plan:     Patient examined and evaluated   Please see attached Discharge Instructions    Written patient dismissal instructions given to patient and signed by patient or POA. Discharge Instructions       Visit Discharge/Physician Orders:  - Discontinue wound vac  - Continue working on weight loss. - No shower, keep wound dressings dry  - when removing silver alginate, soak with saline and remove gently     Wound [address]: [address] [facility]     Dressing orders:      Abdomen wounds-  Cleanse wound with normal saline or wound cleanser and gauze. Pat dry with clean gauze. Apply zinc oxide to surrounding skin. Apply silver alginate to wound if drainage is increased.  Cover with Silicon bordered foam. Change every other day. You can change more often, daily if needed according to drainage       Follow up visit: 2 weeks on September 15 at 1030 am    Keep next scheduled appointment. Please give 24 hour notice if unable to keep appointment. [phone]     If you experience any of the following, please call the Wound Care Service during business hours: Monday through Friday 8:00 am - 4:30 pm  ([phone]).             *Increase in pain               *Temperature over 101              *Increase in drainage from your wound or a foul odor              *Uncontrolled swelling              *Need for compression bandage changes due to slippage, breakthrough drainage     If you need medical attention outside of business hours, please contact your Primary Care Doctor or go to the nearest emergency room.           Electronically signed by Liang Ritter MD on 9/1/2021 at 11:46 AM

## 2021-09-02 RX ORDER — MAGNESIUM OXIDE 400 MG/1
400 TABLET ORAL DAILY
Qty: 10 TABLET | Refills: 0 | Status: SHIPPED | OUTPATIENT
Start: 2021-09-02 | End: 2021-09-22 | Stop reason: SDUPTHER

## 2021-09-02 NOTE — TELEPHONE ENCOUNTER
Patients mom Felicita Bolaños (on hipaa) calilng in for patient who was last seen 8/17/2021. She needs her magnesioum oxide refilled along with another medication. Felicitaalessandro Bolaños was not sure the name, she said it was prescribed maybe 2 months ago to help raise her blood pressure because she kept falling. Pharmacy is [facility], and Felicita Bolaños ask that someone call her back to notify when done.

## 2021-09-15 ENCOUNTER — HOSPITAL ENCOUNTER (OUTPATIENT)
Age: 46
Discharge: HOME | End: 2021-09-15
Payer: MEDICARE

## 2021-09-15 DIAGNOSIS — G90.A POTS (POSTURAL ORTHOSTATIC TACHYCARDIA SYNDROME): ICD-10-CM

## 2021-09-15 DIAGNOSIS — I50.32 CHRONIC DIASTOLIC CONGESTIVE HEART FAILURE (HCC): ICD-10-CM

## 2021-09-15 DIAGNOSIS — R60.0 BILATERAL LEG EDEMA: ICD-10-CM

## 2021-09-15 DIAGNOSIS — R06.02 SOB (SHORTNESS OF BREATH) ON EXERTION: ICD-10-CM

## 2021-09-15 DIAGNOSIS — Z91.81 HISTORY OF FALL: ICD-10-CM

## 2021-09-15 DIAGNOSIS — E66.01 MORBID OBESITY (HCC): ICD-10-CM

## 2021-09-15 DIAGNOSIS — R42 ORTHOSTATIC DIZZINESS: ICD-10-CM

## 2021-09-15 LAB
ANION GAP SERPL CALCULATED.3IONS-SCNC: 12 MEQ/L (ref 8–16)
BUN BLDV-MCNC: 9 MG/DL (ref 7–22)
CALCIUM SERPL-MCNC: 9.2 MG/DL (ref 8.5–10.5)
CHLORIDE BLD-SCNC: 105 MEQ/L (ref 98–111)
CO2: 22 MEQ/L (ref 23–33)
CREAT SERPL-MCNC: 0.6 MG/DL (ref 0.4–1.2)
GFR SERPL CREATININE-BSD FRML MDRD: > 90 ML/MIN/1.73M2
GLUCOSE BLD-MCNC: 90 MG/DL (ref 70–108)
MAGNESIUM: 1.9 MG/DL (ref 1.6–2.4)
POTASSIUM SERPL-SCNC: 3.8 MEQ/L (ref 3.5–5.2)
SODIUM BLD-SCNC: 139 MEQ/L (ref 135–145)

## 2021-09-15 PROCEDURE — 80048 BASIC METABOLIC PNL TOTAL CA: CPT

## 2021-09-15 PROCEDURE — 83735 ASSAY OF MAGNESIUM: CPT

## 2021-09-15 PROCEDURE — 36415 COLL VENOUS BLD VENIPUNCTURE: CPT

## 2021-09-17 RX ORDER — METOPROLOL SUCCINATE 25 MG/1
TABLET, EXTENDED RELEASE ORAL
Qty: 90 TABLET | Refills: 0 | Status: SHIPPED | OUTPATIENT
Start: 2021-09-17 | End: 2021-12-02 | Stop reason: SDUPTHER

## 2021-09-22 ENCOUNTER — HOSPITAL ENCOUNTER (OUTPATIENT)
Dept: WOUND CARE | Age: 46
Discharge: HOME | End: 2021-09-22
Payer: MEDICARE

## 2021-09-22 VITALS
OXYGEN SATURATION: 98 % | HEART RATE: 91 BPM | TEMPERATURE: 97.2 F | DIASTOLIC BLOOD PRESSURE: 73 MMHG | RESPIRATION RATE: 20 BRPM | SYSTOLIC BLOOD PRESSURE: 132 MMHG

## 2021-09-22 PROCEDURE — 99213 OFFICE O/P EST LOW 20 MIN: CPT

## 2021-09-22 NOTE — PROGRESS NOTES
[address] f: [phone] f: [phone] p: [phone] [email or web address]      Ordering Center:   [facility]  [address] [facility] [address]  [phone]  WOUND CARE Dept: [phone]   [facility] NUMBER [phone]    Patient Information:      Leonor Garza  [facility]  [address]   [phone]   : 1975  AGE: 42 y.o. GENDER: female   EPISODE DATE: 2021    Insurance:      PRIMARY INSURANCE:  Plan: MEDICARE PART A AND B  Coverage: MEDICARE  Effective Date: 2015  Group Number: [unfilled]  Subscriber Number: [id number] - (Medicare)    Payor/Plan Subscr  Sex Relation Sub. Ins. ID Effective Group Num   1. MEDICARE - ME* MARANDA ART* 1975 Female Self [id number] 1/1/15                                    [address] [address]   2. [place]ID [place] -* MARANDA ART* 1975 Female Self [id number]                                    [address]       Patient Wound Information:      Problem List Items Addressed This Visit     None          WOUNDS REQUIRING DRESSING SUPPLIES:     Wound 21 Abdomen #1 (Active)   Wound Image   21 1048   Wound Etiology Surgical 21 1048   Dressing Status Intact 21 1048   Wound Cleansed Cleansed with saline 21 1048   Dressing/Treatment Alginate with Ag;Silicone border  1048   Offloading for Diabetic Foot Ulcers No offloading required 21 1048   Wound Length (cm) 1.2 cm 21 1048   Wound Width (cm) 5.2 cm 21 1048   Wound Depth (cm) 0.2 cm 21 1048   Wound Surface Area (cm^2) 6.24 cm^2 21 1048   Change in Wound Size % (l*w) 87.82 21 1048   Wound Volume (cm^3) 1.248 cm^3 21 1048   Wound Healing % 98 21 1048   Wound Assessment Granulation tissue;Slough; Devitalized tissue 21 1048   Drainage Amount Moderate 21 1048   Drainage Description Serosanguinous; Yellow 09/22/21 1048   Odor None 09/22/21 1048   Gwen-wound Assessment Dry/flaky; Intact; Blanchable erythema 09/22/21 1048   Margins Attached edges 09/22/21 1048   Wound Thickness Description not for Pressure Injury Full thickness 09/22/21 1048   Number of days: 77          Supplies Requested :      WOUND #: 1   PRIMARY DRESSING: Alginate with silver pad     Cover and Secure with:  Other silicone bordered foam     FREQUENCY OF DRESSING CHANGES:  Three times per week         ADDITIONAL ITEMS:  [] Gloves Small  [x] Gloves Medium [x] Gloves Large [] Gloves XLarge  [] Tape 1\" [x] Tape 2\" [] Tape 3\"  [] Medipore Tape  [x] Saline  [] Skin Prep   [] Adhesive Remover   [] Cotton Tip Applicators   [] Other:    Patient Wound(s) Debrided: [x] Yes if yes please add date 9/22/21 [] No    Debribement Type: Mechanical     Patient currently being seen by Home Health: [] Yes   [x] No    Duration for needed supplies:  []15  [x]30  []60  []90 Days    Electronically signed by Ezequiel Ramirez RN on 9/22/2021 at 3:48 PM     Provider Information:      PROVIDER'S NAME: Jv Garcia MD    NPI: [id number]

## 2021-09-22 NOTE — LETTER
[facility]  Phone: [phone]    Dave Carrillo MD        September 22, 2021     Patient: Ha Rodriguez   YOB: 1975   Date of Visit: 9/22/2021       To Whom It May Concern: It is my medical opinion that Vicente Gregory may return to work on Monday September 27, 2021. If you have any questions or concerns, please don't hesitate to call.     Sincerely,        Dave Carrillo MD

## 2021-09-22 NOTE — TELEPHONE ENCOUNTER
Rikki Turner called requesting a refill on the following medications:  Requested Prescriptions     Pending Prescriptions Disp Refills    magnesium oxide (MAG-OX) 400 MG tablet 10 tablet 0     Sig: Take 1 tablet by mouth daily    midodrine (PROAMATINE) 5 MG tablet 90 tablet 3     Sig: Take 1 tablet by mouth 3 times daily     Pharmacy verified:  .pv  [facility] [address]    Date of last visit: 08/17/2021  Date of next visit (if applicable): 75/9/4563

## 2021-09-22 NOTE — TELEPHONE ENCOUNTER
Patient's mother called to request these refills. She did not know the current dosage for the magnesium oxide and stated we should have that on file and that it was pending labs that we should have the results for.

## 2021-09-22 NOTE — PROGRESS NOTES
[facility]          Progress Note and Procedure Note      Mare Magallanes  MEDICAL RECORD NUMBER:  [id number]  AGE: 42 y.o. GENDER: female  : 1975  EPISODE DATE:  2021    Subjective:     SUBJECTIVE     Chief Complaint   Patient presents with    Wound Check           HISTORY OF PRESENT ILLNESS     She is a 42 yr old female seen for follow up visit she has history of keloid formation and has been dealing with a wound on the lower abdomen  for over 15 years . she has ADHD and schizophrenia she was accompanied by her mom .she follows with cardiology for CHF.   She had resection of the abdominal wound by Dr. Olga Bowden. the wound is improving. She was discharged by the home health. 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 Soren Celis     TOE SURGERY  2019    Dr. Chan Mention History   Problem Relation Age of Onset    Diabetes Father     Cancer Maternal Grandmother     Cancer Paternal Grandmother      SOCIAL HISTORY       Social History     Tobacco Use    Smoking status: Never Smoker    Smokeless tobacco: Never Used   Vaping Use    Vaping Use: Never used   Substance Use Topics    Alcohol use:  No  Drug use: No     ALLERGIES         Allergies   Allergen Reactions    Latex Rash    Cat Hair Extract     Seasonal      MEDICATIONS         Current Outpatient Medications on File Prior to Encounter   Medication Sig Dispense Refill    metoprolol succinate (TOPROL XL) 25 MG extended release tablet take 1 tablet by mouth once daily 90 tablet 0    magnesium oxide (MAG-OX) 400 MG tablet Take 1 tablet by mouth daily 10 tablet 0    traZODone (DESYREL) 25 mg TABS Take 25 mg by mouth nightly      ketorolac (TORADOL) 10 MG tablet Take 1 tablet by mouth every 8 hours as needed for Pain 15 tablet 0    midodrine (PROAMATINE) 5 MG tablet Take 1 tablet by mouth 3 times daily 90 tablet 3    furosemide (LASIX) 20 MG tablet ALTERNATE 1 TABLET EVERY OTHER DAY WITH 2 TABLETS EVERY OTHER  tablet 3    ibuprofen (ADVIL;MOTRIN) 800 MG tablet Take 1 tablet by mouth every 8 hours as needed for Pain 15 tablet 0    Cholecalciferol (VITAMIN D3) 5000 units TABS Take 2,000 Units by mouth       MedroxyPROGESTERone Acetate (DEPO-PROVERA IM) Inject into the muscle      miconazole (MICOTIN) 2 % powder Apply topically 2 times to 3 times  daily. 45 g 1    Dicyclomine HCl (BENTYL PO)   Take 20 mg by mouth 2 times daily       QUEtiapine Fumarate (SEROQUEL PO) Take 400 mg by mouth 2 times daily.  atomoxetine (STRATTERA) 40 MG capsule Take 40 mg by mouth 2 times daily.  aripiprazole (ABILIFY) 10 MG tablet Take 15 mg by mouth 2 times daily       ACYCLOVIR 400 mg by Does not apply route three times a week       acetaminophen (TYLENOL) 500 MG tablet Take 1,000 mg by mouth every 6 hours as needed for Pain       No current facility-administered medications on file prior to encounter. REVIEW OF SYSTEMS:     Constitutional: no fever, no night sweats, no fatigue. Head: no head ache , no head injury, no migranes. Eye: no blurring of vision, no double vision. Ears: no hearing difficulty, no tinnitus.   Mouth/throat: Amount Moderate 09/22/21 1048   Drainage Description Serosanguinous; Yellow 09/22/21 1048   Odor None 09/22/21 1048   Gwen-wound Assessment Dry/flaky; Intact; Blanchable erythema 09/22/21 1048   Margins Attached edges 09/22/21 1048   Wound Thickness Description not for Pressure Injury Full thickness 09/22/21 1048   Number of days: 77          Lab Results   Component Value Date    BC No growth-preliminary No growth  10/13/2020       Assessment:   Post surgical abdominal wound: improving. Will apply foam dressing and alginate  Follow up will be scheduled in three wks  Will release her to go to work. Patient Active Problem List   Diagnosis Code    Keloid L91.0    MRSA (methicillin resistant staph aureus) culture positive Z22.322    Skin ulcer of abdominal wall ([facility] L98.499    POTS (postural orthostatic tachycardia syndrome) I49.8    Orthostatic dizziness- occasional- not worse R42    CHF (congestive heart failure) ([facility]) chronic diastolic M42.9    Morbid obesity ([facility]) E66.01    Bilateral leg edema =LYMPHEDEMA- ON f/U WITH LYMPHEDEMA CLINIC R60.0    Lymphedema of both lower extremities I89.0    SOB (shortness of breath) on exertion R06.02    Candidiasis, intertriginous B37.2    Dermatitis L30.9    Open abdominal wall wound S31.109A    History of fall Z91.81           Plan:     Patient examined and evaluated   Please see attached Discharge Instructions    Written patient dismissal instructions given to patient and signed by patient or POA.                Electronically signed by Corinne Simons MD on 9/22/2021 at 11:04 AM

## 2021-09-24 RX ORDER — MIDODRINE HYDROCHLORIDE 5 MG/1
5 TABLET ORAL 3 TIMES DAILY
Qty: 90 TABLET | Refills: 3 | Status: SHIPPED | OUTPATIENT
Start: 2021-09-24 | End: 2021-12-02 | Stop reason: SDUPTHER

## 2021-09-24 RX ORDER — MAGNESIUM OXIDE 400 MG/1
400 TABLET ORAL DAILY
Qty: 30 TABLET | Refills: 3 | Status: SHIPPED | OUTPATIENT
Start: 2021-09-24 | End: 2022-10-05

## 2021-10-13 ENCOUNTER — HOSPITAL ENCOUNTER (OUTPATIENT)
Dept: WOUND CARE | Age: 46
Discharge: HOME | End: 2021-10-13
Payer: MEDICARE

## 2021-10-13 VITALS
HEIGHT: 66 IN | WEIGHT: 293 LBS | BODY MASS INDEX: 47.09 KG/M2 | OXYGEN SATURATION: 98 % | RESPIRATION RATE: 20 BRPM | DIASTOLIC BLOOD PRESSURE: 72 MMHG | SYSTOLIC BLOOD PRESSURE: 142 MMHG | TEMPERATURE: 97.3 F | HEART RATE: 120 BPM

## 2021-10-13 PROCEDURE — 99213 OFFICE O/P EST LOW 20 MIN: CPT

## 2021-10-13 ASSESSMENT — PAIN SCALES - GENERAL: PAINLEVEL_OUTOF10: 8

## 2021-10-13 NOTE — PROGRESS NOTES
[address] f: [phone] f: [phone] p: [phone] [email or web address]      Ordering Center:   [facility]  [address] [facility] [address]  [phone]  WOUND CARE Dept: [phone]   [address] [place] [address] NUMBER [phone]    Patient Information:      Esperanza Huffman  [address]  [address] [facility]   [phone]   : 1975  AGE: 42 y.o. GENDER: female   EPISODE DATE: 10/13/2021    Insurance:      PRIMARY INSURANCE:  Plan: MEDICARE PART A AND B  Coverage: MEDICARE  Effective Date: 2015  Group Number: [unfilled]  Subscriber Number: [id number] - (Medicare)    Payor/Plan Subscr  Sex Relation Sub. Ins. ID Effective Group Num   1. MEDICARE - ME* MARANDA ART* 1975 Female Self [id number] 1/1/15                                    [address] [address]   2. [place] -* MARANDA ART* 1975 Female Self [id number] 18                                    [address]       Patient Wound Information:      Problem List Items Addressed This Visit     None          WOUNDS REQUIRING DRESSING SUPPLIES:     Wound 21 Abdomen #1 (Active)   Wound Image   10/13/21 1101   Wound Etiology Surgical 10/13/21 1101   Dressing Status Intact 10/13/21 1101   Wound Cleansed Cleansed with saline 10/13/21 1101   Dressing/Treatment Alginate with Ag;Silicone border  1101   Offloading for Diabetic Foot Ulcers No offloading required 10/13/21 1101   Wound Length (cm) 2.8 cm 10/13/21 1101   Wound Width (cm) 6.2 cm 10/13/21 1101   Wound Depth (cm) 0.1 cm 10/13/21 1101   Wound Surface Area (cm^2) 17.36 cm^2 10/13/21 1101   Change in Wound Size % (l*w) 66.13 10/13/21 1101   Wound Volume (cm^3) 1.736 cm^3 10/13/21 1101   Wound Healing % 97 10/13/21 1101   Wound Assessment Granulation tissue; Hyper granulation tissue 10/13/21 1101   Drainage Amount Moderate 10/13/21 1101   Drainage Description Serosanguinous 10/13/21 1101   Odor None 10/13/21 1101   Gwen-wound Assessment Blanchable erythema;Denuded; Induration 10/13/21 1101   Margins Attached edges 10/13/21 1101   Wound Thickness Description not for Pressure Injury Full thickness 10/13/21 1101   Number of days: 98       Wound 10/13/21 Abdomen Left;Medial;Upper (Active)   Wound Image   10/13/21 1101   Dressing Status Intact; Old drainage noted 10/13/21 1101   Wound Cleansed Cleansed with saline 10/13/21 1101   Dressing/Treatment Silicone border 62/56/53 1101   Wound Length (cm) 0.5 cm 10/13/21 1101   Wound Width (cm) 0.7 cm 10/13/21 1101   Wound Depth (cm) 0.1 cm 10/13/21 1101   Wound Surface Area (cm^2) 0.35 cm^2 10/13/21 1101   Wound Volume (cm^3) 0.035 cm^3 10/13/21 1101   Wound Assessment Granulation tissue; Hyper granulation tissue 10/13/21 1101   Drainage Amount Small 10/13/21 1101   Drainage Description Serous 10/13/21 1101   Odor None 10/13/21 1101   Gwen-wound Assessment Blanchable erythema;Dry/flaky; Intact 10/13/21 1101   Margins Attached edges 10/13/21 1101   Number of days: 0          Supplies Requested :      WOUND #: 1   PRIMARY DRESSING:  Alginate with silver pad   Cover and Secure with: Other silicone bordered foam     FREQUENCY OF DRESSING CHANGES:  Every other day       WOUND #: 2   PRIMARY DRESSING:  Alginate with silver pad   Cover and Secure with:  Other silicone bordered foam     FREQUENCY OF DRESSING CHANGES:  Every other day       ADDITIONAL ITEMS:  [] Gloves Small  [x] Gloves Medium [x] Gloves Large [] Gloves XLarge  [] Tape 1\" [x] Tape 2\" [] Tape 3\"  [x] Medipore Tape  [x] Saline  [x] Skin Prep   [] Adhesive Remover   [] Cotton Tip Applicators   [] Other:    Patient Wound(s) Debrided: [x] Yes if yes please add date 10/13/2021  [] No    Debribement Type: Autolytic    Patient currently being seen by Home Health: [] Yes   [x] No    Duration for needed supplies:  []15  []30  []60  [x]90 Days    Electronically signed by Sharif Virgen Uche Cannon on 90/74/6121 at 1:06 PM     Provider Information:    PROVIDER'S NAME: Cheyenne Stout MD    NPI: [id number]

## 2021-10-13 NOTE — PROGRESS NOTES
[facility]          Progress Note and Procedure Note      Cleveland Porter  MEDICAL RECORD NUMBER:  [id number]  AGE: 42 y.o. GENDER: female  : 1975  EPISODE DATE:  10/13/2021    Subjective:     SUBJECTIVE     Chief Complaint   Patient presents with    Wound Check     abdomen        HISTORY OF PRESENT ILLNESS     She is a 42 yr old female seen for follow up visit she has history of non healing on the lower abdomen  for over 15 years . she has ADHD and schizophrenia she was accompanied by her mom .she follows with cardiology for CHF.   she had debridement of the wound by G.surgeon. 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 [address]ena     TOE SURGERY  2019    Dr. Franklin Greenwood         Family History   Problem Relation Age of Onset    Diabetes Father     Cancer Maternal Grandmother     Cancer Paternal Grandmother      SOCIAL HISTORY       Social History     Tobacco Use    Smoking status: Never Smoker    Smokeless tobacco: Never Used   Vaping Use    Vaping Use: Never used   Substance Use Topics    Alcohol use: No    Drug use: No     ALLERGIES         Allergies   Allergen Reactions    Latex Rash    Cat Hair Extract     Seasonal      MEDICATIONS         Current Outpatient Medications on File Prior to Encounter   Medication Sig Dispense Refill    magnesium oxide (MAG-OX) 400 MG tablet Take 1 tablet by mouth daily 30 tablet 3    midodrine (PROAMATINE) 5 MG tablet Take 1 tablet by mouth 3 times daily 90 tablet 3    metoprolol succinate (TOPROL XL) 25 MG extended release tablet take 1 tablet by mouth once daily 90 tablet 0    traZODone (DESYREL) 25 mg TABS Take 25 mg by mouth nightly      ketorolac (TORADOL) 10 MG tablet Take 1 tablet by mouth every 8 hours as needed for Pain 15 tablet 0    furosemide (LASIX) 20 MG tablet ALTERNATE 1 TABLET EVERY OTHER DAY WITH 2 TABLETS EVERY OTHER  tablet 3    ibuprofen (ADVIL;MOTRIN) 800 MG tablet Take 1 tablet by mouth every 8 hours as needed for Pain 15 tablet 0    acetaminophen (TYLENOL) 500 MG tablet Take 1,000 mg by mouth every 6 hours as needed for Pain      Cholecalciferol (VITAMIN D3) 5000 units TABS Take 2,000 Units by mouth       MedroxyPROGESTERone Acetate (DEPO-PROVERA IM) Inject into the muscle      miconazole (MICOTIN) 2 % powder Apply topically 2 times to 3 times  daily. 45 g 1    Dicyclomine HCl (BENTYL PO)   Take 20 mg by mouth 2 times daily       QUEtiapine Fumarate (SEROQUEL PO) Take 400 mg by mouth 2 times daily.  atomoxetine (STRATTERA) 40 MG capsule Take 40 mg by mouth 2 times daily.  aripiprazole (ABILIFY) 10 MG tablet Take 15 mg by mouth 2 times daily       ACYCLOVIR 400 mg by Does not apply route three times a week        No current facility-administered medications on file prior to encounter. REVIEW OF SYSTEMS:     Constitutional: no fever, no night sweats, no fatigue. Head: no head ache , no head injury, no migranes. Eye: no blurring of vision, no double vision. Ears: no hearing difficulty, no tinnitus.   Mouth/throat: no ulceration, dental caries , dysphagia. Lungs: no cough, no shortness of breath, no wheeze  CVS: no palpitation, no chest pain, no shortness of breath  GI: no abdominal pain, no nausea , no vomiting, no constipation  FRANCOISE: no dysuria, frequency and urgency, no hematuria, no kidney stones  Musculoskeletal: no joint pain, swelling , stiffness,  Endocrine: no polyuria, polydipsia, no cold or heat intolerance  Hematology: no anemia, no easy brusing or bleeding, no hx of clotting disorder  Dermatology: no skin rash, no eczema, no pruritis,         PHYSICAL EXAM      vitals were not taken for this visit. Wt Readings from Last 3 Encounters:   08/17/21 (!) 309 lb (140.2 kg)   08/11/21 (!) 315 lb (142.9 kg)   07/28/21 (!) 307 lb 6.4 oz (139.4 kg)          General Appearance  Awake, alert, oriented,  not  In acute distress  HEENT - normocephalic, atraumatic, pink conjunctiva,  anicteric sclera  Abdomen - soft, not distended, nontender, no organomegally,the abdominal wound is bigger. Neurologic - oriented. Skin - No bruising or bleeding  Extremities -chronic leg edema     Wound 07/07/21 Abdomen #1 (Active)   Wound Image   09/22/21 1048   Wound Etiology Surgical 09/22/21 1048   Dressing Status Intact 09/22/21 1048   Wound Cleansed Cleansed with saline 09/22/21 1048   Dressing/Treatment Alginate with Ag;Silicone border 33/85/43 1107   Offloading for Diabetic Foot Ulcers No offloading required 09/22/21 1048   Wound Length (cm) 1.2 cm 09/22/21 1048   Wound Width (cm) 5.2 cm 09/22/21 1048   Wound Depth (cm) 0.2 cm 09/22/21 1048   Wound Surface Area (cm^2) 6.24 cm^2 09/22/21 1048   Change in Wound Size % (l*w) 87.82 09/22/21 1048   Wound Volume (cm^3) 1.248 cm^3 09/22/21 1048   Wound Healing % 98 09/22/21 1048   Wound Assessment Granulation tissue;Slough; Devitalized tissue 09/22/21 1048   Drainage Amount Moderate 09/22/21 1048   Drainage Description Serosanguinous; Yellow 09/22/21 1048   Odor None 09/22/21 1048   Gwen-wound Assessment

## 2021-10-13 NOTE — PLAN OF CARE
Problem: Wound:  Goal: Will show signs of wound healing; wound closure and no evidence of infection  Description: Will show signs of wound healing; wound closure and no evidence of infection  Outcome: Ongoing   Patient seen for abdominal  wound. Wound shows signs of proper closure and healing. No s/s of infection noted. Follow up in 5 weeks. See AVS for order changes. Care plan reviewed with patient and mother. Patient and mother verbalize understanding of the plan of care and contribute to goal setting.

## 2021-10-13 NOTE — PROGRESS NOTES
Luiz CORNEJO has reviewed and agrees with Trevon MCCORMICK's shift  Assessment.     Heather Kamara RN  10/13/2021

## 2021-11-17 ENCOUNTER — HOSPITAL ENCOUNTER (OUTPATIENT)
Dept: WOUND CARE | Age: 46
Discharge: HOME | End: 2021-11-17
Payer: MEDICARE

## 2021-11-17 ENCOUNTER — HOSPITAL ENCOUNTER (OUTPATIENT)
Dept: NON INVASIVE DIAGNOSTICS | Age: 46
Discharge: HOME | End: 2021-11-17
Payer: MEDICARE

## 2021-11-17 VITALS
WEIGHT: 293 LBS | OXYGEN SATURATION: 95 % | TEMPERATURE: 97.8 F | BODY MASS INDEX: 52.2 KG/M2 | HEART RATE: 110 BPM | DIASTOLIC BLOOD PRESSURE: 62 MMHG | RESPIRATION RATE: 18 BRPM | SYSTOLIC BLOOD PRESSURE: 126 MMHG

## 2021-11-17 DIAGNOSIS — I50.32 CHRONIC DIASTOLIC CONGESTIVE HEART FAILURE (HCC): ICD-10-CM

## 2021-11-17 DIAGNOSIS — G90.A POTS (POSTURAL ORTHOSTATIC TACHYCARDIA SYNDROME): ICD-10-CM

## 2021-11-17 DIAGNOSIS — R42 ORTHOSTATIC DIZZINESS: ICD-10-CM

## 2021-11-17 DIAGNOSIS — R06.02 SOB (SHORTNESS OF BREATH) ON EXERTION: ICD-10-CM

## 2021-11-17 LAB
LV EF: 55 %
LVEF MODALITY: NORMAL

## 2021-11-17 PROCEDURE — 6370000000 HC RX 637 (ALT 250 FOR IP): Performed by: INTERNAL MEDICINE

## 2021-11-17 PROCEDURE — 93306 TTE W/DOPPLER COMPLETE: CPT

## 2021-11-17 PROCEDURE — 99213 OFFICE O/P EST LOW 20 MIN: CPT

## 2021-11-17 RX ADMIN — SILVER NITRATE APPLICATORS 3 EACH: 25; 75 STICK TOPICAL at 10:12

## 2021-11-17 ASSESSMENT — PAIN DESCRIPTION - LOCATION: LOCATION: ABDOMEN;LEG

## 2021-11-17 ASSESSMENT — PAIN SCALES - GENERAL: PAINLEVEL_OUTOF10: 8

## 2021-11-17 ASSESSMENT — PAIN DESCRIPTION - PAIN TYPE: TYPE: CHRONIC PAIN

## 2021-11-17 ASSESSMENT — PAIN DESCRIPTION - ORIENTATION: ORIENTATION: RIGHT

## 2021-11-17 NOTE — PLAN OF CARE
Problem: Wound:  Goal: Will show signs of wound healing; wound closure and no evidence of infection  Description: Will show signs of wound healing; wound closure and no evidence of infection  Outcome: Ongoing     Patient presents to wound clinic for follow up of abdomen wounds. Silver nitrate applied to wounds per Dr. Teresa Hurley. Abdomen wounds- Apply vaseline to surrounding skin. Apply silver alginate to wounds. Cover with ABD pad and secure with tape. Change daily. Follow up visit: 4 weeks on 12/15/2021 at 10:30 am.    Care plan reviewed with patient and mother. Patient and mother verbalize understanding of the plan of care and contribute to goal setting.

## 2021-11-17 NOTE — PROGRESS NOTES
[facility]          Progress Note and Procedure Note      Esperanza Huffman  MEDICAL RECORD NUMBER:  [id number]  AGE: 42 y.o. GENDER: female  : 1975  EPISODE DATE:  2021    Subjective:     SUBJECTIVE     Chief Complaint   Patient presents with    Wound Check     Abdomen        HISTORY OF PRESENT ILLNESS     She is a 42 yr old female seen for follow up visit she has history of non healing on the lower abdomen  for over 15 years . she has ADHD and schizophrenia she was accompanied by her mom .she follows with cardiology for CHF.   she had debridement of the wound by G.surgeon. she still has superfical wound. Has a new wound.  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 Marisel Jackson     TOE SURGERY  2019    Dr. Marilu Araiza         Family History   Problem Relation Age of Onset    Diabetes Father     Cancer Maternal Grandmother     Cancer Paternal Grandmother      SOCIAL HISTORY       Social History     Tobacco Use    Smoking status: Never Smoker    Smokeless tobacco: Never Used   Vaping Use    Vaping Use: Never used   Substance Use Topics    Alcohol use: No    Drug use: No     ALLERGIES         Allergies   Allergen Reactions    Latex Rash    Cat Hair Extract     Seasonal      MEDICATIONS         Current Outpatient Medications on File Prior to Encounter   Medication Sig Dispense Refill    magnesium oxide (MAG-OX) 400 MG tablet Take 1 tablet by mouth daily 30 tablet 3    midodrine (PROAMATINE) 5 MG tablet Take 1 tablet by mouth 3 times daily 90 tablet 3    metoprolol succinate (TOPROL XL) 25 MG extended release tablet take 1 tablet by mouth once daily 90 tablet 0    traZODone (DESYREL) 25 mg TABS Take 25 mg by mouth nightly      furosemide (LASIX) 20 MG tablet ALTERNATE 1 TABLET EVERY OTHER DAY WITH 2 TABLETS EVERY OTHER  tablet 3    Cholecalciferol (VITAMIN D3) 5000 units TABS Take 2,000 Units by mouth       Dicyclomine HCl (BENTYL PO)   Take 20 mg by mouth 2 times daily       QUEtiapine Fumarate (SEROQUEL PO) Take 400 mg by mouth 2 times daily.  atomoxetine (STRATTERA) 40 MG capsule Take 40 mg by mouth 2 times daily.  aripiprazole (ABILIFY) 10 MG tablet Take 15 mg by mouth 2 times daily       ACYCLOVIR 400 mg by Does not apply route three times a week       ketorolac (TORADOL) 10 MG tablet Take 1 tablet by mouth every 8 hours as needed for Pain 15 tablet 0    ibuprofen (ADVIL;MOTRIN) 800 MG tablet Take 1 tablet by mouth every 8 hours as needed for Pain 15 tablet 0    acetaminophen (TYLENOL) 500 MG tablet Take 1,000 mg by mouth every 6 hours as needed for Pain      MedroxyPROGESTERone Acetate (DEPO-PROVERA IM) Inject into the muscle      miconazole (MICOTIN) 2 % powder Apply topically 2 times to 3 times  daily. 45 g 1     No current facility-administered medications on file prior to encounter. REVIEW OF SYSTEMS:     Constitutional: no fever, no night sweats, no fatigue. Head: no head ache , no head injury, no migranes. Eye: no blurring of vision, no double vision. Ears: no hearing difficulty, no tinnitus.   Mouth/throat: no ulceration, dental caries , dysphagia. Lungs: no cough, no shortness of breath, no wheeze  CVS: no palpitation, no chest pain, no shortness of breath  GI: no abdominal pain, no nausea , no vomiting, no constipation  FRANCOISE: no dysuria, frequency and urgency, no hematuria, no kidney stones  Musculoskeletal: no joint pain, swelling , stiffness,  Endocrine: no polyuria, polydipsia, no cold or heat intolerance  Hematology: no anemia, no easy brusing or bleeding, no hx of clotting disorder  Dermatology: no skin rash, no eczema, no pruritis,         PHYSICAL EXAM      infrared temperature is 97.8 °F (36.6 °C). Her blood pressure is 126/62 and her pulse is 110. Her respiration is 18 and oxygen saturation is 95%. Wt Readings from Last 3 Encounters:   10/13/21 (!) 323 lb 14.4 oz (146.9 kg)   08/17/21 (!) 309 lb (140.2 kg)   08/11/21 (!) 315 lb (142.9 kg)          General Appearance  Awake, alert, oriented,  not  In acute distress  HEENT - normocephalic, atraumatic, pink conjunctiva,  anicteric sclera  Abdomen - she has hypergranular open wound on the mid abdomen, measurment noted. Neurologic - oriented. Skin - No bruising or bleeding  Extremities -chronic leg edema     Wound 07/07/21 Abdomen #1 (Active)   Wound Image   09/22/21 1048   Wound Etiology Surgical 09/22/21 1048   Dressing Status Intact 09/22/21 1048   Wound Cleansed Cleansed with saline 09/22/21 1048   Dressing/Treatment Alginate with Ag;Silicone border 86/40/82 1107   Offloading for Diabetic Foot Ulcers No offloading required 09/22/21 1048   Wound Length (cm) 1.2 cm 09/22/21 1048   Wound Width (cm) 5.2 cm 09/22/21 1048   Wound Depth (cm) 0.2 cm 09/22/21 1048   Wound Surface Area (cm^2) 6.24 cm^2 09/22/21 1048   Change in Wound Size % (l*w) 87.82 09/22/21 1048   Wound Volume (cm^3) 1.248 cm^3 09/22/21 1048   Wound Healing % 98 09/22/21 1048   Wound Assessment Granulation tissue;Slough; Devitalized tissue 09/22/21 1048   Drainage Amount Moderate 09/22/21 1048   Drainage Description Serosanguinous; Yellow 09/22/21 1048   Odor None 09/22/21 1048   Gwen-wound Assessment Dry/flaky; Intact; Blanchable erythema 09/22/21 1048   Margins Attached edges 09/22/21 1048   Wound Thickness Description not for Pressure Injury Full thickness 09/22/21 1048   Number of days: 77          Lab Results   Component Value Date    BC No growth-preliminary No growth  10/13/2020       Assessment:   Post surgical abdominal wound: silver nitrate cautery done to the abdominal wound  Continue local wound care. Patient Active Problem List   Diagnosis Code    Keloid L91.0    MRSA (methicillin resistant staph aureus) culture positive Z22.322    Skin ulcer of abdominal wall ([facility] L98.499    POTS (postural orthostatic tachycardia syndrome) I49.8    Orthostatic dizziness- occasional- not worse R42    CHF (congestive heart failure) ([facility]) chronic diastolic N53.3    Morbid obesity ([facility]) E66.01    Bilateral leg edema =LYMPHEDEMA- ON f/U WITH LYMPHEDEMA CLINIC R60.0    Lymphedema of both lower extremities I89.0    SOB (shortness of breath) on exertion R06.02    Candidiasis, intertriginous B37.2    Dermatitis L30.9    Open abdominal wall wound S31.109A    History of fall Z91.81           Plan:     Patient examined and evaluated   Please see attached Discharge Instructions    Written patient dismissal instructions given to patient and signed by patient or POA. Discharge Instructions       Visit Discharge/Physician Orders:  - Continue working on weight loss, work on lower sodium meals, try to eat more fresh fruit and veggies, eat less fast food, canned, and processed meals.  - Supplies ordered through [employer]. [phone].     Wound Location: Abdomen     Dressing orders:      OK to shower take shower before dressing change. Clean wound after shower then re-dress.      Abdomen wounds- Cleanse wound with normal saline or wound cleanser and gauze.  Pat dry with clean gauze. Apply zinc oxide to surrounding skin. Apply silver alginate to wound. Cover with Silicone bordered foam. Change every other day. You can change more often, daily if needed according to drainage       Follow up visit: 4 wks   Keep next scheduled appointment. Please give 24 hour notice if unable to keep appointment. [phone]     If you experience any of the following, please call the Wound Care Service during business hours: Monday through Friday 8:00 am - 4:30 pm  ([phone]).             *Increase in pain               *Temperature over 101              *Increase in drainage from your wound or a foul odor              *Uncontrolled swelling              *Need for compression bandage changes due to slippage, breakthrough drainage     If you need medical attention outside of business hours, please contact your Primary Care Doctor or go to the nearest emergency room.         Electronically signed by Morelia Srinivasan MD on 11/17/2021 at 10:06 AM

## 2021-12-02 ENCOUNTER — OFFICE VISIT (OUTPATIENT)
Dept: CARDIOLOGY CLINIC | Age: 46
End: 2021-12-02
Payer: MEDICARE

## 2021-12-02 VITALS
WEIGHT: 293 LBS | HEIGHT: 66 IN | SYSTOLIC BLOOD PRESSURE: 136 MMHG | HEART RATE: 95 BPM | DIASTOLIC BLOOD PRESSURE: 83 MMHG | BODY MASS INDEX: 47.09 KG/M2

## 2021-12-02 DIAGNOSIS — I50.32 CHRONIC DIASTOLIC CONGESTIVE HEART FAILURE (HCC): Primary | ICD-10-CM

## 2021-12-02 DIAGNOSIS — R06.02 SOB (SHORTNESS OF BREATH) ON EXERTION: ICD-10-CM

## 2021-12-02 DIAGNOSIS — G90.A POTS (POSTURAL ORTHOSTATIC TACHYCARDIA SYNDROME): ICD-10-CM

## 2021-12-02 DIAGNOSIS — E66.01 MORBID OBESITY (HCC): ICD-10-CM

## 2021-12-02 DIAGNOSIS — R60.0 BILATERAL LEG EDEMA: ICD-10-CM

## 2021-12-02 DIAGNOSIS — R42 ORTHOSTATIC DIZZINESS: ICD-10-CM

## 2021-12-02 PROCEDURE — 93000 ELECTROCARDIOGRAM COMPLETE: CPT | Performed by: INTERNAL MEDICINE

## 2021-12-02 PROCEDURE — 99214 OFFICE O/P EST MOD 30 MIN: CPT | Performed by: INTERNAL MEDICINE

## 2021-12-02 PROCEDURE — G8484 FLU IMMUNIZE NO ADMIN: HCPCS | Performed by: INTERNAL MEDICINE

## 2021-12-02 PROCEDURE — 1036F TOBACCO NON-USER: CPT | Performed by: INTERNAL MEDICINE

## 2021-12-02 PROCEDURE — G8427 DOCREV CUR MEDS BY ELIG CLIN: HCPCS | Performed by: INTERNAL MEDICINE

## 2021-12-02 PROCEDURE — G8417 CALC BMI ABV UP PARAM F/U: HCPCS | Performed by: INTERNAL MEDICINE

## 2021-12-02 RX ORDER — MIDODRINE HYDROCHLORIDE 5 MG/1
5 TABLET ORAL 3 TIMES DAILY
Qty: 90 TABLET | Refills: 3 | Status: SHIPPED | OUTPATIENT
Start: 2021-12-02 | End: 2022-04-18 | Stop reason: SDUPTHER

## 2021-12-02 RX ORDER — MAGNESIUM OXIDE 400 MG/1
400 TABLET ORAL DAILY
Qty: 30 TABLET | Refills: 3 | Status: CANCELLED | OUTPATIENT
Start: 2021-12-02

## 2021-12-02 RX ORDER — METOPROLOL SUCCINATE 50 MG/1
TABLET, EXTENDED RELEASE ORAL
Qty: 90 TABLET | Refills: 3 | Status: SHIPPED | OUTPATIENT
Start: 2021-12-02

## 2021-12-02 RX ORDER — FUROSEMIDE 20 MG/1
TABLET ORAL
Qty: 120 TABLET | Refills: 3 | Status: SHIPPED | OUTPATIENT
Start: 2021-12-02

## 2021-12-02 NOTE — PROGRESS NOTES
No chief complaint on file. Originally Pt is a f/u from [address] [place] [address] for POTS syndrome  She has not had any issues until today because it is so hot outside  Since she has been put on medications she is feeling better  She does have chest pressure at night time  Has to sleep in a recliner  Sob and lt headed   She was dizzy off and on prior to going to [facility]  Dr Shayla Silva started her on lasix and they would like to know if you want her to remain on this? ?    4 month f/u    EKG was done today: 12/2/2021    Gained 10 lb in 4 months  Denied cp,  OR PALPITATION    Hx of Chronic dizziness if get too fast up    Sob on exertion-chronic    No more  not fallen after midodrine  And med adjusted    Dizziness on standing fast-getting better and less freq like 2x/ in 2 months    Pt mother states med change made huge difference   EKG done 10-.     Hx of Falling more frequent note after covid in jan 2021 and med changes for worsening of schizophrenia    Used to Fall daily from standing fall back mason with no LOC    Orthopnea for few yrs and sleep on recliner for over 2 yrs    Leg edema  better    Continues with TOMAS bilateral       Patient Active Problem List   Diagnosis    Keloid    MRSA (methicillin resistant staph aureus) culture positive    Skin ulcer of abdominal wall ([address]    POTS (postural orthostatic tachycardia syndrome)    Orthostatic dizziness- occasional- not worse    CHF (congestive heart failure) (HCC) chronic diastolic    Morbid obesity ([address]    Bilateral leg edema =LYMPHEDEMA- ON f/U WITH LYMPHEDEMA CLINIC    Lymphedema of both lower extremities    SOB (shortness of breath) on exertion    Candidiasis, intertriginous    Dermatitis    Open abdominal wall wound    History of fall       Past Surgical History:   Procedure Laterality Date    ABDOMEN SURGERY N/A 6/10/2021    EXCISIONAL DEBRIDEMENT AND CLOSURE OF CHRONIC ABDOMINAL WOUND performed by Emilia Salinas MD at [place] [address] Kelli [address]rell 2014    TOE SURGERY  09/2019    Dr. Helms Free         Allergies   Allergen Reactions    Latex Rash    Cat Hair Extract     Seasonal         Family History   Problem Relation Age of Onset    Diabetes Father     Cancer Maternal Grandmother     Cancer Paternal Grandmother         Social History     Socioeconomic History    Marital status: Single     Spouse name: Not on file    Number of children: Not on file    Years of education: Not on file    Highest education level: Not on file   Occupational History    Not on file   Tobacco Use    Smoking status: Never Smoker    Smokeless tobacco: Never Used   Vaping Use    Vaping Use: Never used   Substance and Sexual Activity    Alcohol use: No    Drug use: No    Sexual activity: Not on file   Other Topics Concern    Not on file   Social History Narrative    Not on file     Social Determinants of Health     Financial Resource Strain:     Difficulty of Paying Living Expenses: Not on file   Food Insecurity:     Worried About Running Out of Food in the Last Year: Not on file    Kathy of Food in the Last Year: Not on file   Transportation Needs:     Lack of Transportation (Medical): Not on file    Lack of Transportation (Non-Medical):  Not on file   Physical Activity:     Days of Exercise per Week: Not on file    Minutes of Exercise per Session: Not on file   Stress:     Feeling of Stress : Not on file   Social Connections:     Frequency of Communication with Friends and Family: Not on file    Frequency of Social Gatherings with Friends and Family: Not on file    Attends Confucianism Services: Not on file    Active Member of Clubs or Organizations: Not on file    Attends Club or Organization Meetings: Not on file    Marital Status: Not on file   Intimate Partner Violence:     Fear of Current or Ex-Partner: Not on file    Emotionally Abused: Not on file   Landen Garcia Physically Abused: Not on file    Sexually Abused: Not on file   Housing Stability:     Unable to Pay for Housing in the Last Year: Not on file    Number of Places Lived in the Last Year: Not on file    Unstable Housing in the Last Year: Not on file       Current Outpatient Medications   Medication Sig Dispense Refill    midodrine (PROAMATINE) 5 MG tablet Take 1 tablet by mouth 3 times daily 90 tablet 3    metoprolol succinate (TOPROL XL) 50 MG extended release tablet take 1 tablet by mouth once daily 90 tablet 3    furosemide (LASIX) 20 MG tablet ALTERNATE 1 TABLET EVERY OTHER DAY WITH 2 TABLETS EVERY OTHER  tablet 3    magnesium oxide (MAG-OX) 400 MG tablet Take 1 tablet by mouth daily 30 tablet 3    traZODone (DESYREL) 25 mg TABS Take 25 mg by mouth nightly      ketorolac (TORADOL) 10 MG tablet Take 1 tablet by mouth every 8 hours as needed for Pain 15 tablet 0    ibuprofen (ADVIL;MOTRIN) 800 MG tablet Take 1 tablet by mouth every 8 hours as needed for Pain 15 tablet 0    acetaminophen (TYLENOL) 500 MG tablet Take 1,000 mg by mouth every 6 hours as needed for Pain      Cholecalciferol (VITAMIN D3) 5000 units TABS Take 2,000 Units by mouth       MedroxyPROGESTERone Acetate (DEPO-PROVERA IM) Inject into the muscle      miconazole (MICOTIN) 2 % powder Apply topically 2 times to 3 times  daily. 45 g 1    Dicyclomine HCl (BENTYL PO)   Take 20 mg by mouth 2 times daily       QUEtiapine Fumarate (SEROQUEL PO) Take 400 mg by mouth 2 times daily.  atomoxetine (STRATTERA) 40 MG capsule Take 40 mg by mouth 2 times daily.  aripiprazole (ABILIFY) 10 MG tablet Take 15 mg by mouth 2 times daily       ACYCLOVIR 400 mg by Does not apply route three times a week        No current facility-administered medications for this visit.        Review of Systems -     General ROS: negative  Psychological ROS: negative  Hematological and Lymphatic ROS: No history of blood clots or bleeding disorder. Respiratory ROS: no cough, shortness of breath, or wheezing  Cardiovascular ROS: no chest pain or dyspnea on exertion  Gastrointestinal ROS: negative  Genito-Urinary ROS: no dysuria, trouble voiding, or hematuria  Musculoskeletal ROS: negative  Neurological ROS: no TIA or stroke symptoms  Dermatological ROS: negative      Blood pressure 136/83, pulse 95, height 5' 6\" (1.676 m), weight (!) 322 lb 9.6 oz (146.3 kg), not currently breastfeeding. Physical Examination:    General appearance - alert, well appearing, and in no distress  Mental status - alert, oriented to person, place, and time  Neck - supple, no significant adenopathy, no JVD, or carotid bruits  Chest - clear to auscultation, no wheezes, rales or rhonchi, symmetric air entry  Heart - normal rate, regular rhythm, normal S1, S2, no murmurs, rubs, clicks or gallops  Abdomen - soft, nontender, nondistended, no masses or organomegaly  Neurological - alert, oriented, normal speech, no focal findings or movement disorder noted  Musculoskeletal - no joint tenderness, deformity or swelling  Extremities - peripheral pulses normal, no pedal edema, no clubbing or cyanosis  Skin - normal coloration and turgor, no rashes, no suspicious skin lesions noted    Lab  No results for input(s): CKTOTAL, CKMB, CKMBINDEX, TROPONINI in the last 72 hours.   CBC:   Lab Results   Component Value Date    WBC 7.7 10/13/2020     BMP:    Lab Results   Component Value Date     09/15/2021    K 3.8 09/15/2021     09/15/2021    CO2 22 09/15/2021    BUN 9 09/15/2021    LABALBU 4.1 10/13/2020    CREATININE 0.6 09/15/2021    CALCIUM 9.2 09/15/2021    LABGLOM >90 09/15/2021    GLUCOSE 90 09/15/2021     Hepatic Function Panel:    Lab Results   Component Value Date    ALKPHOS 85 10/13/2020     Magnesium:    Lab Results   Component Value Date    MG 1.9 09/15/2021     Warfarin PT/INR:    No components found for: PTPATWAR,  PTINRWAR  HgBA1c:    No results found for: LABA1C  FLP:    No components found for: CHLPL,  TRIG,  HDL,  LDLCALC,  LDLDIRECT,  LABVLDL  TSH:    No results found for: TSH  EKG 5/7/15-Sinus  Rhythm   Low voltage in precordial leads. ABNORMAL     Nuc stress test negative    IMPRESSION:      1. CT coronary artery calcium score of zero indicates low risk for coronary disease. 2. Diffusely small coronary arteries. In particular the LAD and right coronary artery are very small in their middle and distal thirds. These segments cannot be assessed. Occlusion of the distal segments cannot be excluded. 3. Normal cardiac function. Final report electronically signed by Dr. Julio Pathak on 5/15/2015 4:50 PM         EKG 8/8/16  Sinus  Rhythm   Low voltage in precordial leads. ABNORMAL       CONCLUSION:       1. This tilt table test is an abnormal study. 2.   It is consistent with postural orthostatic tachycardia syndrome, in             view of the increment of the heart rate up to 130 from 92 and the             difference of 39 beats per minute. Blood pressure has been well             maintained all the way until the end, and at the end the blood             pressure and the heart rate came down, suggesting it was inducing             vasovagal reaction. 3.   POTS induced vasovagal response. 4.   Marked dizziness throughout the tilting in view of the tachycardia. 5.   Marked chest pressure and short of breath throughout tilting,             related to the tachycardia. Once the patient assumed supine             position, the symptoms resolved. 6.   Nonspecific ST segment change during the assumption of standing. RECOMMENDATION:        1. At this level, the patient needs appropriate hydration and liberal             salt intake. Avoid prolonged standing, physical therapy. further clinical correlation.        2.   The patient may need functional study if clinically indicated, like worse and/or develop new symptoms. Congestive heart failure: no evidence of fluid overload today, no recent hospitalization for CHF  Lymphedema  CONT LASIX 20 po qd and may increase for 3 days a week if wt gain > 5 lb and leg edema  Taking more bannana and tomato and avoxcado  BMP and MG today and prior to next visit      POTS syndrome Dxed 2015 on TTT  Sinus tachy  Dizziness chronic  Cont hydration  Hx of  freq fall- no rmore fall after midodrine  Bedside orthostatic eval- negative  Cont midodrine 5 mg po BID  Avoid prolonged standing  Increase toprol xl 50 po qhs    Pat doing much better after midodrine    Hx of Coronary CTA result and stress test result d/w the pat    Morbid obesity  Losing wt  Advised to lose wt  Advised complaince with F/u    D/w the pat and mother the details plan of care    Mag 1.6 and now 1.9  Need replacement  Cont  mag oxide 400 mg po qd for 1 month    Discussed use, benefit, and side effects of prescribed medications. All patient questions answered. Pt voiced understanding. Instructed to continue current medications, diet and exercise. Continue risk factor modification and medical management. Patient agreed with treatment plan. Follow up as directed.       RTC IN 6 months    [facility]

## 2021-12-15 ENCOUNTER — HOSPITAL ENCOUNTER (OUTPATIENT)
Dept: WOUND CARE | Age: 46
Discharge: HOME | End: 2021-12-15
Payer: MEDICARE

## 2021-12-15 VITALS
TEMPERATURE: 98.2 F | DIASTOLIC BLOOD PRESSURE: 89 MMHG | HEART RATE: 117 BPM | RESPIRATION RATE: 20 BRPM | OXYGEN SATURATION: 97 % | SYSTOLIC BLOOD PRESSURE: 138 MMHG

## 2021-12-15 DIAGNOSIS — L30.9 DERMATITIS: ICD-10-CM

## 2021-12-15 PROCEDURE — 99212 OFFICE O/P EST SF 10 MIN: CPT

## 2021-12-15 ASSESSMENT — PAIN SCALES - GENERAL: PAINLEVEL_OUTOF10: 7

## 2021-12-15 ASSESSMENT — PAIN DESCRIPTION - PAIN TYPE: TYPE: CHRONIC PAIN

## 2021-12-15 ASSESSMENT — PAIN DESCRIPTION - LOCATION: LOCATION: ABDOMEN

## 2021-12-15 NOTE — PROGRESS NOTES
[address] f: [phone] f: [phone] p: [phone] [email or web address]      Ordering Center:     [facility]  [address] [place] [address]  [phone]  WOUND CARE Dept: [phone]   [facility] NUMBER [phone]    Patient Information:      Jnoatan Montaño  [address]   [place] [address]   [phone]   : 1975  AGE: 42 y.o. GENDER: female   EPISODE DATE: 12/15/2021    Insurance:      PRIMARY INSURANCE:  Plan: MEDICARE PART A AND B  Coverage: MEDICARE  Effective Date: 2015  Group Number: [unfilled]  Subscriber Number: [id number] - (Medicare)    Payor/Plan Subscr  Sex Relation Sub. Ins. ID Effective Group Num   1. MEDICARE - ME* MARANDA ART* 1975 Female Self [id number] 1/1/15                                    [address] [address]   2. [place] -* MARANDA ART* 1975 Female Self [id number]                                    [address]       Patient Wound Information:      Problem List Items Addressed This Visit     Dermatitis          WOUNDS REQUIRING DRESSING SUPPLIES:     Wound 10/13/21 Abdomen Left;Medial;Upper (Active)   Wound Image   12/15/21 1058   Wound Etiology Non-Healing Surgical 12/15/21 1058   Dressing Status Old drainage noted 12/15/21 1058   Wound Cleansed Cleansed with saline 12/15/21 1058   Dressing/Treatment Alginate with Ag; ABD 21 0947   Offloading for Diabetic Foot Ulcers No offloading required 21 0947   Wound Length (cm) 3.8 cm 12/15/21 1058   Wound Width (cm) 8.8 cm 12/15/21 1058   Wound Depth (cm) 0.05 cm 12/15/21 1058   Wound Surface Area (cm^2) 33.44 cm^2 12/15/21 1058   Change in Wound Size % (l*w) -9454.29 12/15/21 1058   Wound Volume (cm^3) 1.672 cm^3 12/15/21 1058   Wound Healing % -4677 12/15/21 1058   Wound Assessment Hyper granulation tissue;  Epithelialization 12/15/21 1058   Drainage Amount Large 12/15/21 1058   Drainage Description Serosanguinous 12/15/21 1058   Odor None 12/15/21 1058   Gwen-wound Assessment Blanchable erythema;  Maceration 12/15/21 1058   Margins Attached edges 12/15/21 1058   Wound Thickness Description not for Pressure Injury Full thickness 11/17/21 0947   Number of days: 63          Supplies Requested :      WOUND #: 1   PRIMARY DRESSING:  Other: Triad cream- 4 tubes per month   Cover and Secure with: 4X4 non woven gauze pad  ABD pad     FREQUENCY OF DRESSING CHANGES:  Daily       ADDITIONAL ITEMS:  [] Gloves Small  [x] Gloves Medium [] Gloves Large [] Gloves Nelson Manoj  [] Tape 1\" [x] Tape 2\" [] Tape 3\"  [x] Medipore Tape  [x] Saline  [] Skin Prep   [] Adhesive Remover   [] Cotton Tip Applicators   [] Other:    Patient Wound(s) Debrided: [x] Yes if yes please add date 12/15/2021   [] No    Debribement Type: Mechanical     Is the patient currently on an antibiotic for their Wound(s): [] Yes    [x] No    Patient currently being seen by Home Health: [] Yes   [x] No    Duration for needed supplies:  []15  []30  []60  [x]90 Days    Electronically signed by Jacklyn Swanson RN on 12/15/2021 at 11:32 AM     Provider Information:      PROVIDER'S NAME: Kaykay Parker MD    NPI: [id number]

## 2021-12-15 NOTE — PROGRESS NOTES
[facility]          Progress Note and Procedure Note      Jonnathan Alaniz  MEDICAL RECORD NUMBER:  [id number]  AGE: 42 y.o. GENDER: female  : 1975  EPISODE DATE:  12/15/2021    Subjective:     SUBJECTIVE     Chief Complaint   Patient presents with    Wound Check        HISTORY OF PRESENT ILLNESS     She is a 42 yr old female seen for follow up visit she has history of non healing on the lower abdomen  for over 15 years . she has ADHD and schizophrenia she was accompanied by her mom .she follows with cardiology for CHF.   she had debridement of the wound by G.surgeon. she still has superfical wound. Has a new wound. She has more excoriation of the skin around the wound.    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 Karen Ivy     TOE SURGERY  2019    Dr. Trey Pereira         Family History   Problem Relation Age of Onset    Diabetes Father     Cancer Maternal Grandmother     Cancer Paternal Grandmother      SOCIAL HISTORY       Social History     Tobacco Use    Smoking status: Never Smoker    Smokeless tobacco: Never Used   Vaping Use    Vaping Use: Never used   Substance Use Topics    Alcohol use: No    Drug use: No     ALLERGIES         Allergies   Allergen Reactions    Latex Rash    Cat Hair Extract     Seasonal      MEDICATIONS         Current Outpatient Medications on File Prior to Encounter   Medication Sig Dispense Refill    midodrine (PROAMATINE) 5 MG tablet Take 1 tablet by mouth 3 times daily 90 tablet 3    metoprolol succinate (TOPROL XL) 50 MG extended release tablet take 1 tablet by mouth once daily 90 tablet 3    furosemide (LASIX) 20 MG tablet ALTERNATE 1 TABLET EVERY OTHER DAY WITH 2 TABLETS EVERY OTHER  tablet 3    magnesium oxide (MAG-OX) 400 MG tablet Take 1 tablet by mouth daily 30 tablet 3    traZODone (DESYREL) 25 mg TABS Take 25 mg by mouth nightly      ibuprofen (ADVIL;MOTRIN) 800 MG tablet Take 1 tablet by mouth every 8 hours as needed for Pain 15 tablet 0    acetaminophen (TYLENOL) 500 MG tablet Take 1,000 mg by mouth every 6 hours as needed for Pain      Cholecalciferol (VITAMIN D3) 5000 units TABS Take 2,000 Units by mouth       MedroxyPROGESTERone Acetate (DEPO-PROVERA IM) Inject into the muscle      miconazole (MICOTIN) 2 % powder Apply topically 2 times to 3 times  daily. 45 g 1    Dicyclomine HCl (BENTYL PO)   Take 20 mg by mouth 2 times daily       QUEtiapine Fumarate (SEROQUEL PO) Take 400 mg by mouth 2 times daily.  atomoxetine (STRATTERA) 40 MG capsule Take 40 mg by mouth 2 times daily.  aripiprazole (ABILIFY) 10 MG tablet Take 15 mg by mouth 2 times daily       ACYCLOVIR 400 mg by Does not apply route three times a week       ketorolac (TORADOL) 10 MG tablet Take 1 tablet by mouth every 8 hours as needed for Pain 15 tablet 0     No current facility-administered medications on file prior to encounter. REVIEW OF SYSTEMS:     Constitutional: no fever, no night sweats, no fatigue. Head: no head ache , no head injury, no migranes. Eye: no blurring of vision, no double vision. Ears: no hearing difficulty, no tinnitus.   Mouth/throat: no ulceration, dental caries , dysphagia. Lungs: no cough, no shortness of breath, no wheeze  CVS: no palpitation, no chest pain, no shortness of breath  GI: no abdominal pain, no nausea , no vomiting, no constipation  FRANCOISE: no dysuria, frequency and urgency, no hematuria, no kidney stones  Musculoskeletal: no joint pain, swelling , stiffness,  Endocrine: no polyuria, polydipsia, no cold or heat intolerance  Hematology: no anemia, no easy brusing or bleeding, no hx of clotting disorder  Dermatology: no skin rash, no eczema, no pruritis,         PHYSICAL EXAM      infrared temperature is 98.2 °F (36.8 °C). Her blood pressure is 138/89 and her pulse is 117. Her respiration is 20 and oxygen saturation is 97%. Wt Readings from Last 3 Encounters:   12/02/21 (!) 322 lb 9.6 oz (146.3 kg)   11/17/21 (!) 323 lb 6.4 oz (146.7 kg)   10/13/21 (!) 323 lb 14.4 oz (146.9 kg)          General Appearance  Awake, alert, oriented,  not  In acute distress  HEENT - normocephalic, atraumatic, pink conjunctiva,  anicteric sclera  Abdomen - she has obese abdomen has open wound on the mid abdomen, the periwound skin is excoriated from the drainage   Neurologic - oriented.   Skin - No bruising or bleeding  Extremities -chronic leg edema      Wound 10/13/21 Abdomen Left;Medial;Upper (Active)   Wound Image   12/15/21 1058   Wound Etiology Non-Healing Surgical 12/15/21 1058   Dressing Status Old drainage noted 12/15/21 1058   Wound Cleansed Cleansed with saline 12/15/21 1058   Dressing/Treatment Alginate with Ag; ABD 11/17/21 0947   Offloading for Diabetic Foot Ulcers No offloading required 11/17/21 0947   Wound Length (cm) 3.8 cm 12/15/21 1058   Wound Width (cm) 8.8 cm 12/15/21 1058   Wound Depth (cm) 0.05 cm 12/15/21 1058   Wound Surface Area (cm^2) 33.44 cm^2 12/15/21 1058   Change in Wound Size % (l*w) -9454.29 12/15/21 1058   Wound Volume (cm^3) 1.672 cm^3 12/15/21 1058   Wound Healing % -4677 12/15/21 1058   Wound Assessment Hyper granulation tissue; Epithelialization 12/15/21 1058   Drainage Amount Large 12/15/21 1058   Drainage Description Serosanguinous 12/15/21 1058   Odor None 12/15/21 1058   Gwen-wound Assessment Blanchable erythema; Maceration 12/15/21 1058   Margins Attached edges 12/15/21 1058   Wound Thickness Description not for Pressure Injury Full thickness 11/17/21 0947   Number of days: 63         Lab Results   Component Value Date    BC No growth-preliminary No growth  10/13/2020       Assessment:   Post surgical abdominal wound: will apply protective cream   Continue local wound care. Patient Active Problem List   Diagnosis Code    Keloid L91.0    MRSA (methicillin resistant staph aureus) culture positive Z22.322    Skin ulcer of abdominal wall ([facility] L98.499    POTS (postural orthostatic tachycardia syndrome) I49.8    Orthostatic dizziness- occasional- not worse R42    CHF (congestive heart failure) ([facility]) chronic diastolic Z73.6    Morbid obesity ([facility]) E66.01    Bilateral leg edema =LYMPHEDEMA- ON f/U WITH LYMPHEDEMA CLINIC R60.0    Lymphedema of both lower extremities I89.0    SOB (shortness of breath) on exertion R06.02    Candidiasis, intertriginous B37.2    Dermatitis L30.9    Open abdominal wall wound S31.109A    History of fall Z91.81           Plan:     Patient examined and evaluated   Please see attached Discharge Instructions    Written patient dismissal instructions given to patient and signed by patient or POA. Discharge Instructions       Visit Discharge/Physician Orders:  - Continue working on weight loss, work on lower sodium meals, try to eat more fresh fruit and veggies, eat less fast food, canned, and processed meals.  - Supplies ordered through [employer]. [phone].     Wound Location: Abdomen     Dressing orders:      OK to shower take shower before dressing change.  Clean wound after shower then re-dress.      Abdomen wounds- Cleanse wound with normal saline or wound cleanser and gauze. Pat dry with clean gauze. Apply zinc oxide to surrounding skin. Apply silver alginate to wound. Cover with Silicone bordered foam. Change every other day. You can change more often, daily if needed according to drainage       Follow up visit: 4 wks   Keep next scheduled appointment. Please give 24 hour notice if unable to keep appointment. [phone]     If you experience any of the following, please call the Wound Care Service during business hours: Monday through Friday 8:00 am - 4:30 pm  ([phone]).             *Increase in pain               *Temperature over 101              *Increase in drainage from your wound or a foul odor              *Uncontrolled swelling              *Need for compression bandage changes due to slippage, breakthrough drainage     If you need medical attention outside of business hours, please contact your Primary Care Doctor or go to the nearest emergency room.         Electronically signed by Roque Can MD on 12/15/2021 at 11:17 AM

## 2021-12-15 NOTE — PROGRESS NOTES
[address]Ufer [address] f: [phone] f: [phone] p: [phone] [email or web address]      Ordering Center:     [facility]  [address] [facility] [address]  [phone]  WOUND CARE Dept: [phone]   [facility] NUMBER [phone]    Patient Information:      Lane Ybarra  [address] [facility] [address]  [address]   [phone]   : 1975  AGE: 42 y.o. GENDER: female   EPISODE DATE: 12/15/2021    Insurance:      PRIMARY INSURANCE:  Plan: MEDICARE PART A AND B  Coverage: MEDICARE  Effective Date: 2015  Group Number: [unfilled]  Subscriber Number: [id number] - (Medicare)    Payor/Plan Subscr  Sex Relation Sub. Ins. ID Effective Group Num   1. MEDICARE - ME* MARANDA ART* 1975 Female Self [id number] 1/1/15                                    [address] [address]   2. [place] -* MARANDA ART* 1975 Female Self [id number]                                    [address]       Patient Wound Information:      Problem List Items Addressed This Visit     Dermatitis          WOUNDS REQUIRING DRESSING SUPPLIES:     Wound 10/13/21 Abdomen Left;Medial;Upper (Active)   Wound Image   12/15/21 1058   Wound Etiology Non-Healing Surgical 12/15/21 1058   Dressing Status Old drainage noted 12/15/21 1058   Wound Cleansed Cleansed with saline 12/15/21 1058   Dressing/Treatment Triad hydro/zinc oxide-based hydrophilic paste; ABD 85/64/15 1058   Offloading for Diabetic Foot Ulcers No offloading required 12/15/21 1058   Wound Length (cm) 3.8 cm 12/15/21 1058   Wound Width (cm) 8.8 cm 12/15/21 1058   Wound Depth (cm) 0.05 cm 12/15/21 1058   Wound Surface Area (cm^2) 33.44 cm^2 12/15/21 1058   Change in Wound Size % (l*w) -9454.29 12/15/21 1058   Wound Volume (cm^3) 1.672 cm^3 12/15/21 1058   Wound Healing % -4677 12/15/21 1058   Wound Assessment Hyper granulation tissue;  Epithelialization; [facility] 12/15/21 1058   Drainage Amount Moderate 12/15/21 1058   Drainage Description Serosanguinous 12/15/21 1058   Odor None 12/15/21 1058   Gwen-wound Assessment Blanchable erythema;  Maceration 12/15/21 1058   Margins Attached edges 12/15/21 1058   Wound Thickness Description not for Pressure Injury Full thickness 12/15/21 1058   Number of days: 63          Supplies Requested :      WOUND #: 1   PRIMARY DRESSING:  Other: Triad cream- 4 tubes per month   Cover and Secure with: 4X4 gauze pad  ABD pad     FREQUENCY OF DRESSING CHANGES:  Daily         ADDITIONAL ITEMS:  [] Gloves Small  [x] Gloves Medium [] Gloves Large [] Gloves Timothy Fili  [] Tape 1\" [x] Tape 2\" [] Tape 3\"  [x] Medipore Tape  [x] Saline  [] Skin Prep   [] Adhesive Remover   [] Cotton Tip Applicators   [] Other:    Patient Wound(s) Debrided: [x] Yes if yes please add date 12/15/2021 [] No    Debribement Type: Mechanical     Is the patient currently on an antibiotic for their Wound(s): [] Yes   [x] No    Patient currently being seen by Home Health: [] Yes   [x] No    Duration for needed supplies:  []15  []30  []60  [x]90 Days    Electronically signed by Parviz Aly RN on 12/15/2021 at 3:00 PM     Provider Information:      PROVIDER'S NAME: Devi Bowens MD    NPI: [id number]

## 2021-12-15 NOTE — PLAN OF CARE
Problem: Wound:  Goal: Will show signs of wound healing; wound closure and no evidence of infection  Description: Will show signs of wound healing; wound closure and no evidence of infection  Outcome: Ongoing   Patient presents to wound clinic for follow up of abdomen wounds. Supplies ordered through [employer]. Abdomen wounds- Cleanse wound with normal saline or wound cleanser and gauze. Pat dry with clean gauze. Apply triad cream to ABD pad and then apply to wound. Secure with tape. Change daily. Follow up visit: 6 weeks on 1/26/2022 at 10:00 am.    Care plan reviewed with patient and mother. Patient and mother verbalize understanding of the plan of care and contribute to goal setting.

## 2022-01-26 ENCOUNTER — TELEPHONE (OUTPATIENT)
Dept: WOUND CARE | Age: 47
End: 2022-01-26

## 2022-01-26 ENCOUNTER — HOSPITAL ENCOUNTER (OUTPATIENT)
Dept: WOUND CARE | Age: 47
Discharge: HOME | End: 2022-01-26
Payer: MEDICARE

## 2022-01-26 VITALS
TEMPERATURE: 97.1 F | DIASTOLIC BLOOD PRESSURE: 75 MMHG | BODY MASS INDEX: 52.04 KG/M2 | HEART RATE: 96 BPM | SYSTOLIC BLOOD PRESSURE: 135 MMHG | OXYGEN SATURATION: 97 % | WEIGHT: 293 LBS | RESPIRATION RATE: 18 BRPM

## 2022-01-26 PROCEDURE — 99213 OFFICE O/P EST LOW 20 MIN: CPT

## 2022-01-26 ASSESSMENT — PAIN DESCRIPTION - LOCATION: LOCATION: ABDOMEN

## 2022-01-26 ASSESSMENT — PAIN DESCRIPTION - PAIN TYPE: TYPE: CHRONIC PAIN

## 2022-01-26 ASSESSMENT — PAIN SCALES - GENERAL: PAINLEVEL_OUTOF10: 7

## 2022-01-26 NOTE — TELEPHONE ENCOUNTER
Spoke to [address] [place] [address] regarding home health services for wound care per patients mothers request. They will be able to accept patient. Patients mother notified and states that Ramu Jones has already contacted her about getting visits started. No other concerns at this time.

## 2022-01-26 NOTE — PLAN OF CARE
Problem: Wound:  Goal: Will show signs of wound healing; wound closure and no evidence of infection  Description: Will show signs of wound healing; wound closure and no evidence of infection  Outcome: Ongoing     Patient presents to wound clinic for abdominal wound. No s/s of infection noted. See avs for discharge instructions. Follow up visit: 6 weeks on March 9th at 10 am     Care plan reviewed with patient and mother. Patient and mother verbalize understanding of the plan of care and contribute to goal setting.

## 2022-01-26 NOTE — PROGRESS NOTES
[address] [facility]          Progress Note and Procedure Note      Nadja Greco  MEDICAL RECORD NUMBER:  [id number]  AGE: 42 y.o. GENDER: female  : 1975  EPISODE DATE:  2022    Subjective:     SUBJECTIVE     Chief Complaint   Patient presents with    Wound Check     abdomen        HISTORY OF PRESENT ILLNESS     She is a 42 yr old female seen for follow up visit she has history of non healing on the lower abdomen  for over 15 years . she has ADHD and schizophrenia she was accompanied by her mom .she follows with cardiology for CHF.   she had debridement of the wound by G.surgeon. she still has superfical wound. the wound is stable  Has irritation of the skin related to the tape. Mom requesting if she can get an AID to help with her wound dressing.   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 Viktor Lindsay     TOE SURGERY  2019    [facility] History   Problem Relation Age of Onset    Diabetes Father     Cancer Maternal Grandmother     Cancer Paternal Grandmother      SOCIAL HISTORY       Social History     Tobacco Use    Smoking status: Never Smoker    Smokeless tobacco: Never Used   Vaping Use    Vaping Use: Never used   Substance Use Topics    Alcohol use: No    Drug use: No     ALLERGIES         Allergies   Allergen Reactions    Latex Rash    Cat Hair Extract     Seasonal      MEDICATIONS         Current Outpatient Medications on File Prior to Encounter   Medication Sig Dispense Refill    midodrine (PROAMATINE) 5 MG tablet Take 1 tablet by mouth 3 times daily 90 tablet 3    metoprolol succinate (TOPROL XL) 50 MG extended release tablet take 1 tablet by mouth once daily 90 tablet 3    furosemide (LASIX) 20 MG tablet ALTERNATE 1 TABLET EVERY OTHER DAY WITH 2 TABLETS EVERY OTHER  tablet 3    magnesium oxide (MAG-OX) 400 MG tablet Take 1 tablet by mouth daily 30 tablet 3    traZODone (DESYREL) 25 mg TABS Take 25 mg by mouth nightly      ketorolac (TORADOL) 10 MG tablet Take 1 tablet by mouth every 8 hours as needed for Pain 15 tablet 0    ibuprofen (ADVIL;MOTRIN) 800 MG tablet Take 1 tablet by mouth every 8 hours as needed for Pain 15 tablet 0    acetaminophen (TYLENOL) 500 MG tablet Take 1,000 mg by mouth every 6 hours as needed for Pain      Cholecalciferol (VITAMIN D3) 5000 units TABS Take 2,000 Units by mouth       MedroxyPROGESTERone Acetate (DEPO-PROVERA IM) Inject into the muscle      miconazole (MICOTIN) 2 % powder Apply topically 2 times to 3 times  daily. 45 g 1    Dicyclomine HCl (BENTYL PO)   Take 20 mg by mouth 2 times daily       QUEtiapine Fumarate (SEROQUEL PO) Take 400 mg by mouth 2 times daily.  atomoxetine (STRATTERA) 40 MG capsule Take 40 mg by mouth 2 times daily.  aripiprazole (ABILIFY) 10 MG tablet Take 15 mg by mouth 2 times daily       ACYCLOVIR 400 mg by Does not apply route three times a week        No current facility-administered medications on file prior to encounter. REVIEW OF SYSTEMS:     Constitutional: no fever, no night sweats, no fatigue. Head: no head ache , no head injury, no migranes.   Eye: no blurring of vision, no double vision. Ears: no hearing difficulty, no tinnitus. Mouth/throat: no ulceration, dental caries , dysphagia. Lungs: no cough, no shortness of breath, no wheeze  CVS: no palpitation, no chest pain, no shortness of breath  GI: no abdominal pain, no nausea , no vomiting, no constipation  FRANCOISE: no dysuria, frequency and urgency, no hematuria, no kidney stones  Musculoskeletal: no joint pain, swelling , stiffness,  Endocrine: no polyuria, polydipsia, no cold or heat intolerance  Hematology: no anemia, no easy brusing or bleeding, no hx of clotting disorder  Dermatology: no skin rash, no eczema, no pruritis,         PHYSICAL EXAM      weight is 322 lb 6.4 oz (146.2 kg) (abnormal). Her temperature is 97.1 °F (36.2 °C). Her blood pressure is 135/75 and her pulse is 96. Her respiration is 18 and oxygen saturation is 97%. Wt Readings from Last 3 Encounters:   01/26/22 (!) 322 lb 6.4 oz (146.2 kg)   12/02/21 (!) 322 lb 9.6 oz (146.3 kg)   11/17/21 (!) 323 lb 6.4 oz (146.7 kg)          General Appearance  Awake, alert, oriented,  not  In acute distress  HEENT - normocephalic, atraumatic, pink conjunctiva,  anicteric sclera  Abdomen - she has obese abdomen has open wound on the mid abdomen, there is skin irritation related to tape. Much improved since the last visit  Neurologic - oriented.   Skin - No bruising or bleeding  Extremities -chronic leg edema       Wound 10/13/21 Abdomen Left;Medial;Upper (Active)   Wound Image   01/26/22 1025   Wound Etiology Non-Healing Surgical 01/26/22 1025   Dressing Status Old drainage noted 01/26/22 1025   Wound Cleansed Cleansed with saline 01/26/22 1025   Dressing/Treatment Triad hydro/zinc oxide-based hydrophilic paste;ABD 41/46/57 1058   Offloading for Diabetic Foot Ulcers No;No offloading required 01/26/22 1025   Wound Length (cm) 3 cm 01/26/22 1025   Wound Width (cm) 9 cm 01/26/22 1025   Wound Depth (cm) 0.05 cm 01/26/22 1025   Wound Surface Area (cm^2) 27 cm^2 01/26/22 1025   Change in Wound Size % (l*w) -7614.29 01/26/22 1025   Wound Volume (cm^3) 1.35 cm^3 01/26/22 1025   Wound Healing % -3757 01/26/22 1025   Wound Assessment Epithelialization;Slough;Granulation tissue 01/26/22 1025   Drainage Amount Moderate 01/26/22 1025   Drainage Description Serosanguinous 01/26/22 1025   Odor None 01/26/22 1025   Gwen-wound Assessment Blanchable erythema;Dry/flaky; Hypopigmented;Denuded; Other (Comment) 01/26/22 1025   Margins Attached edges 01/26/22 1025   Wound Thickness Description not for Pressure Injury Full thickness 01/26/22 1025   Number of days: 105         Assessment:   Post surgical abdominal wound: will apply protective cream   Continue local wound care. Will ask for an aid to assist with the wound care. Patient Active Problem List   Diagnosis Code    Keloid L91.0    MRSA (methicillin resistant staph aureus) culture positive Z22.322    Skin ulcer of abdominal wall ([facility] L98.499    POTS (postural orthostatic tachycardia syndrome) I49.8    Orthostatic dizziness- occasional- not worse R42    CHF (congestive heart failure) ([facility]) chronic diastolic Y25.0    Morbid obesity ([facility]) E66.01    Bilateral leg edema =LYMPHEDEMA- ON f/U WITH LYMPHEDEMA CLINIC R60.0    Lymphedema of both lower extremities I89.0    SOB (shortness of breath) on exertion R06.02    Candidiasis, intertriginous B37.2    Dermatitis L30.9    Open abdominal wall wound S31.109A    History of fall Z91.81           Plan:     Patient examined and evaluated   Please see attached Discharge Instructions    Written patient dismissal instructions given to patient and signed by patient or POA. Discharge Instructions       Visit Discharge/Physician Orders:  - Continue working on weight loss, work on lower sodium meals, try to eat more fresh fruit and veggies, eat less fast food, canned, and processed meals.  - Supplies ordered through [employer].  [phone].     Wound Location: Abdomen     Dressing orders:      OK to shower take shower before dressing change. Clean wound after shower then re-dress.      Abdomen wounds- Cleanse wound with normal saline or wound cleanser and gauze. Pat dry with clean gauze. Apply zinc oxide to surrounding skin. Apply silver alginate to wound. Cover with Silicone bordered foam. Change every other day. You can change more often, daily if needed according to drainage       Follow up visit: 6 wks  Keep next scheduled appointment. Please give 24 hour notice if unable to keep appointment. [phone]     If you experience any of the following, please call the Wound Care Service during business hours: Monday through Friday 8:00 am - 4:30 pm  ([phone]).             *Increase in pain               *Temperature over 101              *Increase in drainage from your wound or a foul odor              *Uncontrolled swelling              *Need for compression bandage changes due to slippage, breakthrough drainage     If you need medical attention outside of business hours, please contact your Primary Care Doctor or go to the nearest emergency room.         Electronically signed by Darian Hightower MD on 1/26/2022 at 11:07 AM

## 2022-02-24 ENCOUNTER — TELEPHONE (OUTPATIENT)
Dept: WOUND CARE | Age: 47
End: 2022-02-24

## 2022-02-24 NOTE — TELEPHONE ENCOUNTER
Nurse from [facility] called and stated that they will get the patient set up with a company to order supplies for her. She stated that hey cannot order supplies for her with medicaid but can get a separate company to do so.

## 2022-02-24 NOTE — TELEPHONE ENCOUNTER
----- Message from Kait Duncan sent at 2/22/2022  2:43 PM EST -----  [facility] [phone] EVELIA(MOTHER) CALLED AND ASK THAT WE ORDER [employer] SUPPLIES. SHE STATES THAT THEY NEED EVERYTHING.

## 2022-03-09 ENCOUNTER — HOSPITAL ENCOUNTER (OUTPATIENT)
Dept: WOUND CARE | Age: 47
Discharge: HOME | End: 2022-03-09
Payer: MEDICARE

## 2022-03-09 VITALS
OXYGEN SATURATION: 95 % | SYSTOLIC BLOOD PRESSURE: 133 MMHG | DIASTOLIC BLOOD PRESSURE: 66 MMHG | HEART RATE: 101 BPM | TEMPERATURE: 97.3 F | RESPIRATION RATE: 20 BRPM

## 2022-03-09 PROCEDURE — 99213 OFFICE O/P EST LOW 20 MIN: CPT

## 2022-03-09 NOTE — PLAN OF CARE
Problem: Wound:  Goal: Will show signs of wound healing; wound closure and no evidence of infection  Description: Will show signs of wound healing; wound closure and no evidence of infection  Outcome: Ongoing  Note: Patient seen for abdominal wounds. Wounds are measuring bigger today and there is an addition of one more wound. Patient states her \"lymphadem is acting up\". No s/s of infection noted. Follow up in 6 weeks. See AVS for order changes. Patient has [address] health. Care plan reviewed with patient and mother. Patient and mother verbalize understanding of the plan of care and contribute to goal setting.

## 2022-03-09 NOTE — PROGRESS NOTES
[address] [facility]          Progress Note and Procedure Note      Belne Vásquez  MEDICAL RECORD NUMBER:  [id number]  AGE: 41 y.o. GENDER: female  : 1975  EPISODE DATE:  3/9/2022    Subjective:     SUBJECTIVE     Chief Complaint   Patient presents with    Wound Check        HISTORY OF PRESENT ILLNESS     She is a 42 yr old female seen for follow up visit she has history of non healing on the lower abdomen  for over 15 years . she has ADHD and schizophrenia    She had debridement of the wound. Since she was retaining fluid the abdominal wound has gotten bigger especially the epigastric area wound. Her lymphedema has also been getting worse. Patient has not been compliant with his diet i.e. salt intake. There is no any purulent drainage.   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 Bacilio Bautista     TOE SURGERY  2019    Dr. Mccrary Antes History   Problem Relation Age of Onset    Diabetes Father     Cancer Maternal Grandmother     Cancer Paternal Grandmother      SOCIAL HISTORY       Social History     Tobacco Use    Smoking status: Never Smoker    Smokeless tobacco: Never Used   Vaping Use    Vaping Use: Never used   Substance Use Topics    Alcohol use: No    Drug use: No     ALLERGIES         Allergies   Allergen Reactions    Latex Rash    Cat Hair Extract     Seasonal      MEDICATIONS         Current Outpatient Medications on File Prior to Encounter   Medication Sig Dispense Refill    midodrine (PROAMATINE) 5 MG tablet Take 1 tablet by mouth 3 times daily 90 tablet 3    metoprolol succinate (TOPROL XL) 50 MG extended release tablet take 1 tablet by mouth once daily 90 tablet 3    furosemide (LASIX) 20 MG tablet ALTERNATE 1 TABLET EVERY OTHER DAY WITH 2 TABLETS EVERY OTHER  tablet 3    magnesium oxide (MAG-OX) 400 MG tablet Take 1 tablet by mouth daily 30 tablet 3    traZODone (DESYREL) 25 mg TABS Take 25 mg by mouth nightly      ibuprofen (ADVIL;MOTRIN) 800 MG tablet Take 1 tablet by mouth every 8 hours as needed for Pain 15 tablet 0    acetaminophen (TYLENOL) 500 MG tablet Take 1,000 mg by mouth every 6 hours as needed for Pain      Cholecalciferol (VITAMIN D3) 5000 units TABS Take 2,000 Units by mouth       MedroxyPROGESTERone Acetate (DEPO-PROVERA IM) Inject into the muscle      miconazole (MICOTIN) 2 % powder Apply topically 2 times to 3 times  daily. 45 g 1    Dicyclomine HCl (BENTYL PO)   Take 20 mg by mouth 2 times daily       QUEtiapine Fumarate (SEROQUEL PO) Take 400 mg by mouth 2 times daily.  atomoxetine (STRATTERA) 40 MG capsule Take 40 mg by mouth 2 times daily.  ACYCLOVIR 400 mg by Does not apply route three times a week       aripiprazole (ABILIFY) 10 MG tablet Take 15 mg by mouth 2 times daily        No current facility-administered medications on file prior to encounter. REVIEW OF SYSTEMS:     Constitutional: no fever, no night sweats, no fatigue. Head: no head ache , no head injury, no migranes. Eye: no blurring of vision, no double vision. Ears: no hearing difficulty, no tinnitus. Mouth/throat: no ulceration, dental caries , dysphagia.   Lungs: no cough, no shortness of breath, no wheeze  CVS: no palpitation, no chest pain, no shortness of breath  GI: no abdominal pain, no nausea , no vomiting, no constipation  FRANCOISE: no dysuria, frequency and urgency, no hematuria, no kidney stones  Musculoskeletal: she has lymphoedema  Endocrine: no polyuria, polydipsia, no cold or heat intolerance  Hematology: no anemia, no easy brusing or bleeding, no hx of clotting disorder  Dermatology: no skin rash, no eczema, no pruritis,         PHYSICAL EXAM      infrared temperature is 97.3 °F (36.3 °C). Wt Readings from Last 3 Encounters:   01/26/22 (!) 322 lb 6.4 oz (146.2 kg)   12/02/21 (!) 322 lb 9.6 oz (146.3 kg)   11/17/21 (!) 323 lb 6.4 oz (146.7 kg)          General Appearance  Awake, alert, oriented,  not  In acute distress  HEENT - normocephalic, atraumatic, pink conjunctiva,  anicteric sclera  Abdomen - she has two open wound on the abdominal wall. There is more edema on the abdominal wall. The measrument of the abdominal wounds is bigger, no prulent drainage  Neurologic - oriented.   Skin - No bruising or bleeding  Extremities -chronic leg edema, there is more swelling on the legs       Wound 10/13/21 Abdomen Left;Medial;Upper (Active)   Wound Image   01/26/22 1025   Wound Etiology Non-Healing Surgical 01/26/22 1025   Dressing Status Old drainage noted 01/26/22 1025   Wound Cleansed Cleansed with saline 01/26/22 1025   Dressing/Treatment Triad hydro/zinc oxide-based hydrophilic paste;ABD 74/10/87 1025   Offloading for Diabetic Foot Ulcers No;No offloading required 01/26/22 1025   Wound Length (cm) 2 cm 03/09/22 1022   Wound Width (cm) 3.1 cm 03/09/22 1022   Wound Depth (cm) 0.05 cm 03/09/22 1022   Wound Surface Area (cm^2) 6.2 cm^2 03/09/22 1022   Change in Wound Size % (l*w) -1671.43 03/09/22 1022   Wound Volume (cm^3) 0.31 cm^3 03/09/22 1022   Wound Healing % -786 03/09/22 1022   Wound Assessment Pale granulation tissue 03/09/22 1022   Drainage Amount Moderate ulcer of abdominal wall ([facility]) L98.499    POTS (postural orthostatic tachycardia syndrome) I49.8    Orthostatic dizziness- occasional- not worse R42    CHF (congestive heart failure) ([facility]) chronic diastolic V24.6    Morbid obesity ([facility]) E66.01    Bilateral leg edema =LYMPHEDEMA- ON f/U WITH LYMPHEDEMA CLINIC R60.0    Lymphedema of both lower extremities I89.0    SOB (shortness of breath) on exertion R06.02    Candidiasis, intertriginous B37.2    Dermatitis L30.9    Open abdominal wall wound S31.109A    History of fall Z91.81           Plan:     Patient examined and evaluated   Please see attached Discharge Instructions    Written patient dismissal instructions given to patient and signed by patient or POA. Discharge Instructions       Visit Discharge/Physician Orders:  - Continue working on weight loss, work on lower sodium meals, try to eat more fresh fruit and veggies, eat less fast food, canned, and processed meals.  - Supplies ordered through [employer] [phone].     Wound Location: Abdomen     Dressing orders:      OK to shower take shower before dressing change. Clean wound after shower then re-dress.      Abdomen wounds- Cleanse wound with normal saline or wound cleanser and gauze. Pat dry with clean gauze. Apply zinc oxide to surrounding skin. Apply silver alginate to wound. Cover with Silicone bordered foam. Change every other day. You can change more often, daily if needed according to drainage       Follow up visit: 6 wks  Keep next scheduled appointment. Please give 24 hour notice if unable to keep appointment. [phone]     If you experience any of the following, please call the Wound Care Service during business hours: Monday through Friday 8:00 am - 4:30 pm  ([phone]).               *Increase in pain               *Temperature over 101              *Increase in drainage from your wound or a foul odor              *Uncontrolled swelling              *Need for compression bandage changes due to slippage, breakthrough drainage     If you need medical attention outside of business hours, please contact your Primary Care Doctor or go to the nearest emergency room.         Electronically signed by Billy Higgins MD on 3/9/2022 at 10:28 AM

## 2022-03-21 ENCOUNTER — TELEPHONE (OUTPATIENT)
Dept: WOUND CARE | Age: 47
End: 2022-03-21

## 2022-03-21 RX ORDER — FLUCONAZOLE 100 MG/1
200 TABLET ORAL DAILY
Qty: 10 TABLET | Refills: 0 | Status: SHIPPED | OUTPATIENT
Start: 2022-03-21 | End: 2022-03-26

## 2022-03-21 NOTE — TELEPHONE ENCOUNTER
Received fax from [facility] home health requesting something for patients yeast infection on her skin they are currently using nystatin powder and it is not helping. Prescription for diflucan 200mg daily for 5 days sent to [facility] on [address]. Per Dr. Ruiz Wong. Message left with [facility] and patients mother Rasheeda Cruz.

## 2022-03-27 ENCOUNTER — HOSPITAL ENCOUNTER (EMERGENCY)
Age: 47
Discharge: HOME | End: 2022-03-27
Payer: MEDICARE

## 2022-03-27 ENCOUNTER — APPOINTMENT (OUTPATIENT)
Dept: GENERAL RADIOLOGY | Age: 47
End: 2022-03-27
Payer: MEDICARE

## 2022-03-27 VITALS
BODY MASS INDEX: 51.65 KG/M2 | OXYGEN SATURATION: 100 % | WEIGHT: 293 LBS | RESPIRATION RATE: 16 BRPM | SYSTOLIC BLOOD PRESSURE: 130 MMHG | HEART RATE: 97 BPM | TEMPERATURE: 98.4 F | DIASTOLIC BLOOD PRESSURE: 80 MMHG

## 2022-03-27 DIAGNOSIS — J01.00 ACUTE MAXILLARY SINUSITIS, RECURRENCE NOT SPECIFIED: Primary | ICD-10-CM

## 2022-03-27 DIAGNOSIS — S00.411A ABRASION OF RIGHT EAR CANAL, INITIAL ENCOUNTER: ICD-10-CM

## 2022-03-27 DIAGNOSIS — L03.031 CELLULITIS OF RIGHT TOE: ICD-10-CM

## 2022-03-27 PROCEDURE — 99213 OFFICE O/P EST LOW 20 MIN: CPT | Performed by: EMERGENCY MEDICINE

## 2022-03-27 PROCEDURE — 99213 OFFICE O/P EST LOW 20 MIN: CPT

## 2022-03-27 PROCEDURE — 73660 X-RAY EXAM OF TOE(S): CPT

## 2022-03-27 RX ORDER — GREEN TEA/HOODIA GORDONII 315-12.5MG
1 CAPSULE ORAL 2 TIMES DAILY
Qty: 60 TABLET | Refills: 0 | Status: SHIPPED | OUTPATIENT
Start: 2022-03-27 | End: 2022-04-26

## 2022-03-27 RX ORDER — FERROUS SULFATE 325(65) MG
325 TABLET ORAL
COMMUNITY

## 2022-03-27 RX ORDER — SULFAMETHOXAZOLE AND TRIMETHOPRIM 800; 160 MG/1; MG/1
1 TABLET ORAL 2 TIMES DAILY
Qty: 20 TABLET | Refills: 0 | Status: SHIPPED | OUTPATIENT
Start: 2022-03-27 | End: 2022-04-06

## 2022-03-27 RX ORDER — AMOXICILLIN AND CLAVULANATE POTASSIUM 875; 125 MG/1; MG/1
1 TABLET, FILM COATED ORAL 2 TIMES DAILY
Qty: 20 TABLET | Refills: 0 | Status: SHIPPED | OUTPATIENT
Start: 2022-03-27 | End: 2022-04-06

## 2022-03-27 ASSESSMENT — ENCOUNTER SYMPTOMS
SORE THROAT: 1
ABDOMINAL PAIN: 0
SINUS PRESSURE: 0
SHORTNESS OF BREATH: 0
SINUS PAIN: 0
NAUSEA: 0
COUGH: 1
DIARRHEA: 0
RHINORRHEA: 1

## 2022-03-27 NOTE — ED PROVIDER NOTES
[facility]  Urgent Care Encounter       CHIEF COMPLAINT       Chief Complaint   Patient presents with    Chest Congestion    Pharyngitis    Otalgia     bilateral    Wound Check     right second toe       Nurses Notes reviewed and I agree except as noted in the HPI. HISTORY OF PRESENT ILLNESS   Betsey Marshall is a 41 y.o. female who presents for right second toe redness, swelling, infection. Noticed first yesterday. Patient does have a history of hammertoe and has a chronic sore on the top of her toe. She has been seen by Dr. Shana Payan, podiatrist in the past.    Patient is present with her mother. She has a history of schizophrenia and appears to have some type of developmental delay. Her mother first noticed the redness and swelling last evening. Patient also has complaints of ear fullness, sore throat and cough. The symptoms have been present for 7 to 8 days. No fevers. Denies any injury to the toe. No shortness of breath. HPI    REVIEW OF SYSTEMS     Review of Systems   Constitutional: Negative for chills and fever. HENT: Positive for ear pain, rhinorrhea and sore throat. Negative for congestion, ear discharge, sinus pressure and sinus pain. Respiratory: Positive for cough. Negative for shortness of breath. Cardiovascular: Negative for chest pain. Gastrointestinal: Negative for abdominal pain, diarrhea and nausea. Neurological: Negative for dizziness. Psychiatric/Behavioral: Negative for behavioral problems.        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     during hospital visit    Bronchitis     [facility]est[facility]) [facility]     Elbow fracture 10/09/2014    left    Irritable bowel syndrome     Irritable bowel    Lymph edema 2017    Obesity     Conde disease     Conde Syndrome    Schizophrenia ([facility] [address]     Seasonal allergies        SURGICALHISTORY     Patient  has a past surgical history that includes Tonsillectomy and adenoidectomy; layer wound closure; toenail excision; Toe Surgery (09/2019); and Abdomen surgery (N/A, 6/10/2021). CURRENT MEDICATIONS       Discharge Medication List as of 3/27/2022  2:29 PM      CONTINUE these medications which have NOT CHANGED    Details   ferrous sulfate (IRON 325) 325 (65 Fe) MG tablet Take 325 mg by mouth daily (with breakfast)Historical Med      midodrine (PROAMATINE) 5 MG tablet Take 1 tablet by mouth 3 times daily, Disp-90 tablet, R-3Normal      metoprolol succinate (TOPROL XL) 50 MG extended release tablet take 1 tablet by mouth once daily, Disp-90 tablet, R-3Normal      furosemide (LASIX) 20 MG tablet ALTERNATE 1 TABLET EVERY OTHER DAY WITH 2 TABLETS EVERY OTHER DAY, Disp-120 tablet, R-3Normal      magnesium oxide (MAG-OX) 400 MG tablet Take 1 tablet by mouth daily, Disp-30 tablet, R-3Normal      traZODone (DESYREL) 25 mg TABS Take 25 mg by mouth nightlyHistorical Med      ibuprofen (ADVIL;MOTRIN) 800 MG tablet Take 1 tablet by mouth every 8 hours as needed for Pain, Disp-15 tablet,R-0Print      acetaminophen (TYLENOL) 500 MG tablet Take 1,000 mg by mouth every 6 hours as needed for PainHistorical Med      Cholecalciferol (VITAMIN D3) 5000 units TABS Take 2,000 Units by mouth Historical Med      MedroxyPROGESTERone Acetate (DEPO-PROVERA IM) Inject into the muscle      miconazole (MICOTIN) 2 % powder Apply topically 2 times to 3 times  daily. , Disp-45 g, R-1, NO PRINT      Dicyclomine HCl (BENTYL PO)   Take 20 mg by mouth 2 times daily       QUEtiapine Fumarate (SEROQUEL PO) Take 400 mg by mouth 2 times daily. atomoxetine (STRATTERA) 40 MG capsule Take 40 mg by mouth 2 times daily.         aripiprazole (ABILIFY) 10 MG tablet Take 15 mg by mouth 2 times daily Historical Med      ACYCLOVIR 400 mg by Does not apply route three times a week Historical Med ALLERGIES     Patient is is allergic to latex, cat hair extract, and seasonal.    Patients   Immunization History   Administered Date(s) Administered    COVID-19, Pfizer Purple top, DILUTE for use, 12+ yrs, 30mcg/0.3mL dose 04/07/2021, 04/28/2021       FAMILY HISTORY     Patient's family history includes Cancer in her maternal grandmother and paternal grandmother; Diabetes in her father. SOCIAL HISTORY     Patient  reports that she has never smoked. She has never used smokeless tobacco. She reports that she does not drink alcohol and does not use drugs. PHYSICAL EXAM     ED TRIAGE VITALS  BP: 130/80, Temp: 98.4 °F (36.9 °C), Pulse: 97, Resp: 16, SpO2: 100 %,Estimated body mass index is 51.65 kg/m² as calculated from the following:    Height as of 12/2/21: 5' 6\" (1.676 m). Weight as of this encounter: 320 lb (145.2 kg). ,No LMP recorded. Patient has had an injection. Physical Exam  Constitutional:       General: She is not in acute distress. HENT:      Head: Normocephalic. Right Ear: Tympanic membrane is erythematous. Left Ear: Tympanic membrane is erythematous. Ears:      Comments: Redness, swelling and irritation, dried blood in bilateral ear canals. Possible trauma related     Mouth/Throat:      Mouth: Mucous membranes are moist.      Pharynx: Oropharynx is clear. Posterior oropharyngeal erythema present. No oropharyngeal exudate. Cardiovascular:      Rate and Rhythm: Normal rate and regular rhythm. Heart sounds: Normal heart sounds. Pulmonary:      Effort: Pulmonary effort is normal.      Breath sounds: Normal breath sounds. Musculoskeletal:        Feet:       Comments: Severe lymphedema in bilateral lower extremities. There is a scabbed over sore to the IP joint of second digit right foot. The entire toe is red, swollen. Skin:     General: Skin is warm and dry. Capillary Refill: Capillary refill takes less than 2 seconds.    Neurological:      General: No focal deficit present. Mental Status: She is alert. Psychiatric:         Mood and Affect: Mood normal.         Behavior: Behavior normal.         DIAGNOSTIC RESULTS     Labs:No results found for this visit on 03/27/22. IMAGING:    XR TOE RIGHT (MIN 2 VIEWS)   Final Result   Extensive soft tissue swelling. No acute osseous abnormalities. No erosive destructive changes are evident. **This report has been created using voice recognition software. It may contain minor errors which are inherent in voice recognition technology. **      Final report electronically signed by Dr Eric Ramirez on 3/27/2022 2:16 PM            EKG:      URGENT CARE COURSE:     Vitals:    03/27/22 1317   BP: 130/80   Pulse: 97   Resp: 16   Temp: 98.4 °F (36.9 °C)   TempSrc: Oral   SpO2: 100%   Weight: (!) 320 lb (145.2 kg)       Medications - No data to display         PROCEDURES:  None    FINAL IMPRESSION      1. Acute maxillary sinusitis, recurrence not specified    2. Cellulitis of right toe    3. Abrasion of right ear canal, initial encounter          DISPOSITION/ PLAN   Patient presents for was likely cellulitis to the right second toe. There is a chronic-looking wound to the toe with active redness and swelling. Patient also has what is likely sinusitis. During physical exam there is also noted abrasions/ulcerations to the bilateral ear canals may have occurred while cleaning with Q-tips. We will treat the patient with Augmentin but also add Bactrim in case this is MRSA infection to the toe. Cortisporin eardrops. Advised to contact her podiatrist, Dr. Batsheva Avila for follow-up appointment this week. Tylenol for pain. Return for new or worsening symptoms.         PATIENT REFERRED TO:  Ana Cristina Oliva, [address] [place] [address] / [facility] [id number]      DISCHARGE MEDICATIONS:  Discharge Medication List as of 3/27/2022  2:29 PM      START taking these medications    Details   amoxicillin-clavulanate (AUGMENTIN) 775-779 MG per tablet Take 1 tablet by mouth 2 times daily for 10 days, Disp-20 tablet, R-0Normal      sulfamethoxazole-trimethoprim (BACTRIM DS) 800-160 MG per tablet Take 1 tablet by mouth 2 times daily for 10 days, Disp-20 tablet, R-0Normal      Probiotic Acidophilus (FLORANEX) TABS Take 1 tablet by mouth 2 times daily, Disp-60 tablet, R-0Normal      neomycin-polymyxin-hydrocortisone (CORTISPORIN) 3.5-23391-7 otic solution Place 3 drops into both ears 3 times daily for 5 days Instill into both Ears, Disp-10 mL, R-0Normal             Discharge Medication List as of 3/27/2022  2:29 PM          Discharge Medication List as of 3/27/2022  2:29 PM          Surya Matos, APRN - CNP    (Please note that portions of this note were completed with a voice recognition program. Efforts were made to edit the dictations but occasionally words are mis-transcribed.)          Sruya Matos, APRN - CNP  03/27/22 3827

## 2022-03-27 NOTE — ED TRIAGE NOTES
Pt to room 8 with her mother and c/o chest congestion, bilateral ear pain x 2 weeks. Pt also wants provider to look at a wound on her right second toe that she noticed 2 days ago when she woke up. She is handicapped and has an apartment and lives by herself but has assistance when needed.

## 2022-04-05 ENCOUNTER — TELEPHONE (OUTPATIENT)
Dept: WOUND CARE | Age: 47
End: 2022-04-05

## 2022-04-19 RX ORDER — MIDODRINE HYDROCHLORIDE 5 MG/1
5 TABLET ORAL 3 TIMES DAILY
Qty: 90 TABLET | Refills: 1 | Status: SHIPPED | OUTPATIENT
Start: 2022-04-19 | End: 2022-06-06 | Stop reason: SDUPTHER

## 2022-04-27 ENCOUNTER — TELEPHONE (OUTPATIENT)
Dept: WOUND CARE | Age: 47
End: 2022-04-27

## 2022-04-27 NOTE — TELEPHONE ENCOUNTER
Joe Celaya from Home health called regarding patients wound status. Attempted to call back.  LON left

## 2022-05-11 ENCOUNTER — HOSPITAL ENCOUNTER (OUTPATIENT)
Dept: WOUND CARE | Age: 47
Discharge: HOME | End: 2022-05-11
Payer: MEDICARE

## 2022-05-11 VITALS
HEART RATE: 104 BPM | RESPIRATION RATE: 22 BRPM | OXYGEN SATURATION: 95 % | DIASTOLIC BLOOD PRESSURE: 76 MMHG | TEMPERATURE: 97.7 F | BODY MASS INDEX: 53.78 KG/M2 | SYSTOLIC BLOOD PRESSURE: 125 MMHG | WEIGHT: 293 LBS

## 2022-05-11 PROCEDURE — 99214 OFFICE O/P EST MOD 30 MIN: CPT

## 2022-05-11 ASSESSMENT — PAIN DESCRIPTION - ORIENTATION: ORIENTATION: UPPER;LOWER

## 2022-05-11 ASSESSMENT — PAIN DESCRIPTION - LOCATION: LOCATION: ABDOMEN

## 2022-05-11 ASSESSMENT — PAIN DESCRIPTION - DESCRIPTORS: DESCRIPTORS: BURNING

## 2022-05-11 ASSESSMENT — PAIN SCALES - GENERAL: PAINLEVEL_OUTOF10: 7

## 2022-05-11 NOTE — PROGRESS NOTES
[facility]          Progress Note and Procedure Note      Linda Kenny  MEDICAL RECORD NUMBER:  [id number]  AGE: 41 y.o. GENDER: female  : 1975  EPISODE DATE:  2022    Subjective:     SUBJECTIVE     Chief Complaint   Patient presents with    Wound Check        HISTORY OF PRESENT ILLNESS   She is a 42 yr old female seen for follow up visit she has history of non healing on the lower abdomen .she has hx of ADHD and schizophrenia    She had surgical debridement of the wound by general surgeon. She has non healing wound. Has obesity contributing to the delayed wound healing. she has gained over 11 lbs  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     during hospital visit    Bronchitis     CHF (congestive heart failure) ([address]     Elbow fracture 10/09/2014    left    Irritable bowel syndrome     Irritable bowel    Lymph edema 2017    Obesity     Conde disease     Conde Syndrome    Schizophrenia ([address]     Seasonal allergies          PAST SURGICAL HISTORY     Past Surgical History:   Procedure Laterality Date    ABDOMEN SURGERY N/A 6/10/2021    EXCISIONAL DEBRIDEMENT AND CLOSURE OF CHRONIC ABDOMINAL WOUND performed by Maria Guadalupe Murguia MD at [facility] - Dr. Avinash De Leon     TOE SURGERY  2019    Dr. Darrall Sever         Family History   Problem Relation Age of Onset    Diabetes Father     Cancer Maternal Grandmother     Cancer Paternal Grandmother      SOCIAL HISTORY       Social History     Tobacco Use    Smoking status: Never Smoker    Smokeless tobacco: Never Used   Vaping Use    Vaping Use: Never used   Substance Use Topics    Alcohol use: No    Drug use: No     ALLERGIES         Allergies   Allergen Reactions    Latex Rash    Cat Hair Extract     Seasonal      MEDICATIONS         Current Outpatient Medications on File Prior to Encounter   Medication Sig Dispense Refill    midodrine (PROAMATINE) 5 MG tablet Take 1 tablet by mouth 3 times daily 90 tablet 1    ferrous sulfate (IRON 325) 325 (65 Fe) MG tablet Take 325 mg by mouth daily (with breakfast)      metoprolol succinate (TOPROL XL) 50 MG extended release tablet take 1 tablet by mouth once daily 90 tablet 3    furosemide (LASIX) 20 MG tablet ALTERNATE 1 TABLET EVERY OTHER DAY WITH 2 TABLETS EVERY OTHER  tablet 3    magnesium oxide (MAG-OX) 400 MG tablet Take 1 tablet by mouth daily 30 tablet 3    traZODone (DESYREL) 25 mg TABS Take 25 mg by mouth nightly      ibuprofen (ADVIL;MOTRIN) 800 MG tablet Take 1 tablet by mouth every 8 hours as needed for Pain 15 tablet 0    acetaminophen (TYLENOL) 500 MG tablet Take 1,000 mg by mouth every 6 hours as needed for Pain      Cholecalciferol (VITAMIN D3) 5000 units TABS Take 2,000 Units by mouth       MedroxyPROGESTERone Acetate (DEPO-PROVERA IM) Inject into the muscle      miconazole (MICOTIN) 2 % powder Apply topically 2 times to 3 times  daily. 45 g 1    Dicyclomine HCl (BENTYL PO)   Take 20 mg by mouth 2 times daily       QUEtiapine Fumarate (SEROQUEL PO) Take 400 mg by mouth 2 times daily.  atomoxetine (STRATTERA) 40 MG capsule Take 40 mg by mouth 2 times daily.  aripiprazole (ABILIFY) 10 MG tablet Take 15 mg by mouth 2 times daily       ACYCLOVIR 400 mg by Does not apply route three times a week        No current facility-administered medications on file prior to encounter. REVIEW OF SYSTEMS:     Constitutional: no fever, no night sweats, no fatigue. Head: no head ache , no head injury, no migranes. Eye: no blurring of vision, no double vision. Ears: no hearing difficulty, no tinnitus. Mouth/throat: no ulceration, dental caries , dysphagia.   Lungs: no cough, no shortness of breath, no wheeze  CVS: no palpitation, no chest pain, no shortness of breath  GI: no abdominal pain, no nausea , no vomiting, no constipation  FRANCOISE: no dysuria, frequency and urgency, no hematuria, no kidney stones  Musculoskeletal: she has lymphoedema  Endocrine: no polyuria, polydipsia, no cold or heat intolerance  Hematology: no anemia, no easy brusing or bleeding, no hx of clotting disorder  Dermatology: no skin rash, no eczema, no pruritis,         PHYSICAL EXAM      weight is 333 lb 3.2 oz (151.1 kg) (abnormal). Her infrared temperature is 97.7 °F (36.5 °C). Her blood pressure is 125/76 and her pulse is 104. Her respiration is 22 and oxygen saturation is 95%. Wt Readings from Last 3 Encounters:   05/11/22 (!) 333 lb 3.2 oz (151.1 kg)   03/27/22 (!) 320 lb (145.2 kg)   01/26/22 (!) 322 lb 6.4 oz (146.2 kg)          General Appearance  Awake, alert, oriented,  not  In acute distress  HEENT - normocephalic, atraumatic, pink conjunctiva,  anicteric sclera  Abdomen - has two open wound over the abdomen around an old scar tissue. Neurologic - oriented.   Skin - No bruising or bleeding  Extremities -chronic leg edema,      Wound 10/13/21 Abdomen Left;Medial;Upper (Active)   Wound Image   01/26/22 1025   Wound Etiology Non-Healing Surgical 01/26/22 1025   Dressing Status Old drainage noted 01/26/22 1025   Wound Cleansed Cleansed with saline 01/26/22 1025   Dressing/Treatment Triad hydro/zinc oxide-based hydrophilic paste;ABD 15/29/37 1025   Offloading for Diabetic Foot Ulcers No;No offloading required 01/26/22 1025   Wound Length (cm) 2 cm 03/09/22 1022   Wound Width (cm) 3.1 cm 03/09/22 1022   Wound Depth (cm) 0.05 cm 03/09/22 1022   Wound Surface Area (cm^2) 6.2 cm^2 03/09/22 1022   Change in Wound Size % (l*w) -1671.43 03/09/22 1022   Wound Volume (cm^3) 0.31 cm^3 03/09/22 1022   Wound Healing % -786 03/09/22 1022   Wound Assessment Pale granulation tissue 03/09/22 1022   Drainage Amount Moderate 03/09/22 1022   Drainage Description Sanguinous; Serosanguinous 03/09/22 1022   Odor None 03/09/22 1022   Gwen-wound Assessment Dry/flaky; Hyperpigmented 03/09/22 1022   Margins Attached edges 03/09/22 1022   Wound Thickness Description not for Pressure Injury Full thickness 03/09/22 1022   Number of days: 147       Wound 03/09/22 Abdomen Proximal #2 (Active)   Wound Length (cm) 1.5 cm 03/09/22 1022   Wound Width (cm) 7.4 cm 03/09/22 1022   Wound Depth (cm) 0.05 cm 03/09/22 1022   Wound Surface Area (cm^2) 11.1 cm^2 03/09/22 1022   Wound Volume (cm^3) 0.555 cm^3 03/09/22 1022   Wound Assessment Pale granulation tissue;Slough 03/09/22 1022   Drainage Amount Moderate 03/09/22 1022   Drainage Description Serosanguinous 03/09/22 1022   Odor None 03/09/22 1022   Gwen-wound Assessment Induration; Hyperpigmented 03/09/22 1022   Margins Attached edges 03/09/22 1022   Wound Thickness Description not for Pressure Injury Full thickness 03/09/22 1022   Number of days: 0       Wound 03/09/22 Abdomen Medial;Right #3 (Active)   Wound Length (cm) 3.5 cm 03/09/22 1022   Wound Width (cm) 4.6 cm 03/09/22 1022   Wound Depth (cm) 0.05 cm 03/09/22 1022   Wound Surface Area (cm^2) 16.1 cm^2 03/09/22 1022   Wound Volume (cm^3) 0.805 cm^3 03/09/22 1022   Wound Assessment Pale granulation tissue; Epithelialization 03/09/22 1022   Drainage Amount Moderate 03/09/22 1022   Drainage Description Serosanguinous; Sanguinous 03/09/22 1022   Odor None 03/09/22 1022   Gwen-wound Assessment Dry/flaky; Hyperpigmented 03/09/22 1022   Margins Attached edges 03/09/22 1022   Wound Thickness Description not for Pressure Injury Full thickness 03/09/22 1022   Number of days: 0       Assessment:   Post surgical abdominal wound: the wound is bigger and has gained more weight  Will use foam dressing  Discussed on weight and salt intake  Follow up will be scheduled  Patient Active Problem List   Diagnosis Code    Keloid L91.0    MRSA (methicillin resistant staph aureus) culture positive Z22.322    Skin ulcer of abdominal wall ([facility] L98.499    POTS (postural orthostatic tachycardia syndrome) I49.8    Orthostatic dizziness- occasional- not worse R42    CHF (congestive heart failure) ([facility]) chronic diastolic P73.2    Morbid obesity ([facility]) E66.01    Bilateral leg edema =LYMPHEDEMA- ON f/U WITH LYMPHEDEMA CLINIC R60.0    Lymphedema of both lower extremities I89.0    SOB (shortness of breath) on exertion R06.02    Candidiasis, intertriginous B37.2    Dermatitis L30.9    Open abdominal wall wound S31.109A    History of fall Z91.81           Plan:     Patient examined and evaluated   Please see attached Discharge Instructions    Written patient dismissal instructions given to patient and signed by patient or POA. Discharge Instructions       Visit Discharge/Physician Orders:  - Continue working on weight loss, work on lower sodium meals, try to eat more fresh fruit and veggies, eat less fast food, canned, and processed meals.  - Supplies ordered through [facility]. [phone].     Wound Location: Abdomen     Dressing orders:      OK to shower take shower before dressing change. Clean wound after shower then re-dress.      Abdomen wounds- Cleanse wound with normal saline or wound cleanser and gauze. Pat dry with clean gauze. Apply zinc oxide to surrounding skin. Apply silver alginate to wound. Cover with Silicone bordered foam. Change every other day. You can change more often, daily if needed according to drainage       Follow up visit:2 months  Keep next scheduled appointment. Please give 24 hour notice if unable to keep appointment. [phone]     If you experience any of the following, please call the Wound Care Service during business hours: Monday through Friday 8:00 am - 4:30 pm  ([phone]).               *Increase in pain               *Temperature over 101              *Increase in drainage from your wound or a foul odor              *Uncontrolled swelling              *Need for compression bandage changes due to slippage, breakthrough drainage     If you need medical attention outside of business hours, please contact your Primary Care Doctor or go to the nearest emergency room.         Electronically signed by Kvng Jorgensen MD on 5/11/2022 at 10:10 AM

## 2022-05-16 ENCOUNTER — HOSPITAL ENCOUNTER (EMERGENCY)
Age: 47
Discharge: HOME | End: 2022-05-16
Payer: MEDICARE

## 2022-05-16 VITALS
OXYGEN SATURATION: 96 % | DIASTOLIC BLOOD PRESSURE: 65 MMHG | WEIGHT: 293 LBS | SYSTOLIC BLOOD PRESSURE: 128 MMHG | BODY MASS INDEX: 47.09 KG/M2 | RESPIRATION RATE: 22 BRPM | HEART RATE: 86 BPM | HEIGHT: 66 IN | TEMPERATURE: 97.1 F

## 2022-05-16 DIAGNOSIS — J31.0 RHINOSINUSITIS: Primary | ICD-10-CM

## 2022-05-16 DIAGNOSIS — J32.9 RHINOSINUSITIS: Primary | ICD-10-CM

## 2022-05-16 PROCEDURE — 99213 OFFICE O/P EST LOW 20 MIN: CPT

## 2022-05-16 PROCEDURE — 99213 OFFICE O/P EST LOW 20 MIN: CPT | Performed by: EMERGENCY MEDICINE

## 2022-05-16 RX ORDER — AMOXICILLIN AND CLAVULANATE POTASSIUM 875; 125 MG/1; MG/1
1 TABLET, FILM COATED ORAL 2 TIMES DAILY
Qty: 20 TABLET | Refills: 0 | Status: SHIPPED | OUTPATIENT
Start: 2022-05-16 | End: 2022-05-26

## 2022-05-16 RX ORDER — GREEN TEA/HOODIA GORDONII 315-12.5MG
1 CAPSULE ORAL 2 TIMES DAILY
Qty: 60 TABLET | Refills: 0 | Status: SHIPPED | OUTPATIENT
Start: 2022-05-16 | End: 2022-06-15

## 2022-05-16 ASSESSMENT — ENCOUNTER SYMPTOMS
TROUBLE SWALLOWING: 0
COUGH: 0
ABDOMINAL PAIN: 0
SHORTNESS OF BREATH: 0
RHINORRHEA: 1
SINUS PRESSURE: 1
SORE THROAT: 1

## 2022-05-16 ASSESSMENT — PAIN DESCRIPTION - ONSET: ONSET: ON-GOING

## 2022-05-16 ASSESSMENT — PAIN SCALES - GENERAL: PAINLEVEL_OUTOF10: 8

## 2022-05-16 ASSESSMENT — PAIN DESCRIPTION - FREQUENCY: FREQUENCY: CONTINUOUS

## 2022-05-16 ASSESSMENT — PAIN DESCRIPTION - PAIN TYPE: TYPE: ACUTE PAIN

## 2022-05-16 ASSESSMENT — PAIN - FUNCTIONAL ASSESSMENT: PAIN_FUNCTIONAL_ASSESSMENT: 0-10

## 2022-05-16 ASSESSMENT — PAIN DESCRIPTION - DESCRIPTORS: DESCRIPTORS: BURNING;SORE

## 2022-05-16 ASSESSMENT — PAIN DESCRIPTION - LOCATION: LOCATION: THROAT

## 2022-05-16 NOTE — ED PROVIDER NOTES
[facility]  Urgent Care Encounter       CHIEF COMPLAINT       Chief Complaint   Patient presents with    Pharyngitis    Otalgia     bilateral       Nurses Notes reviewed and I agree except as noted in the HPI. HISTORY OF PRESENT ILLNESS   Matthew Ragland is a 41 y.o. female who presents for complaints of sore throat, bilateral ear pain, sinus congestion and pressure. Symptoms have been present for 1 week. Mother reports she has been given sinus medications with no improvement of symptoms. Reports symptoms have became more severe over the past 2 days. No fever. Denies cough or chest pain. No more shortness of breath than normal.  Does have history of CHF. Denies any recent sudden weight gain. She has chronically swollen legs but no worsening. HPI    REVIEW OF SYSTEMS     Review of Systems   Constitutional: Positive for fatigue. Negative for activity change and fever. HENT: Positive for congestion, ear pain, rhinorrhea, sinus pressure and sore throat. Negative for ear discharge and trouble swallowing. Respiratory: Negative for cough and shortness of breath. Cardiovascular: Positive for leg swelling (chronic). Negative for chest pain. Gastrointestinal: Negative for abdominal pain. Neurological: Positive for headaches. Negative for dizziness. 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     during hospital visit    Bronchitis     CHF (congestive heart failure) ([address]     Elbow fracture 10/09/2014    left    Irritable bowel syndrome     Irritable bowel    Lymph edema 2017    Obesity     Conde disease     Conde Syndrome    Schizophrenia ([address]     Seasonal allergies        SURGICALHISTORY     Patient  has a past surgical history that includes Tonsillectomy and adenoidectomy; layer wound closure; toenail excision;  Toe Surgery (09/2019); and Abdomen surgery (N/A, 6/10/2021). CURRENT MEDICATIONS       Discharge Medication List as of 5/16/2022  3:54 PM      CONTINUE these medications which have NOT CHANGED    Details   midodrine (PROAMATINE) 5 MG tablet Take 1 tablet by mouth 3 times daily, Disp-90 tablet, R-1Normal      ferrous sulfate (IRON 325) 325 (65 Fe) MG tablet Take 325 mg by mouth daily (with breakfast)Historical Med      metoprolol succinate (TOPROL XL) 50 MG extended release tablet take 1 tablet by mouth once daily, Disp-90 tablet, R-3Normal      furosemide (LASIX) 20 MG tablet ALTERNATE 1 TABLET EVERY OTHER DAY WITH 2 TABLETS EVERY OTHER DAY, Disp-120 tablet, R-3Normal      magnesium oxide (MAG-OX) 400 MG tablet Take 1 tablet by mouth daily, Disp-30 tablet, R-3Normal      traZODone (DESYREL) 25 mg TABS Take 25 mg by mouth nightlyHistorical Med      ibuprofen (ADVIL;MOTRIN) 800 MG tablet Take 1 tablet by mouth every 8 hours as needed for Pain, Disp-15 tablet,R-0Print      acetaminophen (TYLENOL) 500 MG tablet Take 1,000 mg by mouth every 6 hours as needed for PainHistorical Med      Cholecalciferol (VITAMIN D3) 5000 units TABS Take 2,000 Units by mouth Historical Med      MedroxyPROGESTERone Acetate (DEPO-PROVERA IM) Inject into the muscle      miconazole (MICOTIN) 2 % powder Apply topically 2 times to 3 times  daily. , Disp-45 g, R-1, NO PRINT      Dicyclomine HCl (BENTYL PO)   Take 20 mg by mouth 2 times daily       QUEtiapine Fumarate (SEROQUEL PO) Take 400 mg by mouth 2 times daily. atomoxetine (STRATTERA) 40 MG capsule Take 40 mg by mouth 2 times daily.         aripiprazole (ABILIFY) 10 MG tablet Take 15 mg by mouth 2 times daily Historical Med      ACYCLOVIR 400 mg by Does not apply route three times a week Historical Med             ALLERGIES     Patient is is allergic to latex, cat hair extract, and seasonal.    Patients   Immunization History   Administered Date(s) Administered    COVID-19, Pfizer Purple top, DILUTE for use, 12+ yrs, 30mcg/0.3mL dose 04/07/2021, 04/28/2021       FAMILY HISTORY     Patient's family history includes Cancer in her maternal grandmother and paternal grandmother; Diabetes in her father. SOCIAL HISTORY     Patient  reports that she has never smoked. She has never used smokeless tobacco. She reports that she does not drink alcohol and does not use drugs. PHYSICAL EXAM     ED TRIAGE VITALS  BP: 128/65, Temp: 97.1 °F (36.2 °C), Pulse: 86, Resp: 22, SpO2: 96 %,Estimated body mass index is 51.65 kg/m² as calculated from the following:    Height as of this encounter: 5' 6\" (1.676 m). Weight as of this encounter: 320 lb (145.2 kg). ,No LMP recorded (lmp unknown). Patient has had an injection. Physical Exam  Constitutional:       Appearance: She is not ill-appearing. HENT:      Head: Normocephalic. Right Ear: Tympanic membrane and ear canal normal. Tenderness present. No drainage or swelling. Left Ear: Tympanic membrane and ear canal normal. Tenderness present. No drainage or swelling. Nose:      Right Sinus: Maxillary sinus tenderness and frontal sinus tenderness present. Left Sinus: Maxillary sinus tenderness and frontal sinus tenderness present. Mouth/Throat:      Mouth: Mucous membranes are moist.      Pharynx: Oropharynx is clear. Posterior oropharyngeal erythema present. No uvula swelling. Tonsils: No tonsillar exudate. Cardiovascular:      Rate and Rhythm: Normal rate. Pulmonary:      Effort: Pulmonary effort is normal.      Breath sounds: Normal breath sounds. Skin:     General: Skin is warm and dry. Neurological:      General: No focal deficit present. Mental Status: She is alert. Psychiatric:         Mood and Affect: Mood normal.         Behavior: Behavior normal.         DIAGNOSTIC RESULTS     Labs:No results found for this visit on 05/16/22.     IMAGING:    No orders to display         EKG:      URGENT CARE COURSE:     Vitals: 05/16/22 1518   BP: 128/65   Pulse: 86   Resp: 22   Temp: 97.1 °F (36.2 °C)   TempSrc: Temporal   SpO2: 96%   Weight: (!) 320 lb (145.2 kg)   Height: 5' 6\" (1.676 m)       Medications - No data to display         PROCEDURES:  None    FINAL IMPRESSION      1. Rhinosinusitis          DISPOSITION/ PLAN   Patient presents for what is likely rhinosinusitis. Symptoms present for greater than 8 days. We will treat with Augmentin. Probiotic while on Augmentin. Encourage fluids. Continue current medications. Return for new or worsening symptoms.         PATIENT REFERRED TO:  J Luis Kirkpatrick MD  [facility] [address] / [facility] [address]      DISCHARGE MEDICATIONS:  Discharge Medication List as of 5/16/2022  3:54 PM      START taking these medications    Details   amoxicillin-clavulanate (AUGMENTIN) 875-125 MG per tablet Take 1 tablet by mouth 2 times daily for 10 days, Disp-20 tablet, R-0Normal      Probiotic Acidophilus (FLORANEX) TABS Take 1 tablet by mouth 2 times daily, Disp-60 tablet, R-0Normal             Discharge Medication List as of 5/16/2022  3:54 PM          Discharge Medication List as of 5/16/2022  3:54 PM          VIRGIE Vale CNP    (Please note that portions of this note were completed with a voice recognition program. Efforts were made to edit the dictations but occasionally words are mis-transcribed.)          VIRGIE Vale CNP  05/16/22 [id number]

## 2022-05-16 NOTE — ED NOTES
Pt presents to [facility] with c/o sore throat and bilateral ear pain x 1 week     Hermila Fernandez LPN  29/60/75 0078

## 2022-06-02 ENCOUNTER — HOSPITAL ENCOUNTER (OUTPATIENT)
Age: 47
Discharge: HOME | End: 2022-06-02
Payer: MEDICARE

## 2022-06-02 ENCOUNTER — OFFICE VISIT (OUTPATIENT)
Dept: CARDIOLOGY CLINIC | Age: 47
End: 2022-06-02
Payer: MEDICARE

## 2022-06-02 VITALS
SYSTOLIC BLOOD PRESSURE: 135 MMHG | OXYGEN SATURATION: 99 % | HEIGHT: 66 IN | WEIGHT: 293 LBS | BODY MASS INDEX: 47.09 KG/M2 | HEART RATE: 95 BPM | DIASTOLIC BLOOD PRESSURE: 75 MMHG

## 2022-06-02 DIAGNOSIS — R42 ORTHOSTATIC DIZZINESS: ICD-10-CM

## 2022-06-02 DIAGNOSIS — I50.32 CHRONIC DIASTOLIC CONGESTIVE HEART FAILURE (HCC): Primary | ICD-10-CM

## 2022-06-02 DIAGNOSIS — E66.01 MORBID OBESITY (HCC): ICD-10-CM

## 2022-06-02 DIAGNOSIS — R06.02 SOB (SHORTNESS OF BREATH) ON EXERTION: ICD-10-CM

## 2022-06-02 DIAGNOSIS — Z91.81 HISTORY OF FALL: ICD-10-CM

## 2022-06-02 DIAGNOSIS — R60.0 BILATERAL LEG EDEMA: ICD-10-CM

## 2022-06-02 DIAGNOSIS — I50.32 CHRONIC DIASTOLIC CONGESTIVE HEART FAILURE (HCC): ICD-10-CM

## 2022-06-02 DIAGNOSIS — R42 VERTIGO: ICD-10-CM

## 2022-06-02 DIAGNOSIS — G90.A POTS (POSTURAL ORTHOSTATIC TACHYCARDIA SYNDROME): ICD-10-CM

## 2022-06-02 LAB
ANION GAP SERPL CALCULATED.3IONS-SCNC: 15 MEQ/L (ref 8–16)
BUN BLDV-MCNC: 9 MG/DL (ref 7–22)
CALCIUM SERPL-MCNC: 8.9 MG/DL (ref 8.5–10.5)
CHLORIDE BLD-SCNC: 104 MEQ/L (ref 98–111)
CO2: 16 MEQ/L (ref 23–33)
CREAT SERPL-MCNC: 0.5 MG/DL (ref 0.4–1.2)
ERYTHROCYTE [DISTWIDTH] IN BLOOD BY AUTOMATED COUNT: 13.6 % (ref 11.5–14.5)
ERYTHROCYTE [DISTWIDTH] IN BLOOD BY AUTOMATED COUNT: 44.2 FL (ref 35–45)
GFR SERPL CREATININE-BSD FRML MDRD: > 90 ML/MIN/1.73M2
GLUCOSE BLD-MCNC: 87 MG/DL (ref 70–108)
HCT VFR BLD CALC: 46.4 % (ref 37–47)
HEMOGLOBIN: 15.1 GM/DL (ref 12–16)
MAGNESIUM: 2.2 MG/DL (ref 1.6–2.4)
MCH RBC QN AUTO: 29 PG (ref 26–33)
MCHC RBC AUTO-ENTMCNC: 32.5 GM/DL (ref 32.2–35.5)
MCV RBC AUTO: 89.2 FL (ref 81–99)
PLATELET # BLD: 284 THOU/MM3 (ref 130–400)
PMV BLD AUTO: 10.5 FL (ref 9.4–12.4)
POTASSIUM SERPL-SCNC: 4.2 MEQ/L (ref 3.5–5.2)
RBC # BLD: 5.2 MILL/MM3 (ref 4.2–5.4)
SODIUM BLD-SCNC: 135 MEQ/L (ref 135–145)
WBC # BLD: 10.9 THOU/MM3 (ref 4.8–10.8)

## 2022-06-02 PROCEDURE — 99214 OFFICE O/P EST MOD 30 MIN: CPT | Performed by: INTERNAL MEDICINE

## 2022-06-02 PROCEDURE — 36415 COLL VENOUS BLD VENIPUNCTURE: CPT

## 2022-06-02 PROCEDURE — 80048 BASIC METABOLIC PNL TOTAL CA: CPT

## 2022-06-02 PROCEDURE — G8427 DOCREV CUR MEDS BY ELIG CLIN: HCPCS | Performed by: INTERNAL MEDICINE

## 2022-06-02 PROCEDURE — G8417 CALC BMI ABV UP PARAM F/U: HCPCS | Performed by: INTERNAL MEDICINE

## 2022-06-02 PROCEDURE — 85027 COMPLETE CBC AUTOMATED: CPT

## 2022-06-02 PROCEDURE — 83735 ASSAY OF MAGNESIUM: CPT

## 2022-06-02 PROCEDURE — 1036F TOBACCO NON-USER: CPT | Performed by: INTERNAL MEDICINE

## 2022-06-02 NOTE — PROGRESS NOTES
Chief Complaint   Patient presents with    6 Month Follow-Up    Congestive Heart Failure   Originally Pt is a f/u from [address] [place] [address] for POTS syndrome  She has not had any issues until today because it is so hot outside  Since she has been put on medications she is feeling better  She does have chest pressure at night time  Has to sleep in a recliner  Sob and lt headed   She was dizzy off and on prior to going to [facility]  Dr Carter Marin started her on lasix and they would like to know if you want her to remain on this? ?      6 month follow up. EKG done 12-2-2021.     Things spins around on assuming supine and any other position    Feel lightheaded on standing    Sob on exertion worse    No leg edema    Had fall 4 x in the last week and midodrine controlled fall for long time till recent past  Under Rx for sinus infection and completed    Gianed wt 7 lb      Denied cp or palpitations    Hx of Chronic dizziness if get too fast up now luis fernando    Sob on exertion-chronic    Hx  schizophrenia    Orthopnea for few yrs and sleep on recliner for over 2 yrs          Patient Active Problem List   Diagnosis    Keloid    MRSA (methicillin resistant staph aureus) culture positive    Skin ulcer of abdominal wall (HCC)    POTS (postural orthostatic tachycardia syndrome)    Orthostatic dizziness- occasional- not worse    CHF (congestive heart failure) (HCC) chronic diastolic    Morbid obesity (HCC)    Bilateral leg edema =LYMPHEDEMA- ON f/U WITH LYMPHEDEMA CLINIC    Lymphedema of both lower extremities    SOB (shortness of breath) on exertion    Candidiasis, intertriginous    Dermatitis    Open abdominal wall wound    History of fall    Vertigo       Past Surgical History:   Procedure Laterality Date    ABDOMEN SURGERY N/A 6/10/2021    EXCISIONAL DEBRIDEMENT AND CLOSURE OF CHRONIC ABDOMINAL WOUND performed [facility] Dr. Tess Anaya 2014    TOE SURGERY 09/2019    Dr. Ulises Amor         Allergies   Allergen Reactions    Latex Rash    Cat Hair Extract     Seasonal         Family History   Problem Relation Age of Onset    Diabetes Father     Cancer Maternal Grandmother     Cancer Paternal Grandmother         Social History     Socioeconomic History    Marital status: Single     Spouse name: Not on file    Number of children: Not on file    Years of education: Not on file    Highest education level: Not on file   Occupational History    Not on file   Tobacco Use    Smoking status: Never Smoker    Smokeless tobacco: Never Used   Vaping Use    Vaping Use: Never used   Substance and Sexual Activity    Alcohol use: No    Drug use: No    Sexual activity: Not on file   Other Topics Concern    Not on file   Social History Narrative    Not on file     Social Determinants of Health     Financial Resource Strain:     Difficulty of Paying Living Expenses: Not on file   Food Insecurity:     Worried About Running Out of Food in the Last Year: Not on file    Kathy of Food in the Last Year: Not on file   Transportation Needs:     Lack of Transportation (Medical): Not on file    Lack of Transportation (Non-Medical):  Not on file   Physical Activity:     Days of Exercise per Week: Not on file    Minutes of Exercise per Session: Not on file   Stress:     Feeling of Stress : Not on file   Social Connections:     Frequency of Communication with Friends and Family: Not on file    Frequency of Social Gatherings with Friends and Family: Not on file    Attends Worship Services: Not on file    Active Member of Clubs or Organizations: Not on file    Attends Club or Organization Meetings: Not on file    Marital Status: Not on file   Intimate Partner Violence:     Fear of Current or Ex-Partner: Not on file    Emotionally Abused: Not on file    Physically Abused: Not on file    Sexually Abused: Not on file   Housing Stability:     Unable to Pay for Housing in the Last Year: Not on file    Number of Places Lived in the Last Year: Not on file    Unstable Housing in the Last Year: Not on file       Current Outpatient Medications   Medication Sig Dispense Refill    Probiotic Acidophilus (FLORANEX) TABS Take 1 tablet by mouth 2 times daily 60 tablet 0    midodrine (PROAMATINE) 5 MG tablet Take 1 tablet by mouth 3 times daily 90 tablet 1    ferrous sulfate (IRON 325) 325 (65 Fe) MG tablet Take 325 mg by mouth daily (with breakfast)      metoprolol succinate (TOPROL XL) 50 MG extended release tablet take 1 tablet by mouth once daily 90 tablet 3    furosemide (LASIX) 20 MG tablet ALTERNATE 1 TABLET EVERY OTHER DAY WITH 2 TABLETS EVERY OTHER  tablet 3    magnesium oxide (MAG-OX) 400 MG tablet Take 1 tablet by mouth daily 30 tablet 3    traZODone (DESYREL) 25 mg TABS Take 25 mg by mouth nightly      ibuprofen (ADVIL;MOTRIN) 800 MG tablet Take 1 tablet by mouth every 8 hours as needed for Pain 15 tablet 0    acetaminophen (TYLENOL) 500 MG tablet Take 1,000 mg by mouth every 6 hours as needed for Pain      Cholecalciferol (VITAMIN D3) 5000 units TABS Take 2,000 Units by mouth       MedroxyPROGESTERone Acetate (DEPO-PROVERA IM) Inject into the muscle      miconazole (MICOTIN) 2 % powder Apply topically 2 times to 3 times  daily. 45 g 1    Dicyclomine HCl (BENTYL PO)   Take 20 mg by mouth 2 times daily       QUEtiapine Fumarate (SEROQUEL PO) Take 400 mg by mouth 2 times daily.  atomoxetine (STRATTERA) 40 MG capsule Take 40 mg by mouth 2 times daily.  aripiprazole (ABILIFY) 10 MG tablet Take 15 mg by mouth 2 times daily 10 mg in the am and 15 mg in the evening      ACYCLOVIR 400 mg by Does not apply route three times a week        No current facility-administered medications for this visit.        Review of Systems -     General ROS: negative  Psychological ROS: negative  Hematological and Lymphatic ROS: No history of blood clots or bleeding disorder. Respiratory ROS: no cough, shortness of breath, or wheezing  Cardiovascular ROS: no chest pain or dyspnea on exertion  Gastrointestinal ROS: negative  Genito-Urinary ROS: no dysuria, trouble voiding, or hematuria  Musculoskeletal ROS: negative  Neurological ROS: no TIA or stroke symptoms  Dermatological ROS: negative      Blood pressure 135/75, pulse 95, height 5' 6\" (1.676 m), weight (!) 329 lb 12.8 oz (149.6 kg), SpO2 99 %, not currently breastfeeding. Physical Examination:    General appearance - alert, well appearing, and in no distress  Mental status - alert, oriented to person, place, and time  Neck - supple, no significant adenopathy, no JVD, or carotid bruits  Chest - clear to auscultation, no wheezes, rales or rhonchi, symmetric air entry  Heart - normal rate, regular rhythm, normal S1, S2, no murmurs, rubs, clicks or gallops  Abdomen - soft, nontender, nondistended, no masses or organomegaly  Neurological - alert, oriented, normal speech, no focal findings or movement disorder noted  Musculoskeletal - no joint tenderness, deformity or swelling  Extremities - peripheral pulses normal, no pedal edema, no clubbing or cyanosis  Skin - normal coloration and turgor, no rashes, no suspicious skin lesions noted    Lab  No results for input(s): CKTOTAL, CKMB, CKMBINDEX, TROPONINI in the last 72 hours.   CBC:   Lab Results   Component Value Date    WBC 7.7 10/13/2020     BMP:    Lab Results   Component Value Date     09/15/2021    K 3.8 09/15/2021     09/15/2021    CO2 22 09/15/2021    BUN 9 09/15/2021    LABALBU 4.1 10/13/2020    CREATININE 0.6 09/15/2021    CALCIUM 9.2 09/15/2021    LABGLOM >90 09/15/2021    GLUCOSE 90 09/15/2021     Hepatic Function Panel:    Lab Results   Component Value Date    ALKPHOS 85 10/13/2020     Magnesium:    Lab Results   Component Value Date    MG 1.9 09/15/2021     Warfarin PT/INR:    No components found for: PTPATWAR,  PTINRWAR  HgBA1c:    No results found for: LABA1C  FLP:    No components found for: CHLPL,  TRIG,  HDL,  LDLCALC,  LDLDIRECT,  LABVLDL  TSH:    No results found for: TSH  EKG 5/7/15-Sinus  Rhythm   Low voltage in precordial leads. ABNORMAL     Nuc stress test negative    IMPRESSION:      1. CT coronary artery calcium score of zero indicates low risk for coronary disease. 2. Diffusely small coronary arteries. In particular the LAD and right coronary artery are very small in their middle and distal thirds. These segments cannot be assessed. Occlusion of the distal segments cannot be excluded. 3. Normal cardiac function. Final report electronically signed by Dr. Jose Quiñones on 5/15/2015 4:50 PM         EKG 8/8/16  Sinus  Rhythm   Low voltage in precordial leads. ABNORMAL       CONCLUSION:       1. This tilt table test is an abnormal study. 2.   It is consistent with postural orthostatic tachycardia syndrome, in             view of the increment of the heart rate up to 130 from 92 and the             difference of 39 beats per minute. Blood pressure has been well             maintained all the way until the end, and at the end the blood             pressure and the heart rate came down, suggesting it was inducing             vasovagal reaction. 3.   POTS induced vasovagal response. 4.   Marked dizziness throughout the tilting in view of the tachycardia. 5.   Marked chest pressure and short of breath throughout tilting,             related to the tachycardia. Once the patient assumed supine             position, the symptoms resolved. 6.   Nonspecific ST segment change during the assumption of standing. RECOMMENDATION:        1. At this level, the patient needs appropriate hydration and liberal             salt intake. Avoid prolonged standing, physical therapy. further clinical correlation.        2.   The patient may need functional study if clinically indicated, like             Lexiscan nuclear stress test in view of the nonspecific ST segment             changes on standing. 3.   Avoid prolonged standing in view of prolonged standing induced             vasovagal response. 4.   I discussed with the patient. Erum Coffey M.D.   D: 03/20/2015 13:46      Conclusions    Summary   Technically difficult examination. Normal left ventricle size and systolic function. Ejection fraction was   estimated at 55%. There were no regional left ventricular wall motion   abnormalities and wall thickness was within normal limits. Signature   ----------------------------------------------------------------   Electronically signed by Valeria Leonardo MD (Interpreting   physician) on 11/17/2021 at 05:03 PM      ekg 6/8/18  NSR, NO ACUTE ABN    ekg 6/5/2020  Sinus tachy no acute abn    ekg 12/2/21  Sinus  Tachycardia 105  Low voltage in precordial leads.    -Nonspecific ST depression   +   Nonspecific T-abnormality  -Nondiagnostic. ABNORMAL         Assessment    Grandmother had CAD in her 66's   Diagnosis Orders   1. Chronic diastolic congestive heart failure ([address]     2. POTS (postural orthostatic tachycardia syndrome)     3. SOB (shortness of breath) on exertion     4. Orthostatic dizziness- occasional- not worse     5. Bilateral leg edema =LYMPHEDEMA- ON f/U WITH LYMPHEDEMA CLINIC     6. Morbid obesity (HCC)     7. Vertigo     8. History of fall         Plan     THE  Current Labs and meds reviewed    Leg edema - LYMPHEDEMA ON MECHICAL RX  leg elevation  Ace wrap  Not possible due to leg anatomy    Continue the current treatment and with constant vigilance to changes in symptoms and also any potential side effects. Return for care or seek medical attention immediately if symptoms got worse and/or develop new symptoms.     Congestive heart failure: no evidence of fluid overload today, no recent hospitalization for CHF  Lymphedema  CONT LASIX 20 po qd and may increase for 3 days a week if wt gain > 5 lb and leg edema  Taking more bannana and tomato and avoxcado  BMP and MG today and prior to next visit  Mild worsening of sob  Wt gain 7 lb  Double lasix for 5 days      POTS syndrome Dxed 2015 on TTT  Hx of Sinus tachy  Dizziness chronic  Cont hydration  Hx of  freq fall- no rmore fall after midodrine  Cont midodrine 5 mg po BID  Avoid prolonged standing  Cont  toprol xl 50 po qhs    Today 5/2/22  Recent worsening of dizziness non postural with falls- vertigo versus dizziness  Bedside orthostatic eval- negative  Assuming supine induced severe vertigo with room spinning and pat was really uncomfortable  Definite vertigo  Need vestibular rehab  better on standing    Hx of Coronary CTA result and stress test result d/w the pat    Morbid obesity  Advised to lose wt  Advised complaince with F/u    D/w the pat and mother the details plan of care    Mag 1.6 and now 1.9  Need replacement  Cont  mag oxide 400 mg po qd for 1 month    Discussed use, benefit, and side effects of prescribed medications. All patient questions answered. Pt voiced understanding. Instructed to continue current medications, diet and exercise. Continue risk factor modification and medical management. Patient agreed with treatment plan. Follow up as directed.       RTC IN 4 months    Vinicius Mcintyre [facility]

## 2022-06-06 RX ORDER — MIDODRINE HYDROCHLORIDE 5 MG/1
5 TABLET ORAL 3 TIMES DAILY
Qty: 90 TABLET | Refills: 5 | Status: SHIPPED | OUTPATIENT
Start: 2022-06-06

## 2022-06-08 ENCOUNTER — HOSPITAL ENCOUNTER (OUTPATIENT)
Dept: PHYSICAL THERAPY | Age: 47
Setting detail: THERAPIES SERIES
Discharge: HOME | End: 2022-06-08
Payer: MEDICARE

## 2022-06-08 PROCEDURE — 95992 CANALITH REPOSITIONING PROC: CPT

## 2022-06-08 PROCEDURE — 97162 PT EVAL MOD COMPLEX 30 MIN: CPT

## 2022-06-08 NOTE — PROGRESS NOTES
** PLEASE SIGN, DATE AND TIME CERTIFICATION BELOW AND RETURN TO [facility] OUTPATIENT REHABILITATION (FAX #: [phone]). ATTEST/CO-SIGN IF ACCESSING VIA [employer]. THANK YOU.**    I certify that I have examined the patient below and determined that Physical Medicine and Rehabilitation service is necessary and that I approve the established plan of care for up to 90 days or as specifically noted. Attestation, signature or co-signature of physician indicates approval of certification requirements.    ________________________ ____________ __________  Physician Signature   Date   Time  Misael Cosme 62  PHYSICAL THERAPY  [x] VESTIBULAR EVALUATION  [] DAILY NOTE [] PROGRESS NOTE [] DISCHARGE NOTE    [x] OUTPATIENT REHABILITATION [facility]   [] Aurea [address]    [] [address] [facility]   [] Mely Villegas    Date: 2022  Patient Name:  Collin Mathew  : 1975  MRN: [id number]  CSN: [id number]    Referring Practitioner Bao Pretty MD   Diagnosis Dizziness and giddiness [R42]    Treatment Diagnosis Positional vertigo with difficulty with position changes looking to left, bending forward, leaning back into recliner chair. Unsteady gait. Date of Evaluation 22   Additional Pertinent History Chronic diastolic heart failure, CHF, Conde Syndrome, lymphedema, Schizophrenia, history of abdominal surgery with debridement . Vertigo, frequent ear infections and sinus issues. Functional Outcome Measure Used Dizziness Handicap Inventory   Functional Outcome Score 58/100 (22)       Insurance: Primary: Payor: MEDICARE /  /  / ,   Secondary: MEDICAID [place]   Authorization Information: PRECERTIFICATION REQUIRED:  No  INSURANCE THERAPY BENEFIT:  Patient has unlimited visits based on medical necessity  Benefit will not cover maintenance or preventative treatment.   AQUATIC THERAPY COVERED:   Yes  MODALITIES COVERED:  Yes, with the exception of iontophoresis and hot packs/cold packs  TELEHEALTH COVERED: Yes  REFERENCE #: Visit # 1, 1/10 for progress note   Visits allowed Unlimited based on medical necessity   Recertification Date: August 3, 2022   Physician Follow-Up: As needed. Physician Orders: Vestibular therapy/eval/treat   History of Present Illness: Mother, Vera Casarez observed and participated in history with patient. Patient reports of vertigo with spinning that began 2 weeks ago. Notes that she has this spinning when she lays back into recliner chair and leans back, walking with tendency to lean backward and feels like she is going to fall. Patient reports of a fall 2 weeks ago with 4 walls within 24 hours due to spinning/vertigo. Dr. Varela Members recently evaluated her last Thursday and checked BP in supine, sitting and standing and noted she did not have orthostatic hypotension. Also, doctor felt that it was not heart related as patient communicated in session today. Patient reports that spinning comes and goes and last for a few seconds to minutes and when she is standing it can happen. Aram Hook observed and participated in history with patient. Patient reports of vertigo with spinning that began 2 weeks ago. Notes that she has this spinning when she lays back into recliner chair and leans back, walking with tendency to lean backward and feels like she is going to fall. Patient reports of a fall 2 weeks ago with 4 walls within 24 hours due to spinning/vertigo. Dr. Varela Members recently evaluated her last Thursday and checked BP in supine, sitting and standing and noted she did not have orthostatic hypotension. Also, doctor felt that it was not heart related as patient communicated in session today. Patient reports that spinning comes and goes and last for a few seconds to minutes and when she is standing it can happen. Social/Functional History and Current Status:  Medications and Allergies have been reviewed and are listed on Medical History Questionnaire.     Betsey Roland Nelson lives alone in an apartment  [address], with a level surface to enter. Task Previous Current   ADLs  Modified Independent Assistance Required cleaning, transportation, assistance with washing hair. 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 Right 2nd toe wound,    Neck ROM Right rotation WNLS, left rotation noted dizziness/spinning with limited to 40 degrees. Vertebral Artery Testing Negative/normal.    Visual Field normal   Oculomotor/VOR Saccades note nystagmus to the left, smooth pursuit nystagmus to left. VOR not tolerated due to gaurding with head movement to the left with reported dizziness. Balance Dependent on 4 ww for ambulation, unable to complete Romberg due to increased sway and widened base of support. Gait Ambulates with 4 ww, forward flexed, knee hyperextension, limited endurance with shortness of breath Stopped within 40 feet walking back to clinic   Special Testing BPPV + left modified  [place] Hallpike sidelying, treated with modified Epley maneuver/Canalith repositioning maneuver. Rachel Salekendrick Hearing/Ear Pain Mild hearing loss, no ear pain.     Blood pressure 111/78, pulse 101 bpm sitting, 104/73, 108 bpm standing  TREATMENT   Precautions: Fall risk, Conde syndrome, vertigo, 4 ww ambulator   Pain: 0/10    \"X in shaded column indicates activity completed today    *\" next to exercise/intervention indicates progression   Modalities Parameters/  Location  Notes                     Manual Therapy Time/Technique  Notes                     Exercise/Intervention   Notes   [place] Hallpike modified into sidelying position: left positive    x No nystagmus noted but patient had complaints of vertigo symptoms of spinning that lasted less than 10 seconds. Canalith Repositioning Maneuver (CRP) completed  Completed 2x  x                                                                     Specific Interventions Next Treatment: BPPV reassessment. Needs further assessment for right Bree [place] once left cleared. Roll test as well. Would benefit from x1 viewing exercises gaze stabilization following correction of BPPV. Activity/Treatment Tolerance:  []  Patient tolerated treatment well  []  Patient limited by fatigue  []  Patient limited by pain   []  Patient limited by medical complications  [x]  Other: vertigo  Assessment: Patient referred to PT for vertigo symptoms. Patient has the symptoms of spinning when looking up, down and turning head to left, when reclining back in recliner chair. Evaluated today without nystagmus reported but patient reported of spinning and anxious due to the symptoms during testing. So treated with canalith repositioning maneuver (CRP) due to positive left modified Bree Samantha. Will reassess in 1 week the symptoms and treat again if indicated. Progress to x1 vieweing ex once BPPV cleared. Body Structures/Functions/Activity Limitations:edema, impaired activity tolerance, impaired balance, impaired ROM, abnormal posture and vestibular/BPPV  Prognosis: fair    GOALS:  Patient Goal: To have no spinning with turning neck to left, leaning back in recliner chair and bending forward. Short Term Goals:  Time Frame: 4 weeks  1. Dizziness Handicap Inventory 58/100 to 30/100  2. Note negative testing with Bree Singh right/left with patient able to tolerated position changes leaning back in recliner chair, looking up and turning head to the left.   3. Patient to demonstrate no nystagmus with saccades and smooth pursuit with tracking to the left. Long Term Goals:  Time Frame: 8 weeks  1. Dizziness Handicap Inventory 30/100 to 10/100      Patient Education:   [x]  HEP/Education Completed: Plan of Care, Goals, Issued HEP for 24 hour restrictions not to lay flat in bed/recliner chair, lay at 45 degree angle, avoid positions that trigger these symptoms.  [employer] Access Code:  []  No new Education completed  []  Reviewed Prior HEP      []  Patient verbalized and/or demonstrated understanding of education provided. []  Patient unable to verbalize and/or demonstrate understanding of education provided. Will continue education. [x]  Barriers to learning: mother assists with reminding patient of restrictions and assist with subjective portion. PLAN:  Treatment Recommendations: Vestibular Rehabilitation    [x]  Plan of care initiated. Plan to see patient 1-2 times per week for 8 weeks to address the treatment planned outlined above.   []  Continue with current plan of care  []  Modify plan of care as follows:    []  Hold pending physician visit  []  Discharge    Time In 0705   Time Out 0755   Timed Code Minutes: 15 min   Total Treatment Time: 50 min       Electronically Signed by: Jeni Greenwood, PT

## 2022-06-17 ENCOUNTER — HOSPITAL ENCOUNTER (OUTPATIENT)
Dept: PHYSICAL THERAPY | Age: 47
Setting detail: THERAPIES SERIES
Discharge: HOME | End: 2022-06-17
Payer: MEDICARE

## 2022-06-17 PROCEDURE — 97110 THERAPEUTIC EXERCISES: CPT

## 2022-06-17 PROCEDURE — 95992 CANALITH REPOSITIONING PROC: CPT

## 2022-06-17 NOTE — PROGRESS NOTES
Misael Cosme 62  PHYSICAL THERAPY  [] VESTIBULAR EVALUATION  [x] DAILY NOTE [] PROGRESS NOTE [] DISCHARGE NOTE    [x] OUTPATIENT [facility] - [place]   [] Aurea [address]    [] [address] [facility]   [] Gema Doddlauren    Date: 2022  Patient Name:  Ryan Mcpherson  : 1975  MRN: [id number]  CSN: [id number]    Referring Practitioner Sunny Driver MD   Diagnosis Dizziness and giddiness [R42]    Treatment Diagnosis Positional vertigo with difficulty with position changes looking to left, bending forward, leaning back into recliner chair. Unsteady gait. Date of Evaluation 22   Additional Pertinent History Chronic diastolic heart failure, CHF, Conde Syndrome, lymphedema, Schizophrenia, history of abdominal surgery with debridement . Vertigo, frequent ear infections and sinus issues. Functional Outcome Measure Used Dizziness Handicap Inventory   Functional Outcome Score 58/100 (22)       Insurance: Primary: Payor: MEDICARE /  /  / ,   Secondary: MEDICAID [place]   Authorization Information: PRECERTIFICATION REQUIRED:  No  INSURANCE THERAPY BENEFIT:  Patient has unlimited visits based on medical necessity  Benefit will not cover maintenance or preventative treatment. AQUATIC THERAPY COVERED:   Yes  MODALITIES COVERED:  Yes, with the exception of iontophoresis and hot packs/cold packs  TELEHEALTH COVERED: Yes  REFERENCE #: Visit # 2, 2/10 for progress note   Visits allowed Unlimited based on medical necessity   Recertification Date: August 3, 2022   Physician Follow-Up: As needed. Physician Orders: Vestibular therapy/eval/treat   History of Present Illness: Mother, [facility] observed and participated in history with patient. Patient reports of vertigo with spinning that began 2 weeks ago. Notes that she has this spinning when she lays back into recliner chair and leans back, walking with tendency to lean backward and feels like she is going to fall. Patient reports of a fall 2 weeks ago with 4 walls within 24 hours due to spinning/vertigo. Dr. Corinne Fortune recently evaluated her last Thursday and checked BP in supine, sitting and standing and noted she did not have orthostatic hypotension. Also, doctor felt that it was not heart related as patient communicated in session today. Patient reports that spinning comes and goes and last for a few seconds to minutes and when she is standing it can happen. SUBJECTIVE: Valentino Huh last week at a place called, \"[employer]\" during a stand to sit transfer. \"I was so dizzy, that everything started spinning around. I missed the chair because the dizziness happened when I was standing. I was walking backwards to go to the chair. \"  Also, fell in her apartment that same day once returning home. C/o spinning feeling while in her kitchen. \"  States since her prior session, she still has had vertigo and no change per frequency or intensity. TREATMENT   Precautions: Fall risk, Conde syndrome, vertigo, 4 ww ambulator   Pain: 0/10    \"X in shaded column indicates activity completed today    *\" next to exercise/intervention indicates progression   Modalities Parameters/  Location  Notes                     Manual Therapy Time/Technique  Notes                     Exercise/Intervention   Notes   [place] Hallpike modified into sidelying position: left positive    x No nystagmus noted but patient had complaints of vertigo symptoms of spinning that lasted 30 seconds. And then after 10-20 seconds of spinning subsided: \"Oh, I am starting to feel dizzy again. \"  States room is spinning. Canalith Repositioning Maneuver (CRP) completed  Completed 2x      Loaded Left [place]-Hallpike (with 1 helper, and placed 2 pillows under her back)   x Nystagmus observed, but the magnitude was too slight to ascertain. Spinning sensation lasted more than 1 minute albeit intermittent, but after 1 minute definitive description of room spinning.    Left Liberatory Maneuver (1 helper)   x First part was done in less then or equl to 1.5 seconds. Produced the spinning which lasted 2 minutes. Position held for 2 minutes afterwards. Then Second part was slower per patient anxiety, and table too close to wall, and 2nd part took approximately 4 seconds instead of the ideal 1.5 seconds. Symptoms reproduced. Position held for 3 minutes. No problems returning to seated afterwards. Transfer safety: touch posterior BLE against seat prior to sitting down. Approach chair in manner to help refrain from excessive backwards walking. During course of session  x                                                Specific Interventions Next Treatment: BPPV reassessment. Needs further assessment for right Bree Singh once left cleared. Roll test as well. Would benefit from x1 viewing exercises gaze stabilization following correction of BPPV. Activity/Treatment Tolerance:  []  Patient tolerated treatment well  []  Patient limited by fatigue  []  Patient limited by pain   []  Patient limited by medical complications  [x]  Other: vertigo    Assessment: Patient followed all instructions well and communicated her symptoms regularly throughout the session. We emphasized transfer safety techniques. Liberatory Maneuver worked well with 2 therapists helping. Table is close to wall, and so if this maneuver is done again we can move table more towards center of the room for better space. First step of maneuver went well, and second step was more challenging for patient per technique and ideal 1.5 second duration of the motion. GOALS:  Patient Goal: To have no spinning with turning neck to left, leaning back in recliner chair and bending forward. Short Term Goals:  Time Frame: 4 weeks  1. Dizziness Handicap Inventory 58/100 to 30/100  2.  Note negative testing with Bree Singh right/left with patient able to tolerated position changes leaning back in recliner chair, looking up and turning head to the left. 3. Patient to demonstrate no nystagmus with saccades and smooth pursuit with tracking to the left. Long Term Goals:  Time Frame: 8 weeks  1. Dizziness Handicap Inventory 30/100 to 10/100      Patient Education:   [x]  HEP/Education Completed: Plan of Care, Goals, Issued HEP for 24 hour restrictions not to lay flat in bed/recliner chair, lay at 45 degree angle, avoid positions that trigger these symptoms.  [employer] Access Code:  []  No new Education completed  [x]  Reviewed Prior HEP      []  Patient verbalized and/or demonstrated understanding of education provided. []  Patient unable to verbalize and/or demonstrate understanding of education provided. Will continue education. []  Barriers to learning: mother assists with reminding patient of restrictions and assist with subjective portion. PLAN:  Treatment Recommendations: Vestibular Rehabilitation    []  Plan of care initiated. Plan to see patient 1-2 times per week for 8 weeks to address the treatment planned outlined above.   [x]  Continue with current plan of care  []  Modify plan of care as follows:    []  Hold pending physician visit  []  Discharge    Time In 0732   Time Out 0813   Timed Code Minutes: 25   Total Treatment Time: 39       Electronically Signed by: Roro Nj, PT

## 2022-06-24 ENCOUNTER — HOSPITAL ENCOUNTER (OUTPATIENT)
Dept: PHYSICAL THERAPY | Age: 47
Setting detail: THERAPIES SERIES
Discharge: HOME | End: 2022-06-24
Payer: MEDICARE

## 2022-06-24 PROCEDURE — 95992 CANALITH REPOSITIONING PROC: CPT

## 2022-06-24 NOTE — PROGRESS NOTES
Misael [facility] 62  PHYSICAL THERAPY  [] VESTIBULAR EVALUATION  [x] DAILY NOTE [] PROGRESS NOTE [] DISCHARGE NOTE    [x] OUTPATIENT REHABILITATION CENTER [facility]   [] [place] [address]    [] [facility]   [] Pati Amabil    Date: 2022  Patient Name:  Dinorah Jarvis  : 1975  MRN: [id number]  CSN: [id number]    Referring Practitioner Lilia Patel MD   Diagnosis Dizziness and giddiness [R42]    Treatment Diagnosis Positional vertigo with difficulty with position changes looking to left, bending forward, leaning back into recliner chair. Unsteady gait. Date of Evaluation 22   Additional Pertinent History Chronic diastolic heart failure, CHF, Conde Syndrome, lymphedema, Schizophrenia, history of abdominal surgery with debridement . Vertigo, frequent ear infections and sinus issues. Functional Outcome Measure Used Dizziness Handicap Inventory   Functional Outcome Score 58/100 (22)       Insurance: Primary: Payor: MEDICARE /  /  / ,   Secondary: MEDICAID [place]   Authorization Information: PRECERTIFICATION REQUIRED:  No  INSURANCE THERAPY BENEFIT:  Patient has unlimited visits based on medical necessity  Benefit will not cover maintenance or preventative treatment. AQUATIC THERAPY COVERED:   Yes  MODALITIES COVERED:  Yes, with the exception of iontophoresis and hot packs/cold packs  TELEHEALTH COVERED: Yes  REFERENCE #: Visit # 4, 4/10 for progress note   Visits allowed Unlimited based on medical necessity   Recertification Date: August 3, 2022   Physician Follow-Up: As needed. Physician Orders: Vestibular therapy/eval/treat   History of Present Illness: Mother, Atiya Fernandez observed and participated in history with patient. Patient reports of vertigo with spinning that began 2 weeks ago. Notes that she has this spinning when she lays back into recliner chair and leans back, walking with tendency to lean backward and feels like she is going to fall. Patient reports of a fall 2 weeks ago with 4 walls within 24 hours due to spinning/vertigo. Dr. Virgen Wolf recently evaluated her last Thursday and checked BP in supine, sitting and standing and noted she did not have orthostatic hypotension. Also, doctor felt that it was not heart related as patient communicated in session today. Patient reports that spinning comes and goes and last for a few seconds to minutes and when she is standing it can happen. SUBJECTIVE: Patient reports she is a little bit better with less episodes of dizziness. Mother, Amrajit Shah observed sessions today. States that her daughter has had no falls. Noting that when she looks down or up she still has the symptoms of spinning/dizziness. TREATMENT   Precautions: Fall risk, Conde syndrome, vertigo, 4 ww ambulator   Pain: 0/10    \"X in shaded column indicates activity completed today    *\" next to exercise/intervention indicates progression   Modalities Parameters/  Location  Notes                     Manual Therapy Time/Technique  Notes                     Exercise/Intervention   Notes   [place] Hallpike modified into sidelying position: left positive    x No nystagmus noted but patient had complaints of vertigo symptoms of spinning that lasted 10  seconds. And then after 30 seconds of spinning subsided:     Canalith Repositioning Maneuver (CRP) completed  Completed 1x  x    Loaded Left [place]-Hallpike (with 1 helper, and placed 2 pillows under her back)    Nystagmus observed, but the magnitude was too slight to ascertain. Spinning sensation lasted more than 1 minute albeit intermittent, but after 1 minute definitive description of room spinning. Left Liberatory Maneuver (1 helper)    First part was done in less then or equl to 1.5 seconds. Produced the spinning which lasted 2 minutes. Position held for 2 minutes afterwards.   Then Second part was slower per patient anxiety, and table too close to wall, and 2nd part took approximately 4 seconds instead of the ideal 1.5 seconds. Symptoms reproduced. Position held for 3 minutes. No problems returning to seated afterwards. Transfer safety: touch posterior BLE against seat prior to sitting down. Approach chair in manner to help refrain from excessive backwards walking. During course of session                                                  Specific Interventions Next Treatment: BPPV reassessment. Needs further assessment for right Dinorah Pretty once left cleared. Roll test as well. Would benefit from x1 viewing exercises gaze stabilization following correction of BPPV. Activity/Treatment Tolerance:  []  Patient tolerated treatment well  []  Patient limited by fatigue  []  Patient limited by pain   []  Patient limited by medical complications  [x]  Other: vertigo    Assessment: Patient followed all instructions well and communicated her symptoms regularly throughout the session. Continues to have reports of spinnning with head turns to left, looking upward and downward. Dinorah Pretty left positive reports of spinning but no nystagmus noted. Canalith Repositioning Maneuver completed 1x. Noting minimal changes with complaints of spinning. Continue to note no nystagmus but being treated with canalith repositioning. Will consult Juan Giraldo regarding this vestibular case and get another opinion as far as patient's symptoms of spinning if true BPPV. GOALS:  Patient Goal: To have no spinning with turning neck to left, leaning back in recliner chair and bending forward. Short Term Goals:  Time Frame: 4 weeks  1. Dizziness Handicap Inventory 58/100 to 30/100  2. Note negative testing with Dinorah Pretty right/left with patient able to tolerated position changes leaning back in recliner chair, looking up and turning head to the left. 3. Patient to demonstrate no nystagmus with saccades and smooth pursuit with tracking to the left. Long Term Goals:  Time Frame: 8 weeks  1.  Dizziness Handicap Inventory 30/100 to 10/100      Patient Education:   [x]  HEP/Education Completed: Plan of Care, Goals, Issued HEP for 24 hour restrictions not to lay flat in bed/recliner chair, lay at 45 degree angle, avoid positions that trigger these symptoms.  [employer] Access Code:  []  No new Education completed  [x]  Reviewed Prior HEP      []  Patient verbalized and/or demonstrated understanding of education provided. []  Patient unable to verbalize and/or demonstrate understanding of education provided. Will continue education. []  Barriers to learning: mother assists with reminding patient of restrictions and assist with subjective portion. PLAN:  Treatment Recommendations: Vestibular Rehabilitation    []  Plan of care initiated. Plan to see patient 1-2 times per week for 8 weeks to address the treatment planned outlined above.   [x]  Continue with current plan of care  []  Modify plan of care as follows:    []  Hold pending physician visit  []  Discharge    Time In 0807   Time Out 0827   Timed Code Minutes: 15   Total Treatment Time: 27       Electronically Signed by: Gurmeet Mckeon, PT

## 2022-07-01 ENCOUNTER — HOSPITAL ENCOUNTER (OUTPATIENT)
Dept: PHYSICAL THERAPY | Age: 47
Setting detail: THERAPIES SERIES
Discharge: HOME | End: 2022-07-01
Payer: MEDICARE

## 2022-07-01 PROCEDURE — 95992 CANALITH REPOSITIONING PROC: CPT

## 2022-07-01 NOTE — PROGRESS NOTES
Misael Cosme 62  PHYSICAL THERAPY  [] VESTIBULAR EVALUATION  [x] DAILY NOTE [] PROGRESS NOTE [] DISCHARGE NOTE    [x] [facility] [place]   [] [address]    [] [address] [facility]   [] Jalil Wallace    Date: 2022  Patient Name:  Ana Maria Pool  : 1975  MRN: [id number]  CSN: [id number]    Referring Practitioner Alejandro Vega MD   Diagnosis Dizziness and giddiness [R42]    Treatment Diagnosis Positional vertigo with difficulty with position changes looking to left, bending forward, leaning back into recliner chair. Unsteady gait. Date of Evaluation 22   Additional Pertinent History Chronic diastolic heart failure, CHF, Conde Syndrome, lymphedema, Schizophrenia, history of abdominal surgery with debridement . Vertigo, frequent ear infections and sinus issues. Functional Outcome Measure Used Dizziness Handicap Inventory   Functional Outcome Score 58/100 (22)       Insurance: Primary: Payor: MEDICARE /  /  / ,   Secondary: MEDICAID [place]   Authorization Information: PRECERTIFICATION REQUIRED:  No  INSURANCE THERAPY BENEFIT:  Patient has unlimited visits based on medical necessity  Benefit will not cover maintenance or preventative treatment. AQUATIC THERAPY COVERED:   Yes  MODALITIES COVERED:  Yes, with the exception of iontophoresis and hot packs/cold packs  TELEHEALTH COVERED: Yes  REFERENCE #: Visit # 5, 5/10 for progress note   Visits allowed Unlimited based on medical necessity   Recertification Date: August 3, 2022   Physician Follow-Up: As needed. Physician Orders: Vestibular therapy/eval/treat   History of Present Illness: Mother, Lisa Celaya observed and participated in history with patient. Patient reports of vertigo with spinning that began 2 weeks ago. Notes that she has this spinning when she lays back into recliner chair and leans back, walking with tendency to lean backward and feels like she is going to fall.  Patient reports of a fall 2 weeks ago with 4 walls within 24 hours due to spinning/vertigo. Dr. Lilliana Stanley recently evaluated her last Thursday and checked BP in supine, sitting and standing and noted she did not have orthostatic hypotension. Also, doctor felt that it was not heart related as patient communicated in session today. Patient reports that spinning comes and goes and last for a few seconds to minutes and when she is standing it can happen. SUBJECTIVE: Patient reports that she had to near falls this week when getting out of chair sit<>stand. Notes \"everything spins around\" Noted episode this morning when leaning back into chair. Objective: See BP below in grid. Oculomotor exam: smooth pursuit abnormal horizontally/vertically, saccades: abnormal bilaterally Noting difficulty with eyes following together with tracking. (Patient also has history of weakness of eye muscle reported specifically right eye. More noticeable when her glasses are off), head thrust normal      TREATMENT   Precautions: Fall risk, Conde syndrome, vertigo, 4 ww ambulator   Pain: 0/10    \"X in shaded column indicates activity completed today    *\" next to exercise/intervention indicates progression   Modalities Parameters/  Location  Notes                     Manual Therapy Time/Technique  Notes                     Exercise/Intervention   Notes   P[place] Hallpike modified into sidelying position: left positive     No nystagmus noted but patient had complaints of vertigo symptoms of spinning that lasted 10  seconds. And then after 30 seconds of spinning subsided:     Canalith Repositioning Maneuver (CRP) completed  Completed 1x      Loaded Left [place]-Hallpike (with 1 helper, and placed 2 pillows under her back)    Nystagmus observed, but the magnitude was too slight to ascertain. Spinning sensation lasted more than 1 minute albeit intermittent, but after 1 minute definitive description of room spinning.    Left Liberatory Maneuver (1 helper) tracking to the left. Long Term Goals:  Time Frame: 8 weeks  1. Dizziness Handicap Inventory 30/100 to 10/100      Patient Education:   [x]  HEP/Education Completed: Plan of Care, Goals, Issued HEP for 24 hour restrictions not to lay flat in bed/recliner chair, lay at 45 degree angle, avoid positions that trigger these symptoms.  [email or web address] Access Code:  []  No new Education completed  [x]  Reviewed Prior HEP      []  Patient verbalized and/or demonstrated understanding of education provided. []  Patient unable to verbalize and/or demonstrate understanding of education provided. Will continue education. []  Barriers to learning: mother assists with reminding patient of restrictions and assist with subjective portion. PLAN:  Treatment Recommendations: Vestibular Rehabilitation    []  Plan of care initiated. Plan to see patient 1-2 times per week for 8 weeks to address the treatment planned outlined above.   [x]  Continue with current plan of care  []  Modify plan of care as follows:    []  Hold pending physician visit  []  Discharge    Time In 0800   Time Out 0845   Timed Code Minutes: 15   Total Treatment Time: 45       Electronically Signed by: Henry Villalba, PT

## 2022-07-08 ENCOUNTER — HOSPITAL ENCOUNTER (OUTPATIENT)
Dept: PHYSICAL THERAPY | Age: 47
Setting detail: THERAPIES SERIES
Discharge: HOME | End: 2022-07-08
Payer: MEDICARE

## 2022-07-08 PROCEDURE — 97112 NEUROMUSCULAR REEDUCATION: CPT

## 2022-07-08 NOTE — PROGRESS NOTES
Misael Cosme 62  PHYSICAL THERAPY  [] VESTIBULAR EVALUATION  [x] DAILY NOTE [] PROGRESS NOTE [] DISCHARGE NOTE    [x] [facility] [place]   [] [address]    [] [address] [facility] [facility] [address]   [] Michial Bending    Date: 2022  Patient Name:  Nitin Palacios  : 1975  MRN: [id number]  CSN: [id number]    Referring Practitioner Juan John MD   Diagnosis Dizziness and giddiness [R42]    Treatment Diagnosis Positional vertigo with difficulty with position changes looking to left, bending forward, leaning back into recliner chair. Unsteady gait. Date of Evaluation 22   Additional Pertinent History Chronic diastolic heart failure, CHF, Conde Syndrome, lymphedema, Schizophrenia, history of abdominal surgery with debridement . Vertigo, frequent ear infections and sinus issues. Functional Outcome Measure Used Dizziness Handicap Inventory   Functional Outcome Score 58/100 (22)       Insurance: Primary: Payor: MEDICARE /  /  / ,   Secondary: MEDICA[place]   Authorization Information: PRECERTIFICATION REQUIRED:  No  INSURANCE THERAPY BENEFIT:  Patient has unlimited visits based on medical necessity  Benefit will not cover maintenance or preventative treatment. AQUATIC THERAPY COVERED:   Yes  MODALITIES COVERED:  Yes, with the exception of iontophoresis and hot packs/cold packs  TELEHEALTH COVERED: Yes  REFERENCE #: Visit # 6, 6/10 for progress note   Visits allowed Unlimited based on medical necessity   Recertification Date: August 3, 2022   Physician Follow-Up: As needed. Physician Orders: Vestibular therapy/eval/treat   History of Present Illness: Mother, Poonam Kim observed and participated in history with patient. Patient reports of vertigo with spinning that began 2 weeks ago. Notes that she has this spinning when she lays back into recliner chair and leans back, walking with tendency to lean backward and feels like she is going to fall.  Patient reports of a fall 2 weeks ago with 4 walls within 24 hours due to spinning/vertigo. Dr. Allegra Rod recently evaluated her last Thursday and checked BP in supine, sitting and standing and noted she did not have orthostatic hypotension. Also, doctor felt that it was not heart related as patient communicated in session today. Patient reports that spinning comes and goes and last for a few seconds to minutes and when she is standing it can happen. SUBJECTIVE: Patient reports of only 2 episodes in which she was looking up into her closet earlier in the week. Has been completing daily activities without the vertigo. TREATMENT   Precautions: Fall risk, Conde syndrome, vertigo, 4 ww ambulator   Pain: 0/10    \"X in shaded column indicates activity completed today    *\" next to exercise/intervention indicates progression   Modalities Parameters/  Location  Notes                     Manual Therapy Time/Technique  Notes                     Exercise/Intervention   Notes   [place] Hallpike modified into sidelying position: left positive     No nystagmus noted but patient had complaints of vertigo symptoms of spinning that lasted 10  seconds. And then after 30 seconds of spinning subsided:     Canalith Repositioning Maneuver (CRP) completed  Completed 1x      Loaded Left Wilson-Hallpike (with 1 helper, and placed 2 pillows under her back)    Nystagmus observed, but the magnitude was too slight to ascertain. Spinning sensation lasted more than 1 minute albeit intermittent, but after 1 minute definitive description of room spinning. Left Liberatory Maneuver (1 helper)    First part was done in less then or equl to 1.5 seconds. Produced the spinning which lasted 2 minutes. Position held for 2 minutes afterwards. Then Second part was slower per patient anxiety, and table too close to wall, and 2nd part took approximately 4 seconds instead of the ideal 1.5 seconds. Symptoms reproduced. Position held for 3 minutes.   No problems returning to seated afterwards. Transfer safety: touch posterior BLE against seat prior to sitting down. Approach chair in manner to help refrain from excessive backwards walking. During course of session             right [place] Hallpike:  Negative testing  x No nystagmus noted by therapist or spinning reported by patient    Canalith Repositioning Maneuver for + right Byron Hallpike                     BP seated 109/74, standing 108/83         Specific Interventions Next Treatment: BPPV reassessment. Needs further assessment for right Veola Lyn once left cleared. Roll test as well. Would benefit from x1 viewing exercises gaze stabilization following correction of BPPV. Activity/Treatment Tolerance:  []  Patient tolerated treatment well  []  Patient limited by fatigue  []  Patient limited by pain   []  Patient limited by medical complications  [x]  Other: vertigo    Assessment:  Patient had negative right Veola Lyn today. Canalith Repositioning not completed today. Reassess in 1 week. GOALS:  Patient Goal: To have no spinning with turning neck to left, leaning back in recliner chair and bending forward. Short Term Goals:  Time Frame: 4 weeks  1. Dizziness Handicap Inventory 58/100 to 30/100  2. Note negative testing with Veola Lyn right/left with patient able to tolerated position changes leaning back in recliner chair, looking up and turning head to the left. 3. Patient to demonstrate no nystagmus with saccades and smooth pursuit with tracking to the left. Long Term Goals:  Time Frame: 8 weeks  1. Dizziness Handicap Inventory 30/100 to 10/100      Patient Education:   [x]  HEP/Education Completed: Plan of Care, Goals, Issued HEP for 24 hour restrictions not to lay flat in bed/recliner chair, lay at 45 degree angle, avoid positions that trigger these symptoms.     [employer] Access Code:  []  No new Education completed  [x]  Reviewed Prior HEP      []  Patient verbalized and/or demonstrated understanding of education provided. []  Patient unable to verbalize and/or demonstrate understanding of education provided. Will continue education. []  Barriers to learning: mother assists with reminding patient of restrictions and assist with subjective portion. PLAN:  Treatment Recommendations: Vestibular Rehabilitation    []  Plan of care initiated. Plan to see patient 1-2 times per week for 8 weeks to address the treatment planned outlined above.   [x]  Continue with current plan of care  []  Modify plan of care as follows:    []  Hold pending physician visit  []  Discharge    Time In 0846   Time Out 0901   Timed Code Minutes: 0   Total Treatment Time: 15       Electronically Signed by: Marquis Manjarrez PT

## 2022-07-13 ENCOUNTER — HOSPITAL ENCOUNTER (OUTPATIENT)
Dept: WOUND CARE | Age: 47
Discharge: HOME | End: 2022-07-13
Payer: MEDICARE

## 2022-07-13 VITALS
TEMPERATURE: 97.8 F | OXYGEN SATURATION: 97 % | DIASTOLIC BLOOD PRESSURE: 70 MMHG | HEART RATE: 112 BPM | WEIGHT: 293 LBS | BODY MASS INDEX: 47.09 KG/M2 | RESPIRATION RATE: 20 BRPM | HEIGHT: 66 IN | SYSTOLIC BLOOD PRESSURE: 121 MMHG

## 2022-07-13 PROCEDURE — 99213 OFFICE O/P EST LOW 20 MIN: CPT

## 2022-07-13 ASSESSMENT — PAIN SCALES - GENERAL: PAINLEVEL_OUTOF10: 8

## 2022-07-13 ASSESSMENT — PAIN DESCRIPTION - LOCATION: LOCATION: ABDOMEN

## 2022-07-13 ASSESSMENT — PAIN DESCRIPTION - FREQUENCY: FREQUENCY: CONTINUOUS

## 2022-07-13 NOTE — PROGRESS NOTES
Ivan Cuenca R.N. has reviewed and agrees with Brionna Townsend LPN's shift   Assessment.     Coral Orellana RN  7/13/2022

## 2022-07-13 NOTE — DISCHARGE INSTRUCTIONS
Visit Discharge/Physician Orders:  - Continue working on weight loss, work on lower sodium meals, try to eat more fresh fruit and veggies  - Eat less fast food, canned, and processed meals.  - Keep working on losing weight, about a pound a week       Wound Location: Abdomen     Home Health: [facility]      Dressing orders:      OK to shower take shower before dressing change. Clean wound after shower then re-dress.      Abdomen wounds- Cleanse wound with normal saline or wound cleanser and gauze. Pat dry with clean gauze. - okay to use zinc oxide around wounds if needed     ** Please use skin prep to bridger wound to protect fragile skin and help dressing stick.      Upper Abdominal wound and Lower abdominal wounds-  Apply optilock or ABD pads to wounds (okay to cut lengthwise). Change daily     Follow up visit: 8 weeks on 09/14/202 at 10:30     Keep next scheduled appointment. Please give 24 hour notice if unable to keep appointment. [phone]     If you experience any of the following, please call the Wound Care Service during business hours: Monday through Friday 8:00 am - 4:30 pm  ([phone]).             *Increase in pain               *Temperature over 101              *Increase in drainage from your wound or a foul odor              *Uncontrolled swelling              *Need for compression bandage changes due to slippage, breakthrough drainage     If you need medical attention outside of business hours, please contact your Primary Care Doctor or go to the nearest emergency room.          Restore      Close      Cancel

## 2022-07-13 NOTE — PLAN OF CARE
Problem: Wound:  Goal: Will show signs of wound healing; wound closure and no evidence of infection  Description: Will show signs of wound healing; wound closure and no evidence of infection  Outcome: Progressing   Patient seen for abdominal wounds. Wounds shows signs of proper closure and healing. No s/s of infection noted. Follow up in 8 weeks. See AVS for order changes. Care plan reviewed with patient and mother. Patient and mother verbalize understanding of the plan of care and contribute to goal setting.

## 2022-07-13 NOTE — PROGRESS NOTES
[facility]          Progress Note and Procedure Note      Corrie Al  MEDICAL RECORD NUMBER:  [id number]  AGE: 41 y.o. GENDER: female  : 1975  EPISODE DATE:  2022    Subjective:     SUBJECTIVE   Non healing abdominal wound. HISTORY OF PRESENT ILLNESS   She is a 42 yr old female seen for follow up visit she has history of non healing on the lower abdomen .she has hx of ADHD and schizophrenia. She had surgical debridement of the wound by general surgeon. She has non healing wound. Has obesity with a large pannicula contributing to the delayed wound healing. She has lost around 4 lbs.   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 Ly [facility]     TOE SURGERY  2019    Dr. Rolanda Campos         Family History   Problem Relation Age of Onset    Diabetes Father     Cancer Maternal Grandmother     Cancer Paternal Grandmother      SOCIAL HISTORY       Social History     Tobacco Use    Smoking status: Never Smoker    Smokeless tobacco: Never Used   Vaping Use    Vaping Use: Never used   Substance Use Topics    Alcohol use: No    Drug use: No     ALLERGIES         Allergies   Allergen Reactions    Latex Rash    Cat Hair Extract     Seasonal      MEDICATIONS         Current Outpatient Medications on File Prior to Encounter   Medication Sig Dispense Refill    midodrine (PROAMATINE) 5 MG tablet Take 1 tablet by mouth 3 times daily 90 tablet 5    ferrous sulfate (IRON 325) 325 (65 Fe) MG tablet Take 325 mg by mouth daily (with breakfast)      metoprolol succinate (TOPROL XL) 50 MG extended release tablet take 1 tablet by mouth once daily 90 tablet 3    furosemide (LASIX) 20 MG tablet ALTERNATE 1 TABLET EVERY OTHER DAY WITH 2 TABLETS EVERY OTHER  tablet 3    magnesium oxide (MAG-OX) 400 MG tablet Take 1 tablet by mouth daily 30 tablet 3    traZODone (DESYREL) 25 mg TABS Take 25 mg by mouth nightly      ibuprofen (ADVIL;MOTRIN) 800 MG tablet Take 1 tablet by mouth every 8 hours as needed for Pain (Patient not taking: Reported on 7/13/2022) 15 tablet 0    acetaminophen (TYLENOL) 500 MG tablet Take 1,000 mg by mouth every 6 hours as needed for Pain      Cholecalciferol (VITAMIN D3) 5000 units TABS Take 2,000 Units by mouth       MedroxyPROGESTERone Acetate (DEPO-PROVERA IM) Inject into the muscle      miconazole (MICOTIN) 2 % powder Apply topically 2 times to 3 times  daily. 45 g 1    Dicyclomine HCl (BENTYL PO)   Take 20 mg by mouth 2 times daily       QUEtiapine Fumarate (SEROQUEL PO) Take 400 mg by mouth 2 times daily.  atomoxetine (STRATTERA) 40 MG capsule Take 40 mg by mouth 2 times daily.  aripiprazole (ABILIFY) 10 MG tablet Take 15 mg by mouth 2 times daily 10 mg in the am and 15 mg in the evening      ACYCLOVIR 400 mg by Does not apply route three times a week        No current facility-administered medications on file prior to encounter. REVIEW OF SYSTEMS:     Constitutional: no fever, no night sweats, no fatigue. Head: no head ache , no head injury, no migranes. Eye: no blurring of vision, no double vision. Ears: no hearing difficulty, no tinnitus.   Mouth/throat: no ulceration, dental caries , dysphagia. Lungs: no cough, no shortness of breath, no wheeze  CVS: no palpitation, no chest pain, no shortness of breath  GI: no abdominal pain, no nausea , no vomiting, no constipation  FRANCOISE: no dysuria, frequency and urgency, no hematuria, no kidney stones  Musculoskeletal: she has lymphoedema  Endocrine: no polyuria, polydipsia, no cold or heat intolerance  Hematology: no anemia, no easy brusing or bleeding, no hx of clotting disorder  Dermatology: no skin rash, no eczema, no pruritis,         PHYSICAL EXAM      height is 5' 6\" (1.676 m) and weight is 329 lb 3.2 oz (149.3 kg) (abnormal). Her temperature is 97.8 °F (36.6 °C). Her blood pressure is 121/70 and her pulse is 112 (abnormal). Her respiration is 20 and oxygen saturation is 97%. Wt Readings from Last 3 Encounters:   07/13/22 (!) 329 lb 3.2 oz (149.3 kg)   06/02/22 (!) 329 lb 12.8 oz (149.6 kg)   05/16/22 (!) 320 lb (145.2 kg)          General Appearance  Awake, alert, oriented,  not  In acute distress  HEENT - normocephalic, atraumatic, pink conjunctiva,  anicteric sclera  Abdomen - has two open wound over the abdomen around an old scar tissue. The proximal wound is larger  Neurologic - oriented. Skin - No bruising or bleeding  Extremities -chronic leg edema,       Wound 10/13/21 Abdomen Left;Medial;Upper (Active)   Wound Image   01/26/22 1025   Wound Etiology Non-Healing Surgical 05/11/22 1003   Dressing Status Intact; Old drainage noted 05/11/22 1003   Wound Cleansed Cleansed with saline 05/11/22 1003   Dressing/Treatment Triad hydro/zinc oxide-based hydrophilic paste;ABD 36/21/76 1022   Offloading for Diabetic Foot Ulcers Offloading not required 05/11/22 1003   Wound Length (cm) 2.2 cm 07/13/22 1041   Wound Width (cm) 2.2 cm 07/13/22 1041   Wound Depth (cm) 0.05 cm 07/13/22 1041   Wound Surface Area (cm^2) 4.84 cm^2 07/13/22 1041   Change in Wound Size % (l*w) -1282.86 07/13/22 1041   Wound Volume (cm^3) 0.242 cm^3 07/13/22 1041   Wound Healing % -591 07/13/22 1041   Wound Assessment Granulation tissue;Pale granulation tissue;Slough 07/13/22 1041   Drainage Amount Large 07/13/22 1041   Drainage Description Serosanguinous; Yellow 07/13/22 1041   Odor None 07/13/22 1041   Gwen-wound Assessment Dry/flaky; Intact; Hypopigmented; Other (Comment) 07/13/22 1041   Margins Attached edges 07/13/22 1041   Wound Thickness Description not for Pressure Injury Full thickness 07/13/22 1041   Number of days: 272       Wound 03/09/22 Abdomen Proximal #2 (Active)   Wound Image   05/11/22 1003   Wound Etiology Non-Healing Surgical 07/13/22 1033   Dressing Status Intact; Old drainage noted 07/13/22 1033   Wound Cleansed Cleansed with saline 07/13/22 1033   Dressing/Treatment Triad hydro/zinc oxide-based hydrophilic paste;ABD 72/62/83 1022   Offloading for Diabetic Foot Ulcers Offloading not ordered 07/13/22 1033   Wound Length (cm) 2 cm 07/13/22 1033   Wound Width (cm) 18 cm 07/13/22 1033   Wound Depth (cm) 0.05 cm 07/13/22 1033   Wound Surface Area (cm^2) 36 cm^2 07/13/22 1033   Change in Wound Size % (l*w) -224.32 07/13/22 1033   Wound Volume (cm^3) 1.8 cm^3 07/13/22 1033   Wound Healing % -224 07/13/22 1033   Wound Assessment Hyper granulation tissue;Pale granulation tissue;Slough 07/13/22 1033   Drainage Amount Large 07/13/22 1033   Drainage Description Serosanguinous; Yellow 07/13/22 1033   Odor None 07/13/22 1033   Gwen-wound Assessment Dry/flaky; Intact; Other (Comment) 07/13/22 1033   Margins Attached edges 07/13/22 1033   Wound Thickness Description not for Pressure Injury Full thickness 07/13/22 1033   Number of days: 125       Wound 03/09/22 Abdomen Medial;Right #3 (Active)   Wound Image   05/11/22 1003   Wound Etiology Non-Healing Surgical 05/11/22 1003   Dressing Status Intact; New drainage noted 05/11/22 1003   Wound Cleansed Cleansed with saline 05/11/22 1003   Dressing/Treatment Other (comment) 05/11/22 1003   Offloading for Diabetic Foot Ulcers Offloading not required 05/11/22 1003   Wound Length (cm) 2.7 cm 07/13/22 1041   Wound Width (cm) 4 cm 07/13/22 1041   Wound Depth (cm) 0.05 cm 07/13/22 1041   Wound Surface Area (cm^2) 10.8 cm^2 07/13/22 1041   Change in Wound Size % (l*w) 32.92 07/13/22 1041   Wound Volume (cm^3) 0.54 cm^3 07/13/22 1041   Wound Healing % 33 07/13/22 1041   Wound Assessment Pale granulation tissue;Granulation tissue 07/13/22 1041   Drainage Amount Large 07/13/22 1041   Drainage Description Serosanguinous; Yellow 07/13/22 1041   Odor None 07/13/22 1041   Gwen-wound Assessment Hypopigmented; Intact;Dry/flaky; Other (Comment) 07/13/22 1041   Margins Attached edges 07/13/22 1041   Wound Thickness Description not for Pressure Injury Full thickness 07/13/22 1041   Number of days: 125         Assessment:   Post surgical abdominal wound: the wound is bigger and has gained more weight  Continue ABD  Follow up will be scheduled. Patient Active Problem List   Diagnosis Code    Keloid L91.0    MRSA (methicillin resistant staph aureus) culture positive Z22.322    Skin ulcer of abdominal wall ([facility] L98.499    POTS (postural orthostatic tachycardia syndrome) I49.8    Orthostatic dizziness- occasional- not worse R42    CHF (congestive heart failure) ([facility]) chronic diastolic X06.3    Morbid obesity ([facility]) E66.01    Bilateral leg edema =LYMPHEDEMA- ON f/U WITH LYMPHEDEMA CLINIC R60.0    Lymphedema of both lower extremities I89.0    SOB (shortness of breath) on exertion R06.02    Candidiasis, intertriginous B37.2    Dermatitis L30.9    Open abdominal wall wound S31.109A    History of fall Z91.81    Vertigo R42           Plan:     Patient examined and evaluated   Please see attached Discharge Instructions    Written patient dismissal instructions given to patient and signed by patient or POA.          Discharge Instructions       Visit Discharge/Physician Orders:  - Continue working on weight loss, work on lower sodium meals, try to eat more fresh fruit and veggies, eat less fast food, canned, and processed meals.  - Supplies ordered through [employer]. [phone].     Wound Location: Abdomen     Dressing orders:      OK to shower take shower before dressing change. Clean wound after shower then re-dress.      Abdomen wounds- Cleanse wound with normal saline or wound cleanser and gauze. Pat dry with clean gauze. Apply zinc oxide to surrounding skin. Apply silver alginate to wound. Cover with Silicone bordered foam. Change every other day. You can change more often, daily if needed according to drainage       Follow up visit:2 months  Keep next scheduled appointment. Please give 24 hour notice if unable to keep appointment. [phone]     If you experience any of the following, please call the Wound Care Service during business hours: Monday through Friday 8:00 am - 4:30 pm  ([phone]).             *Increase in pain               *Temperature over 101              *Increase in drainage from your wound or a foul odor              *Uncontrolled swelling              *Need for compression bandage changes due to slippage, breakthrough drainage     If you need medical attention outside of business hours, please contact your Primary Care Doctor or go to the nearest emergency room.         Electronically signed by Barb No MD on 7/13/2022 at 10:41 AM

## 2022-07-15 ENCOUNTER — HOSPITAL ENCOUNTER (OUTPATIENT)
Dept: PHYSICAL THERAPY | Age: 47
Setting detail: THERAPIES SERIES
Discharge: HOME | End: 2022-07-15
Payer: MEDICARE

## 2022-07-15 PROCEDURE — 97110 THERAPEUTIC EXERCISES: CPT

## 2022-07-15 NOTE — PROGRESS NOTES
[facility]  PHYSICAL THERAPY  [] VESTIBULAR EVALUATION  [x] DAILY NOTE [] PROGRESS NOTE [] DISCHARGE NOTE    [x] [facility] [place]   [] [address]    [] [facility]   [] Joel Willis    Date: 7/15/2022  Patient Name:  Cole Wolf  : 1975  MRN: [id number]  CSN: [id number]    Referring Practitioner Paul Brock MD   Diagnosis Dizziness and giddiness [R42]    Treatment Diagnosis Positional vertigo with difficulty with position changes looking to left, bending forward, leaning back into recliner chair. Unsteady gait. Date of Evaluation 22   Additional Pertinent History Chronic diastolic heart failure, CHF, Conde Syndrome, lymphedema, Schizophrenia, history of abdominal surgery with debridement . Vertigo, frequent ear infections and sinus issues. Functional Outcome Measure Used Dizziness Handicap Inventory   Functional Outcome Score 58/100 (22)       Insurance: Primary: Payor: MEDICARE /  /  / ,   Secondary: MEDICAID [place]   Authorization Information: PRECERTIFICATION REQUIRED:  No  INSURANCE THERAPY BENEFIT:  Patient has unlimited visits based on medical necessity  Benefit will not cover maintenance or preventative treatment. AQUATIC THERAPY COVERED:   Yes  MODALITIES COVERED:  Yes, with the exception of iontophoresis and hot packs/cold packs  TELEHEALTH COVERED: Yes  REFERENCE #: Visit # 7, 7/10 for progress note   Visits allowed Unlimited based on medical necessity   Recertification Date: August 3, 2022   Physician Follow-Up: As needed. Physician Orders: Vestibular therapy/eval/treat   History of Present Illness: Mother, Luis Slider observed and participated in history with patient. Patient reports of vertigo with spinning that began 2 weeks ago. Notes that she has this spinning when she lays back into recliner chair and leans back, walking with tendency to lean backward and feels like she is going to fall. Patient reports of a fall 2 weeks ago with 4 walls within 24 hours due to spinning/vertigo. Dr. Nasima Saldaña recently evaluated her last Thursday and checked BP in supine, sitting and standing and noted she did not have orthostatic hypotension. Also, doctor felt that it was not heart related as patient communicated in session today. Patient reports that spinning comes and goes and last for a few seconds to minutes and when she is standing it can happen. SUBJECTIVE: Patient reports that she had 1 episode this morning when she got out of bed she began spinning. She states that she did fall and was able to get her self back up using the commode to assist to standing. TREATMENT   Precautions: Fall risk, Conde syndrome, vertigo, 4 ww ambulator   Pain: 0/10    \"X in shaded column indicates activity completed today    *\" next to exercise/intervention indicates progression   Modalities Parameters/  Location  Notes                     Manual Therapy Time/Technique  Notes                     Exercise/Intervention   Notes   [place] Hallpike modified into sidelying position: left positive     No nystagmus noted but patient had complaints of vertigo symptoms of spinning that lasted 10  seconds. And then after 30 seconds of spinning subsided:     Canalith Repositioning Maneuver (CRP) completed  Completed 1x      Loaded Left [place]-Hallpike (with 1 helper, and placed 2 pillows under her back)    Nystagmus observed, but the magnitude was too slight to ascertain. Spinning sensation lasted more than 1 minute albeit intermittent, but after 1 minute definitive description of room spinning. Left Liberatory Maneuver (1 helper)    First part was done in less then or equl to 1.5 seconds. Produced the spinning which lasted 2 minutes. Position held for 2 minutes afterwards. Then Second part was slower per patient anxiety, and table too close to wall, and 2nd part took approximately 4 seconds instead of the ideal 1.5 seconds. Symptoms reproduced. Position held for 3 minutes. No problems returning to seated afterwards. Transfer safety: touch posterior BLE against seat prior to sitting down. Approach chair in manner to help refrain from excessive backwards walking. During course of session             right [place] Hallpike:     Left [place] Hallpike Negative testing  x No nystagmus noted by therapist or spinning reported by patient    Canalith Repositioning Maneuver for + right [place] Hallpike                     BP seated 116/78   Supine 102/66   Sitting  115/75    x      Specific Interventions Next Treatment: BPPV reassessment. Needs further assessment for right Sulma Shoulder once left cleared. Roll test as well. Would benefit from x1 viewing exercises gaze stabilization following correction of BPPV. Activity/Treatment Tolerance:  []  Patient tolerated treatment well  []  Patient limited by fatigue  []  Patient limited by pain   []  Patient limited by medical complications  [x]  Other: vertigo    Assessment:  Patient had negative right/left Sulma Shoulder today. Canalith Repositioning not completed today. Reassess in 1 week. Patient was perspiring prior and during session and complaining of feeling warm. BP taken in session and no concerns noted. Patient was able to complete Sulma Shoulder with complaints but no spinning noted. \"Head rush\" from position changes possibly with the testing. No vertigo or nystagmus noted. GOALS:  Patient Goal: To have no spinning with turning neck to left, leaning back in recliner chair and bending forward. Short Term Goals:  Time Frame: 4 weeks  1. Dizziness Handicap Inventory 58/100 to 30/100  2. Note negative testing with Sulma Shoulder right/left with patient able to tolerated position changes leaning back in recliner chair, looking up and turning head to the left. 3. Patient to demonstrate no nystagmus with saccades and smooth pursuit with tracking to the left.          Long Term Goals:  Time Frame:

## 2022-07-22 ENCOUNTER — HOSPITAL ENCOUNTER (OUTPATIENT)
Dept: PHYSICAL THERAPY | Age: 47
Setting detail: THERAPIES SERIES
Discharge: HOME | End: 2022-07-22
Payer: MEDICARE

## 2022-07-22 PROCEDURE — 97164 PT RE-EVAL EST PLAN CARE: CPT

## 2022-07-22 NOTE — DISCHARGE SUMMARY
Misael Cosme 62  PHYSICAL THERAPY  [] VESTIBULAR EVALUATION  [] DAILY NOTE [] PROGRESS NOTE [x] DISCHARGE NOTE    [x] OUTPATIENT REHABILITATION CENTER [facility]   [] [place] [address]    [] [facility]   [] Joshua Thakkar    Date: 2022  Patient Name:  Cleveland Porter  : 1975  MRN: [id number]  CSN: [id number]    Referring Practitioner Margarita Todd MD   Diagnosis Dizziness and giddiness [R42]    Treatment Diagnosis Positional vertigo with difficulty with position changes looking to left, bending forward, leaning back into recliner chair. Unsteady gait. Date of Evaluation 22   Additional Pertinent History Chronic diastolic heart failure, CHF, Conde Syndrome, lymphedema, Schizophrenia, history of abdominal surgery with debridement . Vertigo, frequent ear infections and sinus issues. Functional Outcome Measure Used Dizziness Handicap Inventory   Functional Outcome Score 58/100 (22)   14/100 (14/100) Discharge        Insurance: Primary: Payor: Ashkan Covarrubias /  /  / ,   Secondary: MEDICAID [place]   Authorization Information: PRECERTIFICATION REQUIRED:  No  INSURANCE THERAPY BENEFIT:  Patient has unlimited visits based on medical necessity  Benefit will not cover maintenance or preventative treatment. AQUATIC THERAPY COVERED:   Yes  MODALITIES COVERED:  Yes, with the exception of iontophoresis and hot packs/cold packs  TELEHEALTH COVERED: Yes  REFERENCE #: Visit # 8,  for Discharge   Visits allowed Unlimited based on medical necessity   Recertification Date: August 3, 2022   Physician Follow-Up: As needed. Physician Orders: Vestibular therapy/eval/treat   History of Present Illness: Mother, Marcello Schroeder observed and participated in history with patient. Patient reports of vertigo with spinning that began 2 weeks ago.  Notes that she has this spinning when she lays back into recliner chair and leans back, walking with tendency to lean backward and feels like she is going to fall. Patient reports of a fall 2 weeks ago with 4 walls within 24 hours due to spinning/vertigo. Dr. Dex Lewis recently evaluated her last Thursday and checked BP in supine, sitting and standing and noted she did not have orthostatic hypotension. Also, doctor felt that it was not heart related as patient communicated in session today. Patient reports that spinning comes and goes and last for a few seconds to minutes and when she is standing it can happen. SUBJECTIVE: Patient reports that she has had no spinning episodes. Notes occasional with bending forward and straightening back up. Mother reports that her daughter is having no problems with the spinning. She does feel comfortable traveling with her daughter now without fear of falling and having spinning sensations. She has always had problems with bending forward and straightening back up due to BP. Reassessment: Negative testing for BPPV. Negative roll test, negative right/left Sulma Shoulder. Only noted BP changes with flexing forward to upright, sit to stand at times. TREATMENT   Precautions: Fall risk, Conde syndrome, vertigo, 4 ww ambulator   Pain: 0/10    \"X in shaded column indicates activity completed today    *\" next to exercise/intervention indicates progression   Modalities Parameters/  Location  Notes                     Manual Therapy Time/Technique  Notes                     Exercise/Intervention   Notes   [place] Hallpike modified into sidelying position: left positive     No nystagmus noted but patient had complaints of vertigo symptoms of spinning that lasted 10  seconds. And then after 30 seconds of spinning subsided:     Canalith Repositioning Maneuver (CRP) completed  Completed 1x      Loaded Left [place]-Hallpike (with 1 helper, and placed 2 pillows under her back)    Nystagmus observed, but the magnitude was too slight to ascertain.   Spinning sensation lasted more than 1 minute albeit intermittent, but after 1 minute definitive description of room spinning. Left Liberatory Maneuver (1 helper)    First part was done in less then or equl to 1.5 seconds. Produced the spinning which lasted 2 minutes. Position held for 2 minutes afterwards. Then Second part was slower per patient anxiety, and table too close to wall, and 2nd part took approximately 4 seconds instead of the ideal 1.5 seconds. Symptoms reproduced. Position held for 3 minutes. No problems returning to seated afterwards. Transfer safety: touch posterior BLE against seat prior to sitting down. Approach chair in manner to help refrain from excessive backwards walking. During course of session             right [place] Hallpike:     Left [place] Hallpike      Roll test  Negative testing  X        X    X No nystagmus noted by therapist or spinning reported by patient    Canalith Repositioning Maneuver for + right [place] Hallpike                     BP seated 116/78   Supine 102/66   Sitting  115/75          Specific Interventions Next Treatment: BPPV reassessment. Needs further assessment for right Marylen Glatter once left cleared. Roll test as well. Would benefit from x1 viewing exercises gaze stabilization following correction of BPPV. Activity/Treatment Tolerance:  []  Patient tolerated treatment well  []  Patient limited by fatigue  []  Patient limited by pain   []  Patient limited by medical complications  [x]  Other: vertigo    Assessment:  Patient had negative right/left Marylen Glatter today. Canalith Repositioning not completed today. Roll test negative also. Will discharge patient at this time due to no longer having BPPV. She was treated for + left and +right [place] Hallpikes in previous sessions effecting anterior /posterior vestibular canals. Negative testing today. Only having dizziness with flexing forward at waist and returning to upright and with sit to stand at times due to BP. NO SPINNING/VERTIGO symptoms. Discharge from PT at this time. GOALS:  Patient Goal: To have no spinning with turning neck to left, leaning back in recliner chair and bending forward. Short Term Goals:  Time Frame: 4 weeks  1. Dizziness Handicap Inventory 58/100 to 30/100  GOAL MET 14/100 dizziness with BP changes sit to stand and flexion to upright but not BPPV  2. Note negative testing with Ivin Julian right/left with patient able to tolerated position changes leaning back in recliner chair, looking up and turning head to the left. GOAL MET negative testing for roll test, right/left Ivin Julian. 3. Patient to demonstrate no nystagmus with saccades and smooth pursuit with tracking to the left. GOAL MET but does have abnormal tracking of right eye  with smooth pursuit due to muscle weakness at right eye that she wears glasses for. Long Term Goals:  Time Frame: 8 weeks  1. Dizziness Handicap Inventory 30/100 to 10/100      Patient Education:   [x]  HEP/Education Completed: Plan of Care, Goals, no education other than to slow down with sit<>stand and when getting out of bed. Sit at edge of bed prior to getting up and stand for a short period of time before walking. .   [employer] Access Code:  []  No new Education completed  [x]  Reviewed Prior HEP      []  Patient verbalized and/or demonstrated understanding of education provided. []  Patient unable to verbalize and/or demonstrate understanding of education provided. Will continue education. []  Barriers to learning: mother assists with reminding patient of restrictions and assist with subjective portion. PLAN:  Treatment Recommendations: Vestibular Rehabilitation    []  Plan of care initiated. Plan to see patient 1-2 times per week for 8 weeks to address the treatment planned outlined above.   []  Continue with current plan of care  []  Modify plan of care as follows:    []  Hold pending physician visit  [x]  Discharge    Time In 1302   Time Out 1320   Timed Code Minutes: 0   Total Treatment Time: 18 Electronically Signed by: Mary Teresa, PT    x

## 2022-07-25 ENCOUNTER — HOSPITAL ENCOUNTER (EMERGENCY)
Age: 47
Discharge: HOME | End: 2022-07-25
Payer: MEDICARE

## 2022-07-25 VITALS
HEART RATE: 89 BPM | DIASTOLIC BLOOD PRESSURE: 88 MMHG | TEMPERATURE: 98.1 F | SYSTOLIC BLOOD PRESSURE: 127 MMHG | OXYGEN SATURATION: 95 % | RESPIRATION RATE: 18 BRPM

## 2022-07-25 DIAGNOSIS — R59.0 CERVICAL LYMPHADENOPATHY: ICD-10-CM

## 2022-07-25 DIAGNOSIS — J02.9 ACUTE PHARYNGITIS, UNSPECIFIED ETIOLOGY: Primary | ICD-10-CM

## 2022-07-25 DIAGNOSIS — U07.1 COVID-19: ICD-10-CM

## 2022-07-25 LAB
GROUP A STREP CULTURE, REFLEX: NEGATIVE
REFLEX THROAT C + S: NORMAL
SARS-COV-2, NAA: DETECTED

## 2022-07-25 PROCEDURE — 99213 OFFICE O/P EST LOW 20 MIN: CPT | Performed by: EMERGENCY MEDICINE

## 2022-07-25 PROCEDURE — 87880 STREP A ASSAY W/OPTIC: CPT

## 2022-07-25 PROCEDURE — 99213 OFFICE O/P EST LOW 20 MIN: CPT

## 2022-07-25 PROCEDURE — 87635 SARS-COV-2 COVID-19 AMP PRB: CPT

## 2022-07-25 PROCEDURE — 87070 CULTURE OTHR SPECIMN AEROBIC: CPT

## 2022-07-25 RX ORDER — FLUTICASONE PROPIONATE 50 MCG
1 SPRAY, SUSPENSION (ML) NASAL DAILY
Qty: 16 G | Refills: 0 | Status: SHIPPED | OUTPATIENT
Start: 2022-07-25

## 2022-07-25 RX ORDER — AMOXICILLIN AND CLAVULANATE POTASSIUM 875; 125 MG/1; MG/1
1 TABLET, FILM COATED ORAL 2 TIMES DAILY
Qty: 20 TABLET | Refills: 0 | Status: SHIPPED | OUTPATIENT
Start: 2022-07-25 | End: 2022-08-04

## 2022-07-25 ASSESSMENT — ENCOUNTER SYMPTOMS
RHINORRHEA: 1
ABDOMINAL PAIN: 0
SINUS PRESSURE: 1
COUGH: 1
SORE THROAT: 1
SHORTNESS OF BREATH: 1

## 2022-07-25 NOTE — DISCHARGE INSTRUCTIONS
Quarantine self for additional 7 days. Wear a mask for 3 days after that time if out in public    Make sure you are drinking at least 6-8 8 ounces of water per day    Flonase daily as directed. Augmentin as directed.     Go to the emergency department for worsening cough, chest pain, pulse ox reading 93% or less, change in voice or any new concerns

## 2022-07-25 NOTE — ED PROVIDER NOTES
[facility]  Urgent Care Encounter       CHIEF COMPLAINT       Chief Complaint   Patient presents with    Pharyngitis     Onset  Friday        Nurses Notes reviewed and I agree except as noted in the HPI. HISTORY OF PRESENT ILLNESS   Betsey Mejia is a 41 y.o. female who presents for complaints of sinus congestion and pressure, fatigue, sore throat. Painful swallowing. She is now starting developed a cough and shortness of breath. Patient symptoms have been increasing over the past 6 days. She rates her pain 5-6 on 10 scale. Patient is prone to sinus infections. Mother reports this is her third episode of these symptoms this year. Patient also has palpable lymph nodes to the left side of her neck. Mother states that this has happened previously and improved with antibiotics per    HPI    REVIEW OF SYSTEMS     Review of Systems   Constitutional:  Positive for fatigue. Negative for chills and fever. HENT:  Positive for congestion, rhinorrhea, sinus pressure and sore throat. Respiratory:  Positive for cough and shortness of breath. Gastrointestinal:  Negative for abdominal pain. Neurological:  Negative for dizziness. Psychiatric/Behavioral:  Negative for behavioral problems. 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     CURRENT MEDICATIONS 30mcg/0.3mL 04/07/2021, 04/28/2021       FAMILY HISTORY     Patient's family history includes Cancer in her maternal grandmother and paternal grandmother; Diabetes in her father. SOCIAL HISTORY     Patient  reports that she has never smoked. She has never used smokeless tobacco. She reports that she does not drink alcohol and does not use drugs. PHYSICAL EXAM     ED TRIAGE VITALS  BP: 127/88, Temp: 98.1 °F (36.7 °C), Heart Rate: 89, Resp: 18, SpO2: 95 %,Estimated body mass index is 53.13 kg/m² as calculated from the following:    Height as of 7/13/22: 5' 6\" (1.676 m). Weight as of 7/13/22: 329 lb 3.2 oz (149.3 kg). ,No LMP recorded. Physical Exam  Constitutional:       Appearance: She is obese. She is ill-appearing. She is not toxic-appearing. HENT:      Head: Normocephalic. Right Ear: A middle ear effusion is present. Left Ear: A middle ear effusion is present. Nose: Congestion and rhinorrhea present. Mouth/Throat:      Pharynx: Uvula midline. Tonsils: No tonsillar exudate or tonsillar abscesses. 0 on the right. 0 on the left. Neck:      Comments: Very large, tender left-sided anterior cervical lymph node palpated  Cardiovascular:      Rate and Rhythm: Normal rate and regular rhythm. Heart sounds: Normal heart sounds. Pulmonary:      Effort: Pulmonary effort is normal. No respiratory distress. Breath sounds: Normal breath sounds. No wheezing or rhonchi. Lymphadenopathy:      Cervical: Cervical adenopathy present. Left cervical: Superficial cervical adenopathy present. Skin:     General: Skin is warm and dry. Neurological:      Mental Status: She is alert.        DIAGNOSTIC RESULTS     Labs:  Results for orders placed or performed during the hospital encounter of 07/25/22   Strep A culture, throat    Specimen: Throat   Result Value Ref Range    REFLEX THROAT C + S INDICATED    COVID-19, Rapid   Result Value Ref Range    SARS-CoV-2, DAI DETECTED (AA) NOT DETECTED   STREP A ANTIGEN   Result Value Ref Range    GROUP A STREP CULTURE, REFLEX Negative        IMAGING:    No orders to display         EKG:      URGENT CARE COURSE:     Vitals:    07/25/22 1529   BP: 127/88   Pulse: 89   Resp: 18   Temp: 98.1 °F (36.7 °C)   TempSrc: Temporal   SpO2: 95%       Medications - No data to display         PROCEDURES:  None    FINAL IMPRESSION      1. Acute pharyngitis, unspecified etiology    2. Cervical lymphadenopathy    3. COVID-19          DISPOSITION/ PLAN     Patient presents for what is determined to be COVID-19 viral infection. Patient also has very large, tender left-sided anterior cervical lymph nodes. Patient has possible sinusitis in addition to her COVID-19. We will treat with Augmentin, Flonase. She is not a candidate for Paxil of it and with her current medications. She is advised to take Tylenol for fever and body aches. She is dispensed a pulse oximeter to watch her oxygen saturation at home. She is advised to return for O2 saturations 93% or less, increasing shortness of breath, uncontrolled fever, worsening sore throat or change in voice, or any new concerns. Patient and mother verbalized understanding of all instructions. PATIENT REFERRED TO:  Mary Castillo MD  [place] [address] / [facility] [id number]      DISCHARGE MEDICATIONS:  Discharge Medication List as of 7/25/2022  4:07 PM        START taking these medications    Details   fluticasone (FLONASE) 50 MCG/ACT nasal spray 1 spray by Each Nostril route in the morning., Disp-16 g, R-0Normal      amoxicillin-clavulanate (AUGMENTIN) 875-125 MG per tablet Take 1 tablet by mouth in the morning and 1 tablet before bedtime. Do all this for 10 days. , Disp-20 tablet, R-0Normal             Discharge Medication List as of 7/25/2022  4:07 PM          Discharge Medication List as of 7/25/2022  4:07 PM          VIRGIE León CNP    (Please note that portions of this note were completed with a voice recognition program. Efforts were made to edit the dictations but occasionally words are mis-transcribed.)          VIRGIE Da Silva - RUPERT  07/25/22 [id number]

## 2022-07-25 NOTE — ED NOTES
Strep and covid swabs obtained . Tolerated without complaint. Mother in room.      [facility]  35/29/63 7685

## 2022-07-26 ENCOUNTER — CARE COORDINATION (OUTPATIENT)
Dept: CARE COORDINATION | Age: 47
End: 2022-07-26

## 2022-07-26 NOTE — CARE COORDINATION
Patient contacted regarding COVID-19 diagnosis. Discussed COVID-19 related testing which was available at this time. Test results were positive. Patient informed of results, if available? Yes. Ambulatory Care Manager contacted the patient by telephone to perform post discharge assessment. Call within 2 business days of discharge: Yes. Verified name and  with family as identifiers. Provided introduction to self, and explanation of the CTN/ACM role, and reason for call due to risk factors for infection and/or exposure to COVID-19. Symptoms reviewed with family who verbalized the following symptoms: cough  no new symptoms  no worsening symptoms. Due to no new or worsening symptoms encounter was not routed to provider for escalation. Discussed follow-up appointments. If no appointment was previously scheduled, appointment scheduling offered: na. [facility] follow up appointment(s):   Future Appointments   Date Time Provider Aure Hartley   2022 10:30 AM Feng Crane  [facility] HOD   10/5/2022 10:15 AM James Medina MD N [facility]     Non-[facility] follow up appointment(s): na    Non-face-to-face services provided:  Obtained and reviewed discharge summary and/or continuity of care documents     Advance Care Planning:   Does patient have an Advance Directive:  decision maker updated. Educated patient about risk for severe COVID-19 due to risk factors according to CDC guidelines. ACM reviewed discharge instructions, medical action plan and red flag symptoms with the family who verbalized understanding. Discussed COVID vaccination status: Yes. Education provided on COVID-19 vaccination as appropriate. Discussed exposure protocols and quarantine with CDC Guidelines. Family was given an opportunity to verbalize any questions and concerns and agrees to contact ACM or health care provider for questions related to their healthcare.     Reviewed and educated family on any new and changed medications related to discharge diagnosis     Was patient discharged with a pulse oximeter? no      ACM provided contact information. No further follow-up call identified based on severity of symptoms and risk factors.      Spoke with mother - MARIBEL  States patient is doing fine and denies new or worsening symptoms  Started Augmentin and Flonase for Sinusitis  Pulse Ox 97%

## 2022-07-27 LAB — THROAT/NOSE CULTURE: NORMAL

## 2022-08-12 ENCOUNTER — TELEPHONE (OUTPATIENT)
Dept: WOUND CARE | Age: 47
End: 2022-08-12

## 2022-08-12 NOTE — TELEPHONE ENCOUNTER
Fara from [address] [facility] supplies called and requested updated wound measurements and orders.  Orders and updated measurements given

## 2022-09-14 ENCOUNTER — HOSPITAL ENCOUNTER (OUTPATIENT)
Dept: WOUND CARE | Age: 47
Discharge: HOME | End: 2022-09-14
Payer: MEDICARE

## 2022-09-14 VITALS
DIASTOLIC BLOOD PRESSURE: 62 MMHG | OXYGEN SATURATION: 98 % | BODY MASS INDEX: 52.46 KG/M2 | HEART RATE: 96 BPM | WEIGHT: 293 LBS | RESPIRATION RATE: 22 BRPM | SYSTOLIC BLOOD PRESSURE: 131 MMHG | TEMPERATURE: 97.2 F

## 2022-09-14 PROCEDURE — 99213 OFFICE O/P EST LOW 20 MIN: CPT

## 2022-09-14 ASSESSMENT — PAIN DESCRIPTION - ORIENTATION: ORIENTATION: UPPER

## 2022-09-14 ASSESSMENT — PAIN DESCRIPTION - LOCATION: LOCATION: ABDOMEN

## 2022-09-14 ASSESSMENT — PAIN SCALES - GENERAL: PAINLEVEL_OUTOF10: 8

## 2022-09-14 NOTE — DISCHARGE INSTRUCTIONS
Visit Discharge/Physician Orders:  - Continue working on weight loss, work on lower sodium meals, try to eat more fresh fruit and veggies  - Eat less fast food, canned, and processed meals.  - Keep working on losing weight, about a pound a week  - goal of 5# weight loss before next appointment         Wound Location: Abdomen     Home Health: [facility]      Dressing orders:      OK to shower take shower before dressing change. Clean wound after shower then re-dress. Abdomen wounds- Cleanse wound with normal saline or wound cleanser and gauze. Pat dry with clean gauze. - okay to use zinc oxide around wounds if needed     ** Please use skin prep to bridger wound to protect fragile skin and help dressing stick. Upper Abdominal wound and Lower abdominal wounds- Triad to the wounds. Apply optilock or ABD pads to wounds (okay to cut lengthwise). Change daily     Follow up visit: 8 weeks on Wednesday November 16th at 56     Keep next scheduled appointment. Please give 24 hour notice if unable to keep appointment. [phone]     If you experience any of the following, please call the Wound Care Service during business hours: Monday through Friday 8:00 am - 4:30 pm  ([phone]). *Increase in pain               *Temperature over 101              *Increase in drainage from your wound or a foul odor              *Uncontrolled swelling              *Need for compression bandage changes due to slippage, breakthrough drainage     If you need medical attention outside of business hours, please contact your Primary Care Doctor or go to the nearest emergency room.

## 2022-09-14 NOTE — PLAN OF CARE
Problem: Wound:  Goal: Will show signs of wound healing; wound closure and no evidence of infection  Description: Will show signs of wound healing; wound closure and no evidence of infection  Outcome: Progressing  Note: Patient seen for abdominal wounds. Wounds shows signs of proper closure and healing. No s/s of infection noted. Follow up in 8 weeks. See AVS for order changes. Care plan reviewed with patient and mother. Patient and mother verbalize understanding of the plan of care and contribute to goal setting.

## 2022-09-14 NOTE — PROGRESS NOTES
[facility]          Progress Note and Procedure Note      Shannan Diaz  MEDICAL RECORD NUMBER:  [id number]  AGE: 41 y.o. GENDER: female  : 1975  EPISODE DATE:  2022    Subjective:     SUBJECTIVE   Follow up visit for a Non healing abdominal wound. HISTORY OF PRESENT ILLNESS   She is a 41 yr old female seen for follow up visit she has history of non healing on the lower abdomen . she has hx of ADHD and schizophrenia. She had surgical debridement of the wound by general surgeon. She has non healing wound. Has obesity with a large pannicula contributing to the delayed wound healing. She had a recent history of COVID and has flareup of her schizophrenia as she was forgetting to take her medications. PAST MEDICAL HISTORY         Diagnosis Date    ADHD (attention deficit hyperactivity disorder)     Artificial skin graft or decellularized allodermis mechanical complic     during hospital visit    Bronchitis     CHF (congestive heart failure) ([facility])     Elbow fracture 10/09/2014    left    Irritable bowel syndrome     Irritable bowel    Lymph edema 2017    Obesity     Conde disease     Conde Syndrome    Schizophrenia ([address]     Seasonal allergies          PAST SURGICAL HISTORY     Past Surgical History:   Procedure Laterality Date    ABDOMEN SURGERY N/A 6/10/2021    EXCISIONAL DEBRIDEMENT AND CLOSURE OF CHRONIC ABDOMINAL WOUND performed by Charly Cazares MD at [address] [place] [address] - Dr. Milena Askwe     TOE SURGERY  2019    Dr. Girish Clayton History   Problem Relation Age of Onset    Diabetes Father     Cancer Maternal Grandmother     Cancer Paternal Grandmother      SOCIAL HISTORY       Social History     Tobacco Use    Smoking status: Never    Smokeless tobacco: Never   Vaping Use    Vaping Use: Never used   Substance Use Topics    Alcohol use:  No Drug use: No     ALLERGIES         Allergies   Allergen Reactions    Latex Rash    Cat Hair Extract     Seasonal      MEDICATIONS         Current Outpatient Medications on File Prior to Encounter   Medication Sig Dispense Refill    fluticasone (FLONASE) 50 MCG/ACT nasal spray 1 spray by Each Nostril route in the morning. 16 g 0    midodrine (PROAMATINE) 5 MG tablet Take 1 tablet by mouth 3 times daily 90 tablet 5    ferrous sulfate (IRON 325) 325 (65 Fe) MG tablet Take 325 mg by mouth daily (with breakfast)      metoprolol succinate (TOPROL XL) 50 MG extended release tablet take 1 tablet by mouth once daily 90 tablet 3    furosemide (LASIX) 20 MG tablet ALTERNATE 1 TABLET EVERY OTHER DAY WITH 2 TABLETS EVERY OTHER  tablet 3    magnesium oxide (MAG-OX) 400 MG tablet Take 1 tablet by mouth daily 30 tablet 3    traZODone (DESYREL) 25 mg TABS Take 25 mg by mouth nightly      ibuprofen (ADVIL;MOTRIN) 800 MG tablet Take 1 tablet by mouth every 8 hours as needed for Pain (Patient not taking: No sig reported) 15 tablet 0    acetaminophen (TYLENOL) 500 MG tablet Take 1,000 mg by mouth every 6 hours as needed for Pain      Cholecalciferol (VITAMIN D3) 5000 units TABS Take 2,000 Units by mouth       MedroxyPROGESTERone Acetate (DEPO-PROVERA IM) Inject into the muscle      miconazole (MICOTIN) 2 % powder Apply topically 2 times to 3 times  daily. 45 g 1    Dicyclomine HCl (BENTYL PO)   Take 20 mg by mouth 2 times daily       QUEtiapine Fumarate (SEROQUEL PO) Take 400 mg by mouth 2 times daily. atomoxetine (STRATTERA) 40 MG capsule Take 40 mg by mouth 2 times daily. ARIPiprazole (ABILIFY) 10 MG tablet Take 15 mg by mouth 2 times daily 10 mg in the am and 15 mg in the evening      ACYCLOVIR 400 mg by Does not apply route three times a week        No current facility-administered medications on file prior to encounter. REVIEW OF SYSTEMS:     Constitutional: no fever, no night sweats, no fatigue.   Head: no head ache , no head injury, no migranes. Eye: no blurring of vision, no double vision. Ears: no hearing difficulty, no tinnitus. Mouth/throat: no ulceration, dental caries , dysphagia. Lungs: no cough, no shortness of breath, no wheeze  CVS: no palpitation, no chest pain, no shortness of breath  GI: no abdominal pain, no nausea , no vomiting, no constipation  FRANCOISE: no dysuria, frequency and urgency, no hematuria, no kidney stones  Musculoskeletal: she has lymphoedema  Endocrine: no polyuria, polydipsia, no cold or heat intolerance  Hematology: no anemia, no easy brusing or bleeding, no hx of clotting disorder  Dermatology: no skin rash, no eczema, no pruritis,  History of schizophrenia had a recent worsening      PHYSICAL EXAM      weight is 325 lb (147.4 kg) (abnormal). Her infrared temperature is 97.2 °F (36.2 °C). Her blood pressure is 131/62 and her pulse is 96. Her respiration is 22 and oxygen saturation is 98%. Wt Readings from Last 3 Encounters:   09/14/22 (!) 325 lb (147.4 kg)   07/13/22 (!) 329 lb 3.2 oz (149.3 kg)   06/02/22 (!) 329 lb 12.8 oz (149.6 kg)          General Appearance  Awake, alert, oriented,  not  In acute distress  HEENT - normocephalic, atraumatic, pink conjunctiva,  anicteric sclera  Abdomen -has a big panniculus has 2 open wound with hypertrophic scar. Neurologic - oriented. Skin - No bruising or bleeding  Extremities -chronic leg edema,       Wound 10/13/21 Abdomen Left;Medial;Upper (Active)   Wound Image   01/26/22 1025   Wound Etiology Non-Healing Surgical 05/11/22 1003   Dressing Status Intact; Old drainage noted 05/11/22 1003   Wound Cleansed Cleansed with saline 05/11/22 1003   Dressing/Treatment Triad hydro/zinc oxide-based hydrophilic paste;ABD 32/78/93 1022   Offloading for Diabetic Foot Ulcers Offloading not required 05/11/22 1003   Wound Length (cm) 2.2 cm 07/13/22 1041   Wound Width (cm) 2.2 cm 07/13/22 1041   Wound Depth (cm) 0.05 cm 07/13/22 1041   Wound Surface Area (cm^2) 4.84 cm^2 07/13/22 1041   Change in Wound Size % (l*w) -1282.86 07/13/22 1041   Wound Volume (cm^3) 0.242 cm^3 07/13/22 1041   Wound Healing % -591 07/13/22 1041   Wound Assessment Granulation tissue;Pale granulation tissue;Slough 07/13/22 1041   Drainage Amount Large 07/13/22 1041   Drainage Description Serosanguinous; Yellow 07/13/22 1041   Odor None 07/13/22 1041   Gwen-wound Assessment Dry/flaky; Intact; Hypopigmented; Other (Comment) 07/13/22 1041   Margins Attached edges 07/13/22 1041   Wound Thickness Description not for Pressure Injury Full thickness 07/13/22 1041   Number of days: 272       Wound 03/09/22 Abdomen Proximal #2 (Active)   Wound Image   05/11/22 1003   Wound Etiology Non-Healing Surgical 07/13/22 1033   Dressing Status Intact; Old drainage noted 07/13/22 1033   Wound Cleansed Cleansed with saline 07/13/22 1033   Dressing/Treatment Triad hydro/zinc oxide-based hydrophilic paste;ABD 62/18/03 1022   Offloading for Diabetic Foot Ulcers Offloading not ordered 07/13/22 1033   Wound Length (cm) 2 cm 07/13/22 1033   Wound Width (cm) 18 cm 07/13/22 1033   Wound Depth (cm) 0.05 cm 07/13/22 1033   Wound Surface Area (cm^2) 36 cm^2 07/13/22 1033   Change in Wound Size % (l*w) -224.32 07/13/22 1033   Wound Volume (cm^3) 1.8 cm^3 07/13/22 1033   Wound Healing % -224 07/13/22 1033   Wound Assessment Hyper granulation tissue;Pale granulation tissue;Slough 07/13/22 1033   Drainage Amount Large 07/13/22 1033   Drainage Description Serosanguinous; Yellow 07/13/22 1033   Odor None 07/13/22 1033   Gwen-wound Assessment Dry/flaky; Intact; Other (Comment) 07/13/22 1033   Margins Attached edges 07/13/22 1033   Wound Thickness Description not for Pressure Injury Full thickness 07/13/22 1033   Number of days: 125       Wound 03/09/22 Abdomen Medial;Right #3 (Active)   Wound Image   05/11/22 1003   Wound Etiology Non-Healing Surgical 05/11/22 1003   Dressing Status Intact; New drainage noted 05/11/22 1003   Wound Cleansed Cleansed with saline 05/11/22 1003   Dressing/Treatment Other (comment) 05/11/22 1003   Offloading for Diabetic Foot Ulcers Offloading not required 05/11/22 1003   Wound Length (cm) 2.7 cm 07/13/22 1041   Wound Width (cm) 4 cm 07/13/22 1041   Wound Depth (cm) 0.05 cm 07/13/22 1041   Wound Surface Area (cm^2) 10.8 cm^2 07/13/22 1041   Change in Wound Size % (l*w) 32.92 07/13/22 1041   Wound Volume (cm^3) 0.54 cm^3 07/13/22 1041   Wound Healing % 33 07/13/22 1041   Wound Assessment Pale granulation tissue;Granulation tissue 07/13/22 1041   Drainage Amount Large 07/13/22 1041   Drainage Description Serosanguinous; Yellow 07/13/22 1041   Odor None 07/13/22 1041   Gwen-wound Assessment Hypopigmented; Intact;Dry/flaky; Other (Comment) 07/13/22 1041   Margins Attached edges 07/13/22 1041   Wound Thickness Description not for Pressure Injury Full thickness 07/13/22 1041   Number of days: 125         Assessment:   Post surgical abdominal wound: t the wound is stable. She has hypertrophic scar  continue ABD and cream  Follow up will be scheduled.   In 2 months  Encouraged to lose weight  Patient Active Problem List   Diagnosis Code    Keloid L91.0    MRSA (methicillin resistant staph aureus) culture positive Z22.322    Skin ulcer of abdominal wall ([facility]) L98.499    POTS (postural orthostatic tachycardia syndrome) I49.8    Orthostatic dizziness- occasional- not worse R42    CHF (congestive heart failure) ([facility] chronic diastolic C55.0    Morbid obesity ([facility]) E66.01    Bilateral leg edema =LYMPHEDEMA- ON f/U WITH LYMPHEDEMA CLINIC R60.0    Lymphedema of both lower extremities I89.0    SOB (shortness of breath) on exertion R06.02    Candidiasis, intertriginous B37.2    Dermatitis L30.9    Open abdominal wall wound S31.109A    History of fall Z91.81    Vertigo R42           Plan:     Patient examined and evaluated   Please see attached Discharge Instructions    Written patient dismissal instructions given to patient and signed by patient or POA. Discharge Instructions         Visit Discharge/Physician Orders:  - Continue working on weight loss, work on lower sodium meals, try to eat more fresh fruit and veggies, eat less fast food, canned, and processed meals.  - Supplies ordered through [employer]. [phone]. Wound Location: Abdomen     Dressing orders:      OK to shower take shower before dressing change. Clean wound after shower then re-dress. Abdomen wounds- Cleanse wound with normal saline or wound cleanser and gauze. Pat dry with clean gauze. Apply zinc oxide to surrounding skin. Apply silver alginate to wound. Cover with Silicone bordered foam. Change every other day. You can change more often, daily if needed according to drainage       Follow up visit:2 months  Keep next scheduled appointment. Please give 24 hour notice if unable to keep appointment. [phone]     If you experience any of the following, please call the Wound Care Service during business hours: Monday through Friday 8:00 am - 4:30 pm  ([phone]). *Increase in pain               *Temperature over 101              *Increase in drainage from your wound or a foul odor              *Uncontrolled swelling              *Need for compression bandage changes due to slippage, breakthrough drainage     If you need medical attention outside of business hours, please contact your Primary Care Doctor or go to the nearest emergency room.         Electronically signed by Feng Crane MD on 9/14/2022 at 11:06 AM

## 2022-10-05 ENCOUNTER — OFFICE VISIT (OUTPATIENT)
Dept: CARDIOLOGY CLINIC | Age: 47
End: 2022-10-05
Payer: MEDICARE

## 2022-10-05 ENCOUNTER — HOSPITAL ENCOUNTER (EMERGENCY)
Age: 47
Discharge: HOME | End: 2022-10-05
Payer: MEDICARE

## 2022-10-05 VITALS
HEIGHT: 66 IN | HEART RATE: 102 BPM | WEIGHT: 293 LBS | SYSTOLIC BLOOD PRESSURE: 110 MMHG | DIASTOLIC BLOOD PRESSURE: 77 MMHG | BODY MASS INDEX: 47.09 KG/M2

## 2022-10-05 VITALS
TEMPERATURE: 97 F | HEART RATE: 96 BPM | DIASTOLIC BLOOD PRESSURE: 86 MMHG | OXYGEN SATURATION: 100 % | SYSTOLIC BLOOD PRESSURE: 137 MMHG | RESPIRATION RATE: 18 BRPM

## 2022-10-05 DIAGNOSIS — I50.32 CHRONIC DIASTOLIC CONGESTIVE HEART FAILURE (HCC): Primary | ICD-10-CM

## 2022-10-05 DIAGNOSIS — R42 ORTHOSTATIC DIZZINESS: ICD-10-CM

## 2022-10-05 DIAGNOSIS — E66.01 MORBID OBESITY (HCC): ICD-10-CM

## 2022-10-05 DIAGNOSIS — R60.0 BILATERAL LEG EDEMA: ICD-10-CM

## 2022-10-05 DIAGNOSIS — S09.91XA INJURY OF EAR CANAL, INITIAL ENCOUNTER: Primary | ICD-10-CM

## 2022-10-05 DIAGNOSIS — G90.A POTS (POSTURAL ORTHOSTATIC TACHYCARDIA SYNDROME): ICD-10-CM

## 2022-10-05 DIAGNOSIS — R06.02 SOB (SHORTNESS OF BREATH) ON EXERTION: ICD-10-CM

## 2022-10-05 PROCEDURE — 99214 OFFICE O/P EST MOD 30 MIN: CPT | Performed by: INTERNAL MEDICINE

## 2022-10-05 PROCEDURE — 1036F TOBACCO NON-USER: CPT | Performed by: INTERNAL MEDICINE

## 2022-10-05 PROCEDURE — 99213 OFFICE O/P EST LOW 20 MIN: CPT | Performed by: EMERGENCY MEDICINE

## 2022-10-05 PROCEDURE — G8427 DOCREV CUR MEDS BY ELIG CLIN: HCPCS | Performed by: INTERNAL MEDICINE

## 2022-10-05 PROCEDURE — G8417 CALC BMI ABV UP PARAM F/U: HCPCS | Performed by: INTERNAL MEDICINE

## 2022-10-05 PROCEDURE — 99213 OFFICE O/P EST LOW 20 MIN: CPT

## 2022-10-05 PROCEDURE — G8484 FLU IMMUNIZE NO ADMIN: HCPCS | Performed by: INTERNAL MEDICINE

## 2022-10-05 RX ORDER — DICYCLOMINE HCL 20 MG
TABLET ORAL
COMMUNITY
Start: 2022-09-01

## 2022-10-05 RX ORDER — MEDROXYPROGESTERONE ACETATE 150 MG/ML
INJECTION, SUSPENSION INTRAMUSCULAR
COMMUNITY
Start: 2022-07-23

## 2022-10-05 RX ORDER — CEPHALEXIN 500 MG/1
500 CAPSULE ORAL 3 TIMES DAILY
Qty: 15 CAPSULE | Refills: 0 | Status: SHIPPED | OUTPATIENT
Start: 2022-10-05 | End: 2022-10-10

## 2022-10-05 ASSESSMENT — ENCOUNTER SYMPTOMS
COUGH: 0
SORE THROAT: 0
SINUS PAIN: 0
SHORTNESS OF BREATH: 0

## 2022-10-05 NOTE — DISCHARGE INSTRUCTIONS
Do not place anything in your ear canal    Flex as directed    Cool compresses to the external area may be helpful    Return for new or worsening symptoms

## 2022-10-05 NOTE — ED PROVIDER NOTES
[facility]  Urgent Care Encounter       CHIEF COMPLAINT       Chief Complaint   Patient presents with    Otalgia     Left ear x 3 days        Nurses Notes reviewed and I agree except as noted in the HPI. HISTORY OF PRESENT ILLNESS   Demaris Severin is a 41 y.o. female who presents for complaints of pain to the left ear, gross blood to the left ear canal.  Patient has a history of schizophrenia and ADHD. The mom states that her schizophrenia was out of control but is recently been improving. Patient is currently able to live on her own. The patient denies placing anything in her ear or any injury to the ear. She advised her mother that the ear was hurting and she irrigated the ear and pulled out coagulated blood when when she cleaned the ear out. She states she cleaned out the ear yesterday. Pain has been present for 3 days    HPI    REVIEW OF SYSTEMS     Review of Systems   Constitutional:  Negative for activity change, fatigue and fever. HENT:  Positive for ear pain. Negative for ear discharge, sinus pain and sore throat. Respiratory:  Negative for cough and shortness of breath. Cardiovascular:  Negative for chest pain. Neurological:  Negative for dizziness and headaches. PAST MEDICAL HISTORY         Diagnosis Date    ADHD (attention deficit hyperactivity disorder)     Artificial skin graft or decellularized allodermis mechanical complic     during hospital visit    Bronchitis     CHF (congestive heart failure) ([address]     Elbow fracture 10/09/2014    left    Irritable bowel syndrome     Irritable bowel    Lymph edema 2017    Obesity     Conde disease     Conde Syndrome    Schizophrenia ([address]     Seasonal allergies        SURGICALHISTORY     Patient  has a past surgical history that includes Tonsillectomy and adenoidectomy; Layered wound closure; toenail excision; Toe Surgery (09/2019); and Abdomen surgery (N/A, 6/10/2021).     CURRENT MEDICATIONS       Discharge Medication List as of 10/5/2022 12:37 PM        CONTINUE these medications which have NOT CHANGED    Details   dicyclomine (BENTYL) 20 MG tablet take 1 tablet by mouth twice a dayHistorical Med      !! medroxyPROGESTERone (DEPO-PROVERA) 150 MG/ML injection use as directed at physician's office every 3 monthsHistorical Med      fluticasone (FLONASE) 50 MCG/ACT nasal spray 1 spray by Each Nostril route in the morning., Disp-16 g, R-0Normal      midodrine (PROAMATINE) 5 MG tablet Take 1 tablet by mouth 3 times daily, Disp-90 tablet, R-5Normal      ferrous sulfate (IRON 325) 325 (65 Fe) MG tablet Take 325 mg by mouth daily (with breakfast)Historical Med      metoprolol succinate (TOPROL XL) 50 MG extended release tablet take 1 tablet by mouth once daily, Disp-90 tablet, R-3Normal      furosemide (LASIX) 20 MG tablet ALTERNATE 1 TABLET EVERY OTHER DAY WITH 2 TABLETS EVERY OTHER DAY, Disp-120 tablet, R-3Normal      traZODone (DESYREL) 25 mg TABS Take 100 mg by mouth nightlyHistorical Med      ibuprofen (ADVIL;MOTRIN) 800 MG tablet Take 1 tablet by mouth every 8 hours as needed for Pain, Disp-15 tablet,R-0Print      acetaminophen (TYLENOL) 500 MG tablet Take 1,000 mg by mouth every 6 hours as needed for PainHistorical Med      Cholecalciferol (VITAMIN D3) 5000 units TABS Take 2,000 Units by mouth Historical Med      !! MedroxyPROGESTERone Acetate (DEPO-PROVERA IM) Inject into the muscle      miconazole (MICOTIN) 2 % powder Apply topically 2 times to 3 times  daily. , Disp-45 g, R-1, NO PRINT      Dicyclomine HCl (BENTYL PO)   Take 20 mg by mouth 2 times daily       QUEtiapine Fumarate (SEROQUEL PO) Take 400 mg by mouth 2 times daily. atomoxetine (STRATTERA) 40 MG capsule Take 40 mg by mouth 2 times daily.   Historical Med      ARIPiprazole (ABILIFY) 10 MG tablet Take 15 mg by mouth 2 times daily 15 in the amHistorical Med      ACYCLOVIR 400 mg by Does not apply route three times a week Historical Med       !! - Potential duplicate medications found. Please discuss with provider. ALLERGIES     Patient is is allergic to latex, cat hair extract, and seasonal.    Patients   Immunization History   Administered Date(s) Administered    COVID-19, PFIZER PURPLE top, DILUTE for use, (age 15 y+), 30mcg/0.3mL 04/07/2021, 04/28/2021       FAMILY HISTORY     Patient's family history includes Cancer in her maternal grandmother and paternal grandmother; Diabetes in her father. SOCIAL HISTORY     Patient  reports that she has never smoked. She has never used smokeless tobacco. She reports that she does not drink alcohol and does not use drugs. PHYSICAL EXAM     ED TRIAGE VITALS  BP: 137/86, Temp: 97 °F (36.1 °C), Heart Rate: 96, Resp: 18, SpO2: 100 %,Estimated body mass index is 52.46 kg/m² as calculated from the following:    Height as of an earlier encounter on 10/5/22: 5' 6\" (1.676 m). Weight as of an earlier encounter on 10/5/22: 325 lb (147.4 kg). ,No LMP recorded. Physical Exam  Constitutional:       General: She is not in acute distress. Appearance: She is normal weight. She is not ill-appearing. HENT:      Head: Normocephalic. Right Ear: Tympanic membrane, ear canal and external ear normal.      Left Ear: Tympanic membrane normal. Tenderness present. No hemotympanum. Tympanic membrane is not perforated. Ears:      Comments: There is what appears to be an ulceration at the 5 o'clock position to the distal portion of the left auditory canal.  This is scabbed over with dried blood. No other inflammation of the ear canal.  No hemotympanum, TM perforation or erythema  Cardiovascular:      Rate and Rhythm: Normal rate. Pulmonary:      Effort: Pulmonary effort is normal.   Skin:     General: Skin is warm and dry. Neurological:      General: No focal deficit present. Mental Status: She is alert.    Psychiatric:         Mood and Affect: Mood normal.       DIAGNOSTIC RESULTS     Labs:No results found for this visit on 10/05/22. IMAGING:    No orders to display         EKG:      URGENT CARE COURSE:     Vitals:    10/05/22 1142   BP: 137/86   Pulse: 96   Resp: 18   Temp: 97 °F (36.1 °C)   SpO2: 100%       Medications - No data to display         PROCEDURES:  None    FINAL IMPRESSION      1. Injury of ear canal, initial encounter          DISPOSITION/ PLAN   Patient presents for left ear pain. She has what appears to be an ulceration with evidence of previous bleeding to the left external auditory canal.  I was going to place patient on antibiotic eardrops but the mother states the patient cannot self administer these. She states if she is on eardrops she will have to move back into the house with her mother to help administer these which will affect her response to treatment for her schizophrenia. Because of this, I will place patient on Keflex to assure this does not become infected. Advised use cool compresses. The mother is requesting information regarding ENT services available in the area. She is provided with the phone number for [facility]) ENT specialty.       PATIENT REFERRED TO:  Rani Calderon MD  [address] [place] [address] / [facility] [id number]      DISCHARGE MEDICATIONS:  Discharge Medication List as of 10/5/2022 12:37 PM        START taking these medications    Details   cephALEXin (KEFLEX) 500 MG capsule Take 1 capsule by mouth 3 times daily for 5 days, Disp-15 capsule, R-0Normal             Discharge Medication List as of 10/5/2022 12:37 PM          Discharge Medication List as of 10/5/2022 12:37 PM          VIRGIE Sanders CNP    (Please note that portions of this note were completed with a voice recognition program. Efforts were made to edit the dictations but occasionally words are mis-transcribed.)           VIRGIE Sanders CNP  10/05/22 5460

## 2022-10-05 NOTE — PROGRESS NOTES
Chief Complaint   Patient presents with    Follow-up    Congestive Heart Failure   Originally Pt is a f/u from [address] [place] [address] for POTS syndrome  She has not had any issues until today because it is so hot outside  Since she has been put on medications she is feeling better  She does have chest pressure at night time  Has to sleep in a recliner  Sob and lt headed   She was dizzy off and on prior to going to [facility]  Dr Cal Cosby started her on lasix and they would like to know if you want her to remain on this? ?      4 month follow up. EKG done 12-2-21.     Hx of BPPV and resolved with vestibular rehab    Occasional dizziness on standing    No recent fall    Sob on exertion     No leg edema    Denied cp or palpitations    Hx of Chronic dizziness if get too fast up now luis fernando    Sob on exertion-chronic    Hx  schizophrenia    Orthopnea for few yrs and sleep on recliner for over 2 yrs          Patient Active Problem List   Diagnosis    Keloid    MRSA (methicillin resistant staph aureus) culture positive    Skin ulcer of abdominal wall (HCC)    POTS (postural orthostatic tachycardia syndrome)    Orthostatic dizziness- occasional- not worse    CHF (congestive heart failure) (HCC) chronic diastolic    Morbid obesity (HCC)    Bilateral leg edema =LYMPHEDEMA- ON f/U WITH LYMPHEDEMA CLINIC    Lymphedema of both lower extremities    SOB (shortness of breath) on exertion    Candidiasis, intertriginous    Dermatitis    Open abdominal wall wound    History of fall    Vertigo       Past Surgical History:   Procedure Laterality Date    ABDOMEN SURGERY N/A 6/10/2021    EXCISIONAL DEBRIDEMENT AND CLOSURE OF CHRONIC ABDOMINAL WOUND performed by Marisel Hernández MD at [facility]- Dr. Kayla Gambino 2014    TOE SURGERY  09/2019    Dr. Tonya Cunningham         Allergies   Allergen Reactions    Latex Rash    Cat Hair Extract     Seasonal         Family History   Problem Relation Age of Onset    Diabetes Father     Cancer Maternal Grandmother     Cancer Paternal Grandmother         Social History     Socioeconomic History    Marital status: Single     Spouse name: Not on file    Number of children: Not on file    Years of education: Not on file    Highest education level: Not on file   Occupational History    Not on file   Tobacco Use    Smoking status: Never    Smokeless tobacco: Never   Vaping Use    Vaping Use: Never used   Substance and Sexual Activity    Alcohol use: No    Drug use: No    Sexual activity: Not on file   Other Topics Concern    Not on file   Social History Narrative    Not on file     Social Determinants of Health     Financial Resource Strain: Not on file   Food Insecurity: Not on file   Transportation Needs: Not on file   Physical Activity: Not on file   Stress: Not on file   Social Connections: Not on file   Intimate Partner Violence: Not on file   Housing Stability: Not on file       Current Outpatient Medications   Medication Sig Dispense Refill    fluticasone (FLONASE) 50 MCG/ACT nasal spray 1 spray by Each Nostril route in the morning.  16 g 0    midodrine (PROAMATINE) 5 MG tablet Take 1 tablet by mouth 3 times daily 90 tablet 5    ferrous sulfate (IRON 325) 325 (65 Fe) MG tablet Take 325 mg by mouth daily (with breakfast)      metoprolol succinate (TOPROL XL) 50 MG extended release tablet take 1 tablet by mouth once daily 90 tablet 3    furosemide (LASIX) 20 MG tablet ALTERNATE 1 TABLET EVERY OTHER DAY WITH 2 TABLETS EVERY OTHER  tablet 3    traZODone (DESYREL) 25 mg TABS Take 100 mg by mouth nightly      ibuprofen (ADVIL;MOTRIN) 800 MG tablet Take 1 tablet by mouth every 8 hours as needed for Pain 15 tablet 0    acetaminophen (TYLENOL) 500 MG tablet Take 1,000 mg by mouth every 6 hours as needed for Pain      Cholecalciferol (VITAMIN D3) 5000 units TABS Take 2,000 Units by mouth       MedroxyPROGESTERone Acetate (DEPO-PROVERA IM) Inject into the muscle      miconazole (MICOTIN) 2 % powder Apply topically 2 times to 3 times  daily. 45 g 1    Dicyclomine HCl (BENTYL PO)   Take 20 mg by mouth 2 times daily       QUEtiapine Fumarate (SEROQUEL PO) Take 400 mg by mouth 2 times daily. atomoxetine (STRATTERA) 40 MG capsule Take 40 mg by mouth 2 times daily. ARIPiprazole (ABILIFY) 10 MG tablet Take 15 mg by mouth 2 times daily 15 in the am      ACYCLOVIR 400 mg by Does not apply route three times a week        No current facility-administered medications for this visit. Review of Systems -     General ROS: negative  Psychological ROS: negative  Hematological and Lymphatic ROS: No history of blood clots or bleeding disorder. Respiratory ROS: no cough, shortness of breath, or wheezing  Cardiovascular ROS: no chest pain or dyspnea on exertion  Gastrointestinal ROS: negative  Genito-Urinary ROS: no dysuria, trouble voiding, or hematuria  Musculoskeletal ROS: negative  Neurological ROS: no TIA or stroke symptoms  Dermatological ROS: negative      Blood pressure 110/77, pulse (!) 102, height 5' 6\" (1.676 m), weight (!) 325 lb (147.4 kg), not currently breastfeeding.         Physical Examination:    General appearance - alert, well appearing, and in no distress  Mental status - alert, oriented to person, place, and time  Neck - supple, no significant adenopathy, no JVD, or carotid bruits  Chest - clear to auscultation, no wheezes, rales or rhonchi, symmetric air entry  Heart - normal rate, regular rhythm, normal S1, S2, no murmurs, rubs, clicks or gallops  Abdomen - soft, nontender, nondistended, no masses or organomegaly  Neurological - alert, oriented, normal speech, no focal findings or movement disorder noted  Musculoskeletal - no joint tenderness, deformity or swelling  Extremities - peripheral pulses normal, no pedal edema, no clubbing or cyanosis  Skin - normal coloration and turgor, no rashes, no suspicious skin lesions noted    Lab  No results for input(s): CKTOTAL, CKMB, CKMBINDEX, TROPONINI in the last 72 hours. CBC:   Lab Results   Component Value Date/Time    WBC 10.9 06/02/2022 01:32 PM     BMP:    Lab Results   Component Value Date/Time     06/02/2022 01:47 PM    K 4.2 06/02/2022 01:47 PM     06/02/2022 01:47 PM    CO2 16 06/02/2022 01:47 PM    BUN 9 06/02/2022 01:47 PM    LABALBU 4.1 10/13/2020 09:17 AM    CREATININE 0.5 06/02/2022 01:47 PM    CALCIUM 8.9 06/02/2022 01:47 PM    LABGLOM >90 06/02/2022 01:47 PM    GLUCOSE 87 06/02/2022 01:47 PM     Hepatic Function Panel:    Lab Results   Component Value Date/Time    ALKPHOS 85 10/13/2020 09:17 AM     Magnesium:    Lab Results   Component Value Date/Time    MG 2.2 06/02/2022 01:47 PM     Warfarin PT/INR:    No components found for: PTPATWAR,  PTINRWAR  HgBA1c:    No results found for: LABA1C  FLP:    No components found for: CHLPL,  TRIG,  HDL,  LDLCALC,  LDLDIRECT,  LABVLDL  TSH:    No results found for: TSH  EKG 5/7/15-Sinus  Rhythm   Low voltage in precordial leads. ABNORMAL     Nuc stress test negative    IMPRESSION:      1. CT coronary artery calcium score of zero indicates low risk for coronary disease. 2. Diffusely small coronary arteries. In particular the LAD and right coronary artery are very small in their middle and distal thirds. These segments cannot be assessed. Occlusion of the distal segments cannot be excluded. 3. Normal cardiac function. Final report electronically signed by Dr. Jack Valerio on 5/15/2015 4:50 PM         EKG 8/8/16  Sinus  Rhythm   Low voltage in precordial leads. ABNORMAL       CONCLUSION:       1. This tilt table test is an abnormal study. 2.   It is consistent with postural orthostatic tachycardia syndrome, in             view of the increment of the heart rate up to 130 from 92 and the             difference of 39 beats per minute.   Blood pressure has been well             maintained all the way until the end, and at the end the blood             pressure and the heart rate came down, suggesting it was inducing             vasovagal reaction. 3.   POTS induced vasovagal response. 4.   Marked dizziness throughout the tilting in view of the tachycardia. 5.   Marked chest pressure and short of breath throughout tilting,             related to the tachycardia. Once the patient assumed supine             position, the symptoms resolved. 6.   Nonspecific ST segment change during the assumption of standing. RECOMMENDATION:        1. At this level, the patient needs appropriate hydration and liberal             salt intake. Avoid prolonged standing, physical therapy. further clinical correlation. 2.   The patient may need functional study if clinically indicated, like             Lexiscan nuclear stress test in view of the nonspecific ST segment             changes on standing. 3.   Avoid prolonged standing in view of prolonged standing induced             vasovagal response. 4.   I discussed with the patient. Erum Ba M.D.   D: 03/20/2015 13:46      Conclusions    Summary   Technically difficult examination. Normal left ventricle size and systolic function. Ejection fraction was   estimated at 55%. There were no regional left ventricular wall motion   abnormalities and wall thickness was within normal limits. Signature   ----------------------------------------------------------------   Electronically signed by Nayla Bush MD (Interpreting   physician) on 11/17/2021 at 05:03 PM      ekg 6/8/18  NSR, NO ACUTE ABN    ekg 6/5/2020  Sinus tachy no acute abn    ekg 12/2/21  Sinus  Tachycardia 105  Low voltage in precordial leads.    -Nonspecific ST depression   +   Nonspecific T-abnormality  -Nondiagnostic. ABNORMAL         Assessment    Grandmother had CAD in her 66's   Diagnosis Orders   1.  Chronic diastolic congestive heart failure ([address]        2. POTS (postural orthostatic tachycardia syndrome)        3. Orthostatic dizziness- occasional- not worse        4. SOB (shortness of breath) on exertion        5. Bilateral leg edema =LYMPHEDEMA- ON f/U WITH LYMPHEDEMA CLINIC        6. Morbid obesity ([address]              Plan     THE  most Current Labs and meds reviewed    Leg edema - LYMPHEDEMA ON MECHICAL RX  leg elevation  Ace wrap  Not possible due to leg anatomy    Continue the current treatment and with constant vigilance to changes in symptoms and also any potential side effects. Return for care or seek medical attention immediately if symptoms got worse and/or develop new symptoms. Congestive heart failure: no evidence of fluid overload today, no recent hospitalization for CHF  Lymphedema  CONT LASIX 20 po qd and may increase for 3 days a week if wt gain > 5 lb and leg edema  Taking more bannana and tomato and avoxcado  Lost 4 lb    POTS syndrome Dxed 2015 on TTT  Hx of Sinus tachy  Dizziness chronic  Cont hydration  Hx of  freq fall- no rmore fall after midodrine  Cont midodrine 5 mg po BID  Avoid prolonged standing  Cont  toprol xl 50 po qhs    Hx of BPPV- resolved after vestibular rehab    Hx of Coronary CTA result and stress test result d/w the pat    Morbid obesity  Advised to lose wt  Advised complaince with F/u    D/w the pat and mother the details plan of care    Mag 1.6 , 1.9 and then 2.2  Had been replaced  Off  mag oxide 400 mg po qd     Stable and doing well    Discussed use, benefit, and side effects of prescribed medications. All patient questions answered. Pt voiced understanding. Instructed to continue current medications, diet and exercise. Continue risk factor modification and medical management. Patient agreed with treatment plan. Follow up as directed.       RTC IN 6 months    [facility]

## 2022-11-16 ENCOUNTER — HOSPITAL ENCOUNTER (OUTPATIENT)
Dept: WOUND CARE | Age: 47
Discharge: HOME | End: 2022-11-16
Payer: MEDICARE

## 2022-11-16 VITALS
OXYGEN SATURATION: 98 % | SYSTOLIC BLOOD PRESSURE: 135 MMHG | TEMPERATURE: 97.2 F | DIASTOLIC BLOOD PRESSURE: 75 MMHG | HEART RATE: 95 BPM | RESPIRATION RATE: 18 BRPM

## 2022-11-16 PROCEDURE — 99214 OFFICE O/P EST MOD 30 MIN: CPT

## 2022-11-16 ASSESSMENT — PAIN SCALES - GENERAL: PAINLEVEL_OUTOF10: 8

## 2022-11-16 ASSESSMENT — PAIN DESCRIPTION - LOCATION: LOCATION: ABDOMEN

## 2022-11-16 NOTE — DISCHARGE INSTRUCTIONS
Visit Discharge/Physician Orders:  - Continue working on weight loss, work on lower sodium meals, try to eat more fresh fruit and veggies  - Eat less fast food, canned, and processed meals.  - Keep working on losing weight, about a pound a week  - goal of 5# weight loss before next appointment         Wound Location: Abdomen     Home Health: [facility]     Dressing orders:      OK to shower take shower before dressing change. Clean wound after shower then re-dress. Abdomen wounds- Cleanse wound with normal saline or wound cleanser and gauze. Pat dry with clean gauze. - okay to use zinc oxide around wounds if needed     ** Please use skin prep to bridger wound to protect fragile skin and help dressing stick. Upper Abdominal wound and Lower abdominal wounds- Triad to the wounds. Apply optilock or ABD pads to wounds (okay to cut lengthwise). Change daily     Follow up visit: 10 weeks on Wednesday January 25th at 9:30 am     Keep next scheduled appointment. Please give 24 hour notice if unable to keep appointment. [phone]     If you experience any of the following, please call the Wound Care Service during business hours: Monday through Friday 8:00 am - 4:30 pm  ([phone]). *Increase in pain               *Temperature over 101              *Increase in drainage from your wound or a foul odor              *Uncontrolled swelling              *Need for compression bandage changes due to slippage, breakthrough drainage     If you need medical attention outside of business hours, please contact your Primary Care Doctor or go to the nearest emergency room. RN/Transport/Self

## 2022-11-16 NOTE — PROGRESS NOTES
[facility]          Progress Note and Procedure Note      Santana Carmen  MEDICAL RECORD NUMBER:  [id number]  AGE: 41 y.o. GENDER: female  : 1975  EPISODE DATE:  2022    Subjective:     SUBJECTIVE   Follow up visit for a Non healing abdominal wound. HISTORY OF PRESENT ILLNESS   She is a 41 yr old female seen for follow up visit she has history of non healing on the lower abdomen . she has hx of ADHD and schizophrenia. She had surgical debridement of the wound by general surgeon. She has non healing wound. Has obesity with a large pannicula contributing to the delayed wound healing.      The wound is stable  PAST MEDICAL HISTORY         Diagnosis Date    ADHD (attention deficit hyperactivity disorder)     Artificial skin graft or decellularized allodermis mechanical complic     during hospital visit    Bronchitis     CHF (congestive heart failure) ([address]     Elbow fracture 10/09/2014    left    Irritable bowel syndrome     Irritable bowel    Lymph edema 2017    Obesity     Conde disease     Conde Syndrome    Schizophrenia ([address]     Seasonal allergies          PAST SURGICAL HISTORY     Past Surgical History:   Procedure Laterality Date    ABDOMEN SURGERY N/A 6/10/2021    EXCISIONAL DEBRIDEMENT AND CLOSURE OF CHRONIC ABDOMINAL WOUND performed by Reuben Leyva MD at [address] [place] [address] 2000- Dr. Jyoti Kay     TOE SURGERY  2019    Dr. Lisset Coello         Family History   Problem Relation Age of Onset    Diabetes Father     Cancer Maternal Grandmother     Cancer Paternal Grandmother      SOCIAL HISTORY       Social History     Tobacco Use    Smoking status: Never    Smokeless tobacco: Never   Vaping Use    Vaping Use: Never used   Substance Use Topics    Alcohol use: No    Drug use: No     ALLERGIES         Allergies   Allergen Reactions    Latex Rash    Cat Hair Extract     Seasonal      MEDICATIONS         Current Outpatient Medications on File Prior to Encounter   Medication Sig Dispense Refill    dicyclomine (BENTYL) 20 MG tablet take 1 tablet by mouth twice a day      medroxyPROGESTERone (DEPO-PROVERA) 150 MG/ML injection use as directed at physician's office every 3 months      fluticasone (FLONASE) 50 MCG/ACT nasal spray 1 spray by Each Nostril route in the morning. 16 g 0    midodrine (PROAMATINE) 5 MG tablet Take 1 tablet by mouth 3 times daily 90 tablet 5    ferrous sulfate (IRON 325) 325 (65 Fe) MG tablet Take 325 mg by mouth daily (with breakfast)      metoprolol succinate (TOPROL XL) 50 MG extended release tablet take 1 tablet by mouth once daily 90 tablet 3    furosemide (LASIX) 20 MG tablet ALTERNATE 1 TABLET EVERY OTHER DAY WITH 2 TABLETS EVERY OTHER  tablet 3    traZODone (DESYREL) 25 mg TABS Take 100 mg by mouth nightly      ibuprofen (ADVIL;MOTRIN) 800 MG tablet Take 1 tablet by mouth every 8 hours as needed for Pain 15 tablet 0    acetaminophen (TYLENOL) 500 MG tablet Take 1,000 mg by mouth every 6 hours as needed for Pain      Cholecalciferol (VITAMIN D3) 5000 units TABS Take 2,000 Units by mouth       MedroxyPROGESTERone Acetate (DEPO-PROVERA IM) Inject into the muscle      miconazole (MICOTIN) 2 % powder Apply topically 2 times to 3 times  daily. 45 g 1    Dicyclomine HCl (BENTYL PO)   Take 20 mg by mouth 2 times daily       QUEtiapine Fumarate (SEROQUEL PO) Take 400 mg by mouth 2 times daily. atomoxetine (STRATTERA) 40 MG capsule Take 40 mg by mouth 2 times daily. ARIPiprazole (ABILIFY) 10 MG tablet Take 15 mg by mouth 2 times daily 15 in the am      ACYCLOVIR 400 mg by Does not apply route three times a week        No current facility-administered medications on file prior to encounter. REVIEW OF SYSTEMS:     Constitutional: no fever, no night sweats, no fatigue. Head: no head ache , no head injury, no migranes.   Eye: no blurring of vision, no double vision. Ears: no hearing difficulty, no tinnitus. Mouth/throat: no ulceration, dental caries , dysphagia. Lungs: no cough, no shortness of breath, no wheeze  CVS: no palpitation, no chest pain, no shortness of breath  GI: no abdominal pain, no nausea , no vomiting, no constipation  FRANCOISE: no dysuria, frequency and urgency, no hematuria, no kidney stones  Musculoskeletal: she has lymphoedema  Endocrine: no polyuria, polydipsia, no cold or heat intolerance  Hematology: no anemia, no easy brusing or bleeding, no hx of clotting disorder  Dermatology: no skin rash, no eczema, no pruritis,  History of schizophrenia had a recent worsening      PHYSICAL EXAM      infrared temperature is 97.2 °F (36.2 °C). Her blood pressure is 135/75 and her pulse is 95. Her respiration is 18 and oxygen saturation is 98%. Wt Readings from Last 3 Encounters:   10/05/22 (!) 325 lb (147.4 kg)   09/14/22 (!) 325 lb (147.4 kg)   07/13/22 (!) 329 lb 3.2 oz (149.3 kg)          General Appearance  Awake, alert, oriented,  not  In acute distress  HEENT - normocephalic, atraumatic, pink conjunctiva,  anicteric sclera  Abdomen -has two open abdominal wound, the wound look smaller and clean  . Neurologic - oriented. Skin - No bruising or bleeding  Extremities -chronic leg edema,       Wound 10/13/21 Abdomen Left;Medial;Upper (Active)   Wound Image   01/26/22 1025   Wound Etiology Non-Healing Surgical 09/14/22 1052   Dressing Status Intact; Old drainage noted 09/14/22 1052   Wound Cleansed Cleansed with saline 09/14/22 1052   Dressing/Treatment Triad hydro/zinc oxide-based hydrophilic paste;ABD 74/96/32 1052   Offloading for Diabetic Foot Ulcers Offloading not required 09/14/22 1052   Wound Length (cm) 1.5 cm 11/16/22 1045   Wound Width (cm) 3 cm 11/16/22 1045   Wound Depth (cm) 0.05 cm 11/16/22 1045   Wound Surface Area (cm^2) 4.5 cm^2 11/16/22 1045   Change in Wound Size % (l*w) -1185.71 11/16/22 1045   Wound Volume (cm^3) 0.225 cm^3 11/16/22 1045   Wound Healing % -543 11/16/22 1045   Wound Assessment Granulation tissue;Pale granulation tissue; Epithelialization 11/16/22 1045   Drainage Amount Large 11/16/22 1045   Drainage Description Serosanguinous 11/16/22 1045   Odor None 11/16/22 1045   Gwen-wound Assessment Dry/flaky; Intact; Hypopigmented; Other (Comment) 11/16/22 1045   Margins Attached edges 11/16/22 1045   Wound Thickness Description not for Pressure Injury Full thickness 11/16/22 1045   Number of days: 399       Wound 03/09/22 Abdomen Proximal #2 (Active)   Wound Image   09/14/22 1052   Wound Etiology Non-Healing Surgical 09/14/22 1052   Dressing Status Intact; Old drainage noted 09/14/22 1052   Wound Cleansed Cleansed with saline 09/14/22 1052   Dressing/Treatment Triad hydro/zinc oxide-based hydrophilic paste;ABD 41/45/63 1052   Offloading for Diabetic Foot Ulcers Offloading not required 09/14/22 1052   Wound Length (cm) 1 cm 11/16/22 1045   Wound Width (cm) 21.5 cm 11/16/22 1045   Wound Depth (cm) 0 cm 11/16/22 1045   Wound Surface Area (cm^2) 21.5 cm^2 11/16/22 1045   Change in Wound Size % (l*w) -93.69 11/16/22 1045   Wound Volume (cm^3) 0 cm^3 11/16/22 1045   Wound Healing % 100 11/16/22 1045   Wound Assessment Hyper granulation tissue;Pale granulation tissue;Slough 11/16/22 1045   Drainage Amount Large 11/16/22 1045   Drainage Description Serosanguinous 11/16/22 1045   Odor None 11/16/22 1045   Gwen-wound Assessment Hyperpigmented;Dry/flaky 11/16/22 1045   Margins Attached edges 11/16/22 1045   Wound Thickness Description not for Pressure Injury Full thickness 11/16/22 1045   Number of days: 252       Wound 03/09/22 Abdomen Medial;Right #3 (Active)   Wound Image   09/14/22 1052   Wound Etiology Non-Healing Surgical 09/14/22 1052   Dressing Status Intact; New drainage noted 09/14/22 1052   Wound Cleansed Cleansed with saline 09/14/22 1052   Dressing/Treatment Triad hydro/zinc oxide-based hydrophilic paste;ABD 09/14/22 1052   Offloading for Diabetic Foot Ulcers Offloading not required 09/14/22 1052   Wound Length (cm) 2.3 cm 11/16/22 1045   Wound Width (cm) 3.7 cm 11/16/22 1045   Wound Depth (cm) 0.05 cm 11/16/22 1045   Wound Surface Area (cm^2) 8.51 cm^2 11/16/22 1045   Change in Wound Size % (l*w) 47.14 11/16/22 1045   Wound Volume (cm^3) 0.4255 cm^3 11/16/22 1045   Wound Healing % 47 11/16/22 1045   Wound Assessment Granulation tissue; Epithelialization;Pale granulation tissue 11/16/22 1045   Drainage Amount Large 11/16/22 1045   Drainage Description Serosanguinous 11/16/22 1045   Odor None 11/16/22 1045   Gwen-wound Assessment Hypopigmented; Intact;Dry/flaky; Other (Comment) 11/16/22 1045   Margins Attached edges 11/16/22 1045   Wound Thickness Description not for Pressure Injury Full thickness 11/16/22 1045   Number of days: 252         Assessment:   Post surgical abdominal wound: the wound is stable. She has hypertrophic scar  continue ABD and cream  Follow up will be scheduled. In 10 wks   Discussed with her mom. We agreed to continue current treatment  Patient Active Problem List   Diagnosis Code    Keloid L91.0    MRSA (methicillin resistant staph aureus) culture positive Z22.322    Skin ulcer of abdominal wall ([facility]) L98.499    POTS (postural orthostatic tachycardia syndrome) G90. A    Orthostatic dizziness- occasional- not worse R42    CHF (congestive heart failure) ([facility]) chronic diastolic H49.0    Morbid obesity ([facility]) E66.01    Bilateral leg edema =LYMPHEDEMA- ON f/U WITH LYMPHEDEMA CLINIC R60.0    Lymphedema of both lower extremities I89.0    SOB (shortness of breath) on exertion R06.02    Candidiasis, intertriginous B37.2    Dermatitis L30.9    Open abdominal wall wound S31.109A    History of fall Z91.81    Vertigo R42           Plan:     Patient examined and evaluated   Please see attached Discharge Instructions    Written patient dismissal instructions given to patient and signed by patient or POA. Discharge Instructions         Visit Discharge/Physician Orders:  - Continue working on weight loss, work on lower sodium meals, try to eat more fresh fruit and veggies, eat less fast food, canned, and processed meals.  - Supplies ordered through [employer]. [phone]. Wound Location: Abdomen     Dressing orders:      OK to shower take shower before dressing change. Clean wound after shower then re-dress. Abdomen wounds- Cleanse wound with normal saline or wound cleanser and gauze. Pat dry with clean gauze. Apply zinc oxide to surrounding skin. Apply silver alginate to wound. Cover with Silicone bordered foam. Change every other day. You can change more often, daily if needed according to drainage       Follow up visit:10 wks. Keep next scheduled appointment. Please give 24 hour notice if unable to keep appointment. [phone]     If you experience any of the following, please call the Wound Care Service during business hours: Monday through Friday 8:00 am - 4:30 pm  ([phone]). *Increase in pain               *Temperature over 101              *Increase in drainage from your wound or a foul odor              *Uncontrolled swelling              *Need for compression bandage changes due to slippage, breakthrough drainage     If you need medical attention outside of business hours, please contact your Primary Care Doctor or go to the nearest emergency room.         Electronically signed by Shira Rubi MD on 11/16/2022 at 11:08 AM

## 2022-11-16 NOTE — PLAN OF CARE
Problem: Wound:  Goal: Will show signs of wound healing; wound closure and no evidence of infection  Description: Will show signs of wound healing; wound closure and no evidence of infection  Outcome: Progressing   Patient seen in clinic today for abdomen wound. No s/s of infection. See AVS. Follow up in 10 week/s. Care plan reviewed with patient and mother. Patient and mother verbalize understanding of the plan of care and contribute to goal setting.

## 2022-11-25 RX ORDER — METOPROLOL SUCCINATE 50 MG/1
TABLET, EXTENDED RELEASE ORAL
Qty: 90 TABLET | Refills: 1 | Status: SHIPPED | OUTPATIENT
Start: 2022-11-25

## 2022-12-13 RX ORDER — MIDODRINE HYDROCHLORIDE 5 MG/1
5 TABLET ORAL 3 TIMES DAILY
Qty: 90 TABLET | Refills: 4 | Status: SHIPPED | OUTPATIENT
Start: 2022-12-13

## 2022-12-29 ENCOUNTER — CLINICAL DOCUMENTATION (OUTPATIENT)
Dept: WOUND CARE | Age: 47
End: 2022-12-29

## 2023-01-26 ENCOUNTER — HOSPITAL ENCOUNTER (EMERGENCY)
Age: 48
Discharge: HOME | End: 2023-01-26
Payer: MEDICARE

## 2023-01-26 VITALS
BODY MASS INDEX: 53.26 KG/M2 | OXYGEN SATURATION: 96 % | TEMPERATURE: 98.8 F | DIASTOLIC BLOOD PRESSURE: 64 MMHG | WEIGHT: 293 LBS | HEART RATE: 104 BPM | RESPIRATION RATE: 18 BRPM | SYSTOLIC BLOOD PRESSURE: 92 MMHG

## 2023-01-26 DIAGNOSIS — J01.40 ACUTE NON-RECURRENT PANSINUSITIS: Primary | ICD-10-CM

## 2023-01-26 DIAGNOSIS — H65.03 NON-RECURRENT ACUTE SEROUS OTITIS MEDIA OF BOTH EARS: ICD-10-CM

## 2023-01-26 LAB
FLUAV AG SPEC QL: NEGATIVE
FLUBV AG SPEC QL: NEGATIVE
S PYO AG THROAT QL: NEGATIVE

## 2023-01-26 PROCEDURE — 87804 INFLUENZA ASSAY W/OPTIC: CPT

## 2023-01-26 PROCEDURE — 99213 OFFICE O/P EST LOW 20 MIN: CPT

## 2023-01-26 PROCEDURE — 87651 STREP A DNA AMP PROBE: CPT

## 2023-01-26 RX ORDER — AMOXICILLIN AND CLAVULANATE POTASSIUM 875; 125 MG/1; MG/1
1 TABLET, FILM COATED ORAL 2 TIMES DAILY
Qty: 14 TABLET | Refills: 0 | Status: SHIPPED | OUTPATIENT
Start: 2023-01-26 | End: 2023-02-02

## 2023-01-26 ASSESSMENT — ENCOUNTER SYMPTOMS
WHEEZING: 0
ABDOMINAL PAIN: 0
SORE THROAT: 1
VOMITING: 0
CONSTIPATION: 0
SHORTNESS OF BREATH: 0
BLOOD IN STOOL: 0
DIARRHEA: 0
NAUSEA: 0
EYE PAIN: 0
RHINORRHEA: 0
COUGH: 1

## 2023-01-26 ASSESSMENT — PAIN SCALES - GENERAL: PAINLEVEL_OUTOF10: 8

## 2023-01-26 ASSESSMENT — PAIN - FUNCTIONAL ASSESSMENT: PAIN_FUNCTIONAL_ASSESSMENT: 0-10

## 2023-01-26 ASSESSMENT — PAIN DESCRIPTION - LOCATION: LOCATION: THROAT

## 2023-01-26 NOTE — ED TRIAGE NOTES
Betsey arrives to room with complaint of  bilateral ear pain, headache, sore throat, body aches, sinus pressure  symptoms started 5 days ago.

## 2023-01-26 NOTE — ED PROVIDER NOTES
[facility] Encounter      [facility]       Chief Complaint   Patient presents with    Pharyngitis        Nurses Notes reviewed and I agree except as noted in the HPI. HISTORY OF PRESENT ILLNESS   Nadeem Riley is a 41 y.o. female who presents to urgent care with complaint of sinus congestion, generalized body aches, headache, sore throat, ear pressure bilaterally and fatigue that is been ongoing for 5 to 7 days. Denies taking any medications for her symptoms. Patient does state that she has had some diarrhea, but does have IBS. She states she also has had some nausea. She denies chest pain, shortness of breath or fever. REVIEW OF SYSTEMS     Review of Systems   Constitutional:  Negative for appetite change, chills, fatigue, fever and unexpected weight change. HENT:  Positive for ear pain and sore throat. Negative for rhinorrhea. Eyes:  Negative for pain and visual disturbance. Respiratory:  Positive for cough. Negative for shortness of breath and wheezing. Cardiovascular:  Negative for chest pain, palpitations and leg swelling. Gastrointestinal:  Negative for abdominal pain, blood in stool, constipation, diarrhea, nausea and vomiting. Genitourinary:  Negative for dysuria, frequency and hematuria. Musculoskeletal:  Positive for myalgias. Negative for arthralgias, joint swelling and neck stiffness. Skin:  Negative for rash. Neurological:  Negative for dizziness, syncope, weakness, light-headedness and headaches. Hematological:  Does not bruise/bleed easily.      PAST MEDICAL HISTORY         Diagnosis Date    ADHD (attention deficit hyperactivity disorder)     Artificial skin graft or decellularized allodermis mechanical complic     during hospital visit    Bronchitis     CHF (congestive heart failure) ([facility]     Elbow fracture 10/09/2014    left    Irritable bowel syndrome     Irritable bowel    Lymph edema 2017    Obesity     Conde disease Conde Syndrome    Schizophrenia ([facility]     Seasonal allergies        SURGICAL HISTORY     Patient  has a past surgical history that includes Tonsillectomy and adenoidectomy; Layered wound closure; toenail excision; Toe Surgery (09/2019); and Abdomen surgery (N/A, 6/10/2021). CURRENT MEDICATIONS       Previous Medications    ACETAMINOPHEN (TYLENOL) 500 MG TABLET    Take 1,000 mg by mouth every 6 hours as needed for Pain    ACYCLOVIR    400 mg by Does not apply route three times a week     ARIPIPRAZOLE (ABILIFY) 10 MG TABLET    Take 15 mg by mouth 2 times daily 15 in the am    ATOMOXETINE (STRATTERA) 40 MG CAPSULE    Take 40 mg by mouth 2 times daily. CHOLECALCIFEROL (VITAMIN D3) 5000 UNITS TABS    Take 2,000 Units by mouth     DICYCLOMINE (BENTYL) 20 MG TABLET    take 1 tablet by mouth twice a day    DICYCLOMINE HCL (BENTYL PO)      Take 20 mg by mouth 2 times daily     FERROUS SULFATE (IRON 325) 325 (65 FE) MG TABLET    Take 325 mg by mouth daily (with breakfast)    FLUTICASONE (FLONASE) 50 MCG/ACT NASAL SPRAY    1 spray by Each Nostril route in the morning. FUROSEMIDE (LASIX) 20 MG TABLET    ALTERNATE 1 TABLET EVERY OTHER DAY WITH 2 TABLETS EVERY OTHER DAY    IBUPROFEN (ADVIL;MOTRIN) 800 MG TABLET    Take 1 tablet by mouth every 8 hours as needed for Pain    MEDROXYPROGESTERONE (DEPO-PROVERA) 150 MG/ML INJECTION    use as directed at physician's office every 3 months    MEDROXYPROGESTERONE ACETATE (DEPO-PROVERA IM)    Inject into the muscle    METOPROLOL SUCCINATE (TOPROL XL) 50 MG EXTENDED RELEASE TABLET    take 1 tablet by mouth once daily    MICONAZOLE (MICOTIN) 2 % POWDER    Apply topically 2 times to 3 times  daily. MIDODRINE (PROAMATINE) 5 MG TABLET    Take 1 tablet by mouth 3 times daily    QUETIAPINE FUMARATE (SEROQUEL PO)    Take 400 mg by mouth 2 times daily.       TRAZODONE (DESYREL) 25 MG TABS    Take 100 mg by mouth nightly       ALLERGIES     Patient is is allergic to latex, cat hair extract, and seasonal.    FAMILY HISTORY     Patient'sfamily history includes Cancer in her maternal grandmother and paternal grandmother; Diabetes in her father. SOCIAL HISTORY     Patient  reports that she has never smoked. She has never used smokeless tobacco. She reports that she does not drink alcohol and does not use drugs. PHYSICAL EXAM     ED TRIAGE VITALS  BP: 92/64, Temp: 98.8 °F (37.1 °C), Heart Rate: (!) 104, Resp: 18, SpO2: 96 %  Physical Exam  Vitals and nursing note reviewed. Constitutional:       Appearance: She is well-developed. HENT:      Head: Normocephalic and atraumatic. Nose: Congestion present. Right Sinus: Maxillary sinus tenderness and frontal sinus tenderness present. Left Sinus: Maxillary sinus tenderness and frontal sinus tenderness present. Mouth/Throat:      Pharynx: No oropharyngeal exudate or posterior oropharyngeal erythema. Eyes:      Conjunctiva/sclera: Conjunctivae normal.   Cardiovascular:      Rate and Rhythm: Normal rate and regular rhythm. Heart sounds: Normal heart sounds. No murmur heard. No gallop. Pulmonary:      Effort: Pulmonary effort is normal. No respiratory distress. Breath sounds: Normal breath sounds. No stridor. No decreased breath sounds, wheezing, rhonchi or rales. Chest:      Chest wall: No tenderness. Musculoskeletal:         General: Normal range of motion. Cervical back: Normal range of motion and neck supple. Skin:     General: Skin is warm and dry. Neurological:      Mental Status: She is alert and oriented to person, place, and time.        DIAGNOSTIC RESULTS   Labs:  Results for orders placed or performed during the hospital encounter of 01/26/23   Strep Screen Group A Throat   Result Value Ref Range    Rapid Strep A Screen NEGATIVE    Rapid influenza A/B antigens   Result Value Ref Range    Flu A Antigen Negative NEGATIVE    Flu B Antigen Negative NEGATIVE       IMAGING:  No orders to display URGENT CARE COURSE:        MDM      Patient presents to urgent care with complaint of sinus congestion, generalized body aches, headache, sore throat, ear pressure bilaterally and fatigue that is been ongoing for 5 to 7 days. Differential diagnosis include not limited to  strep throat, influenza or viral illness. Strep and influenza tests are both negative. Patient does have a significant sinusitis and has been having symptoms from 5 to 7 days. Patient will be treated with oral Augmentin and Claritin-D. Patient to follow-up with primary care provider. Patient instructed to go to ER for worsening symptoms, inability to swallow, inability to keep liquids down, inability to urinate for greater than 8 hours or difficulty breathing. Follow-up with your primary care provider. Increase oral intake. Warm salt water gargles after meals and at bedtime to help with sore throat. May take tylenol or ibuprofen as needed for pain or fever. Medications - No data to display  PROCEDURES:    Procedures    FINALIMPRESSION      1. Acute non-recurrent pansinusitis    2. Non-recurrent acute serous otitis media of both ears        DISPOSITION/PLAN   DISPOSITION Decision To Discharge 01/26/2023 09:58:07 AM    PATIENT REFERRED TO:  [address] [address] [facility] [address]  [phone]      DISCHARGE MEDICATIONS:  New Prescriptions    AMOXICILLIN-CLAVULANATE (AUGMENTIN) 875-125 MG PER TABLET    Take 1 tablet by mouth 2 times daily for 7 days    LORATADINE-PSEUDOEPHEDRINE (CLARITIN-D 12HR) 5-120 MG PER EXTENDED RELEASE TABLET    Take 1 tablet by mouth 2 times daily as needed (nasal congestion)     Current Discharge Medication List          VIRGIE Lowe - VIRGIE Hauser CNP  01/26/23 1001

## 2023-02-01 ENCOUNTER — TELEPHONE (OUTPATIENT)
Dept: WOUND CARE | Age: 48
End: 2023-02-01

## 2023-02-01 DIAGNOSIS — L30.9 DERMATITIS: Primary | ICD-10-CM

## 2023-02-01 NOTE — TELEPHONE ENCOUNTER
Visit Discharge/Physician Orders:  - Continue working on weight loss, work on lower sodium meals, try to eat more fresh fruit and veggies  - Eat less fast food, canned, and processed meals.  - Keep working on losing weight, about a pound a week  - goal of 5# weight loss before next appointment         Wound Location: Abdomen     Home Health: [facility]     Dressing orders:      OK to shower take shower before dressing change. Clean wound after shower then re-dress. Abdomen wounds- Cleanse wound with normal saline or wound cleanser and gauze. Pat dry with clean gauze. - okay to use zinc oxide around wounds if needed     ** Please use skin prep to bridger wound to protect fragile skin and help dressing stick. Upper Abdominal wound and Lower abdominal wounds- Triad to the wounds. Apply optilock or ABD pads to wounds (okay to cut lengthwise). Change daily     Follow up visit: 10 weeks on Wednesday February 15 th at 9:30 am     Keep next scheduled appointment. Please give 24 hour notice if unable to keep appointment. [phone]     If you experience any of the following, please call the Wound Care Service during business hours: Monday through Friday 8:00 am - 4:30 pm  ([phone]).               *Increase in pain               *Temperature over 101              *Increase in drainage from your wound or a foul odor              *Uncontrolled swelling              *Need for compression bandage changes due to slippage, breakthrough drainage     If you need medical attention outside of business hours, please contact your Primary Care Doctor or go to the nearest emergency room

## 2023-02-15 ENCOUNTER — HOSPITAL ENCOUNTER (OUTPATIENT)
Dept: WOUND CARE | Age: 48
Discharge: HOME | End: 2023-02-15
Payer: MEDICARE

## 2023-02-15 VITALS
HEART RATE: 110 BPM | BODY MASS INDEX: 53.26 KG/M2 | WEIGHT: 293 LBS | TEMPERATURE: 97.1 F | SYSTOLIC BLOOD PRESSURE: 127 MMHG | RESPIRATION RATE: 18 BRPM | DIASTOLIC BLOOD PRESSURE: 70 MMHG | OXYGEN SATURATION: 94 %

## 2023-02-15 PROCEDURE — 99213 OFFICE O/P EST LOW 20 MIN: CPT

## 2023-02-15 NOTE — PLAN OF CARE
Problem: Wound:  Goal: Will show signs of wound healing; wound closure and no evidence of infection  Description: Will show signs of wound healing; wound closure and no evidence of infection  Outcome: Progressing   Patient seen in clinic today for abdomen wounds. No s/s of infection. See AVS. Follow up in 3 months. Care plan reviewed with patient and mother. Patient and mother verbalize understanding of the plan of care and contribute to goal setting.

## 2023-02-15 NOTE — PROGRESS NOTES
[address] [facility]          Progress Note and Procedure Note      Kentrell Ann  MEDICAL RECORD NUMBER:  [id number]  AGE: 41 y.o. GENDER: female  : 1975  EPISODE DATE:  2/15/2023    Subjective:     SUBJECTIVE   Follow up visit for a Non healing abdominal wound. HISTORY OF PRESENT ILLNESS   She is a 41 yr old female seen for follow up visit she has history of non healing on the lower abdomen . she has hx of ADHD and schizophrenia. She had surgical debridement of the wound by general surgeon. The wound has failed to heal by forming a hypertrophic scar   She has  obesity with a large pannicula contributing to the delayed wound healing.      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 Gerard Members 2014    TOE SURGERY  2019    Dr. Mirian Baires History   Problem Relation Age of Onset    Diabetes Father     Cancer Maternal Grandmother     Cancer Paternal Grandmother      SOCIAL HISTORY       Social History     Tobacco Use    Smoking status: Never    Smokeless tobacco: Never   Vaping Use    Vaping Use: Never used   Substance Use Topics    Alcohol use: No    Drug use: No     ALLERGIES         Allergies   Allergen Reactions    Latex Rash    Cat Hair Extract     Seasonal      MEDICATIONS         Current Outpatient Medications on File Prior to Encounter   Medication Sig Dispense Refill    loratadine-pseudoephedrine (CLARITIN-D 12HR) 5-120 MG per extended release tablet Take 1 tablet by mouth 2 times daily as needed (nasal congestion) 30 tablet 0    midodrine (PROAMATINE) 5 MG tablet Take 1 tablet by mouth 3 times daily 90 tablet 4    metoprolol succinate (TOPROL XL) 50 MG extended release tablet take 1 tablet by mouth once daily 90 tablet 1    dicyclomine (BENTYL) 20 MG tablet take 1 tablet by mouth twice a day      medroxyPROGESTERone (DEPO-PROVERA) 150 MG/ML injection use as directed at physician's office every 3 months      fluticasone (FLONASE) 50 MCG/ACT nasal spray 1 spray by Each Nostril route in the morning. 16 g 0    ferrous sulfate (IRON 325) 325 (65 Fe) MG tablet Take 325 mg by mouth daily (with breakfast)      furosemide (LASIX) 20 MG tablet ALTERNATE 1 TABLET EVERY OTHER DAY WITH 2 TABLETS EVERY OTHER  tablet 3    traZODone (DESYREL) 25 mg TABS Take 100 mg by mouth nightly      ibuprofen (ADVIL;MOTRIN) 800 MG tablet Take 1 tablet by mouth every 8 hours as needed for Pain 15 tablet 0    acetaminophen (TYLENOL) 500 MG tablet Take 1,000 mg by mouth every 6 hours as needed for Pain      Cholecalciferol (VITAMIN D3) 5000 units TABS Take 2,000 Units by mouth       MedroxyPROGESTERone Acetate (DEPO-PROVERA IM) Inject into the muscle      miconazole (MICOTIN) 2 % powder Apply topically 2 times to 3 times  daily. 45 g 1    Dicyclomine HCl (BENTYL PO)   Take 20 mg by mouth 2 times daily       QUEtiapine Fumarate (SEROQUEL PO) Take 400 mg by mouth 2 times daily. atomoxetine (STRATTERA) 40 MG capsule Take 40 mg by mouth 2 times daily.         ARIPiprazole (ABILIFY) 10 MG tablet Take 15 mg by mouth 2 times daily 15 in the am      ACYCLOVIR 400 mg by Does not apply route three times a week        No current facility-administered medications on file prior to encounter. REVIEW OF SYSTEMS:     Constitutional: no fever, no night sweats, no fatigue. Head: no head ache , no head injury, no migranes. Eye: no blurring of vision, no double vision. Ears: no hearing difficulty, no tinnitus. Mouth/throat: no ulceration, dental caries , dysphagia. Lungs: no cough, no shortness of breath, no wheeze  CVS: no palpitation, no chest pain, no shortness of breath  GI: no abdominal pain, no nausea , no vomiting, no constipation  FRANCOISE: no dysuria, frequency and urgency, no hematuria, no kidney stones  Musculoskeletal: she has lymphoedema  Endocrine: no polyuria, polydipsia, no cold or heat intolerance  Hematology: no anemia, no easy brusing or bleeding, no hx of clotting disorder  Dermatology: no skin rash, no eczema, no pruritis,  History of schizophrenia had a recent worsening      PHYSICAL EXAM      infrared temperature is 97.1 °F (36.2 °C). Wt Readings from Last 3 Encounters:   01/26/23 (!) 330 lb (149.7 kg)   10/05/22 (!) 325 lb (147.4 kg)   09/14/22 (!) 325 lb (147.4 kg)          General Appearance  Awake, alert, oriented,  not  In acute distress  HEENT - normocephalic, atraumatic, pink conjunctiva,  anicteric sclera  Abdomen -has two open abdominal wound,it has not changed much  . Neurologic - oriented. Skin - No bruising or bleeding  Extremities -chronic leg edema,       Wound 10/13/21 Abdomen Left;Medial;Upper (Active)   Wound Image   01/26/22 1025   Wound Etiology Non-Healing Surgical 11/16/22 1045   Dressing Status Intact; Old drainage noted 11/16/22 1045   Wound Cleansed Cleansed with saline 11/16/22 1045   Dressing/Treatment Triad hydro/zinc oxide-based hydrophilic paste;ABD 97/28/53 1045   Offloading for Diabetic Foot Ulcers Offloading not required 11/16/22 1045   Wound Length (cm) 1.5 cm 11/16/22 1045   Wound Width (cm) 3 cm 11/16/22 1045   Wound Depth (cm) 0.05 cm 11/16/22 1045   Wound Surface Area (cm^2) 4.5 cm^2 11/16/22 1045   Change in Wound Size % (l*w) -1185.71 11/16/22 1045   Wound Volume (cm^3) 0.225 cm^3 11/16/22 1045   Wound Healing % -543 11/16/22 1045   Wound Assessment Granulation tissue;Pale granulation tissue; Epithelialization 11/16/22 1045   Drainage Amount Large 11/16/22 1045   Drainage Description Serosanguinous 11/16/22 1045   Odor None 11/16/22 1045   Gwen-wound Assessment Dry/flaky; Intact; Hypopigmented; Other (Comment) 11/16/22 1045   Margins Attached edges 11/16/22 1045   Wound Thickness Description not for Pressure Injury Full thickness 11/16/22 1045   Number of days: 489       Wound 03/09/22 Abdomen Proximal #2 (Active)   Wound Image   11/16/22 1045   Wound Etiology Non-Healing Surgical 11/16/22 1045   Dressing Status Intact; Old drainage noted 11/16/22 1045   Wound Cleansed Cleansed with saline 11/16/22 1045   Dressing/Treatment Triad hydro/zinc oxide-based hydrophilic paste;ABD 38/79/58 1045   Offloading for Diabetic Foot Ulcers Offloading not required 11/16/22 1045   Wound Length (cm) 1 cm 02/15/23 0911   Wound Width (cm) 20 cm 02/15/23 0911   Wound Depth (cm) 0 cm 11/16/22 1045   Wound Surface Area (cm^2) 20 cm^2 02/15/23 0911   Change in Wound Size % (l*w) -80.18 02/15/23 0911   Wound Volume (cm^3) 0 cm^3 11/16/22 1045   Wound Healing % 100 11/16/22 1045   Wound Assessment Hyper granulation tissue;Pale granulation tissue;Slough 11/16/22 1045   Drainage Amount Large 11/16/22 1045   Drainage Description Serosanguinous 11/16/22 1045   Odor None 11/16/22 1045   Gwen-wound Assessment Hyperpigmented;Dry/flaky 11/16/22 1045   Margins Attached edges 11/16/22 1045   Wound Thickness Description not for Pressure Injury Full thickness 11/16/22 1045   Number of days: 342       Wound 03/09/22 Abdomen Medial;Right #3 (Active)   Wound Image   11/16/22 1045   Wound Etiology Non-Healing Surgical 11/16/22 1045   Dressing Status Intact; New drainage noted 11/16/22 1045   Wound Cleansed Cleansed with saline 11/16/22 1045   Dressing/Treatment Triad hydro/zinc oxide-based hydrophilic paste;ABD 44/60/24 1045   Offloading for Diabetic Foot Ulcers Offloading not required 11/16/22 1045   Wound Length (cm) 2.3 cm 11/16/22 1045   Wound Width (cm) 3.7 cm 11/16/22 1045   Wound Depth (cm) 0.05 cm 11/16/22 1045   Wound Surface Area (cm^2) 8.51 cm^2 11/16/22 1045   Change in Wound Size % (l*w) 47.14 11/16/22 1045   Wound Volume (cm^3) 0.4255 cm^3 11/16/22 1045   Wound Healing % 47 11/16/22 1045   Wound Assessment Granulation tissue; Epithelialization;Pale granulation tissue 11/16/22 1045   Drainage Amount Large 11/16/22 1045   Drainage Description Serosanguinous 11/16/22 1045   Odor None 11/16/22 1045   Gwen-wound Assessment Hypopigmented; Intact;Dry/flaky; Other (Comment) 11/16/22 1045   Margins Attached edges 11/16/22 1045   Wound Thickness Description not for Pressure Injury Full thickness 11/16/22 1045   Number of days: 342       Assessment:   Non healing abdominal wound hypertrophic scar  Continue current treatment  Follow up will be scheduled. 3 months  Will continue wound maintenance, I do not expect the wound to heal completely  Patient Active Problem List   Diagnosis Code    Keloid L91.0    MRSA (methicillin resistant staph aureus) culture positive Z22.322    Skin ulcer of abdominal wall ([facility]) L98.499    POTS (postural orthostatic tachycardia syndrome) G90. A    Orthostatic dizziness- occasional- not worse R42    CHF (congestive heart failure) ([facility]) chronic diastolic W97.1    Morbid obesity ([facility]) E66.01    Bilateral leg edema =LYMPHEDEMA- ON f/U WITH LYMPHEDEMA CLINIC R60.0    Lymphedema of both lower extremities I89.0    SOB (shortness of breath) on exertion R06.02    Candidiasis, intertriginous B37.2    Dermatitis L30.9    Open abdominal wall wound S31.109A    History of fall Z91.81    Vertigo R42           Plan:     Patient examined and evaluated   Please see attached Discharge Instructions    Written patient dismissal instructions given to patient and signed by patient or POA. Discharge Instructions         Visit Discharge/Physician Orders:  - Continue working on weight loss, work on lower sodium meals, try to eat more fresh fruit and veggies, eat less fast food, canned, and processed meals.  - Supplies ordered through [employer]. [phone]. Wound Location: Abdomen     Dressing orders:      OK to shower take shower before dressing change. Clean wound after shower then re-dress. Abdomen wounds- Cleanse wound with normal saline or wound cleanser and gauze. Pat dry with clean gauze. Apply zinc oxide to surrounding skin. Apply silver alginate to wound. Cover with Silicone bordered foam. Change every other day. You can change more often, daily if needed according to drainage       Follow up visit:10 wks. Keep next scheduled appointment. Please give 24 hour notice if unable to keep appointment. [phone]     If you experience any of the following, please call the Wound Care Service during business hours: Monday through Friday 8:00 am - 4:30 pm  ([phone]). *Increase in pain               *Temperature over 101              *Increase in drainage from your wound or a foul odor              *Uncontrolled swelling              *Need for compression bandage changes due to slippage, breakthrough drainage     If you need medical attention outside of business hours, please contact your Primary Care Doctor or go to the nearest emergency room.         Electronically signed by Althea Calle MD on 2/15/2023 at 9:18 AM

## 2023-02-23 ENCOUNTER — TELEPHONE (OUTPATIENT)
Dept: WOUND CARE | Age: 48
End: 2023-02-23

## 2023-02-23 NOTE — TELEPHONE ENCOUNTER
Patients mom called and states patient has a new wound on her right calf and needs and appointment with Dr. Elsy Ward.  Appointment scheduled for 3/1 at 9:30 am.

## 2023-03-01 ENCOUNTER — HOSPITAL ENCOUNTER (OUTPATIENT)
Dept: WOUND CARE | Age: 48
Discharge: HOME | End: 2023-03-01
Payer: MEDICARE

## 2023-03-01 VITALS
DIASTOLIC BLOOD PRESSURE: 74 MMHG | HEART RATE: 110 BPM | TEMPERATURE: 97.9 F | OXYGEN SATURATION: 99 % | SYSTOLIC BLOOD PRESSURE: 120 MMHG | RESPIRATION RATE: 18 BRPM

## 2023-03-01 PROCEDURE — 99213 OFFICE O/P EST LOW 20 MIN: CPT

## 2023-03-01 ASSESSMENT — PAIN DESCRIPTION - LOCATION: LOCATION: LEG

## 2023-03-01 ASSESSMENT — PAIN DESCRIPTION - DESCRIPTORS: DESCRIPTORS: BURNING

## 2023-03-01 ASSESSMENT — PAIN SCALES - GENERAL: PAINLEVEL_OUTOF10: 8

## 2023-03-01 ASSESSMENT — PAIN DESCRIPTION - ORIENTATION: ORIENTATION: RIGHT

## 2023-03-01 ASSESSMENT — PAIN DESCRIPTION - PAIN TYPE: TYPE: ACUTE PAIN

## 2023-03-01 NOTE — PROGRESS NOTES
[facility]          Progress Note and Procedure Note      Shaquille Wright  MEDICAL RECORD NUMBER:  [id number]  AGE: 57 y.o. GENDER: female  : 1975  EPISODE DATE:  3/1/2023    Subjective:     SUBJECTIVE   Wound on the posterior calf    HISTORY OF PRESENT ILLNESS   She is a 41 yr old female seen for follow up visit she has history of non healing on the lower abdomen . she has hx of ADHD and schizophrenia. She had surgical debridement of the wound by general surgeon. The wound has failed to heal by forming a hypertrophic scar   She has  obesity with a large pannicula contributing to the delayed she came early due to a wound on the right posterior calf. Mom was worried as it was red swollen and draining when she scheduled the appointment.   To day the draiange has stopped the wound has started to heal. 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 Natividad Wild 2014    TOE SURGERY  2019    Dr. Sharon Urrutia History   Problem Relation Age of Onset    Diabetes Father     Cancer Maternal Grandmother     Cancer Paternal Grandmother      SOCIAL HISTORY       Social History     Tobacco Use    Smoking status: Never Smokeless tobacco: Never   Vaping Use    Vaping Use: Never used   Substance Use Topics    Alcohol use: No    Drug use: No     ALLERGIES         Allergies   Allergen Reactions    Latex Rash    Cat Hair Extract     Seasonal      MEDICATIONS         Current Outpatient Medications on File Prior to Encounter   Medication Sig Dispense Refill    loratadine-pseudoephedrine (CLARITIN-D 12HR) 5-120 MG per extended release tablet Take 1 tablet by mouth 2 times daily as needed (nasal congestion) 30 tablet 0    midodrine (PROAMATINE) 5 MG tablet Take 1 tablet by mouth 3 times daily 90 tablet 4    metoprolol succinate (TOPROL XL) 50 MG extended release tablet take 1 tablet by mouth once daily 90 tablet 1    dicyclomine (BENTYL) 20 MG tablet take 1 tablet by mouth twice a day      medroxyPROGESTERone (DEPO-PROVERA) 150 MG/ML injection use as directed at physician's office every 3 months      fluticasone (FLONASE) 50 MCG/ACT nasal spray 1 spray by Each Nostril route in the morning. 16 g 0    ferrous sulfate (IRON 325) 325 (65 Fe) MG tablet Take 325 mg by mouth daily (with breakfast)      furosemide (LASIX) 20 MG tablet ALTERNATE 1 TABLET EVERY OTHER DAY WITH 2 TABLETS EVERY OTHER  tablet 3    traZODone (DESYREL) 25 mg TABS Take 100 mg by mouth nightly      ibuprofen (ADVIL;MOTRIN) 800 MG tablet Take 1 tablet by mouth every 8 hours as needed for Pain 15 tablet 0    acetaminophen (TYLENOL) 500 MG tablet Take 1,000 mg by mouth every 6 hours as needed for Pain      Cholecalciferol (VITAMIN D3) 5000 units TABS Take 2,000 Units by mouth       MedroxyPROGESTERone Acetate (DEPO-PROVERA IM) Inject into the muscle      miconazole (MICOTIN) 2 % powder Apply topically 2 times to 3 times  daily. 45 g 1    Dicyclomine HCl (BENTYL PO)   Take 20 mg by mouth 2 times daily       QUEtiapine Fumarate (SEROQUEL PO) Take 400 mg by mouth 2 times daily. atomoxetine (STRATTERA) 40 MG capsule Take 40 mg by mouth 2 times daily. ARIPiprazole (ABILIFY) 10 MG tablet Take 15 mg by mouth 2 times daily 15 in the am      ACYCLOVIR 400 mg by Does not apply route three times a week        No current facility-administered medications on file prior to encounter. REVIEW OF SYSTEMS:     Constitutional: no fever, no night sweats, no fatigue. Head: no head ache , no head injury, no migranes. Eye: no blurring of vision, no double vision. Ears: no hearing difficulty, no tinnitus. Mouth/throat: no ulceration, dental caries , dysphagia. Lungs: no cough, no shortness of breath, no wheeze  CVS: no palpitation, no chest pain, no shortness of breath  GI: no abdominal pain, no nausea , no vomiting, no constipation  FRANCOISE: no dysuria, frequency and urgency, no hematuria, no kidney stones  Musculoskeletal: she has lymphoedema  Endocrine: no polyuria, polydipsia, no cold or heat intolerance  Hematology: no anemia, no easy brusing or bleeding, no hx of clotting disorder  Dermatology: no skin rash, no eczema, no pruritis,  History of schizophrenia had a recent worsening      PHYSICAL EXAM      infrared temperature is 97.9 °F (36.6 °C). Her blood pressure is 120/74 and her pulse is 110 (abnormal). Her respiration is 18 and oxygen saturation is 99%. Wt Readings from Last 3 Encounters:   02/15/23 (!) 330 lb (149.7 kg)   01/26/23 (!) 330 lb (149.7 kg)   10/05/22 (!) 325 lb (147.4 kg)          General Appearance  Awake, alert, oriented,  not  In acute distress  HEENT - normocephalic, atraumatic,   Has scabbed wound on the right posterior calf area, no draiange noted  Wound 03/09/22 Abdomen Proximal #2 (Active)   Wound Image   02/15/23 0911   Wound Etiology Non-Healing Surgical 02/15/23 0911   Dressing Status Intact; Old drainage noted 02/15/23 0911   Wound Cleansed Cleansed with saline 02/15/23 0911   Dressing/Treatment Triad hydro/zinc oxide-based hydrophilic paste;ABD 23/72/55 0911   Offloading for Diabetic Foot Ulcers Offloading not required 02/15/23 1725   Wound Length (cm) 1 cm 02/15/23 0911   Wound Width (cm) 20 cm 02/15/23 0911   Wound Depth (cm) 0 cm 02/15/23 0911   Wound Surface Area (cm^2) 20 cm^2 02/15/23 0911   Change in Wound Size % (l*w) -80.18 02/15/23 0911   Wound Volume (cm^3) 0 cm^3 02/15/23 0911   Wound Healing % 100 02/15/23 0911   Wound Assessment Granulation tissue; Hyper granulation tissue; Epithelialization;Slough 02/15/23 0911   Drainage Amount Small 02/15/23 0911   Drainage Description Serosanguinous 02/15/23 0911   Odor None 02/15/23 0911   Gwen-wound Assessment Hyperpigmented;Dry/flaky 02/15/23 0911   Margins Attached edges 02/15/23 0911   Wound Thickness Description not for Pressure Injury Full thickness 02/15/23 0911   Number of days: 356       Wound 03/09/22 Abdomen Distal (Active)   Wound Image   02/15/23 0906   Wound Etiology Non-Healing Surgical 02/15/23 0906   Dressing Status Intact; New drainage noted; Old drainage noted 02/15/23 0906   Wound Cleansed Cleansed with saline 02/15/23 0906   Dressing/Treatment Triad hydro/zinc oxide-based hydrophilic paste;ABD 00/49/63 0906   Offloading for Diabetic Foot Ulcers Offloading not required 02/15/23 0906   Wound Length (cm) 2 cm 02/15/23 0906   Wound Width (cm) 5.8 cm 02/15/23 0906   Wound Depth (cm) 0.01 cm 02/15/23 0906   Wound Surface Area (cm^2) 11.6 cm^2 02/15/23 0906   Change in Wound Size % (l*w) 27.95 02/15/23 0906   Wound Volume (cm^3) 0.116 cm^3 02/15/23 0906   Wound Healing % 86 02/15/23 0906   Wound Assessment Granulation tissue;Slough 02/15/23 0906   Drainage Amount Moderate 02/15/23 0906   Drainage Description Serosanguinous; Yellow 02/15/23 0906   Odor None 02/15/23 0906   Gwen-wound Assessment Hyperpigmented; Intact;Dry/flaky; Blanchable erythema 02/15/23 0906   Margins Attached edges 02/15/23 0906   Wound Thickness Description not for Pressure Injury Full thickness 02/15/23 0906   Number of days: 356       Wound 03/01/23 Tibial Right;Posterior (Active)   Wound Image   03/01/23 8936   Wound Etiology Traumatic 03/01/23 0943   Dressing Status New dressing applied 03/01/23 0943   Wound Cleansed Cleansed with saline 03/01/23 0943   Offloading for Diabetic Foot Ulcers Offloading not required 03/01/23 0943   Wound Length (cm) 6 cm 03/01/23 0943   Wound Width (cm) 5.5 cm 03/01/23 0943   Wound Depth (cm) 0.05 cm 03/01/23 0943   Wound Surface Area (cm^2) 33 cm^2 03/01/23 0943   Wound Volume (cm^3) 1.65 cm^3 03/01/23 0943   Wound Assessment Dry;Slough 03/01/23 0943   Drainage Amount None 03/01/23 0943   Odor None 03/01/23 0943   Gwen-wound Assessment Blanchable erythema;Dry/flaky; Intact 03/01/23 0943   Margins Attached edges 03/01/23 0943   Wound Thickness Description not for Pressure Injury Full thickness 03/01/23 0943   Number of days: 0       Wound 10/13/21 Abdomen Left;Medial;Upper (Active)   Wound Image   01/26/22 1025   Wound Etiology Non-Healing Surgical 11/16/22 1045   Dressing Status Intact; Old drainage noted 11/16/22 1045   Wound Cleansed Cleansed with saline 11/16/22 1045   Dressing/Treatment Triad hydro/zinc oxide-based hydrophilic paste;ABD 71/85/38 1045   Offloading for Diabetic Foot Ulcers Offloading not required 11/16/22 1045   Wound Length (cm) 1.5 cm 11/16/22 1045   Wound Width (cm) 3 cm 11/16/22 1045   Wound Depth (cm) 0.05 cm 11/16/22 1045   Wound Surface Area (cm^2) 4.5 cm^2 11/16/22 1045   Change in Wound Size % (l*w) -1185.71 11/16/22 1045   Wound Volume (cm^3) 0.225 cm^3 11/16/22 1045   Wound Healing % -543 11/16/22 1045   Wound Assessment Granulation tissue;Pale granulation tissue; Epithelialization 11/16/22 1045   Drainage Amount Large 11/16/22 1045   Drainage Description Serosanguinous 11/16/22 1045   Odor None 11/16/22 1045   Gwen-wound Assessment Dry/flaky; Intact; Hypopigmented; Other (Comment) 11/16/22 1045   Margins Attached edges 11/16/22 1045   Wound Thickness Description not for Pressure Injury Full thickness 11/16/22 1045   Number of days: 489       Wound 03/09/22 Abdomen Proximal #2 (Active)   Wound Image   11/16/22 1045   Wound Etiology Non-Healing Surgical 11/16/22 1045   Dressing Status Intact;Old drainage noted 11/16/22 1045   Wound Cleansed Cleansed with saline 11/16/22 1045   Dressing/Treatment Triad hydro/zinc oxide-based hydrophilic paste;ABD 11/16/22 1045   Offloading for Diabetic Foot Ulcers Offloading not required 11/16/22 1045   Wound Length (cm) 1 cm 02/15/23 0911   Wound Width (cm) 20 cm 02/15/23 0911   Wound Depth (cm) 0 cm 11/16/22 1045   Wound Surface Area (cm^2) 20 cm^2 02/15/23 0911   Change in Wound Size % (l*w) -80.18 02/15/23 0911   Wound Volume (cm^3) 0 cm^3 11/16/22 1045   Wound Healing % 100 11/16/22 1045   Wound Assessment Hyper granulation tissue;Pale granulation tissue;Slough 11/16/22 1045   Drainage Amount Large 11/16/22 1045   Drainage Description Serosanguinous 11/16/22 1045   Odor None 11/16/22 1045   Gwen-wound Assessment Hyperpigmented;Dry/flaky 11/16/22 1045   Margins Attached edges 11/16/22 1045   Wound Thickness Description not for Pressure Injury Full thickness 11/16/22 1045   Number of days: 342       Wound 03/09/22 Abdomen Medial;Right #3 (Active)   Wound Image   11/16/22 1045   Wound Etiology Non-Healing Surgical 11/16/22 1045   Dressing Status Intact;New drainage noted 11/16/22 1045   Wound Cleansed Cleansed with saline 11/16/22 1045   Dressing/Treatment Triad hydro/zinc oxide-based hydrophilic paste;ABD 11/16/22 1045   Offloading for Diabetic Foot Ulcers Offloading not required 11/16/22 1045   Wound Length (cm) 2.3 cm 11/16/22 1045   Wound Width (cm) 3.7 cm 11/16/22 1045   Wound Depth (cm) 0.05 cm 11/16/22 1045   Wound Surface Area (cm^2) 8.51 cm^2 11/16/22 1045   Change in Wound Size % (l*w) 47.14 11/16/22 1045   Wound Volume (cm^3) 0.4255 cm^3 11/16/22 1045   Wound Healing % 47 11/16/22 1045   Wound Assessment Granulation tissue;Epithelialization;Pale granulation tissue 11/16/22 1045   Drainage Amount Large 11/16/22 1045  Drainage Description Serosanguinous 11/16/22 1045   Odor None 11/16/22 1045   Gwen-wound Assessment Hypopigmented; Intact;Dry/flaky; Other (Comment) 11/16/22 1045   Margins Attached edges 11/16/22 1045   Wound Thickness Description not for Pressure Injury Full thickness 11/16/22 1045   Number of days: 342       Assessment:   Non healing abdominal wound hypertrophic scar  Traumatic right calf wound : healing. Will open to air. The wound is already healing. Follow up will be scheduled. 3 months  Will continue wound maintenance,    Patient Active Problem List   Diagnosis Code    Keloid L91.0    MRSA (methicillin resistant staph aureus) culture positive Z22.322    Skin ulcer of abdominal wall ([facility]) L98.499    POTS (postural orthostatic tachycardia syndrome) G90. A    Orthostatic dizziness- occasional- not worse R42    CHF (congestive heart failure) ([facility]) chronic diastolic E62.9    Morbid obesity ([facility]) E66.01    Bilateral leg edema =LYMPHEDEMA- ON f/U WITH LYMPHEDEMA CLINIC R60.0    Lymphedema of both lower extremities I89.0    SOB (shortness of breath) on exertion R06.02    Candidiasis, intertriginous B37.2    Dermatitis L30.9    Open abdominal wall wound S31.109A    History of fall Z91.81    Vertigo R42           Plan:     Patient examined and evaluated   Please see attached Discharge Instructions    Written patient dismissal instructions given to patient and signed by patient or POA. Discharge Instructions         Visit Discharge/Physician Orders:  - Continue working on weight loss, work on lower sodium meals, try to eat more fresh fruit and veggies, eat less fast food, canned, and processed meals.  - Supplies ordered through [employer]. [phone]. Wound Location: Abdomen     Dressing orders:      OK to shower take shower before dressing change. Clean wound after shower then re-dress. Abdomen wounds- Cleanse wound with normal saline or wound cleanser and gauze. Pat dry with clean gauze.  Apply zinc oxide to surrounding skin. Apply silver alginate to wound. Cover with Silicone bordered foam. Change every other day. You can change more often, daily if needed according to drainage       Follow up visit:10 wks. Keep next scheduled appointment. Please give 24 hour notice if unable to keep appointment. [phone]     If you experience any of the following, please call the Wound Care Service during business hours: Monday through Friday 8:00 am - 4:30 pm  ([phone]). *Increase in pain               *Temperature over 101              *Increase in drainage from your wound or a foul odor              *Uncontrolled swelling              *Need for compression bandage changes due to slippage, breakthrough drainage     If you need medical attention outside of business hours, please contact your Primary Care Doctor or go to the nearest emergency room.         Electronically signed by Chris Norris MD on 3/1/2023 at 10:10 AM

## 2023-03-01 NOTE — PLAN OF CARE
Problem: Wound:  Goal: Will show signs of wound healing; wound closure and no evidence of infection  Description: Will show signs of wound healing; wound closure and no evidence of infection  Outcome: Progressing  Note: Patient seen for right leg wound. Wound shows signs of proper closure and healing. No s/s of infection noted. Follow up in 10 weeks. See AVS for order changes. Care plan reviewed with patient and mother. Patient and mother verbalize understanding of the plan of care and contribute to goal setting.

## 2023-03-01 NOTE — DISCHARGE INSTRUCTIONS
Visit Discharge/Physician Orders:  - Be careful not to use too much tape. - Continue working on weight loss, work on lower sodium meals, try to eat more fresh fruit and veggies  - Eat less fast food, canned, and processed meals.  - Keep working on losing weight, about a pound a week  - goal of 5# weight loss before next appointment        Wound Location: Abdomen, Right leg     Home [facility]: [facility] Fax [phone]     Dressing orders:      OK to shower take shower before dressing change. Clean wound after shower then re-dress. Abdomen wounds- Cleanse wound with normal saline or wound cleanser and gauze. Pat dry with clean gauze. - okay to use zinc oxide around wounds if needed      *Be careful not to use too much tape as it irritates her skin*     ** Please use skin prep to bridger wound to protect fragile skin and help dressing stick**     Upper Abdominal wound and Lower abdominal wounds- Triad to the wounds. Apply optilock or ABD pads to wounds (okay to cut lengthwise). Secure with 2 inch medipore tape. Change daily    you can use adhesive remover to remove tape buildup to prevent scrubbing of skin and creating more wounds    Right leg- ok to leave wound open to air as long as it is dry     Follow up visit: 3 Months on Wednesday May 10th at 11 am     Keep next scheduled appointment. Please give 24 hour notice if unable to keep appointment. [phone]     If you experience any of the following, please call the Wound Care Service during business hours: Monday through Friday 8:00 am - 4:30 pm  ([phone]). *Increase in pain               *Temperature over 101              *Increase in drainage from your wound or a foul odor              *Uncontrolled swelling              *Need for compression bandage changes due to slippage, breakthrough drainage     If you need medical attention outside of business hours, please contact your Primary Care Doctor or go to the nearest emergency room.

## 2023-03-06 ENCOUNTER — OFFICE VISIT (OUTPATIENT)
Dept: CARDIOLOGY CLINIC | Age: 48
End: 2023-03-06
Payer: MEDICARE

## 2023-03-06 VITALS
BODY MASS INDEX: 47.09 KG/M2 | SYSTOLIC BLOOD PRESSURE: 121 MMHG | DIASTOLIC BLOOD PRESSURE: 67 MMHG | HEART RATE: 102 BPM | WEIGHT: 293 LBS | HEIGHT: 66 IN

## 2023-03-06 DIAGNOSIS — R00.2 INTERMITTENT PALPITATIONS: ICD-10-CM

## 2023-03-06 DIAGNOSIS — R00.0 SINUS TACHYCARDIA: ICD-10-CM

## 2023-03-06 DIAGNOSIS — R60.0 BILATERAL LEG EDEMA: ICD-10-CM

## 2023-03-06 DIAGNOSIS — Z91.81 HISTORY OF FALL: ICD-10-CM

## 2023-03-06 DIAGNOSIS — R06.02 SOB (SHORTNESS OF BREATH) ON EXERTION: ICD-10-CM

## 2023-03-06 DIAGNOSIS — E66.01 MORBID OBESITY (HCC): ICD-10-CM

## 2023-03-06 DIAGNOSIS — G90.A POTS (POSTURAL ORTHOSTATIC TACHYCARDIA SYNDROME): ICD-10-CM

## 2023-03-06 DIAGNOSIS — I50.32 CHRONIC DIASTOLIC CONGESTIVE HEART FAILURE (HCC): Primary | ICD-10-CM

## 2023-03-06 DIAGNOSIS — R42 ORTHOSTATIC DIZZINESS: ICD-10-CM

## 2023-03-06 PROCEDURE — 93000 ELECTROCARDIOGRAM COMPLETE: CPT | Performed by: INTERNAL MEDICINE

## 2023-03-06 PROCEDURE — G8417 CALC BMI ABV UP PARAM F/U: HCPCS | Performed by: INTERNAL MEDICINE

## 2023-03-06 PROCEDURE — G8427 DOCREV CUR MEDS BY ELIG CLIN: HCPCS | Performed by: INTERNAL MEDICINE

## 2023-03-06 PROCEDURE — 1036F TOBACCO NON-USER: CPT | Performed by: INTERNAL MEDICINE

## 2023-03-06 PROCEDURE — G8484 FLU IMMUNIZE NO ADMIN: HCPCS | Performed by: INTERNAL MEDICINE

## 2023-03-06 PROCEDURE — 99214 OFFICE O/P EST MOD 30 MIN: CPT | Performed by: INTERNAL MEDICINE

## 2023-03-06 RX ORDER — FUROSEMIDE 20 MG/1
20 TABLET ORAL 2 TIMES DAILY
Qty: 60 TABLET | Refills: 4 | Status: SHIPPED | OUTPATIENT
Start: 2023-03-06

## 2023-03-06 RX ORDER — POTASSIUM CHLORIDE 750 MG/1
10 TABLET, EXTENDED RELEASE ORAL DAILY
Qty: 30 TABLET | Refills: 1 | Status: SHIPPED | OUTPATIENT
Start: 2023-03-06

## 2023-03-06 RX ORDER — MIDODRINE HYDROCHLORIDE 10 MG/1
10 TABLET ORAL 2 TIMES DAILY
Qty: 60 TABLET | Refills: 5 | Status: SHIPPED | OUTPATIENT
Start: 2023-03-06 | End: 2023-03-06

## 2023-03-06 RX ORDER — MIDODRINE HYDROCHLORIDE 10 MG/1
10 TABLET ORAL 3 TIMES DAILY
Qty: 90 TABLET | Refills: 4 | Status: SHIPPED | OUTPATIENT
Start: 2023-03-06

## 2023-03-06 NOTE — PROGRESS NOTES
Chief Complaint   Patient presents with    6 Month Follow-Up   Originally Pt is a f/u from [address] [place] [address] for POTS syndrome  She has not had any issues until today because it is so hot outside  Since she has been put on medications she is feeling better  She does have chest pressure at night time  Has to sleep in a recliner  Sob and lt headed   She was dizzy off and on prior to going to [facility]  Dr Hyde Brought started her on lasix and they would like to know if you want her to remain on this? ?        Pt here for 6 month follow up    Sob  on exertion progressively worse in the last  4 months    Gained 15 lb since oct 2022    Level of activity decreased     Leg edema  and lymphedema getting worse    Hx of chest pain 5 days back,heaviness, moderate, non radiating, resolved after 20 to 30 min  None after and before  Mother stated she was having panic attack    Dizziness on standing getting worse and started fall more  Her sinus is acting up  No syncope  No new nonpsych med- serquel dose increased recently  and dizziness worse    EKG done today    Occasional  palpitations    Hx of Chronic dizziness  on standing    Sob on exertion-chronic- worse    Hx  schizophrenia    Orthopnea for few yrs and sleep on recliner for over 2 yrs          Patient Active Problem List   Diagnosis    Keloid    MRSA (methicillin resistant staph aureus) culture positive    Skin ulcer of abdominal wall (HCC)    POTS (postural orthostatic tachycardia syndrome)    Orthostatic dizziness- occasional-     CHF (congestive heart failure) (HCC) chronic diastolic    Morbid obesity (HCC)    Bilateral leg edema =LYMPHEDEMA- ON f/U WITH LYMPHEDEMA CLINIC    Lymphedema of both lower extremities    SOB (shortness of breath) on exertion    Candidiasis, intertriginous    Dermatitis    Open abdominal wall wound    History of fall    Vertigo    Sinus tachycardia    Intermittent palpitations       Past Surgical History:   Procedure Laterality Date    ABDOMEN SURGERY N/A 6/10/2021    EXCISIONAL DEBRIDEMENT AND CLOSURE OF CHRONIC ABDOMINAL WOUND performed by Nikki Wagner MD at [address] [facility] 2000- Dr. Melissa Smith 2014    TOE SURGERY  09/2019    Dr. Dayday Weston   Allergen Reactions    Latex Rash    Cat Hair Extract     Seasonal         Family History   Problem Relation Age of Onset    Diabetes Father     Cancer Maternal Grandmother     Cancer Paternal Grandmother         Social History     Socioeconomic History    Marital status: Single     Spouse name: Not on file    Number of children: Not on file    Years of education: Not on file    Highest education level: Not on file   Occupational History    Not on file   Tobacco Use    Smoking status: Never    Smokeless tobacco: Never   Vaping Use    Vaping Use: Never used   Substance and Sexual Activity    Alcohol use: No    Drug use: No    Sexual activity: Not on file   Other Topics Concern    Not on file   Social History Narrative    Not on file     Social Determinants of Health     Financial Resource Strain: Not on file   Food Insecurity: Not on file   Transportation Needs: Not on file   Physical Activity: Not on file   Stress: Not on file   Social Connections: Not on file   Intimate Partner Violence: Not on file   Housing Stability: Not on file       Current Outpatient Medications   Medication Sig Dispense Refill    furosemide (LASIX) 20 MG tablet Take 1 tablet by mouth 2 times daily 60 tablet 4    potassium chloride (KLOR-CON M) 10 MEQ extended release tablet Take 1 tablet by mouth daily 30 tablet 1    midodrine (PROAMATINE) 10 MG tablet Take 1 tablet by mouth 3 times daily 90 tablet 4    loratadine-pseudoephedrine (CLARITIN-D 12HR) 5-120 MG per extended release tablet Take 1 tablet by mouth 2 times daily as needed (nasal congestion) 30 tablet 0    metoprolol succinate (TOPROL XL) 50 MG extended release tablet take 1 tablet by mouth once daily 90 tablet 1    dicyclomine (BENTYL) 20 MG tablet take 1 tablet by mouth twice a day      medroxyPROGESTERone (DEPO-PROVERA) 150 MG/ML injection use as directed at physician's office every 3 months      fluticasone (FLONASE) 50 MCG/ACT nasal spray 1 spray by Each Nostril route in the morning. 16 g 0    ferrous sulfate (IRON 325) 325 (65 Fe) MG tablet Take 325 mg by mouth daily (with breakfast)      traZODone (DESYREL) 25 mg TABS Take 100 mg by mouth nightly      ibuprofen (ADVIL;MOTRIN) 800 MG tablet Take 1 tablet by mouth every 8 hours as needed for Pain 15 tablet 0    acetaminophen (TYLENOL) 500 MG tablet Take 1,000 mg by mouth every 6 hours as needed for Pain      Cholecalciferol (VITAMIN D3) 5000 units TABS Take 2,000 Units by mouth       MedroxyPROGESTERone Acetate (DEPO-PROVERA IM) Inject into the muscle      miconazole (MICOTIN) 2 % powder Apply topically 2 times to 3 times  daily. 45 g 1    Dicyclomine HCl (BENTYL PO)   Take 20 mg by mouth 2 times daily       QUEtiapine Fumarate (SEROQUEL PO) Take 600 mg by mouth 2 times daily      atomoxetine (STRATTERA) 40 MG capsule Take 40 mg by mouth 2 times daily. ARIPiprazole (ABILIFY) 10 MG tablet Take 15 mg by mouth 2 times daily 15 in the am      ACYCLOVIR 400 mg by Does not apply route three times a week        No current facility-administered medications for this visit. Review of Systems -     General ROS: negative  Psychological ROS: negative  Hematological and Lymphatic ROS: No history of blood clots or bleeding disorder.    Respiratory ROS: no cough, shortness of breath, or wheezing  Cardiovascular ROS: no chest pain or dyspnea on exertion  Gastrointestinal ROS: negative  Genito-Urinary ROS: no dysuria, trouble voiding, or hematuria  Musculoskeletal ROS: negative  Neurological ROS: no TIA or stroke symptoms  Dermatological ROS: negative      Blood pressure 121/67, pulse (!) 102, height 5' 6\" (1.676 m), weight (!) 340 lb (154.2 kg), not currently breastfeeding. Physical Examination:    General appearance - alert, well appearing, and in no distress  Mental status - alert, oriented to person, place, and time  Neck - supple, no significant adenopathy, no JVD, or carotid bruits  Chest - clear to auscultation, no wheezes, rales or rhonchi, symmetric air entry  Heart - normal rate, regular rhythm, normal S1, S2, no murmurs, rubs, clicks or gallops  Abdomen - soft, nontender, nondistended, no masses or organomegaly  Neurological - alert, oriented, normal speech, no focal findings or movement disorder noted  Musculoskeletal - no joint tenderness, deformity or swelling  Extremities - peripheral pulses normal, no pedal edema, no clubbing or cyanosis  Skin - normal coloration and turgor, no rashes, no suspicious skin lesions noted    Lab  No results for input(s): CKTOTAL, CKMB, CKMBINDEX, TROPONINI in the last 72 hours. CBC:   Lab Results   Component Value Date/Time    WBC 10.9 06/02/2022 01:32 PM     BMP:    Lab Results   Component Value Date/Time     06/02/2022 01:47 PM    K 4.2 06/02/2022 01:47 PM     06/02/2022 01:47 PM    CO2 16 06/02/2022 01:47 PM    BUN 9 06/02/2022 01:47 PM    LABALBU 4.1 10/13/2020 09:17 AM    CREATININE 0.5 06/02/2022 01:47 PM    CALCIUM 8.9 06/02/2022 01:47 PM    LABGLOM >90 06/02/2022 01:47 PM    GLUCOSE 87 06/02/2022 01:47 PM     Hepatic Function Panel:    Lab Results   Component Value Date/Time    ALKPHOS 85 10/13/2020 09:17 AM     Magnesium:    Lab Results   Component Value Date/Time    MG 2.2 06/02/2022 01:47 PM     Warfarin PT/INR:    No components found for: PTPATWAR,  PTINRWAR  HgBA1c:    No results found for: LABA1C  FLP:    No components found for: CHLPL,  TRIG,  HDL,  LDLCALC,  LDLDIRECT,  LABVLDL  TSH:    No results found for: TSH  EKG 5/7/15-Sinus  Rhythm   Low voltage in precordial leads.    ABNORMAL     Nuc stress test negative    IMPRESSION:      1. CT coronary artery calcium score of zero indicates low risk for coronary disease. 2. Diffusely small coronary arteries. In particular the LAD and right coronary artery are very small in their middle and distal thirds. These segments cannot be assessed. Occlusion of the distal segments cannot be excluded. 3. Normal cardiac function. Final report electronically signed by Dr. Veronica Petty on 5/15/2015 4:50 PM         EKG 8/8/16  Sinus  Rhythm   Low voltage in precordial leads. ABNORMAL       CONCLUSION:       1. This tilt table test is an abnormal study. 2.   It is consistent with postural orthostatic tachycardia syndrome, in             view of the increment of the heart rate up to 130 from 92 and the             difference of 39 beats per minute. Blood pressure has been well             maintained all the way until the end, and at the end the blood             pressure and the heart rate came down, suggesting it was inducing             vasovagal reaction. 3.   POTS induced vasovagal response. 4.   Marked dizziness throughout the tilting in view of the tachycardia. 5.   Marked chest pressure and short of breath throughout tilting,             related to the tachycardia. Once the patient assumed supine             position, the symptoms resolved. 6.   Nonspecific ST segment change during the assumption of standing. RECOMMENDATION:        1. At this level, the patient needs appropriate hydration and liberal             salt intake. Avoid prolonged standing, physical therapy. further clinical correlation. 2.   The patient may need functional study if clinically indicated, like             Lexiscan nuclear stress test in view of the nonspecific ST segment             changes on standing. 3.   Avoid prolonged standing in view of prolonged standing induced             vasovagal response. 4.   I discussed with the patient. Erum Keene M.D.   D: 03/20/2015 13:46      Conclusions    Summary   Technically difficult examination. Normal left ventricle size and systolic function. Ejection fraction was   estimated at 55%. There were no regional left ventricular wall motion   abnormalities and wall thickness was within normal limits. Signature   ----------------------------------------------------------------   Electronically signed by Elizabeth Sharma MD (Interpreting   physician) on 11/17/2021 at 05:03 PM      ekg 6/8/18  NSR, NO ACUTE ABN    ekg 6/5/2020  Sinus tachy no acute abn    ekg 12/2/21  Sinus  Tachycardia 105  Low voltage in precordial leads.    -Nonspecific ST depression   +   Nonspecific T-abnormality  -Nondiagnostic. ABNORMAL     Ekg 3/6/23  Sinus  Tachycardia  102 bopm  Low voltage in precordial leads.    -  Nonspecific T-abnormality. ABNORMAL           Assessment  CHF/dizziness/cp and leg edema    Grandmother had CAD in her 70's   Diagnosis Orders   1. Chronic diastolic congestive heart failure (HCC)  ECHO Complete 2D W Doppler W Color    Stress test Harrington Ted)    Basic Metabolic Panel    Magnesium      2. SOB (shortness of breath) on exertion  ECHO Complete 2D W Doppler W Color    Stress test Harrington Ted)    Basic Metabolic Panel    Magnesium      3. Bilateral leg edema =LYMPHEDEMA- ON f/U WITH LYMPHEDEMA CLINIC  ECHO Complete 2D W Doppler W Color    Stress test Harrington Ted)    Basic Metabolic Panel    Magnesium      4. Orthostatic dizziness- occasional-   ECHO Complete 2D W Doppler W Color    Stress test Harrington Ted)    Basic Metabolic Panel    Magnesium      5. Sinus tachycardia  Stress test Harrington Ted)    Basic Metabolic Panel    Magnesium      6. Intermittent palpitations  Stress test Harrington Ted)    Basic Metabolic Panel    Magnesium      7. POTS (postural orthostatic tachycardia syndrome)  Stress test Harrington Ted)    Basic Metabolic Panel    Magnesium      8. Morbid obesity ([address]  Stress test (Lexiscan)    Basic Metabolic Panel    Magnesium      9. History of fall  Stress test Amalia Christy)    Basic Metabolic Panel    Magnesium            Plan     THE  Current Labs and meds reviewed    Leg edema - LYMPHEDEMA ON MECHICAL RX  leg elevation  Ace wrap  Not possible due to leg anatomy    Continue the current treatment and with constant vigilance to changes in symptoms and also any potential side effects. Return for care or seek medical attention immediately if symptoms got worse and/or develop new symptoms. Congestive heart failure: +ve evidence of fluid overload today, no recent hospitalization for CHF  Lymphedema  edema worse  Taking more bannana and tomato and avoxcado  Increase lasix 20 po bid from qd  Start kcl 10 meq po qd  If needed may increase  lasix for 3 days a week if wt gain > 5 lb and leg edema  Mother to talk to psych about the increased dose of serquel and abilify    Hx of cp  Worse sob  Echo  Markie nuc      POTS syndrome Dxed 2015 on TTT  Hx of Sinus tachy  Dizziness chronic worse  Cont hydration  Hx of  freq fall- no rmore fall after midodrine  Increase  midodrine 10 mg po BID fro 5 bid ( pat can not take TID due to forgetting in mid day)  Avoid prolonged standing    Palpitation better  Hx of Sinus tachy  Cont  toprol xl 50 po qhs    Hx of BPPV- resolved after vestibular rehab    Hx of Coronary CTA result and stress test result d/w the pat    Morbid obesity  Advised to lose wt  Advised complaince with F/u    D/w the pat and mother the details plan of care    Mag 1.6 , 1.9 and then 2.2  Had been replaced  Off  mag oxide 400 mg po qd     Discussed use, benefit, and side effects of prescribed medications. All patient questions answered. Pt voiced understanding. Instructed to continue current medications, diet and exercise. Continue risk factor modification and medical management. Patient agreed with treatment plan. Follow up as directed.     RTC IN 1 months for chf , dizziness , cp and lab and stress test    Eric Henley, [facility]

## 2023-03-21 ENCOUNTER — HOSPITAL ENCOUNTER (OUTPATIENT)
Age: 48
Discharge: HOME | End: 2023-03-21
Payer: MEDICARE

## 2023-03-21 ENCOUNTER — HOSPITAL ENCOUNTER (OUTPATIENT)
Dept: NON INVASIVE DIAGNOSTICS | Age: 48
Discharge: HOME | End: 2023-03-21
Payer: MEDICARE

## 2023-03-21 DIAGNOSIS — R60.0 BILATERAL LEG EDEMA: ICD-10-CM

## 2023-03-21 DIAGNOSIS — R06.02 SOB (SHORTNESS OF BREATH) ON EXERTION: ICD-10-CM

## 2023-03-21 DIAGNOSIS — R42 ORTHOSTATIC DIZZINESS: ICD-10-CM

## 2023-03-21 DIAGNOSIS — R00.0 SINUS TACHYCARDIA: ICD-10-CM

## 2023-03-21 DIAGNOSIS — E66.01 MORBID OBESITY (HCC): ICD-10-CM

## 2023-03-21 DIAGNOSIS — I50.32 CHRONIC DIASTOLIC CONGESTIVE HEART FAILURE (HCC): ICD-10-CM

## 2023-03-21 DIAGNOSIS — R00.2 INTERMITTENT PALPITATIONS: ICD-10-CM

## 2023-03-21 DIAGNOSIS — G90.A POTS (POSTURAL ORTHOSTATIC TACHYCARDIA SYNDROME): ICD-10-CM

## 2023-03-21 DIAGNOSIS — Z91.81 HISTORY OF FALL: ICD-10-CM

## 2023-03-21 LAB
ANION GAP SERPL CALC-SCNC: 12 MEQ/L (ref 8–16)
BUN SERPL-MCNC: 10 MG/DL (ref 7–22)
CALCIUM SERPL-MCNC: 9.4 MG/DL (ref 8.5–10.5)
CHLORIDE SERPL-SCNC: 105 MEQ/L (ref 98–111)
CO2 SERPL-SCNC: 25 MEQ/L (ref 23–33)
CREAT SERPL-MCNC: 0.7 MG/DL (ref 0.4–1.2)
GFR SERPL CREATININE-BSD FRML MDRD: > 60 ML/MIN/1.73M2
GLUCOSE SERPL-MCNC: 92 MG/DL (ref 70–108)
LV EF: 53 %
LVEF MODALITY: NORMAL
MAGNESIUM SERPL-MCNC: 1.9 MG/DL (ref 1.6–2.4)
POTASSIUM SERPL-SCNC: 4.1 MEQ/L (ref 3.5–5.2)
SODIUM SERPL-SCNC: 142 MEQ/L (ref 135–145)

## 2023-03-21 PROCEDURE — 93017 CV STRESS TEST TRACING ONLY: CPT | Performed by: INTERNAL MEDICINE

## 2023-03-21 PROCEDURE — 3430000000 HC RX DIAGNOSTIC RADIOPHARMACEUTICAL: Performed by: INTERNAL MEDICINE

## 2023-03-21 PROCEDURE — A9500 TC99M SESTAMIBI: HCPCS | Performed by: INTERNAL MEDICINE

## 2023-03-21 PROCEDURE — 36415 COLL VENOUS BLD VENIPUNCTURE: CPT

## 2023-03-21 PROCEDURE — 6360000002 HC RX W HCPCS

## 2023-03-21 PROCEDURE — 78452 HT MUSCLE IMAGE SPECT MULT: CPT | Performed by: INTERNAL MEDICINE

## 2023-03-21 PROCEDURE — 83735 ASSAY OF MAGNESIUM: CPT

## 2023-03-21 PROCEDURE — 80048 BASIC METABOLIC PNL TOTAL CA: CPT

## 2023-03-21 PROCEDURE — 93306 TTE W/DOPPLER COMPLETE: CPT

## 2023-03-21 RX ORDER — TETRAKIS(2-METHOXYISOBUTYLISOCYANIDE)COPPER(I) TETRAFLUOROBORATE 1 MG/ML
9.4 INJECTION, POWDER, LYOPHILIZED, FOR SOLUTION INTRAVENOUS
Status: COMPLETED | OUTPATIENT
Start: 2023-03-21 | End: 2023-03-21

## 2023-03-21 RX ORDER — TETRAKIS(2-METHOXYISOBUTYLISOCYANIDE)COPPER(I) TETRAFLUOROBORATE 1 MG/ML
34.4 INJECTION, POWDER, LYOPHILIZED, FOR SOLUTION INTRAVENOUS
Status: COMPLETED | OUTPATIENT
Start: 2023-03-21 | End: 2023-03-21

## 2023-03-21 RX ADMIN — Medication 34.4 MILLICURIE: at 13:41

## 2023-03-21 RX ADMIN — Medication 9.4 MILLICURIE: at 12:40

## 2023-04-05 ENCOUNTER — OFFICE VISIT (OUTPATIENT)
Dept: CARDIOLOGY CLINIC | Age: 48
End: 2023-04-05
Payer: MEDICARE

## 2023-04-05 VITALS
SYSTOLIC BLOOD PRESSURE: 121 MMHG | HEART RATE: 101 BPM | BODY MASS INDEX: 47.09 KG/M2 | WEIGHT: 293 LBS | HEIGHT: 66 IN | DIASTOLIC BLOOD PRESSURE: 79 MMHG

## 2023-04-05 DIAGNOSIS — Z91.81 HISTORY OF FALL: ICD-10-CM

## 2023-04-05 DIAGNOSIS — R00.0 SINUS TACHYCARDIA: ICD-10-CM

## 2023-04-05 DIAGNOSIS — I89.0 LYMPHEDEMA OF BOTH LOWER EXTREMITIES: ICD-10-CM

## 2023-04-05 DIAGNOSIS — R60.0 BILATERAL LEG EDEMA: ICD-10-CM

## 2023-04-05 DIAGNOSIS — I50.32 CHRONIC DIASTOLIC CONGESTIVE HEART FAILURE (HCC): Primary | ICD-10-CM

## 2023-04-05 DIAGNOSIS — R42 ORTHOSTATIC DIZZINESS: ICD-10-CM

## 2023-04-05 DIAGNOSIS — G90.A POTS (POSTURAL ORTHOSTATIC TACHYCARDIA SYNDROME): ICD-10-CM

## 2023-04-05 DIAGNOSIS — E66.01 MORBID OBESITY (HCC): ICD-10-CM

## 2023-04-05 DIAGNOSIS — R06.02 SOB (SHORTNESS OF BREATH) ON EXERTION: ICD-10-CM

## 2023-04-05 PROCEDURE — 1036F TOBACCO NON-USER: CPT | Performed by: INTERNAL MEDICINE

## 2023-04-05 PROCEDURE — G8417 CALC BMI ABV UP PARAM F/U: HCPCS | Performed by: INTERNAL MEDICINE

## 2023-04-05 PROCEDURE — G8427 DOCREV CUR MEDS BY ELIG CLIN: HCPCS | Performed by: INTERNAL MEDICINE

## 2023-04-05 PROCEDURE — 99214 OFFICE O/P EST MOD 30 MIN: CPT | Performed by: INTERNAL MEDICINE

## 2023-04-05 RX ORDER — MIDODRINE HYDROCHLORIDE 10 MG/1
15 TABLET ORAL 3 TIMES DAILY
Qty: 405 TABLET | Refills: 3 | Status: SHIPPED | OUTPATIENT
Start: 2023-04-05

## 2023-04-05 NOTE — PROGRESS NOTES
Chief Complaint   Patient presents with    Follow-up     3 week    Originally Pt is a f/u from [address] [place] [address] for POTS syndrome  She has not had any issues until today because it is so hot outside  Since she has been put on medications she is feeling better  She does have chest pressure at night time  Has to sleep in a recliner  Sob and lt headed   She was dizzy off and on prior to going to [facility]  Dr Austin Post started her on lasix and they would like to know if you want her to remain on this? ?          Pt here today for a 3 week follow up after testing    EKG done 3/6/2023    No more chest pain    Double lasix to 20 bid week back and psych decrease serquel and lost 13 lb in in 4 week    Sob  on exertion progressively worse in the last  5 months-only slight improvment    Gained 13 lb since oct 2022 and     Level of activity  not improved      Leg edema  and lymphedema getting worse    Dizziness on standing - chronic  Her sinus is acting up  No syncope  No new nonpsych med- serquel dose increased recently  and dizziness worse    Occasional  palpitations    Hx of Chronic dizziness  on standing    Hx  schizophrenia    Orthopnea for few yrs and sleep on recliner for over 2 yrs          Patient Active Problem List   Diagnosis    Keloid    MRSA (methicillin resistant staph aureus) culture positive    Skin ulcer of abdominal wall (HCC)    POTS (postural orthostatic tachycardia syndrome)    Orthostatic dizziness- occasional-     CHF (congestive heart failure) (HCC) chronic diastolic    Morbid obesity (HCC)    Bilateral leg edema =LYMPHEDEMA- ON f/U WITH LYMPHEDEMA CLINIC    Lymphedema of both lower extremities    SOB (shortness of breath) on exertion    Candidiasis, intertriginous    Dermatitis    Open abdominal wall wound    History of fall    Vertigo    Sinus tachycardia    Intermittent palpitations       Past Surgical History:   Procedure Laterality Date    ABDOMEN SURGERY N/A 6/10/2021    EXCISIONAL DEBRIDEMENT AND CLOSURE

## 2023-04-27 RX ORDER — POTASSIUM CHLORIDE 750 MG/1
10 TABLET, EXTENDED RELEASE ORAL DAILY
Qty: 90 TABLET | Refills: 0 | Status: SHIPPED | OUTPATIENT
Start: 2023-04-27

## 2023-05-10 ENCOUNTER — HOSPITAL ENCOUNTER (OUTPATIENT)
Dept: WOUND CARE | Age: 48
Discharge: HOME | End: 2023-05-10
Payer: MEDICARE

## 2023-05-10 ENCOUNTER — HOSPITAL ENCOUNTER (OUTPATIENT)
Dept: PHYSICAL THERAPY | Age: 48
Setting detail: THERAPIES SERIES
Discharge: HOME | End: 2023-05-10
Payer: MEDICARE

## 2023-05-10 VITALS
SYSTOLIC BLOOD PRESSURE: 132 MMHG | TEMPERATURE: 97.7 F | OXYGEN SATURATION: 95 % | HEART RATE: 92 BPM | DIASTOLIC BLOOD PRESSURE: 78 MMHG | RESPIRATION RATE: 16 BRPM

## 2023-05-10 PROCEDURE — 2500000003 HC RX 250 WO HCPCS: Performed by: INTERNAL MEDICINE

## 2023-05-10 PROCEDURE — 97140 MANUAL THERAPY 1/> REGIONS: CPT

## 2023-05-10 PROCEDURE — 97163 PT EVAL HIGH COMPLEX 45 MIN: CPT

## 2023-05-10 PROCEDURE — 99213 OFFICE O/P EST LOW 20 MIN: CPT

## 2023-05-10 RX ADMIN — MICONAZOLE NITRATE: 2 POWDER TOPICAL at 11:34

## 2023-05-10 NOTE — PROGRESS NOTES
** PLEASE SIGN, DATE AND TIME CERTIFICATION BELOW AND RETURN TO [facility] OUTPATIENT REHABILITATION (FAX #: [phone]). ATTEST/CO-SIGN IF ACCESSING VIA [email or web address]. THANK YOU.**    I certify that I have examined the patient below and determined that Physical Medicine and Rehabilitation service is necessary and that I approve the established plan of care for up to 90 days or as specifically noted. Attestation, signature or co-signature of physician indicates approval of certification requirements.    ________________________ ____________ __________  Physician Signature   Date   Time  [facility]  PHYSICAL THERAPY  [x] LYMPHEDEMA SERVICES EVALUATION  [] DAILY NOTE [] PROGRESS NOTE [] DISCHARGE NOTE    [] [facility]   [] [facility]    [] [facility]   [] Kavitha Shoulder    Date: 5/10/2023  Patient Name:  Cedrick Denson  : 1975  MRN: [id number]  CSN: [id number]    Referring Practitioner Maty Hutchinson MD   Diagnosis Chronic diastolic (congestive) heart failure [I50.32]  Localized edema [R60.0]  Lymphedema, not elsewhere classified [I89.0]  Postural orthostatic tachycardia syndrome (POTS) [G90. A]  Dizziness and giddiness [R42]  History of falling [Z91.81]  Shortness of breath [R06.02]  Tachycardia, unspecified [R00.0]  Morbid (severe) obesity due to excess calories [E66.01]    Treatment Diagnosis -Increased Lymphedema swelling. Date of Evaluation 5/10/23       Functional Outcome Measure Used LYMPHEDEMA LIFE IMPACT SCALE (LLIS)-Version 2. Functional Outcome Score 52 (5/10/23)       Insurance: Primary: Payor: Gómez Mcknight /  /  / ,   Secondary: [place]ICAID [place]   Authorization Information: PRECERTIFICATION REQUIRED:  No   Visit # 1, 1/10 for progress note   Visits Allowed: INSURANCE THERAPY BENEFIT:  Patient has unlimited visits based on medical necessity  Benefit will not cover maintenance or preventative treatment.

## 2023-05-10 NOTE — DISCHARGE INSTRUCTIONS
Visit Discharge/Physician Orders:  - Continue working on weight loss, work on lower sodium meals, try to eat more fresh fruit and veggies, eat less fast food, canned, and processed meals.  - Supplies ordered through [employer]. [phone].  - Be sure to keep under the abdominal fold clean and dry. - Miconazole powder given today     Wound Location: Abdomen     Dressing orders:      OK to shower take shower before dressing change. Clean wound after shower then re-dress. Abdomen wounds- Cleanse wound with normal saline or wound cleanser and gauze. Pat dry with clean gauze. Apply Triad to lower wound. Cover with Excell SAP Silicone bordered foam. Change daily   Apply medicated chapstick and a very thin layer of petroleum to upper wound, cover with incisional Excell SAP silicon bordered foam. Change daily  Abdominal fold and belly button- Apply Miconazole powder to under abdominal fold. Apply daily. Follow up visit:3 months 07/26/2023 at 10:00    Keep next scheduled appointment. Please give 24 hour notice if unable to keep appointment. [phone]     If you experience any of the following, please call the Wound Care Service during business hours: Monday through Friday 8:00 am - 4:30 pm  ([phone]). *Increase in pain               *Temperature over 101              *Increase in drainage from your wound or a foul odor              *Uncontrolled swelling              *Need for compression bandage changes due to slippage, breakthrough drainage     If you need medical attention outside of business hours, please contact your Primary Care Doctor or go to the nearest emergency room.

## 2023-05-10 NOTE — PROGRESS NOTES
[address] f: [phone] f: [phone] p: [phone] [email or web address]      Ordering Center:   [facility]  [facility] [address] [facility] [address]  [phone]  WOUND CARE Dept: [phone]   [facility] NUMBER [phone]    Patient Information:      Kashmir Jeffries  [address]  [address]   [phone]   : 1975  AGE: 57 y.o. GENDER: female   EPISODE DATE: 5/10/2023    Insurance:      PRIMARY INSURANCE:  Plan: MEDICARE PART A AND B  Coverage: MEDICARE  Effective Date: 2015  Group Number: [unfilled]  Subscriber Number: [id number] - (Medicare)    Payer/Plan Subscr  Sex Relation Sub. Ins. ID Effective Group Num   1. MEDICARE - ME* MARANDA ART* 1975 Female Self [id number] 1/1/15                                    [address] [address]   2. [place] -* REINALDO ARTMI* 1975 Female Self [id number]                                    [address]       Patient Wound Information:      Problem List Items Addressed This Visit    None      WOUNDS REQUIRING DRESSING SUPPLIES:     Wound 22 Abdomen Proximal #2 (Active)   Wound Image   05/10/23 1059   Wound Etiology Non-Healing Surgical 05/10/23 1059   Dressing Status Intact; New dressing applied 05/10/23 1059   Wound Cleansed Cleansed with saline 05/10/23 1059   Dressing/Treatment Triad hydro/zinc oxide-based hydrophilic paste;ABD 57/74/85 0911   Offloading for Diabetic Foot Ulcers Offloading not required 05/10/23 1059   Wound Length (cm) 1 cm 05/10/23 1059   Wound Width (cm) 21.5 cm 05/10/23 1059   Wound Depth (cm) 0.05 cm 05/10/23 1059   Wound Surface Area (cm^2) 21.5 cm^2 05/10/23 1059   Change in Wound Size % (l*w) -93.69 05/10/23 105   Wound Volume (cm^3) 1.075 cm^3 05/10/23 1059   Wound Healing % -94 05/10/23 1059   Wound Assessment Granulation tissue;Pale granulation tissue;Slough 05/10/23

## 2023-05-10 NOTE — PLAN OF CARE
Problem: Wound:  Goal: Will show signs of wound healing; wound closure and no evidence of infection  Description: Will show signs of wound healing; wound closure and no evidence of infection  Outcome: Progressing   Patient seen for abdominal wound. Wound shows signs of proper closure and healing. No s/s of infection noted. Follow up in 2 weeks. See AVS for order changes. Care plan reviewed with patient and mother. Patient and mother verbalize understanding of the plan of care and contribute to goal setting.

## 2023-05-10 NOTE — PROGRESS NOTES
[facility]          Progress Note and Procedure Note      Soraya Winkler  MEDICAL RECORD NUMBER:  [id number]  AGE: 57 y.o. GENDER: female  : 1975  EPISODE DATE:  5/10/2023    Subjective:     SUBJECTIVE     Chief Complaint   Patient presents with    Wound Check     abdomen           HISTORY OF PRESENT ILLNESS    She is a 41 yr old female seen for follow up visit she has history of non healing on the lower abdomen . she has hx of ADHD and schizophrenia. She had surgical debridement of the wound by general surgeon. The wound has failed to heal by forming a hypertrophic scar    the wound is stable. Has been using protective cream and alginate  Has a fungal rash over the abdominal fold  No other issues. 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 Justin Case     TOE SURGERY  2019    Dr. Nisha Strong History   Problem Relation Age of Onset    Diabetes Father     Cancer Maternal Grandmother     Cancer Paternal Grandmother      SOCIAL HISTORY       Social History     Tobacco Use    Smoking status: Never    Smokeless tobacco: Never   Vaping Use    Vaping Use: Never used   Substance Use Topics    Alcohol use: No    Drug use:  No

## 2023-05-10 NOTE — PROGRESS NOTES
Luiz CORNEJO has reviewed and agrees with Trevon MCCORMICK's shift  Assessment.     Wendie Ng RN  5/10/2023

## 2023-05-23 ENCOUNTER — HOSPITAL ENCOUNTER (OUTPATIENT)
Age: 48
Discharge: HOME | End: 2023-05-23
Payer: MEDICARE

## 2023-05-23 DIAGNOSIS — Z91.81 HISTORY OF FALL: ICD-10-CM

## 2023-05-23 DIAGNOSIS — G90.A POTS (POSTURAL ORTHOSTATIC TACHYCARDIA SYNDROME): ICD-10-CM

## 2023-05-23 DIAGNOSIS — R00.0 SINUS TACHYCARDIA: ICD-10-CM

## 2023-05-23 DIAGNOSIS — E66.01 MORBID OBESITY (HCC): ICD-10-CM

## 2023-05-23 DIAGNOSIS — R06.02 SOB (SHORTNESS OF BREATH) ON EXERTION: ICD-10-CM

## 2023-05-23 DIAGNOSIS — R42 ORTHOSTATIC DIZZINESS: ICD-10-CM

## 2023-05-23 DIAGNOSIS — I50.32 CHRONIC DIASTOLIC CONGESTIVE HEART FAILURE (HCC): ICD-10-CM

## 2023-05-23 DIAGNOSIS — R60.0 BILATERAL LEG EDEMA: ICD-10-CM

## 2023-05-23 DIAGNOSIS — I89.0 LYMPHEDEMA OF BOTH LOWER EXTREMITIES: ICD-10-CM

## 2023-05-23 LAB
ANION GAP SERPL CALC-SCNC: 13 MEQ/L (ref 8–16)
BUN SERPL-MCNC: 9 MG/DL (ref 7–22)
CALCIUM SERPL-MCNC: 9.2 MG/DL (ref 8.5–10.5)
CHLORIDE SERPL-SCNC: 103 MEQ/L (ref 98–111)
CO2 SERPL-SCNC: 22 MEQ/L (ref 23–33)
CREAT SERPL-MCNC: 0.7 MG/DL (ref 0.4–1.2)
GFR SERPL CREATININE-BSD FRML MDRD: > 60 ML/MIN/1.73M2
GLUCOSE SERPL-MCNC: 85 MG/DL (ref 70–108)
MAGNESIUM SERPL-MCNC: 2 MG/DL (ref 1.6–2.4)
POTASSIUM SERPL-SCNC: 3.7 MEQ/L (ref 3.5–5.2)
SODIUM SERPL-SCNC: 138 MEQ/L (ref 135–145)

## 2023-05-23 PROCEDURE — 36415 COLL VENOUS BLD VENIPUNCTURE: CPT

## 2023-05-23 PROCEDURE — 80048 BASIC METABOLIC PNL TOTAL CA: CPT

## 2023-05-23 PROCEDURE — 83735 ASSAY OF MAGNESIUM: CPT

## 2023-05-24 ENCOUNTER — OFFICE VISIT (OUTPATIENT)
Dept: CARDIOLOGY CLINIC | Age: 48
End: 2023-05-24
Payer: MEDICARE

## 2023-05-24 VITALS
WEIGHT: 293 LBS | HEIGHT: 66 IN | BODY MASS INDEX: 47.09 KG/M2 | SYSTOLIC BLOOD PRESSURE: 118 MMHG | DIASTOLIC BLOOD PRESSURE: 82 MMHG | HEART RATE: 80 BPM

## 2023-05-24 DIAGNOSIS — R42 ORTHOSTATIC DIZZINESS: ICD-10-CM

## 2023-05-24 DIAGNOSIS — E66.01 MORBID OBESITY (HCC): ICD-10-CM

## 2023-05-24 DIAGNOSIS — R06.02 SOB (SHORTNESS OF BREATH) ON EXERTION: ICD-10-CM

## 2023-05-24 DIAGNOSIS — Z91.81 HISTORY OF FALL: ICD-10-CM

## 2023-05-24 DIAGNOSIS — R60.0 BILATERAL LEG EDEMA: ICD-10-CM

## 2023-05-24 DIAGNOSIS — I50.32 CHRONIC DIASTOLIC CONGESTIVE HEART FAILURE (HCC): Primary | ICD-10-CM

## 2023-05-24 DIAGNOSIS — G90.A POTS (POSTURAL ORTHOSTATIC TACHYCARDIA SYNDROME): ICD-10-CM

## 2023-05-24 DIAGNOSIS — I89.0 LYMPHEDEMA OF BOTH LOWER EXTREMITIES: ICD-10-CM

## 2023-05-24 PROCEDURE — 1036F TOBACCO NON-USER: CPT | Performed by: INTERNAL MEDICINE

## 2023-05-24 PROCEDURE — G8427 DOCREV CUR MEDS BY ELIG CLIN: HCPCS | Performed by: INTERNAL MEDICINE

## 2023-05-24 PROCEDURE — G8417 CALC BMI ABV UP PARAM F/U: HCPCS | Performed by: INTERNAL MEDICINE

## 2023-05-24 PROCEDURE — 99214 OFFICE O/P EST MOD 30 MIN: CPT | Performed by: INTERNAL MEDICINE

## 2023-05-24 NOTE — PROGRESS NOTES
adenopathy, no JVD, or carotid bruits  Chest - clear to auscultation, no wheezes, rales or rhonchi, symmetric air entry  Heart - normal rate, regular rhythm, normal S1, S2, no murmurs, rubs, clicks or gallops  Abdomen - soft, nontender, nondistended, no masses or organomegaly  Neurological - alert, oriented, normal speech, no focal findings or movement disorder noted  Musculoskeletal - no joint tenderness, deformity or swelling  Extremities - peripheral pulses normal, no pedal edema, no clubbing or cyanosis  Skin - normal coloration and turgor, no rashes, no suspicious skin lesions noted    Lab  No results for input(s): CKTOTAL, CKMB, CKMBINDEX, TROPONINI in the last 72 hours. CBC:   Lab Results   Component Value Date/Time    WBC 10.9 06/02/2022 01:32 PM     BMP:    Lab Results   Component Value Date/Time     05/23/2023 04:41 PM    K 3.7 05/23/2023 04:41 PM     05/23/2023 04:41 PM    CO2 22 05/23/2023 04:41 PM    BUN 9 05/23/2023 04:41 PM    LABALBU 4.1 10/13/2020 09:17 AM    CREATININE 0.7 05/23/2023 04:41 PM    CALCIUM 9.2 05/23/2023 04:41 PM    LABGLOM >60 05/23/2023 04:41 PM    GLUCOSE 85 05/23/2023 04:41 PM     Hepatic Function Panel:    Lab Results   Component Value Date/Time    ALKPHOS 85 10/13/2020 09:17 AM     Magnesium:    Lab Results   Component Value Date/Time    MG 2.0 05/23/2023 04:41 PM     Warfarin PT/INR:    No components found for: PTPATWAR,  PTINRWAR  HgBA1c:    No results found for: LABA1C  FLP:    No components found for: CHLPL,  TRIG,  HDL,  LDLCALC,  LDLDIRECT,  LABVLDL  TSH:    No results found for: TSH  EKG 5/7/15-Sinus  Rhythm   Low voltage in precordial leads. ABNORMAL     Nuc stress test negative    IMPRESSION:      1. CT coronary artery calcium score of zero indicates low risk for coronary disease. 2. Diffusely small coronary arteries. In particular the LAD and right coronary artery are very small in their middle and distal thirds. These segments cannot be assessed.

## 2023-05-25 ENCOUNTER — HOSPITAL ENCOUNTER (OUTPATIENT)
Dept: PHYSICAL THERAPY | Age: 48
Setting detail: THERAPIES SERIES
Discharge: HOME | End: 2023-05-25
Payer: MEDICARE

## 2023-05-25 PROCEDURE — 97140 MANUAL THERAPY 1/> REGIONS: CPT

## 2023-05-25 PROCEDURE — 97535 SELF CARE MNGMENT TRAINING: CPT

## 2023-05-25 NOTE — PROGRESS NOTES
[address] [facility]  PHYSICAL THERAPY  [] LYMPHEDEMA SERVICES EVALUATION  [x] DAILY NOTE [] PROGRESS NOTE [] DISCHARGE NOTE    [x] [facility] [place]   [] Aurea     [] [facility]   [] Franki Serra    Date: 2023  Patient Name:  Juancarlos Rico  : 1975  MRN: [id number]  CSN: [id number]    Referring Practitioner Mejia Patrick MD   Diagnosis Chronic diastolic (congestive) heart failure [I50.32]  Localized edema [R60.0]  Lymphedema, not elsewhere classified [I89.0]  Postural orthostatic tachycardia syndrome (POTS) [G90. A]  Dizziness and giddiness [R42]  History of falling [Z91.81]  Shortness of breath [R06.02]  Tachycardia, unspecified [R00.0]  Morbid (severe) obesity due to excess calories [E66.01]    Treatment Diagnosis -Increased Lymphedema swelling. Date of Evaluation 5/10/23       Functional Outcome Measure Used LYMPHEDEMA LIFE IMPACT SCALE (LLIS)-Version 2. Functional Outcome Score 52 (5/10/23)       Insurance: Primary: Payor: Navin Covarrubias /  /  / ,   Secondary: [place]ICA[place]   Authorization Information: PRECERTIFICATION REQUIRED:  No   Visit # 2, 2/10 for progress note   Visits Allowed: INSURANCE THERAPY BENEFIT:  Patient has unlimited visits based on medical necessity  Benefit will not cover maintenance or preventative treatment. Recertification Date: 10/06/9846 (12 weeks)   Physician Follow-Up: -Follow up appt on 23 with referring provider. Physician Orders: -AMB REFERRAL TO LYMPHEDEMA CLINIC. Cancer History No History of Cancer   Prior Lymphedema Treatment Received Complete Decongestive Therapy/Manual Lymph Drainage, Utilized Compression Garment, Utilized Sequential Compression Pumps, and Utilized Elevation  -Patient uses compression pump at least between 1-2 hours per day.    Additional Pertinent History  -History of Cellulitis (Right second toe  -Non-healing abdominal wounds  -Schizophrenia  -Chronic

## 2023-05-26 RX ORDER — METOPROLOL SUCCINATE 50 MG/1
TABLET, EXTENDED RELEASE ORAL
Qty: 90 TABLET | Refills: 1 | Status: SHIPPED | OUTPATIENT
Start: 2023-05-26

## 2023-06-07 ENCOUNTER — HOSPITAL ENCOUNTER (OUTPATIENT)
Dept: PHYSICAL THERAPY | Age: 48
Setting detail: THERAPIES SERIES
Discharge: HOME | End: 2023-06-07
Payer: MEDICARE

## 2023-06-07 PROCEDURE — 97140 MANUAL THERAPY 1/> REGIONS: CPT

## 2023-06-07 PROCEDURE — 97535 SELF CARE MNGMENT TRAINING: CPT

## 2023-06-07 NOTE — PROGRESS NOTES
[facility]  PHYSICAL [facility]ERAPY  [] LYMPHEDEMA SERVICES EVALUATION  [x] DAILY NOTE [] PROGRESS NOTE [] DISCHARGE NOTE    [x] [facility] [place]   [] [place] [address]    [] [facility]   [] [facility]    Date: 2023  Patient Name:  Marco Spencer  : 1975  MRN: [id number]  CSN: [id number]    Referring Practitioner Karla Olson MD   Diagnosis Chronic diastolic (congestive) heart failure [I50.32]  Localized edema [R60.0]  Lymphedema, not elsewhere classified [I89.0]  Postural orthostatic tachycardia syndrome (POTS) [G90. A]  Dizziness and giddiness [R42]  History of falling [Z91.81]  Shortness of breath [R06.02]  Tachycardia, unspecified [R00.0]  Morbid (severe) obesity due to excess calories [E66.01]    Treatment Diagnosis -Increased Lymphedema swelling. Date of Evaluation 5/10/23       Functional Outcome Measure Used LYMPHEDEMA LIFE IMPACT SCALE (LLIS)-Version 2. Functional Outcome Score 52 (5/10/23)       Insurance: Primary: Payor: Marian Brar /  /  / ,   Secondary: [place]   Authorization Information: PRECERTIFICATION REQUIRED:  No   Visit # 3, 3/10 for progress note   Visits Allowed: INSURANCE THERAPY BENEFIT:  Patient has unlimited visits based on medical necessity  Benefit will not cover maintenance or preventative treatment. Recertification Date:  (12 weeks)   Physician Follow-Up: -Follow up appt on 23 with referring provider. Physician Orders: -AMB REFERRAL TO LYMPHEDEMA CLINIC. Cancer History No History of Cancer   Prior Lymphedema Treatment Received Complete Decongestive Therapy/Manual Lymph Drainage, Utilized Compression Garment, Utilized Sequential Compression Pumps, and Utilized Elevation  -Patient uses compression pump at least between 1-2 hours per day.    Additional Pertinent History  -History of Cellulitis (Right second toe  -Non-healing abdominal wounds  -Schizophrenia  -Chronic

## 2023-07-05 ENCOUNTER — HOSPITAL ENCOUNTER (OUTPATIENT)
Dept: PHYSICAL THERAPY | Age: 48
Setting detail: THERAPIES SERIES
Discharge: HOME | End: 2023-07-05
Payer: MEDICARE

## 2023-07-05 PROCEDURE — 97140 MANUAL THERAPY 1/> REGIONS: CPT

## 2023-07-05 NOTE — DISCHARGE SUMMARY
[address] [facility]  PHYSICAL THERAPY  [] LYMPHEDEMA SERVICES EVALUATION  [] DAILY NOTE [] PROGRESS NOTE [x] DISCHARGE NOTE    [x] [facility] [place]   [] [facility]    [] [facility] [place]   [] Arlyn Fuentes    Date: 2023  Patient Name:  Anthony Garcia  : 1975  MRN: [id number]  CSN: [id number]    Referring Practitioner Peter Martins MD   Diagnosis Chronic diastolic (congestive) heart failure [I50.32]  Localized edema [R60.0]  Lymphedema, not elsewhere classified [I89.0]  Postural orthostatic tachycardia syndrome (POTS) [G90. A]  Dizziness and giddiness [R42]  History of falling [Z91.81]  Shortness of breath [R06.02]  Tachycardia, unspecified [R00.0]  Morbid (severe) obesity due to excess calories [E66.01]    Treatment Diagnosis -Increased Lymphedema swelling. Date of Evaluation 5/10/23       Functional Outcome Measure Used LYMPHEDEMA LIFE IMPACT SCALE (LLIS)-Version 2. Functional Outcome Score 52 (05/10/23)   33 (23)      Insurance: Primary: Payor: Aura Oakley /  /  / ,   Secondary: [place]   Authorization Information: PRECERTIFICATION REQUIRED:  No   Visit # 5, 5/10 for progress note   Visits Allowed: INSURANCE THERAPY BENEFIT:  Patient has unlimited visits based on medical necessity  Benefit will not cover maintenance or preventative treatment. Recertification Date:  (12 weeks)   Physician Follow-Up: -Follow up appt on 23 with referring provider. Physician Orders: -AMB REFERRAL TO LYMPHEDEMA CLINIC. Cancer History No History of Cancer   Prior Lymphedema Treatment Received Complete Decongestive Therapy/Manual Lymph Drainage, Utilized Compression Garment, Utilized Sequential Compression Pumps, and Utilized Elevation  -Patient uses compression pump at least between 1-2 hours per day.    Additional Pertinent History  -History of Cellulitis (Right second toe  -Non-healing abdominal

## 2023-07-26 ENCOUNTER — HOSPITAL ENCOUNTER (OUTPATIENT)
Dept: WOUND CARE | Age: 48
Discharge: HOME | End: 2023-07-26

## 2023-07-31 ENCOUNTER — TELEPHONE (OUTPATIENT)
Dept: WOUND CARE | Age: 48
End: 2023-07-31

## 2023-07-31 NOTE — TELEPHONE ENCOUNTER
----- Message from Sarah Gomez sent at 7/31/2023  8:10 AM EDT -----   [phone] EVELIA(MOM) NEEDS TO R/S [facility] W[facility] 8/2 APPT, HAS A SCHEDULING CONFLICT

## 2023-08-01 RX ORDER — POTASSIUM CHLORIDE 750 MG/1
10 TABLET, EXTENDED RELEASE ORAL DAILY
Qty: 90 TABLET | Refills: 0 | Status: SHIPPED | OUTPATIENT
Start: 2023-08-01

## 2023-08-14 ENCOUNTER — TELEPHONE (OUTPATIENT)
Dept: WOUND CARE | Age: 48
End: 2023-08-14

## 2023-08-14 NOTE — TELEPHONE ENCOUNTER
Returned call to Juan Manuel Wyatt. Appt cancelled. She will call back to reschedule when she is able to.

## 2023-08-14 NOTE — TELEPHONE ENCOUNTER
----- Message from Sarah Gomez sent at 8/14/2023 12:04 PM EDT -----  [address] [place] [phone] EVELIA(MOM) STATES THAT LETY WILL NEED TO R/S DUE TO BEING HER MODE OF TRANSPORTATION.  MOM IS IN A [facility] RIGHT NOW, CAN PLEASE CALL TO CONFIRM THIS IS [facility] FOR WED 8/16

## 2023-09-10 ENCOUNTER — HOSPITAL ENCOUNTER (EMERGENCY)
Age: 48
Discharge: HOME | End: 2023-09-10
Payer: MEDICARE

## 2023-09-10 VITALS
BODY MASS INDEX: 47.09 KG/M2 | TEMPERATURE: 98.4 F | HEIGHT: 66 IN | SYSTOLIC BLOOD PRESSURE: 121 MMHG | OXYGEN SATURATION: 98 % | DIASTOLIC BLOOD PRESSURE: 81 MMHG | WEIGHT: 293 LBS | RESPIRATION RATE: 16 BRPM | HEART RATE: 99 BPM

## 2023-09-10 DIAGNOSIS — M54.6 ACUTE MIDLINE THORACIC BACK PAIN: Primary | ICD-10-CM

## 2023-09-10 PROCEDURE — 6370000000 HC RX 637 (ALT 250 FOR IP): Performed by: NURSE PRACTITIONER

## 2023-09-10 PROCEDURE — 99284 EMERGENCY DEPT VISIT MOD MDM: CPT

## 2023-09-10 PROCEDURE — 6360000002 HC RX W HCPCS: Performed by: NURSE PRACTITIONER

## 2023-09-10 PROCEDURE — 96372 THER/PROPH/DIAG INJ SC/IM: CPT

## 2023-09-10 RX ORDER — ORPHENADRINE CITRATE 100 MG/1
100 TABLET, EXTENDED RELEASE ORAL 2 TIMES DAILY
Status: DISCONTINUED | OUTPATIENT
Start: 2023-09-10 | End: 2023-09-10 | Stop reason: HOSPADM

## 2023-09-10 RX ORDER — LIDOCAINE 4 G/G
1 PATCH TOPICAL EVERY 24 HOURS
Qty: 30 PATCH | Refills: 0 | Status: SHIPPED | OUTPATIENT
Start: 2023-09-10 | End: 2023-10-10

## 2023-09-10 RX ORDER — LIDOCAINE 4 G/G
1 PATCH TOPICAL DAILY
Status: DISCONTINUED | OUTPATIENT
Start: 2023-09-10 | End: 2023-09-10 | Stop reason: HOSPADM

## 2023-09-10 RX ORDER — KETOROLAC TROMETHAMINE 30 MG/ML
30 INJECTION, SOLUTION INTRAMUSCULAR; INTRAVENOUS ONCE
Status: COMPLETED | OUTPATIENT
Start: 2023-09-10 | End: 2023-09-10

## 2023-09-10 RX ORDER — CYCLOBENZAPRINE HCL 10 MG
10 TABLET ORAL 3 TIMES DAILY PRN
Qty: 20 TABLET | Refills: 0 | Status: SHIPPED | OUTPATIENT
Start: 2023-09-10 | End: 2023-09-17

## 2023-09-10 RX ADMIN — ORPHENADRINE CITRATE 100 MG: 100 TABLET, EXTENDED RELEASE ORAL at 14:04

## 2023-09-10 RX ADMIN — KETOROLAC TROMETHAMINE 30 MG: 30 INJECTION, SOLUTION INTRAMUSCULAR at 14:06

## 2023-09-10 ASSESSMENT — PAIN DESCRIPTION - LOCATION: LOCATION: BACK

## 2023-09-10 ASSESSMENT — PAIN SCALES - GENERAL
PAINLEVEL_OUTOF10: 8
PAINLEVEL_OUTOF10: 9

## 2023-09-10 ASSESSMENT — PAIN - FUNCTIONAL ASSESSMENT: PAIN_FUNCTIONAL_ASSESSMENT: 0-10

## 2023-09-10 NOTE — ED PROVIDER NOTES
[address] [facility] EMERGENCY DEPT      EMERGENCY MEDICINE     Pt Name: Mariela Nguyen  MRN: [id number]  [address] [facility] 1975  Date of evaluation: 9/10/2023  Provider: VIRGIE Choe CNP    CHIEF COMPLAINT       Chief Complaint   Patient presents with    Back Pain     HISTORY OF PRESENT ILLNESS   Betsey Gomez is a pleasant 57 y.o. female who presents to the emergency department from from home, by private vehicle for evaluation of back pain. Patient reports back pain that started today when getting up out of a chair. Patient felt as if her back muscles tightened and she had to sit back down. Patient reports she was able to get up and walk around but does have continued pain. Patient reports pain 8 out of 10. Patient denies any numbness, tingling or weakness to legs. Patient denies any falls or near falls. 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 A    Orthostatic dizziness- occasional-  R42    CHF (congestive heart failure) ([facility]) chronic diastolic V84.3    Morbid obesity ([facility]) E66.01    Bilateral leg edema =LYMPHEDEMA- ON f/U WITH LYMPHEDEMA CLINIC R60.0    Lymphedema of both lower extremities I89.0    SOB (shortness of breath) on exertion R06.02    Candidiasis, intertriginous B37.2    Dermatitis L30.9    Open abdominal wall wound S31.109A

## 2023-09-10 NOTE — ED TRIAGE NOTES
Pt presents to ED with chief complaint of lower back pain. Pt states she was getting out of her chair and her back gave out.  No meds taken prior to arrival.

## 2023-09-10 NOTE — DISCHARGE INSTRUCTIONS
Rest, stay well-hydrated. Continue home medications as previously prescribed. Over-the-counter Tylenol and/or Motrin as needed for pain. Lidocaine patches as prescribed. Flexeril 3 times daily as needed for muscle spasming and pain. Follow-up with primary care provider within the next week for reevaluation. If any worsening or concerns return to the ER immediately.

## 2023-09-13 ENCOUNTER — HOSPITAL ENCOUNTER (OUTPATIENT)
Dept: WOUND CARE | Age: 48
Discharge: HOME | End: 2023-09-13
Payer: MEDICARE

## 2023-09-13 DIAGNOSIS — L30.9 DERMATITIS: Primary | ICD-10-CM

## 2023-09-13 PROCEDURE — 99213 OFFICE O/P EST LOW 20 MIN: CPT

## 2023-09-13 NOTE — PROGRESS NOTES
[address] [facility]          Progress Note and Procedure Note      Trace Gore  MEDICAL RECORD NUMBER:  [id number]  AGE: 57 y.o. GENDER: female  : 1975  EPISODE DATE:  2023    Subjective:     SUBJECTIVE     Chief Complaint   Patient presents with    Wound Check           HISTORY OF PRESENT ILLNESS    She is a 41 yr old female seen for follow up visit she has history of non healing on the lower abdomen . she has hx of ADHD and schizophrenia. She had surgical debridement of the wound by general surgeon. The wound has failed to heal by forming a hypertrophic scar    the wound is stable. Has been using protective cream and alginate   No new changes.   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 Paula Carvalho     TOE SURGERY  2019    Dr. Guillen Prior History   Problem Relation Age of Onset    Diabetes Father     Cancer Maternal Grandmother     Cancer Paternal Grandmother      SOCIAL HISTORY       Social History     Tobacco Use    Smoking status: Never    Smokeless tobacco: Never   Vaping Use    Vaping Use: Never used   Substance Use Topics    Alcohol use: No    Drug use: No     ALLERGIES         Allergies   Allergen Reactions

## 2023-09-13 NOTE — DISCHARGE INSTRUCTIONS
Visit Discharge/Physician Orders:  - Continue working on weight loss, work on lower sodium meals, try to eat more fresh fruit and veggies, eat less fast food, canned, and processed meals.  - Be sure to keep under the abdominal fold clean and dry. Wound Location: Abdomen     Dressing orders:      OK to shower take shower before dressing change. Clean wound after shower then re-dress. Abdomen wounds- Cleanse wound with normal saline or wound cleanser and gauze. Pat dry with clean gauze. Apply Triad to lower wound. Cover with Excell SAP Silicone bordered foam. Change daily   Apply medicated chapstick and a very thin layer of petroleum to upper wound, cover with incisional Excell SAP silicon bordered foam. Change daily  Abdominal fold and belly button- Apply Miconazole powder to under abdominal fold. Apply daily. Follow up visit:3 months 12/06/2023 at 10:30 am     Keep next scheduled appointment. Please give 24 hour notice if unable to keep appointment. [phone]     If you experience any of the following, please call the Wound Care Service during business hours: Monday through Friday 8:00 am - 4:30 pm  ([phone]). *Increase in pain               *Temperature over 101              *Increase in drainage from your wound or a foul odor              *Uncontrolled swelling              *Need for compression bandage changes due to slippage, breakthrough drainage     If you need medical attention outside of business hours, please contact your Primary Care Doctor or go to the nearest emergency room.

## 2023-09-13 NOTE — PLAN OF CARE
Problem: Chronic Conditions and Co-morbidities  Goal: Patient's chronic conditions and co-morbidity symptoms are monitored and maintained or improved  Outcome: Progressing     Problem: Wound:  Goal: Will show signs of wound healing; wound closure and no evidence of infection  Description: Will show signs of wound healing; wound closure and no evidence of infection  Outcome: Progressing     Patient seen today at [address] [place] [address] for evaluation of abdominal wounds, please see AVS. Care plan reviewed with patient and mother. Patient and mother verbalize understanding of the plan of care and contribute to goal setting.

## 2023-10-11 ENCOUNTER — OFFICE VISIT (OUTPATIENT)
Dept: CARDIOLOGY CLINIC | Age: 48
End: 2023-10-11

## 2023-10-11 VITALS
HEIGHT: 66 IN | SYSTOLIC BLOOD PRESSURE: 111 MMHG | DIASTOLIC BLOOD PRESSURE: 70 MMHG | BODY MASS INDEX: 47.09 KG/M2 | HEART RATE: 98 BPM | WEIGHT: 293 LBS

## 2023-10-11 DIAGNOSIS — R60.0 BILATERAL LEG EDEMA: ICD-10-CM

## 2023-10-11 DIAGNOSIS — G90.A POTS (POSTURAL ORTHOSTATIC TACHYCARDIA SYNDROME): ICD-10-CM

## 2023-10-11 DIAGNOSIS — I50.32 CHRONIC DIASTOLIC CONGESTIVE HEART FAILURE (HCC): Primary | ICD-10-CM

## 2023-10-11 DIAGNOSIS — R42 ORTHOSTATIC DIZZINESS: ICD-10-CM

## 2023-10-11 DIAGNOSIS — R06.02 SOB (SHORTNESS OF BREATH) ON EXERTION: ICD-10-CM

## 2023-10-11 DIAGNOSIS — R00.2 INTERMITTENT PALPITATIONS: ICD-10-CM

## 2023-10-11 DIAGNOSIS — R00.0 SINUS TACHYCARDIA: ICD-10-CM

## 2023-10-11 DIAGNOSIS — E66.01 MORBID OBESITY (HCC): ICD-10-CM

## 2023-10-11 DIAGNOSIS — I47.11 INAPPROPRIATE SINUS TACHYCARDIA: ICD-10-CM

## 2023-10-11 RX ORDER — ACYCLOVIR 400 MG/1
400 TABLET ORAL
COMMUNITY
Start: 2023-09-22

## 2023-10-11 NOTE — PROGRESS NOTES
CTA result and stress test result d/w the pat    Morbid obesity BMI 52  Advised to lose wt  Advised complaince with F/u    D/w the pat and mother the details plan of care    Mag 1.6 , 1.9 and then 2.2  Had been replaced  Off  mag oxide 400 mg po qd     Stable from cardiac stand but sinus tach and dizziness    Discussed use, benefit, and side effects of prescribed medications. All patient questions answered. Pt voiced understanding. Instructed to continue current medications, diet and exercise. Continue risk factor modification and medical management. Patient agreed with treatment plan. Follow up as directed. The patient is advised to attempt to improve diet and exercise patterns to aid in medical management of this problem. Advised more plant based nutrition/meditarrean diet   Advised patient to call office or seek immediate medical attention if there is any new onset of  any chest pain, sob, palpitations, lightheadedness, dizziness, orthopnea, PND or pedal edema. All medication side effects were discussed in details.       RTC IN  6 months     [facility]

## 2023-10-28 RX ORDER — FUROSEMIDE 20 MG/1
20 TABLET ORAL 2 TIMES DAILY
Qty: 60 TABLET | Refills: 4 | Status: SHIPPED | OUTPATIENT
Start: 2023-10-28

## 2023-11-08 ENCOUNTER — OFFICE VISIT (OUTPATIENT)
Dept: PULMONOLOGY | Age: 48
End: 2023-11-08
Payer: MEDICARE

## 2023-11-08 VITALS
SYSTOLIC BLOOD PRESSURE: 126 MMHG | WEIGHT: 293 LBS | BODY MASS INDEX: 47.09 KG/M2 | OXYGEN SATURATION: 98 % | TEMPERATURE: 97.7 F | HEART RATE: 83 BPM | HEIGHT: 66 IN | DIASTOLIC BLOOD PRESSURE: 76 MMHG

## 2023-11-08 DIAGNOSIS — E66.01 MORBID OBESITY WITH BMI OF 50.0-59.9, ADULT (HCC): ICD-10-CM

## 2023-11-08 DIAGNOSIS — Z77.22 SECOND HAND SMOKE EXPOSURE: ICD-10-CM

## 2023-11-08 DIAGNOSIS — F25.9 SCHIZO AFFECTIVE SCHIZOPHRENIA (HCC): ICD-10-CM

## 2023-11-08 DIAGNOSIS — I89.0 LYMPHEDEMA OF BOTH LOWER EXTREMITIES: ICD-10-CM

## 2023-11-08 DIAGNOSIS — G90.A POTS (POSTURAL ORTHOSTATIC TACHYCARDIA SYNDROME): ICD-10-CM

## 2023-11-08 DIAGNOSIS — J32.9 RECURRENT SINUS INFECTIONS: ICD-10-CM

## 2023-11-08 DIAGNOSIS — R09.82 PND (POST-NASAL DRIP): ICD-10-CM

## 2023-11-08 DIAGNOSIS — T78.40XA ALLERGY, INITIAL ENCOUNTER: ICD-10-CM

## 2023-11-08 DIAGNOSIS — I27.29 OTHER SECONDARY PULMONARY HYPERTENSION (HCC): ICD-10-CM

## 2023-11-08 DIAGNOSIS — R06.02 SOB (SHORTNESS OF BREATH) ON EXERTION: Primary | ICD-10-CM

## 2023-11-08 DIAGNOSIS — Z91.81 HISTORY OF FALL: ICD-10-CM

## 2023-11-08 DIAGNOSIS — I50.32 CHRONIC DIASTOLIC CONGESTIVE HEART FAILURE (HCC): ICD-10-CM

## 2023-11-08 PROCEDURE — G8427 DOCREV CUR MEDS BY ELIG CLIN: HCPCS | Performed by: SPECIALIST

## 2023-11-08 PROCEDURE — 99205 OFFICE O/P NEW HI 60 MIN: CPT | Performed by: SPECIALIST

## 2023-11-08 PROCEDURE — 1036F TOBACCO NON-USER: CPT | Performed by: SPECIALIST

## 2023-11-08 PROCEDURE — G8417 CALC BMI ABV UP PARAM F/U: HCPCS | Performed by: SPECIALIST

## 2023-11-08 PROCEDURE — G8484 FLU IMMUNIZE NO ADMIN: HCPCS | Performed by: SPECIALIST

## 2023-11-08 NOTE — PROGRESS NOTES
[facility]    Patient: Trace Gore, 57 y.o.   : 1975    Patient of VIRGIE Johnson CNP   Referring Provider: René Coleman MD       Subjective     Chief Complaint   Patient presents with    New Patient     New pt. SOB, no previous chest imaging. PAULETTE Leggett is here for evaluation for her lung problems because of the shortness of breath. This patient is being followed by cardiology service for chronic diastolic congestive heart failure and also history of POTS syndrome. She has been having the progressive worsening shortness of breath midodrine. Patient has significant lymphedema for which she has been following with the lymphedema clinic as well. She has been having the chronic dizziness on standing and also history of schizophrenia. Patient has a skin ulcer of the abdominal wall. And was diagnosed as having MRSA and also formed a keloid s/p surgery. She does have history of vertigo related to the above and uses the rolling walker. She complained to the cardiology about intermittent palpitation. Patient had the CT coronary calcification score 0. Coronary arteries are diffusely small. Patient was tested for the tilt-table in the past and it was abnormal consistent with the proximal.  Patient had the echocardiogram done sometime in  which showed ejection fraction of 55%. And patient's weight with diastolic pressure was marked as 30. In summary which appear to be slightly elevated. At the request of the patient and her mother patient is referred to the pulmonary service to rule out any other underlying lung problems. Nodule of snoring but patient did very sleepy and tired and exhausted when she is at rest after work. Patient works in [employer]. Patient sleeps in a recliner and she  cannot lay down on the back with Significant shortness of breath.   She denies of having any known snoring or any witnessed

## 2023-11-15 ENCOUNTER — NURSE ONLY (OUTPATIENT)
Dept: LAB | Age: 48
End: 2023-11-15

## 2023-11-15 DIAGNOSIS — R09.82 PND (POST-NASAL DRIP): ICD-10-CM

## 2023-11-15 DIAGNOSIS — T78.40XA ALLERGY, INITIAL ENCOUNTER: ICD-10-CM

## 2023-11-15 DIAGNOSIS — J32.9 RECURRENT SINUS INFECTIONS: ICD-10-CM

## 2023-11-15 RX ORDER — POTASSIUM CHLORIDE 750 MG/1
10 TABLET, EXTENDED RELEASE ORAL DAILY
Qty: 90 TABLET | Refills: 3 | Status: SHIPPED | OUTPATIENT
Start: 2023-11-15

## 2023-11-15 NOTE — TELEPHONE ENCOUNTER
Betsey Bryant called requesting a refill on the following medications:  Requested Prescriptions     Pending Prescriptions Disp Refills    potassium chloride (KLOR-CON M) 10 MEQ extended release tablet 90 tablet 0     Sig: Take 1 tablet by mouth daily     Pharmacy verified:  [facility]      Date of last visit: 10-11-23  Date of next visit (if applicable): 1/71/5179

## 2023-11-16 LAB
IGA SERPL-MCNC: 275 MG/DL (ref 70–400)
IGE SERPL-ACNC: 8 IU/ML
IGG SERPL-MCNC: 1293 MG/DL (ref 700–1600)
IGM SERPL-MCNC: 150 MG/DL (ref 40–230)

## 2023-11-24 ENCOUNTER — OFFICE VISIT (OUTPATIENT)
Dept: FAMILY MEDICINE CLINIC | Age: 48
End: 2023-11-24

## 2023-11-24 VITALS
BODY MASS INDEX: 47.09 KG/M2 | HEIGHT: 66 IN | RESPIRATION RATE: 16 BRPM | WEIGHT: 293 LBS | TEMPERATURE: 98.6 F | OXYGEN SATURATION: 98 % | SYSTOLIC BLOOD PRESSURE: 118 MMHG | DIASTOLIC BLOOD PRESSURE: 72 MMHG | HEART RATE: 96 BPM

## 2023-11-24 DIAGNOSIS — H66.003 ACUTE SUPPURATIVE OTITIS MEDIA OF BOTH EARS WITHOUT SPONTANEOUS RUPTURE OF TYMPANIC MEMBRANES, RECURRENCE NOT SPECIFIED: Primary | ICD-10-CM

## 2023-11-24 DIAGNOSIS — R05.1 ACUTE COUGH: ICD-10-CM

## 2023-11-24 LAB
ALLERGEN BERMUDA GRASS IGE: < 0.1 KU/L (ref 0–0.34)
ALLERGEN BIRCH IGE: < 0.1 KU/L (ref 0–0.34)
ALLERGEN DOG DANDER IGE: ABNORMAL KU/L (ref 0–0.34)
ALLERGEN GERMAN COCKROACH IGE: < 0.1 KU/L (ref 0–0.34)
ALLERGEN HORMODENDRUM IGE: ABNORMAL KUL/L (ref 0–0.34)
ALLERGEN MOUSE EPITHELIA IGE: < 0.1 KU/L (ref 0–0.34)
ALLERGEN OAK TREE IGE: < 0.1 KU/L (ref 0–0.34)
ALLERGEN PECAN TREE IGE: < 0.1 KU/L (ref 0–0.34)
ALLERGEN PIGWEED ROUGH IGE: < 0.1 KU/L (ref 0–0.34)
ALLERGEN SHEEP SORREL (W18) IGE: < 0.1 KU/L (ref 0–0.34)
ALLERGEN TREE SYCAMORE: < 0.1 KU/L (ref 0–0.34)
ALLERGEN WALNUT TREE IGE: < 0.1 KU/L (ref 0–0.34)
ALLERGEN WHITE MULBERRY TREE, IGE: < 0.1 KU/L (ref 0–0.34)
ALLERGEN, TREE, WHITE ASH IGE: < 0.1 KU/L (ref 0–0.34)
ALTERNARIA ALTERNATA: < 0.1 KU/L (ref 0–0.34)
ASPERGILLUS FUMIGATUS: < 0.1 KU/L (ref 0–0.34)
CAT DANDER ANTIBODY: < 0.1 KU/L (ref 0–0.34)
COTTONWOOD TREE: < 0.1 KU/L (ref 0–0.34)
D. FARINAE: 0.48 KU/L (ref 0–0.34)
D. PTERONYSSINUS: ABNORMAL KU/L (ref 0–0.34)
ELM TREE: < 0.1 KU/L (ref 0–0.34)
IGE SERPL-ACNC: 8 IU/ML
MAPLE/BOXELDER TREE: < 0.1 KU/L (ref 0–0.34)
MOUNTAIN CEDAR TREE: < 0.1 KU/L (ref 0–0.34)
MUCOR RACEMOSUS: < 0.1 KU/L (ref 0–0.34)
P. NOTATUM: < 0.1 KU/L (ref 0–0.34)
RUSSIAN THISTLE: < 0.1 KU/L (ref 0–0.34)
SHORT RAGWD(A ARTEMIS.) IGE: < 0.1 KU/L (ref 0–0.34)
TIMOTHY GRASS: < 0.1 KU/L (ref 0–0.34)

## 2023-11-24 RX ORDER — ALBUTEROL SULFATE 90 UG/1
2 AEROSOL, METERED RESPIRATORY (INHALATION) EVERY 6 HOURS PRN
Qty: 18 G | Refills: 3 | Status: SHIPPED | OUTPATIENT
Start: 2023-11-24

## 2023-11-24 RX ORDER — AMOXICILLIN AND CLAVULANATE POTASSIUM 875; 125 MG/1; MG/1
1 TABLET, FILM COATED ORAL 2 TIMES DAILY
Qty: 20 TABLET | Refills: 0 | Status: SHIPPED | OUTPATIENT
Start: 2023-11-24 | End: 2023-12-04

## 2023-11-24 RX ORDER — FLUTICASONE PROPIONATE 50 MCG
1 SPRAY, SUSPENSION (ML) NASAL DAILY
Qty: 16 G | Refills: 0 | Status: SHIPPED | OUTPATIENT
Start: 2023-11-24

## 2023-11-24 RX ORDER — TRAZODONE HYDROCHLORIDE 100 MG/1
TABLET ORAL
COMMUNITY
Start: 2023-10-11

## 2023-11-24 ASSESSMENT — ENCOUNTER SYMPTOMS
COUGH: 1
NAUSEA: 0
SINUS PAIN: 0
DIARRHEA: 0
SHORTNESS OF BREATH: 1
SORE THROAT: 1
CONSTIPATION: 0
VOICE CHANGE: 0
SINUS PRESSURE: 0
TROUBLE SWALLOWING: 0
RHINORRHEA: 0
VOMITING: 0

## 2023-11-24 ASSESSMENT — PATIENT HEALTH QUESTIONNAIRE - PHQ9
SUM OF ALL RESPONSES TO PHQ QUESTIONS 1-9: 0
2. FEELING DOWN, DEPRESSED OR HOPELESS: 0
SUM OF ALL RESPONSES TO PHQ QUESTIONS 1-9: 0
SUM OF ALL RESPONSES TO PHQ9 QUESTIONS 1 & 2: 0
1. LITTLE INTEREST OR PLEASURE IN DOING THINGS: 0
SUM OF ALL RESPONSES TO PHQ QUESTIONS 1-9: 0
SUM OF ALL RESPONSES TO PHQ QUESTIONS 1-9: 0

## 2023-12-06 ENCOUNTER — HOSPITAL ENCOUNTER (OUTPATIENT)
Dept: CT IMAGING | Age: 48
Discharge: HOME | End: 2023-12-06
Attending: SPECIALIST
Payer: MEDICARE

## 2023-12-06 ENCOUNTER — HOSPITAL ENCOUNTER (OUTPATIENT)
Dept: WOUND CARE | Age: 48
Discharge: HOME | End: 2023-12-06
Attending: INTERNAL MEDICINE
Payer: MEDICARE

## 2023-12-06 VITALS
RESPIRATION RATE: 16 BRPM | DIASTOLIC BLOOD PRESSURE: 83 MMHG | SYSTOLIC BLOOD PRESSURE: 146 MMHG | OXYGEN SATURATION: 98 % | HEART RATE: 102 BPM | TEMPERATURE: 97.7 F

## 2023-12-06 DIAGNOSIS — G90.A POTS (POSTURAL ORTHOSTATIC TACHYCARDIA SYNDROME): ICD-10-CM

## 2023-12-06 DIAGNOSIS — I89.0 LYMPHEDEMA OF BOTH LOWER EXTREMITIES: ICD-10-CM

## 2023-12-06 DIAGNOSIS — L30.9 DERMATITIS: Primary | ICD-10-CM

## 2023-12-06 DIAGNOSIS — R06.02 SOB (SHORTNESS OF BREATH) ON EXERTION: ICD-10-CM

## 2023-12-06 DIAGNOSIS — E66.01 MORBID OBESITY WITH BMI OF 50.0-59.9, ADULT (HCC): ICD-10-CM

## 2023-12-06 PROCEDURE — 99213 OFFICE O/P EST LOW 20 MIN: CPT

## 2023-12-06 PROCEDURE — 6360000004 HC RX CONTRAST MEDICATION: Performed by: SPECIALIST

## 2023-12-06 PROCEDURE — 71275 CT ANGIOGRAPHY CHEST: CPT

## 2023-12-06 RX ADMIN — IOPAMIDOL 80 ML: 755 INJECTION, SOLUTION INTRAVENOUS at 10:37

## 2023-12-06 ASSESSMENT — PAIN - FUNCTIONAL ASSESSMENT: PAIN_FUNCTIONAL_ASSESSMENT: ACTIVITIES ARE NOT PREVENTED

## 2023-12-06 ASSESSMENT — PAIN SCALES - GENERAL: PAINLEVEL_OUTOF10: 8

## 2023-12-06 ASSESSMENT — PAIN DESCRIPTION - LOCATION: LOCATION: ABDOMEN

## 2023-12-06 ASSESSMENT — PAIN DESCRIPTION - DESCRIPTORS: DESCRIPTORS: ACHING

## 2023-12-06 NOTE — PLAN OF CARE
Problem: Wound:  Goal: Will show signs of wound healing; wound closure and no evidence of infection  Description: Will show signs of wound healing; wound closure and no evidence of infection  Outcome: Not Progressing   Pt. Seen today for abdomen wounds see AVS for new orders. Follow up in 4 months. Care plan reviewed with patient and mother. Patient and mother verbalize understanding of the plan of care and contribute to goal setting.

## 2023-12-06 NOTE — PATIENT INSTRUCTIONS
Visit Discharge/Physician Orders:  - Continue working on weight loss, work on lower sodium meals, try to eat more fresh fruit and veggies, eat less fast food, canned, and processed meals.  - Be sure to keep under the abdominal fold clean and dry. Wound Location: Abdomen     Dressing orders:      OK to shower take shower before dressing change. Clean wound after shower then re-dress. Abdomen wounds- Cleanse wound with normal saline or wound cleanser and gauze. Pat dry with clean gauze. Apply Triad to lower wound. Cover with Excell SAP Silicone bordered foam. Change daily   Apply medicated chapstick and a very thin layer of petroleum to upper wound, cover with incisional Excell SAP silicone bordered foam. Change daily  Abdominal fold and belly button- Apply Miconazole powder to under abdominal fold. Apply daily. Follow up visit: 4 months 4/03/2023 at 10:00 am     Keep next scheduled appointment. Please give 24 hour notice if unable to keep appointment. [phone]     If you experience any of the following, please call the Wound Care Service during business hours: Monday through Friday 8:00 am - 4:30 pm  ([phone]). *Increase in pain               *Temperature over 101              *Increase in drainage from your wound or a foul odor              *Uncontrolled swelling              *Need for compression bandage changes due to slippage, breakthrough drainage     If you need medical attention outside of business hours, please contact your Primary Care Doctor or go to the nearest emergency room.

## 2023-12-06 NOTE — PROGRESS NOTES
[address] [facility]          Progress Note and Procedure Note      Coco Hensley  MEDICAL RECORD NUMBER:  [id number]  AGE: 57 y.o. GENDER: female  : 1975  EPISODE DATE:  2023    Subjective:     SUBJECTIVE     Chief Complaint   Patient presents with    Wound Check     abdomen           HISTORY OF PRESENT ILLNESS    She is a 41 yr old female seen for follow up visit she has history of non healing on the lower abdomen . she has hx of ADHD and schizophrenia. She had surgical debridement of the wound . The wound has failed to heal by forming a hypertrophic scar    the wound is stable. Has been using protective cream and alginate   No new changes.   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 Yasemin Delacruz     TOE SURGERY  2019    Dr. Haylee Whittaker History   Problem Relation Age of Onset    Diabetes Father     Cancer Maternal Grandmother     Cancer Paternal Grandmother      SOCIAL HISTORY       Social History     Tobacco Use    Smoking status: Never     Passive exposure: Never    Smokeless tobacco: Never   Vaping Use    Vaping Use: Never used   Substance Use Topics    Alcohol use: No    Drug use: No     ALLERGIES         Allergies

## 2023-12-13 ENCOUNTER — HOSPITAL ENCOUNTER (OUTPATIENT)
Dept: PULMONOLOGY | Age: 48
Discharge: HOME | End: 2023-12-13
Attending: SPECIALIST
Payer: MEDICARE

## 2023-12-13 PROCEDURE — 6360000002 HC RX W HCPCS

## 2023-12-13 PROCEDURE — 94070 EVALUATION OF WHEEZING: CPT

## 2023-12-20 ENCOUNTER — TELEPHONE (OUTPATIENT)
Dept: PULMONOLOGY | Age: 48
End: 2023-12-20

## 2023-12-20 PROBLEM — F25.9 SCHIZOAFFECTIVE DISORDER (HCC): Status: ACTIVE | Noted: 2023-12-20

## 2023-12-20 NOTE — TELEPHONE ENCOUNTER
Patient mother Stephy is calling to cancel Betsey appt scheduled today with Janine Lorenzo. The patient has a sore throat and is scheduled to see her PCP. Stephy says she will call later to reschedule this appt

## 2023-12-20 NOTE — PROGRESS NOTES
[address] [address]  Dept: [phone]  Loc: [phone]    John Malhotra is a 57 y.o. female who presents today for:  Chief Complaint   Patient presents with    Establish Care     Still having bilateral ear pain. Goals    None         Assessment/Plan:     Betsey was seen today for establish care. Diagnoses and all orders for this visit:    Encounter for medical examination to establish care    Recurrent acute suppurative otitis media without spontaneous rupture of tympanic membrane of both sides  -     Discontinue: amoxicillin-clavulanate (AUGMENTIN) 875-125 MG per tablet; Take 1 tablet by mouth 2 times daily for 7 days  -     amoxicillin-clavulanate (AUGMENTIN) 875-125 MG per tablet; Take 1 tablet by mouth 2 times daily for 7 days    Ulcer of abdomen wall, limited to breakdown of skin (HCC)    Excessive bleeding in premenopausal period    Colon cancer screening  -     Fecal DNA Colorectal cancer screening (Cologuard)    Encounter for screening for HIV  -     HIV Screen; Future  -     HIV Screen    Need for hepatitis C screening test  -     Hepatitis C Antibody; Future  -     Hepatitis C Antibody    Lipid screening  -     Lipid, Fasting; Future  -     Lipid, Fasting    Hyperglycemia  -     Comprehensive Metabolic Panel; Future  -     CBC with Auto Differential; Future  -     Hemoglobin A1C; Future  -     TSH; Future  -     T4, Free; Future  -     T4, Free  -     TSH  -     Hemoglobin A1C  -     CBC with Auto Differential  -     Comprehensive Metabolic Panel    Need for influenza vaccination  -     Influenza, FLUCELVAX, (age 10 mo+), IM, Preservative Free, 0.5 mL    Chronic fatigue  -     TSH;  Future  -     T4, Free; Future  -     T4, Free  -     TSH    Other orders  -     Anion Gap  -     Glomerular Filtration Rate, Estimated      Patient establishing care, multiple chronic issues, complicated

## 2023-12-27 ENCOUNTER — OFFICE VISIT (OUTPATIENT)
Dept: FAMILY MEDICINE CLINIC | Age: 48
End: 2023-12-27
Payer: MEDICARE

## 2023-12-27 VITALS
SYSTOLIC BLOOD PRESSURE: 134 MMHG | HEART RATE: 93 BPM | RESPIRATION RATE: 18 BRPM | OXYGEN SATURATION: 99 % | DIASTOLIC BLOOD PRESSURE: 88 MMHG | HEIGHT: 66 IN | BODY MASS INDEX: 47.09 KG/M2 | WEIGHT: 293 LBS

## 2023-12-27 DIAGNOSIS — Z11.4 ENCOUNTER FOR SCREENING FOR HIV: ICD-10-CM

## 2023-12-27 DIAGNOSIS — L98.491 ULCER OF ABDOMEN WALL, LIMITED TO BREAKDOWN OF SKIN (HCC): ICD-10-CM

## 2023-12-27 DIAGNOSIS — Z23 NEED FOR INFLUENZA VACCINATION: ICD-10-CM

## 2023-12-27 DIAGNOSIS — Z00.00 ENCOUNTER FOR MEDICAL EXAMINATION TO ESTABLISH CARE: Primary | ICD-10-CM

## 2023-12-27 DIAGNOSIS — R53.82 CHRONIC FATIGUE: ICD-10-CM

## 2023-12-27 DIAGNOSIS — H66.006 RECURRENT ACUTE SUPPURATIVE OTITIS MEDIA WITHOUT SPONTANEOUS RUPTURE OF TYMPANIC MEMBRANE OF BOTH SIDES: ICD-10-CM

## 2023-12-27 DIAGNOSIS — Z12.11 COLON CANCER SCREENING: ICD-10-CM

## 2023-12-27 DIAGNOSIS — Z11.59 NEED FOR HEPATITIS C SCREENING TEST: ICD-10-CM

## 2023-12-27 DIAGNOSIS — Z13.220 LIPID SCREENING: ICD-10-CM

## 2023-12-27 DIAGNOSIS — R73.9 HYPERGLYCEMIA: ICD-10-CM

## 2023-12-27 DIAGNOSIS — N92.4 EXCESSIVE BLEEDING IN PREMENOPAUSAL PERIOD: ICD-10-CM

## 2023-12-27 LAB
ALBUMIN SERPL BCG-MCNC: 4.1 G/DL (ref 3.5–5.1)
ALP SERPL-CCNC: 106 U/L (ref 38–126)
ALT SERPL W/O P-5'-P-CCNC: 21 U/L (ref 11–66)
ANION GAP SERPL CALC-SCNC: 14 MEQ/L (ref 8–16)
AST SERPL-CCNC: 18 U/L (ref 5–40)
BASOPHILS ABSOLUTE: 0 THOU/MM3 (ref 0–0.1)
BASOPHILS NFR BLD AUTO: 0.6 %
BILIRUB SERPL-MCNC: 0.4 MG/DL (ref 0.3–1.2)
BUN SERPL-MCNC: 9 MG/DL (ref 7–22)
CALCIUM SERPL-MCNC: 9.2 MG/DL (ref 8.5–10.5)
CHLORIDE SERPL-SCNC: 100 MEQ/L (ref 98–111)
CHOLESTEROL, FASTING: 210 MG/DL (ref 100–199)
CO2 SERPL-SCNC: 24 MEQ/L (ref 23–33)
CREAT SERPL-MCNC: 0.7 MG/DL (ref 0.4–1.2)
DEPRECATED MEAN GLUCOSE BLD GHB EST-ACNC: 90 MG/DL (ref 70–126)
DEPRECATED RDW RBC AUTO: 47.1 FL (ref 35–45)
EOSINOPHIL NFR BLD AUTO: 3.6 %
EOSINOPHILS ABSOLUTE: 0.3 THOU/MM3 (ref 0–0.4)
ERYTHROCYTE [DISTWIDTH] IN BLOOD BY AUTOMATED COUNT: 13.9 % (ref 11.5–14.5)
GFR SERPL CREATININE-BSD FRML MDRD: > 60 ML/MIN/1.73M2
GLUCOSE SERPL-MCNC: 84 MG/DL (ref 70–108)
HBA1C MFR BLD HPLC: 5 % (ref 4.4–6.4)
HCT VFR BLD AUTO: 46.1 % (ref 37–47)
HCV IGG SERPL QL IA: NEGATIVE
HDLC SERPL-MCNC: 43 MG/DL
HGB BLD-MCNC: 14.6 GM/DL (ref 12–16)
IMM GRANULOCYTES # BLD AUTO: 0.03 THOU/MM3 (ref 0–0.07)
IMM GRANULOCYTES NFR BLD AUTO: 0.4 %
LDLC SERPL CALC-MCNC: 135 MG/DL
LYMPHOCYTES ABSOLUTE: 2.4 THOU/MM3 (ref 1–4.8)
LYMPHOCYTES NFR BLD AUTO: 30.2 %
MCH RBC QN AUTO: 29.3 PG (ref 26–33)
MCHC RBC AUTO-ENTMCNC: 31.7 GM/DL (ref 32.2–35.5)
MCV RBC AUTO: 92.4 FL (ref 81–99)
MONOCYTES ABSOLUTE: 0.6 THOU/MM3 (ref 0.4–1.3)
MONOCYTES NFR BLD AUTO: 7 %
NEUTROPHILS NFR BLD AUTO: 58.2 %
NRBC BLD AUTO-RTO: 0 /100 WBC
PLATELET # BLD AUTO: 317 THOU/MM3 (ref 130–400)
PMV BLD AUTO: 10.6 FL (ref 9.4–12.4)
POTASSIUM SERPL-SCNC: 3.9 MEQ/L (ref 3.5–5.2)
PROT SERPL-MCNC: 7.7 G/DL (ref 6.1–8)
RBC # BLD AUTO: 4.99 MILL/MM3 (ref 4.2–5.4)
SEGMENTED NEUTROPHILS ABSOLUTE COUNT: 4.7 THOU/MM3 (ref 1.8–7.7)
SODIUM SERPL-SCNC: 138 MEQ/L (ref 135–145)
T4 FREE SERPL-MCNC: 0.99 NG/DL (ref 0.93–1.76)
TRIGLYCERIDE, FASTING: 161 MG/DL (ref 0–199)
TSH SERPL DL<=0.005 MIU/L-ACNC: 0.88 UIU/ML (ref 0.4–4.2)
WBC # BLD AUTO: 8 THOU/MM3 (ref 4.8–10.8)

## 2023-12-27 PROCEDURE — 99214 OFFICE O/P EST MOD 30 MIN: CPT | Performed by: STUDENT IN AN ORGANIZED HEALTH CARE EDUCATION/TRAINING PROGRAM

## 2023-12-27 PROCEDURE — 90674 CCIIV4 VAC NO PRSV 0.5 ML IM: CPT | Performed by: STUDENT IN AN ORGANIZED HEALTH CARE EDUCATION/TRAINING PROGRAM

## 2023-12-27 PROCEDURE — 36415 COLL VENOUS BLD VENIPUNCTURE: CPT | Performed by: STUDENT IN AN ORGANIZED HEALTH CARE EDUCATION/TRAINING PROGRAM

## 2023-12-27 PROCEDURE — G8417 CALC BMI ABV UP PARAM F/U: HCPCS | Performed by: STUDENT IN AN ORGANIZED HEALTH CARE EDUCATION/TRAINING PROGRAM

## 2023-12-27 PROCEDURE — 1036F TOBACCO NON-USER: CPT | Performed by: STUDENT IN AN ORGANIZED HEALTH CARE EDUCATION/TRAINING PROGRAM

## 2023-12-27 PROCEDURE — G0008 ADMIN INFLUENZA VIRUS VAC: HCPCS | Performed by: STUDENT IN AN ORGANIZED HEALTH CARE EDUCATION/TRAINING PROGRAM

## 2023-12-27 PROCEDURE — G8482 FLU IMMUNIZE ORDER/ADMIN: HCPCS | Performed by: STUDENT IN AN ORGANIZED HEALTH CARE EDUCATION/TRAINING PROGRAM

## 2023-12-27 PROCEDURE — G8427 DOCREV CUR MEDS BY ELIG CLIN: HCPCS | Performed by: STUDENT IN AN ORGANIZED HEALTH CARE EDUCATION/TRAINING PROGRAM

## 2023-12-27 RX ORDER — AMOXICILLIN AND CLAVULANATE POTASSIUM 875; 125 MG/1; MG/1
1 TABLET, FILM COATED ORAL 2 TIMES DAILY
Qty: 14 TABLET | Refills: 0 | Status: SHIPPED | OUTPATIENT
Start: 2023-12-27 | End: 2024-01-03

## 2023-12-27 RX ORDER — AMOXICILLIN AND CLAVULANATE POTASSIUM 875; 125 MG/1; MG/1
1 TABLET, FILM COATED ORAL 2 TIMES DAILY
Qty: 14 TABLET | Refills: 0 | Status: SHIPPED | OUTPATIENT
Start: 2023-12-27 | End: 2023-12-27

## 2023-12-27 NOTE — PROGRESS NOTES
After obtaining consent, and per orders of Dr. Gabe Conner, injection of Flu given in Right deltoid by Ashwin Fisher MA. Patient instructed to remain in clinic for 20 minutes afterwards, and to report any adverse reaction to me immediately.     Immunizations Administered       Name Date Dose Route    Influenza, FLUCELVAX, (age 10 mo+), MDCK, PF, 0.5mL 12/27/2023 0.5 mL Intramuscular    Site: Deltoid- Right    Lot: [id number]    NDC: 53704-271-41

## 2023-12-28 LAB — HIV 1+2 AB+HIV1 P24 AG SERPL QL IA: NONREACTIVE

## 2024-01-15 NOTE — PROGRESS NOTES
[facility]    Patient: LETY ART, 48 y.o.   : 1975    Patient of Dr. Flores     Subjective     Chief Complaint   Patient presents with    Follow-up     2 month pulmonary follow up with bronchial challenge.         PAULETTE Leggett is here for follow up for shortness of breath. Patient and her mother report patient's shortness of breath has been progressively worsening for the past 1.5 years. She is using a walker with seat now. She reports for the last 15-20 years has had issues with bronchitis - usually in spring time. She has also had issues with sinus in past but not last couple years. Overall patient reports respiratory symptoms have been stable since last appointment. Patient using albuterol as needed. She has difficulty with timing her breath with inhaling puff of albuterol. Patient reports physical limitation due to respiratory symptoms. Her past medical history is significant for schizophrenia, POTS, CHF, ADHD, IBS, Conde syndrome, seasonal allergies.     Patient's weight is down 1 lb since 2023. Patient is monitoring her diet.    Follows with cardio for CHF and POTS     Allergy panel: allergy to dust mites    IgG, IgA, and IgM normal  CTA Chest 23 - negative for PE. Unremarkable lung findings.     Treated with augmentin end of Dec - ear infection  Treated with augmentin end of Nov for bronchitis and ear infection   Patient reports she had sinus drainage yesterday and coughed up small yellow phlegm this morning. She also has a \"scratchy\"/sore throat this AM.      Allergy regimen: claritin     Complaints: Shortness of Breath  Onset Duration: Over a year  Exacerbating factors: Exertional tasks - small exertion  Alleviating factors: Rest  Associated symptoms: Chest Tightness  Pertinent negatives: Cough, Sputum Production, Hemoptysis, and Wheezing  Risk factors for lung disease: no known risk factors    Progress History:   Since last

## 2024-01-17 ENCOUNTER — OFFICE VISIT (OUTPATIENT)
Dept: PULMONOLOGY | Age: 49
End: 2024-01-17
Payer: MEDICARE

## 2024-01-17 VITALS
DIASTOLIC BLOOD PRESSURE: 80 MMHG | HEART RATE: 101 BPM | OXYGEN SATURATION: 97 % | SYSTOLIC BLOOD PRESSURE: 132 MMHG | WEIGHT: 293 LBS | TEMPERATURE: 97.9 F | HEIGHT: 66 IN | BODY MASS INDEX: 47.09 KG/M2

## 2024-01-17 DIAGNOSIS — G47.19 EXCESSIVE DAYTIME SLEEPINESS: ICD-10-CM

## 2024-01-17 DIAGNOSIS — J30.89 ALLERGIC RHINITIS DUE TO DUST MITE: ICD-10-CM

## 2024-01-17 DIAGNOSIS — J45.40 MODERATE PERSISTENT ASTHMA WITHOUT COMPLICATION: Primary | ICD-10-CM

## 2024-01-17 DIAGNOSIS — R06.83 SNORING: ICD-10-CM

## 2024-01-17 PROCEDURE — G8417 CALC BMI ABV UP PARAM F/U: HCPCS

## 2024-01-17 PROCEDURE — 1036F TOBACCO NON-USER: CPT

## 2024-01-17 PROCEDURE — G8482 FLU IMMUNIZE ORDER/ADMIN: HCPCS

## 2024-01-17 PROCEDURE — G8427 DOCREV CUR MEDS BY ELIG CLIN: HCPCS

## 2024-01-17 PROCEDURE — 99214 OFFICE O/P EST MOD 30 MIN: CPT

## 2024-01-17 RX ORDER — FLUTICASONE FUROATE AND VILANTEROL 200; 25 UG/1; UG/1
1 POWDER RESPIRATORY (INHALATION) DAILY
Qty: 28 EACH | Refills: 11 | Status: SHIPPED | OUTPATIENT
Start: 2024-01-17

## 2024-01-17 ASSESSMENT — ENCOUNTER SYMPTOMS
SINUS PRESSURE: 1
SHORTNESS OF BREATH: 1
RHINORRHEA: 1
WHEEZING: 0
COUGH: 1
CHEST TIGHTNESS: 1

## 2024-02-02 ENCOUNTER — NURSE ONLY (OUTPATIENT)
Dept: FAMILY MEDICINE CLINIC | Age: 49
End: 2024-02-02

## 2024-02-02 DIAGNOSIS — N92.4 EXCESSIVE BLEEDING IN PREMENOPAUSAL PERIOD: Primary | ICD-10-CM

## 2024-02-02 RX ORDER — MEDROXYPROGESTERONE ACETATE 150 MG/ML
150 INJECTION, SUSPENSION INTRAMUSCULAR ONCE
Status: COMPLETED | OUTPATIENT
Start: 2024-02-02 | End: 2024-02-02

## 2024-02-02 RX ADMIN — MEDROXYPROGESTERONE ACETATE 150 MG: 150 INJECTION, SUSPENSION INTRAMUSCULAR at 09:11

## 2024-02-02 NOTE — PROGRESS NOTES
Administrations This Visit       medroxyPROGESTERone (DEPO-PROVERA) injection 150 mg       Admin Date  02/02/2024  09:11 Action  Given Dose  150 mg Route  IntraMUSCular Site  Deltoid Right Administered By  Patricia Rodas MA    Ordering Provider: Beverley Castellon MD    NDC: [address]-2    Lot#: [id number]    : [employer]    Patient Supplied?: Yes                    Patient instructed to remain in clinic for 20 minutes after injection and was advised to report any adverse reaction to me immediately.

## 2024-02-26 ENCOUNTER — OFFICE VISIT (OUTPATIENT)
Dept: FAMILY MEDICINE CLINIC | Age: 49
End: 2024-02-26
Payer: MEDICARE

## 2024-02-26 VITALS
SYSTOLIC BLOOD PRESSURE: 110 MMHG | RESPIRATION RATE: 20 BRPM | OXYGEN SATURATION: 98 % | HEART RATE: 84 BPM | TEMPERATURE: 98.6 F | BODY MASS INDEX: 47.09 KG/M2 | DIASTOLIC BLOOD PRESSURE: 66 MMHG | WEIGHT: 293 LBS | HEIGHT: 66 IN

## 2024-02-26 DIAGNOSIS — H60.331 ACUTE SWIMMER'S EAR OF RIGHT SIDE: ICD-10-CM

## 2024-02-26 DIAGNOSIS — H66.006 RECURRENT ACUTE SUPPURATIVE OTITIS MEDIA WITHOUT SPONTANEOUS RUPTURE OF TYMPANIC MEMBRANE OF BOTH SIDES: ICD-10-CM

## 2024-02-26 DIAGNOSIS — B37.2 CANDIDAL SKIN INFECTION: ICD-10-CM

## 2024-02-26 DIAGNOSIS — J44.9 CHRONIC OBSTRUCTIVE PULMONARY DISEASE, UNSPECIFIED COPD TYPE (HCC): ICD-10-CM

## 2024-02-26 DIAGNOSIS — K58.0 IRRITABLE BOWEL SYNDROME WITH DIARRHEA: Primary | ICD-10-CM

## 2024-02-26 DIAGNOSIS — F79 MENTALLY DISABLED: ICD-10-CM

## 2024-02-26 PROCEDURE — 1036F TOBACCO NON-USER: CPT | Performed by: NURSE PRACTITIONER

## 2024-02-26 PROCEDURE — G8417 CALC BMI ABV UP PARAM F/U: HCPCS | Performed by: NURSE PRACTITIONER

## 2024-02-26 PROCEDURE — 99214 OFFICE O/P EST MOD 30 MIN: CPT | Performed by: NURSE PRACTITIONER

## 2024-02-26 PROCEDURE — 3023F SPIROM DOC REV: CPT | Performed by: NURSE PRACTITIONER

## 2024-02-26 PROCEDURE — G8482 FLU IMMUNIZE ORDER/ADMIN: HCPCS | Performed by: NURSE PRACTITIONER

## 2024-02-26 PROCEDURE — 4130F TOPICAL PREP RX AOE: CPT | Performed by: NURSE PRACTITIONER

## 2024-02-26 PROCEDURE — G8427 DOCREV CUR MEDS BY ELIG CLIN: HCPCS | Performed by: NURSE PRACTITIONER

## 2024-02-26 RX ORDER — OFLOXACIN 3 MG/ML
5 SOLUTION AURICULAR (OTIC) 2 TIMES DAILY
Qty: 5 ML | Refills: 0 | Status: SHIPPED | OUTPATIENT
Start: 2024-02-26 | End: 2024-03-07

## 2024-02-26 RX ORDER — NYSTATIN 100000 [USP'U]/G
POWDER TOPICAL
Qty: 60 G | Refills: 0 | Status: SHIPPED | OUTPATIENT
Start: 2024-02-26

## 2024-02-26 RX ORDER — LORATADINE 10 MG/1
10 TABLET ORAL DAILY
Qty: 90 TABLET | Refills: 3 | Status: SHIPPED | OUTPATIENT
Start: 2024-02-26

## 2024-02-26 RX ORDER — DICYCLOMINE HCL 20 MG
20 TABLET ORAL 2 TIMES DAILY
Qty: 60 TABLET | Refills: 11 | Status: SHIPPED | OUTPATIENT
Start: 2024-02-26 | End: 2025-02-25

## 2024-02-26 RX ORDER — QUETIAPINE FUMARATE 300 MG/1
600 TABLET, FILM COATED ORAL EVERY EVENING
COMMUNITY
Start: 2024-01-17

## 2024-02-26 RX ORDER — QUETIAPINE FUMARATE 400 MG/1
400 TABLET, FILM COATED ORAL EVERY MORNING
COMMUNITY
Start: 2024-01-03

## 2024-02-26 RX ORDER — ACYCLOVIR 400 MG/1
400 TABLET ORAL
Qty: 12 TABLET | Refills: 11 | Status: SHIPPED | OUTPATIENT
Start: 2024-02-26 | End: 2025-02-25

## 2024-02-26 RX ORDER — FLUCONAZOLE 150 MG/1
150 TABLET ORAL DAILY
Qty: 3 TABLET | Refills: 0 | Status: SHIPPED | OUTPATIENT
Start: 2024-02-26 | End: 2024-02-29

## 2024-02-26 ASSESSMENT — PATIENT HEALTH QUESTIONNAIRE - PHQ9: DEPRESSION UNABLE TO ASSESS: FUNCTIONAL CAPACITY MOTIVATION LIMITS ACCURACY

## 2024-02-26 NOTE — PROGRESS NOTES
[facility] PROFESSIONAL SERVS  [facility]  [address] [address]  Dept: [phone]  Loc: [phone]    Betsey Bryant (:  1975) is a 49 y.o. female,Established patient, here for evaluation of the following chief complaint(s):  Other (Constant yeast under abdominal fold, unable to get control of it this time. Usually using foot fungal powder, currently using monistat cream. ) and Otalgia (Ear pain started in right ear, reoccurring situation. Patient states she cannot hear out of it at this time. )      ASSESSMENT/PLAN:  1. Irritable bowel syndrome with diarrhea  Stable.   -     dicyclomine (BENTYL) 20 MG tablet; Take 1 tablet by mouth 2 times daily, Disp-60 tablet, R-11Normal  2. Candidal skin infection   Extensive Under abdominal fold  with shiny beefy red erythema across entire area.   -     nystatin (MYCOSTATIN) 857163 UNIT/GM powder; Apply 2 times a day, Disp-60 g, R-0, Normal  -     fluconazole (DIFLUCAN) 150 MG tablet; Take 1 tablet by mouth daily for 3 days, Disp-3 tablet, R-0Normal  3. Acute swimmer's ear of right side  Right canal with tenderness, drainage and swelling   -     ofloxacin (FLOXIN) 0.3 % otic solution; Place 5 drops into the right ear 2 times daily for 10 days, Disp-5 mL, R-0Normal  4. Recurrent acute suppurative otitis media without spontaneous rupture of tympanic membrane of both sides  -     loratadine (CLARITIN) 10 MG tablet; Take 1 tablet by mouth daily, Disp-90 tablet, R-3Normal  5. Mentally disabled  Unchanged, at baseline.   6. Chronic obstructive pulmonary disease, unspecified COPD type (HCC)  Stable.         Ulcer of abdomen wall: Follows with wound clinic, this is chronic and ongoing.     Time spent 35 minutes     Return if symptoms worsen or fail to improve.    SUBJECTIVE/OBJECTIVE:  Patient presents with:  Other: Constant yeast under abdominal fold, unable to get control of it this time. Usually using foot fungal

## 2024-03-05 PROBLEM — J44.9 CHRONIC OBSTRUCTIVE PULMONARY DISEASE, UNSPECIFIED (HCC): Status: ACTIVE | Noted: 2024-03-05

## 2024-03-05 ASSESSMENT — ENCOUNTER SYMPTOMS: DIARRHEA: 1

## 2024-03-17 RX ORDER — FUROSEMIDE 20 MG/1
20 TABLET ORAL 2 TIMES DAILY
Qty: 180 TABLET | Refills: 3 | Status: SHIPPED | OUTPATIENT
Start: 2024-03-17

## 2024-04-08 RX ORDER — MIDODRINE HYDROCHLORIDE 10 MG/1
15 TABLET ORAL 3 TIMES DAILY
Qty: 405 TABLET | Refills: 0 | Status: SHIPPED | OUTPATIENT
Start: 2024-04-08

## 2024-04-29 ENCOUNTER — TELEPHONE (OUTPATIENT)
Dept: FAMILY MEDICINE CLINIC | Age: 49
End: 2024-04-29

## 2024-04-29 DIAGNOSIS — N94.6 DYSMENORRHEA: Primary | ICD-10-CM

## 2024-04-29 RX ORDER — MEDROXYPROGESTERONE ACETATE 150 MG/ML
150 INJECTION, SUSPENSION INTRAMUSCULAR
Qty: 1 ML | Refills: 0 | Status: SHIPPED | OUTPATIENT
Start: 2024-04-29

## 2024-04-29 NOTE — TELEPHONE ENCOUNTER
Patients mother calling in get new Rx of Depo Provera. She states that the one she had - has an appointment Thursday for OV and injection     Patient's last appointment was : 2024  Patient's next appointment is : 2024  Last refilled: has not been prescribed by PCP here yet       [facility] [address]

## 2024-04-29 NOTE — TELEPHONE ENCOUNTER
Medication refill sent. Dr. Castellon to take over further prescriptions when back in office. Thanks    Dr. Lowery

## 2024-05-07 ENCOUNTER — NURSE ONLY (OUTPATIENT)
Dept: FAMILY MEDICINE CLINIC | Age: 49
End: 2024-05-07

## 2024-05-07 DIAGNOSIS — N92.4 EXCESSIVE BLEEDING IN PREMENOPAUSAL PERIOD: Primary | ICD-10-CM

## 2024-05-07 RX ORDER — MEDROXYPROGESTERONE ACETATE 150 MG/ML
150 INJECTION, SUSPENSION INTRAMUSCULAR ONCE
Status: COMPLETED | OUTPATIENT
Start: 2024-05-07 | End: 2024-05-07

## 2024-05-07 RX ORDER — MEDROXYPROGESTERONE ACETATE 150 MG/ML
150 INJECTION, SUSPENSION INTRAMUSCULAR
Qty: 1 ML | Refills: 3 | Status: CANCELLED | OUTPATIENT
Start: 2024-05-07

## 2024-05-07 RX ADMIN — MEDROXYPROGESTERONE ACETATE 150 MG: 150 INJECTION, SUSPENSION INTRAMUSCULAR at 13:23

## 2024-05-07 NOTE — PROGRESS NOTES
Medication(s) given during visit:    Administrations This Visit       medroxyPROGESTERone (DEPO-PROVERA) injection 150 mg       Admin Date  05/07/2024  13:23 Action  Given Dose  150 mg Route  IntraMUSCular Site  Deltoid Left Administered By  Diane Echevarria MA    Ordering Provider: Kelly Randhawa APRN - CNP    NDC: [address]-6    Lot#: [id number]    : [employer]    Patient Supplied?: Yes                    Patient instructed to remain in clinic for 20 minutes after injection and was advised to report any adverse reaction to me immediately.

## 2024-05-08 ENCOUNTER — OFFICE VISIT (OUTPATIENT)
Dept: CARDIOLOGY CLINIC | Age: 49
End: 2024-05-08
Payer: MEDICARE

## 2024-05-08 VITALS
HEART RATE: 89 BPM | DIASTOLIC BLOOD PRESSURE: 64 MMHG | SYSTOLIC BLOOD PRESSURE: 102 MMHG | WEIGHT: 293 LBS | BODY MASS INDEX: 47.09 KG/M2 | HEIGHT: 66 IN

## 2024-05-08 DIAGNOSIS — R42 ORTHOSTATIC DIZZINESS: ICD-10-CM

## 2024-05-08 DIAGNOSIS — G90.A POTS (POSTURAL ORTHOSTATIC TACHYCARDIA SYNDROME): ICD-10-CM

## 2024-05-08 DIAGNOSIS — I50.32 CHRONIC DIASTOLIC CONGESTIVE HEART FAILURE (HCC): Primary | ICD-10-CM

## 2024-05-08 DIAGNOSIS — I47.11 INAPPROPRIATE SINUS TACHYCARDIA (HCC): ICD-10-CM

## 2024-05-08 DIAGNOSIS — R06.02 SOB (SHORTNESS OF BREATH) ON EXERTION: ICD-10-CM

## 2024-05-08 DIAGNOSIS — R00.2 INTERMITTENT PALPITATIONS: ICD-10-CM

## 2024-05-08 DIAGNOSIS — R60.0 BILATERAL LEG EDEMA: ICD-10-CM

## 2024-05-08 PROCEDURE — 93000 ELECTROCARDIOGRAM COMPLETE: CPT | Performed by: INTERNAL MEDICINE

## 2024-05-08 PROCEDURE — G8427 DOCREV CUR MEDS BY ELIG CLIN: HCPCS | Performed by: INTERNAL MEDICINE

## 2024-05-08 PROCEDURE — 1036F TOBACCO NON-USER: CPT | Performed by: INTERNAL MEDICINE

## 2024-05-08 PROCEDURE — G8417 CALC BMI ABV UP PARAM F/U: HCPCS | Performed by: INTERNAL MEDICINE

## 2024-05-08 PROCEDURE — 99214 OFFICE O/P EST MOD 30 MIN: CPT | Performed by: INTERNAL MEDICINE

## 2024-05-08 NOTE — PROGRESS NOTES
Chief Complaint   Patient presents with    Follow-up    Congestive Heart Failure   Originally Pt is a f/u from [facility] for POTS syndrome  She has not had any issues until today because it is so hot outside  Since she has been put on medications she is feeling better  She does have chest pressure at night time  Has to sleep in a recliner  Sob and lt headed   She was dizzy off and on prior to going to [facility]  Dr Zapien started her on lasix and they would like to know if you want her to remain on this??            5 month follow up.    EKG done today.    Lost 25 lb in 5 months      Denied chest pain, palpitation  or  edema     Hx of Dizziness on standing better after increased midodrine to 15 tid  No more fall  Serquel also decreased    Sob  on exertion  chronic  and  better after lost wt    No wt gain    Leg edema  and lymphedema  stable    Dizziness on standing - chronic  Her sinus is acting up  No syncope  No new nonpsych med- serquel dose increased recently  and dizziness worse    Occasional  palpitations    Hx of Chronic dizziness  on standing    Hx  schizophrenia    Orthopnea for few yrs and sleep on recliner for over 2 yrs          Patient Active Problem List   Diagnosis    Keloid    MRSA (methicillin resistant staph aureus) culture positive    Skin ulcer of abdominal wall ([facility])    POTS (postural orthostatic tachycardia syndrome)    Orthostatic dizziness- occasional-     CHF (congestive heart failure) ([facility]) chronic diastolic    Morbid obesity ([facility])    Bilateral leg edema =LYMPHEDEMA- ON f/U WITH LYMPHEDEMA CLINIC    Lymphedema of both lower extremities    SOB (shortness of breath) on exertion    Candidiasis, intertriginous    Dermatitis    Open abdominal wall wound    History of fall    Vertigo    Sinus tachycardia    Intermittent palpitations    Inappropriate sinus tachycardia (HCC)    Schizoaffective disorder ([facility])    Irritable bowel syndrome with diarrhea    Chronic obstructive pulmonary disease,

## 2024-05-09 RX ORDER — FUROSEMIDE 20 MG/1
20 TABLET ORAL 2 TIMES DAILY
Qty: 180 TABLET | Refills: 3 | Status: SHIPPED | OUTPATIENT
Start: 2024-05-09

## 2024-05-09 RX ORDER — METOPROLOL SUCCINATE 50 MG/1
TABLET, EXTENDED RELEASE ORAL
Qty: 90 TABLET | Refills: 3 | Status: SHIPPED | OUTPATIENT
Start: 2024-05-09

## 2024-05-09 RX ORDER — POTASSIUM CHLORIDE 750 MG/1
10 TABLET, EXTENDED RELEASE ORAL DAILY
Qty: 90 TABLET | Refills: 3 | Status: SHIPPED | OUTPATIENT
Start: 2024-05-09

## 2024-05-15 ENCOUNTER — TELEPHONE (OUTPATIENT)
Dept: PULMONOLOGY | Age: 49
End: 2024-05-15

## 2024-05-15 ENCOUNTER — HOSPITAL ENCOUNTER (OUTPATIENT)
Dept: WOUND CARE | Age: 49
Discharge: HOME | End: 2024-05-15
Attending: INTERNAL MEDICINE
Payer: MEDICARE

## 2024-05-15 VITALS — RESPIRATION RATE: 20 BRPM | TEMPERATURE: 97.4 F

## 2024-05-15 DIAGNOSIS — L30.9 DERMATITIS: Primary | ICD-10-CM

## 2024-05-15 PROCEDURE — 99213 OFFICE O/P EST LOW 20 MIN: CPT

## 2024-05-15 NOTE — PLAN OF CARE
Problem: Wound:  Goal: Will show signs of wound healing; wound closure and no evidence of infection  Description: Will show signs of wound healing; wound closure and no evidence of infection  Outcome: Progressing   Seen today for umbilicus wound. Wound is improving. See AVS for discharge.   Care plan reviewed with patient and mom.  Patient and mom verbalize understanding of the plan of care and contribute to goal setting.

## 2024-05-15 NOTE — PROGRESS NOTES
diastolic I50.9    Morbid obesity (HCC) E66.01    Bilateral leg edema =LYMPHEDEMA- ON f/U WITH LYMPHEDEMA CLINIC R60.0    Lymphedema of both lower extremities I89.0    SOB (shortness of breath) on exertion R06.02    Candidiasis, intertriginous B37.2    Dermatitis L30.9    Open abdominal wall wound S31.109A    History of fall Z91.81    Vertigo R42    Sinus tachycardia R00.0    Intermittent palpitations R00.2    Inappropriate sinus tachycardia (HCC) I47.11    Schizoaffective disorder ([facility]) F25.9    Irritable bowel syndrome with diarrhea K58.0    Chronic obstructive pulmonary disease, unspecified J44.9              Plan:     Patient examined and evaluated     Please see attached Discharge Instructions    Written patient dismissal instructions given to patient and signed by patient or POA.             Electronically signed by Anival De Oliveira MD on 5/15/2024 at 11:31 AM

## 2024-05-15 NOTE — TELEPHONE ENCOUNTER
Address referral: Betsey has a NP sleep appt scheduled for 05-01 with [facility], she is established with [facility] for pulm. Please call Alize to RS her appt with [facility], [facility] can only schedule NP with MD's not CNP. 2 in basket messages sent with no response.

## 2024-05-15 NOTE — PATIENT INSTRUCTIONS
Visit Discharge/Physician Orders:  - Be sure to keep under the abdominal fold clean and dry.  -call us with any issues      Wound Location: umbilicus     Dressing orders:      OK to shower take shower. Clean wound after shower then re-dress.      Umbilicus wound- Cleanse wound with normal saline or wound cleanser and gauze. Pat dry with clean gauze. Continue to use the bag balm cream.       Abdominal fold and belly button- Apply Miconazole powder to under abdominal fold. Apply daily.     Follow up visit: as needed     Keep next scheduled appointment. Please give 24 hour notice if unable to keep appointment. [phone]     If you experience any of the following, please call the Wound Care Service during business hours: Monday through Friday 8:00 am - 4:30 pm  ([phone]).              *Increase in pain               *Temperature over 101              *Increase in drainage from your wound or a foul odor              *Uncontrolled swelling              *Need for compression bandage changes due to slippage, breakthrough drainage     If you need medical attention outside of business hours, please contact your Primary Care Doctor or go to the nearest emergency room.

## 2024-05-23 ENCOUNTER — APPOINTMENT (OUTPATIENT)
Dept: GENERAL RADIOLOGY | Age: 49
End: 2024-05-23
Payer: MEDICARE

## 2024-05-23 ENCOUNTER — HOSPITAL ENCOUNTER (EMERGENCY)
Age: 49
Discharge: HOME | End: 2024-05-23
Payer: MEDICARE

## 2024-05-23 VITALS
OXYGEN SATURATION: 97 % | SYSTOLIC BLOOD PRESSURE: 114 MMHG | HEIGHT: 66 IN | DIASTOLIC BLOOD PRESSURE: 76 MMHG | HEART RATE: 81 BPM | TEMPERATURE: 98.1 F | RESPIRATION RATE: 16 BRPM | WEIGHT: 293 LBS | BODY MASS INDEX: 47.09 KG/M2

## 2024-05-23 DIAGNOSIS — S80.02XA CONTUSION OF LEFT KNEE, INITIAL ENCOUNTER: Primary | ICD-10-CM

## 2024-05-23 DIAGNOSIS — S80.01XA CONTUSION OF RIGHT KNEE, INITIAL ENCOUNTER: ICD-10-CM

## 2024-05-23 PROCEDURE — 99283 EMERGENCY DEPT VISIT LOW MDM: CPT

## 2024-05-23 PROCEDURE — 73564 X-RAY EXAM KNEE 4 OR MORE: CPT

## 2024-05-23 ASSESSMENT — PAIN SCALES - WONG BAKER: WONGBAKER_NUMERICALRESPONSE: HURTS WHOLE LOT

## 2024-05-23 ASSESSMENT — PAIN - FUNCTIONAL ASSESSMENT: PAIN_FUNCTIONAL_ASSESSMENT: WONG-BAKER FACES

## 2024-05-23 NOTE — ED PROVIDER NOTES
blank)  Labs Reviewed - No data to display        (Any cultures that may have been sent were not resulted at the time of this patient visit)    MEDICAL DECISION MAKING / ED COURSE:     1) Number and Complexity of Problems            Problem List This Visit:         Chief Complaint   Patient presents with    Knee Pain     bilateral            Differential Diagnosis includes (but not limited to):  Bilateral knee contusion        Diagnoses Considered but I have low suspicion of:   Knee fracture             Pertinent Comorbid Conditions:    Lymphedema obesity    2)  Data Reviewed (none if left blank)          My Independent interpretations:     EKG:      None    Imaging: None    Labs:      None                 Decision Rules/Clinical Scores utilized:  None            External Documentation Reviewed:         Previous patient encounter documents & history available on EMR was reviewed yes             See Formal Diagnostic Results above for the lab and radiology tests and orders.    3)  Treatment and Disposition         ED Reassessment: Stable         Case discussed with (none if left blank)         Shared Decision-Making was performed and disposition discussed with the        Patient/Family and questions answered yes         Social determinants of health impacting treatment or disposition:  None         Code Status:  N/A      Summary of Patient Presentation:      MDM     Amount and/or Complexity of Data Reviewed  Discussion of test results with the performing providers: no  Decide to obtain previous medical records or to obtain history from someone other than the patient: yes  Obtain history from someone other than the patient: no  Review and summarize past medical records: yes  Discuss the patient with other providers: no  Independent visualization of images, tracings, or specimens: no    Risk of Complications, Morbidity, and/or Mortality  Presenting problems: moderate  Diagnostic procedures: moderate  Management

## 2024-05-23 NOTE — ED TRIAGE NOTES
Patient presents to ER with complaints of bilateral knee pain that started yesterday after falling. Patient reports having hx of lymphedema.

## 2024-05-24 ENCOUNTER — TELEPHONE (OUTPATIENT)
Dept: FAMILY MEDICINE CLINIC | Age: 49
End: 2024-05-24

## 2024-05-28 NOTE — PLAN OF CARE
Problem: Skin Integrity:  Goal: Will show no infection signs and symptoms  Description: Will show no infection signs and symptoms  Outcome: Ongoing     Patient presents to wound clinic for abdominal wound. Wound similar in size. No s/s of infection noted at this time. See AVS for discharge instructions. Puraply AM applied today. 2cm x 4cm (8 sqcm - all 8 sq cm used on patient today) Lot # [id number] expiration date 06/24/2022. Saline lot # [id number] expiration 10-1-2021. Follow up visit: 2 weeks April 8th at 10:45 am.     Care plan reviewed with patient and mother. Patient and mother verbalize understanding of the plan of care and contribute to goal setting.
83

## 2024-06-05 ENCOUNTER — OFFICE VISIT (OUTPATIENT)
Dept: PULMONOLOGY | Age: 49
End: 2024-06-05
Payer: MEDICARE

## 2024-06-05 VITALS
SYSTOLIC BLOOD PRESSURE: 114 MMHG | WEIGHT: 293 LBS | HEIGHT: 66 IN | TEMPERATURE: 98.6 F | DIASTOLIC BLOOD PRESSURE: 68 MMHG | HEART RATE: 70 BPM | OXYGEN SATURATION: 97 % | BODY MASS INDEX: 47.09 KG/M2

## 2024-06-05 DIAGNOSIS — R06.81 WITNESSED EPISODE OF APNEA: Primary | ICD-10-CM

## 2024-06-05 DIAGNOSIS — G47.19 EXCESSIVE DAYTIME SLEEPINESS: ICD-10-CM

## 2024-06-05 DIAGNOSIS — G47.33 OBSTRUCTIVE SLEEP APNEA (ADULT) (PEDIATRIC): ICD-10-CM

## 2024-06-05 DIAGNOSIS — R06.83 SNORING: ICD-10-CM

## 2024-06-05 PROCEDURE — 1036F TOBACCO NON-USER: CPT | Performed by: INTERNAL MEDICINE

## 2024-06-05 PROCEDURE — G8417 CALC BMI ABV UP PARAM F/U: HCPCS | Performed by: INTERNAL MEDICINE

## 2024-06-05 PROCEDURE — 99204 OFFICE O/P NEW MOD 45 MIN: CPT | Performed by: INTERNAL MEDICINE

## 2024-06-05 PROCEDURE — G8427 DOCREV CUR MEDS BY ELIG CLIN: HCPCS | Performed by: INTERNAL MEDICINE

## 2024-06-05 NOTE — PROGRESS NOTES
New Sleep Medicine Initial Consultation     Betsey Bryant                                         [id number]      Betsey Bryant is a 49 y.o.oldfemale came for further evaluation regarding her sleep apnea  with referral from VIRGIE Hinton.  She is accompanied by her mother during today's visit.  Patient has history of schizophrenia and per her mother she has been getting worse of his late.  Patient and mother endorse the snoring has been ongoing for years.  Patient reports that she cannot breathe at night.  She has been sleeping in a recliner for the last 16 years due to heart failure and significant lymphedema.  Patient reports waking up feeling dizzy and tired which last throughout the day.  She does not wake up with headaches.  She does take multiple naps throughout the day that are unintentional but not refreshing.  Her mother thinks is a combination of her schizophrenia medications as well as untreated sleep apnea.  Patient does not drive.  She drinks 1 soda and 1 cappuccino a day.  Due to her having sporadic sleep schedule is difficult to specify when she goes to sleep but typically it is around 2100.  If she uses naps throughout the day she will have difficulty falling asleep at night.  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

## 2024-06-18 NOTE — PROGRESS NOTES
[facility]    Patient: BETSEY ART, 49 y.o.   : 1975    Patient of Dr. Flores     Assessment/Plan   1. Moderate persistent asthma without complication - stable  -Continue Breo. Rinse mouth with water, gargle, and spit after use.   -Continue albuterol 2 puffs every 6 hours as needed for shortness of breath or wheezing   -Reviewed preventative vaccinations    2. Allergic rhinitis due to dust mite - stable  -Currently well controlled off of medication. OK to resume claritin and flonase if symptoms return     3. Body mass index (BMI) 50.0-59.9, adult (HCC)  -Encouraged weight loss to alleviate dyspnea with exertion    Advised patient to call office with any changes, questions, or concerns regarding respiratory status or issues with prescribed medications    Will plan for pulmonary follow up in 2024. Patient and her mother did not wish to schedule this appt today. They will plan to schedule appt at patient's sleep clinic follow up.   Subjective     Chief Complaint   Patient presents with    Follow-up     Asthma 5 month f/u with no testing.         HPI  Complaints: Shortness of Breath  Onset Duration: Over a year  Exacerbating factors:  walking  Alleviating factors: Rest after a couple minutes  Pertinent negatives: Cough, Sputum Production, Hemoptysis, Wheezing, and Chest Tightness  Risk factors for lung disease: no known risk factors    Betsey is here for follow up for asthma. Overall patient reports respiratory symptoms have been stable since last appointment. Patient reports good compliance with inhaled medications (Breo).  She uses at nighttime. Patient has not required albuterol. Patient reports physical limitation due to respiratory symptoms. . Her past medical history is significant for schizophrenia, POTS, CHF, ADHD, IBS, Conde syndrome, seasonal allergies.     Patient had difficulty following instructions for spirometry - Per patient's

## 2024-06-19 ENCOUNTER — OFFICE VISIT (OUTPATIENT)
Dept: PULMONOLOGY | Age: 49
End: 2024-06-19
Payer: MEDICARE

## 2024-06-19 VITALS
BODY MASS INDEX: 47.09 KG/M2 | TEMPERATURE: 97.4 F | HEART RATE: 77 BPM | OXYGEN SATURATION: 99 % | HEIGHT: 66 IN | WEIGHT: 293 LBS | DIASTOLIC BLOOD PRESSURE: 68 MMHG | SYSTOLIC BLOOD PRESSURE: 118 MMHG

## 2024-06-19 DIAGNOSIS — J30.89 ALLERGIC RHINITIS DUE TO DUST MITE: ICD-10-CM

## 2024-06-19 DIAGNOSIS — J45.40 MODERATE PERSISTENT ASTHMA WITHOUT COMPLICATION: Primary | ICD-10-CM

## 2024-06-19 PROCEDURE — G8427 DOCREV CUR MEDS BY ELIG CLIN: HCPCS

## 2024-06-19 PROCEDURE — G8417 CALC BMI ABV UP PARAM F/U: HCPCS

## 2024-06-19 PROCEDURE — 99214 OFFICE O/P EST MOD 30 MIN: CPT

## 2024-06-19 PROCEDURE — 1036F TOBACCO NON-USER: CPT

## 2024-06-19 ASSESSMENT — ENCOUNTER SYMPTOMS
SINUS PAIN: 0
WHEEZING: 0
SINUS PRESSURE: 0
SHORTNESS OF BREATH: 1
RHINORRHEA: 0
CHEST TIGHTNESS: 0
COUGH: 0

## 2024-06-19 NOTE — PATIENT INSTRUCTIONS
houses*   People with allergies and asthma do not usually need to move, except if they live in a basement or in a home that is very damp and wet.  If you are allergic to dust mites or mold, you might have fewer symptoms if you move to:  An apartment on or above the second floor  A home with wooden floors and bedrooms on the second floor   HEPA filter: high-efficiency particulate air filter.  * The average family in the [place] moves about every 4 years.  Graphic 00936 Version 13.0    Tips for avoiding animal allergens (urine, saliva, or dander) in your home  If possible, remove animals from your home   Keep any animals outside, in the garage, or in a kennel. Keeping them only out of certain rooms in the house might help a bit, but does not remove animal allergens from your home.  After you remove the animal, clean your home thoroughly.   If you cannot remove the animals from your home, try these steps   Use an air filter with a \"HEPA\" filter, but remember that air filters help only a little. Most of the animal allergens in your home are not floating in the air. Air filters only remove allergens that are floating in the air.  Reduce the number of things in your home where allergens can build up. Examples include carpets, fabric-covered furniture, and drapes. Replace these with bare floors, furniture that is not covered with fabric, and window blinds.  Every week, vacuum with a vacuum  that has a \"HEPA\" filter.  If you are allergic to your dog, bathing your dog every week might help reduce your symptoms. If you are allergic to your cat, bathing your cat will probably not reduce your symptoms.   HEPA: high-efficiency particulate air filter.

## 2024-06-25 RX ORDER — MIDODRINE HYDROCHLORIDE 10 MG/1
15 TABLET ORAL 3 TIMES DAILY
Qty: 405 TABLET | Refills: 0 | Status: SHIPPED | OUTPATIENT
Start: 2024-06-25

## 2024-07-15 ENCOUNTER — HOSPITAL ENCOUNTER (OUTPATIENT)
Dept: SLEEP CENTER | Age: 49
Discharge: HOME | End: 2024-07-17
Payer: MEDICARE

## 2024-07-15 DIAGNOSIS — R06.83 SNORING: ICD-10-CM

## 2024-07-15 DIAGNOSIS — G47.19 EXCESSIVE DAYTIME SLEEPINESS: ICD-10-CM

## 2024-07-15 DIAGNOSIS — R06.81 WITNESSED EPISODE OF APNEA: ICD-10-CM

## 2024-07-15 DIAGNOSIS — G47.33 OBSTRUCTIVE SLEEP APNEA (ADULT) (PEDIATRIC): ICD-10-CM

## 2024-07-15 PROCEDURE — 95810 POLYSOM 6/> YRS 4/> PARAM: CPT

## 2024-07-16 DIAGNOSIS — G47.10 HYPERSOMNIA: ICD-10-CM

## 2024-07-16 DIAGNOSIS — G47.19 EXCESSIVE DAYTIME SLEEPINESS: ICD-10-CM

## 2024-07-16 DIAGNOSIS — G47.33 OSA (OBSTRUCTIVE SLEEP APNEA): Primary | ICD-10-CM

## 2024-07-18 ENCOUNTER — TELEPHONE (OUTPATIENT)
Dept: SLEEP CENTER | Age: 49
End: 2024-07-18

## 2024-07-18 NOTE — TELEPHONE ENCOUNTER
LMOM for Betsey to call the sleep lab regarding her sleep study results and recommendations from the sleep physician. We would like to schedule Betsey back for a CPAP titration if she agrees or she can F/U with the ordering provider for results.   Thanks.

## 2024-07-19 ENCOUNTER — TELEPHONE (OUTPATIENT)
Dept: SLEEP CENTER | Age: 49
End: 2024-07-19

## 2024-07-19 NOTE — TELEPHONE ENCOUNTER
Betsey's f/u appt will need r/s.  Her CPAP titration is not scheduled until 08/26/24.        Thank you,  Chetna

## 2024-08-05 DIAGNOSIS — N94.6 DYSMENORRHEA: ICD-10-CM

## 2024-08-05 RX ORDER — MEDROXYPROGESTERONE ACETATE 150 MG/ML
INJECTION, SUSPENSION INTRAMUSCULAR
Qty: 1 ML | OUTPATIENT
Start: 2024-08-05

## 2024-08-06 RX ORDER — MEDROXYPROGESTERONE ACETATE 150 MG/ML
150 INJECTION, SUSPENSION INTRAMUSCULAR
Qty: 1 ML | Refills: 4 | Status: SHIPPED | OUTPATIENT
Start: 2024-08-06

## 2024-08-08 ENCOUNTER — LAB (OUTPATIENT)
Dept: FAMILY MEDICINE CLINIC | Age: 49
End: 2024-08-08

## 2024-08-08 DIAGNOSIS — N92.4 EXCESSIVE BLEEDING IN PREMENOPAUSAL PERIOD: Primary | ICD-10-CM

## 2024-08-08 RX ORDER — MEDROXYPROGESTERONE ACETATE 150 MG/ML
150 INJECTION, SUSPENSION INTRAMUSCULAR ONCE
Status: COMPLETED | OUTPATIENT
Start: 2024-08-08 | End: 2024-08-08

## 2024-08-08 RX ADMIN — MEDROXYPROGESTERONE ACETATE 150 MG: 150 INJECTION, SUSPENSION INTRAMUSCULAR at 13:03

## 2024-08-08 NOTE — PROGRESS NOTES
Medication(s) given during visit:    Administrations This Visit       medroxyPROGESTERone (DEPO-PROVERA) injection 150 mg       Admin Date  08/08/2024  13:03 Action  Given Dose  150 mg Route  IntraMUSCular Site  Deltoid Right Administered By  Jaxon Reynolds LPN    Ordering Provider: Beverley Castellon MD    NDC: 50970-511-79    Lot#: [id number]    : [employer]    Patient Supplied?: Yes                    Patient instructed to remain in clinic for 20 minutes after injection and was advised to report any adverse reaction to me immediately.

## 2024-08-26 ENCOUNTER — HOSPITAL ENCOUNTER (OUTPATIENT)
Dept: SLEEP CENTER | Age: 49
Discharge: HOME | End: 2024-08-28
Payer: MEDICARE

## 2024-08-26 DIAGNOSIS — G47.19 EXCESSIVE DAYTIME SLEEPINESS: ICD-10-CM

## 2024-08-26 DIAGNOSIS — G47.10 HYPERSOMNIA: ICD-10-CM

## 2024-08-26 DIAGNOSIS — G47.33 OSA (OBSTRUCTIVE SLEEP APNEA): ICD-10-CM

## 2024-08-26 PROCEDURE — 95811 POLYSOM 6/>YRS CPAP 4/> PARM: CPT

## 2024-08-27 DIAGNOSIS — G47.33 OSA ON CPAP: Primary | ICD-10-CM

## 2024-08-27 DIAGNOSIS — G47.10 HYPERSOMNIA: ICD-10-CM

## 2024-08-27 NOTE — PROGRESS NOTES
Title:  CPAP/BiLevel Titration's    Approved by:  Angel Almonte MD      Approval Date:  March, 2024 Next Review:  March, 2026     Responsible Party: [facility] Institution/Entities Applies to:   [facility]   Policy Number:  None    Document Type:  Such as Guideline, Policy, Policy & Procedure, or Procedure, Instructions  Manual:  Policy and Procedures    Section: IV Policy Start Date: October, 2000         POLICY: Positive airway pressure device is used to treat patients with sleep related breathing disorders characterized by full or partial occlusion of the upper airway during sleep. A patient must have undergone polysomnography and diagnosed with obstructive sleep apnea.    All individuals who record sleep studies must follow best practices for CPAP/bilevel/ASV titrations in order to attain the ideal pressure setting for their patients. Too low of pressure may cause patients to either be sub-optimally treated or to wake up in a panic. Too much pressure may cause the patient to experience bloating or mask leakage. Determining the appropriate pressure setting for each patient will lead to improved adherence and outcome. Titrations are not an exact science, and it is understood that technologists may need to make minor changes for individual patients. The procedures outlined below are meant to be a guideline and follow the spirit of the AASM clinical guidelines.      PROCEDURE:    CPAP:    Review the patients pertinent medical al history, previous sleep study or studies to ass the severity of sleep disordered breathing. Review of pertinent information will help to attain a better titration.   Applications of electrodes, montages, filters, sensitivities and scoring will be performed according to the current version of the AASM Scoring Manual.   Prior to initiating study collect all appropriate PAP supplies  Tubing   Humidifier (filled with distilled  inspiratory and expiratory pressure settings should be increased by 2 cm H20. If occasional hypopneas, snoring, or mask flow limitation are present inspiratory and expiratory pressures settings should be increased by 1 cm h20 and maintained for at least 5 minutes to determine if events improve or resolve. Pressure settings may need to be increased more quickly during REM sleep given the limited amount of REM during sleep and the need to treat events during this stage.   If a mask leak occurs, the tech should first fix the leakage before raising the pressure. Otherwise, the final pressure setting chosen for the patient may be too high. Once the mask leak has been fixed, decrease the pressure to the last setting where mouth breathing and/or mask leakage was not present, and then re-titrate as indicated. Make sure to document directly on the study the steps taken to resolve the leak and the type of masks used. Pressure setting usually do not need to be set as high with a nasal-mask than with a full-face mask.   The recording technologist should document directly on the study at least every 30 minutes.   If the patient takes a break from wearing the mask, do not decrease the CPAP pressure on attempted return to sleep unless the patient remains awake for 15 minutes or the patient specifically requests that the pressure be lowered.   Do not raise pressure for central apneas. If the patient develops central apneas, pressure setting may need to be lowered. If the patient has central apneas on bilevel, the use of spontaneous (ST) mode may be indicated.   ST mode may only be used in 2 cases  An order for ST mode with a primary diagnosis of central sleep apnea  During a titration if obstructive events are less than 5/ hour and centrals must be greater than 50% of total respiratory events.   Ensure that supine sleep has been seen on the chosen setting. Going above the chosen setting 1 or 2 cm H20 to show range may be helpful to

## 2024-08-27 NOTE — PROGRESS NOTES
Title:  CPAP/BiLevel Titration's    Approved by:  Angel Almonte MD      Approval Date:  March, 2024 Next Review:  March, 2026     Responsible Party: [facility] Institution/Entities Applies to:   [facility]   Policy Number:  None    Document Type:  Such as Guideline, Policy, Policy & Procedure, or Procedure, Instructions  Manual:  Policy and Procedures    Section: IV Policy Start Date: October, 2000         POLICY: Positive airway pressure device is used to treat patients with sleep related breathing disorders characterized by full or partial occlusion of the upper airway during sleep. A patient must have undergone polysomnography and diagnosed with obstructive sleep apnea.    All individuals who record sleep studies must follow best practices for CPAP/bilevel/ASV titrations in order to attain the ideal pressure setting for their patients. Too low of pressure may cause patients to either be sub-optimally treated or to wake up in a panic. Too much pressure may cause the patient to experience bloating or mask leakage. Determining the appropriate pressure setting for each patient will lead to improved adherence and outcome. Titrations are not an exact science, and it is understood that technologists may need to make minor changes for individual patients. The procedures outlined below are meant to be a guideline and follow the spirit of the AASM clinical guidelines.      PROCEDURE:    CPAP:    Review the patients pertinent medical al history, previous sleep study or studies to ass the severity of sleep disordered breathing. Review of pertinent information will help to attain a better titration.   Applications of electrodes, montages, filters, sensitivities and scoring will be performed according to the current version of the AASM Scoring Manual.   Prior to initiating study collect all appropriate PAP supplies  Tubing   Humidifier (filled with distilled

## 2024-08-28 ENCOUNTER — TELEPHONE (OUTPATIENT)
Dept: SLEEP CENTER | Age: 49
End: 2024-08-28

## 2024-09-13 NOTE — PROGRESS NOTES
none   15 cm Artiflex Cotton none   4 cm Rosidal K none   6 cm Rosidal K none   8 cm Rosidal K none   10 cm Rosidal K none   12 cm Rosidal K none   Rosidal Soft none             Decongestive/Therapeutic Exercise  Deep Breathing and ankle pumps    Patient Education  New Education Provided:   Continue with pump on left leg. Educated on amount legs have decreased. Learner:caregiver and patient  Method: demonstration and explanation       Outcome: verbalized concerns, demonstrated understanding, needs reinforcement and asked questions     GOALS   SHORT-TERM GOALS:  to be met in 4 weeks   X MET  Patient will demonstrate a decrease in circumferential measurements of the affected extremity by 2 cm working towards the lymphedema swelling stabilizing and patient being able to get measured and fitted for a new compression garment to be worn daily to keep swelling down. X  Patient will tolerate wearing the compression bandages from one treatment session until the next treatment session working towards meeting short-term goal #1. X Patient/family/caregiver will demonstrate and verbalize 3-5 skin care precautionary measures to decrease dry skin and risk of infection. X Patient/family/caregiver will demonstrate applying compression bandages with no greater than moderate assist and verbal cues from the therapist working towards being modified independent with applying the compression bandages for night time swelling. ASSESSMENT:  Assessment:  Patient progressing toward goal achievement. Activity Tolerance:  Patient tolerance of  treatment: Good. Plan: Continue treatment according to established plan of care         Time In: 1100   Time Out: 1208   Timed Code Minutes: 68   Untimed Code Minutes: 0   Total Treatment Time: [address]  [address] [address] [place], [address] [facility] 4 = No assist / stand by assistance

## 2024-10-02 DIAGNOSIS — J45.40 MODERATE PERSISTENT ASTHMA WITHOUT COMPLICATION: ICD-10-CM

## 2024-10-02 RX ORDER — FLUTICASONE FUROATE AND VILANTEROL TRIFENATATE 200; 25 UG/1; UG/1
1 POWDER RESPIRATORY (INHALATION) DAILY
Qty: 28 EACH | Refills: 11 | Status: SHIPPED | OUTPATIENT
Start: 2024-10-02

## 2024-10-29 ENCOUNTER — OFFICE VISIT (OUTPATIENT)
Dept: FAMILY MEDICINE CLINIC | Age: 49
End: 2024-10-29
Payer: MEDICARE

## 2024-10-29 VITALS
HEART RATE: 93 BPM | OXYGEN SATURATION: 96 % | BODY MASS INDEX: 53.04 KG/M2 | SYSTOLIC BLOOD PRESSURE: 118 MMHG | RESPIRATION RATE: 18 BRPM | DIASTOLIC BLOOD PRESSURE: 82 MMHG | WEIGHT: 293 LBS

## 2024-10-29 DIAGNOSIS — J01.90 ACUTE BACTERIAL SINUSITIS: Primary | ICD-10-CM

## 2024-10-29 DIAGNOSIS — B96.89 ACUTE BACTERIAL SINUSITIS: Primary | ICD-10-CM

## 2024-10-29 DIAGNOSIS — N94.6 DYSMENORRHEA: ICD-10-CM

## 2024-10-29 PROCEDURE — G8484 FLU IMMUNIZE NO ADMIN: HCPCS | Performed by: STUDENT IN AN ORGANIZED HEALTH CARE EDUCATION/TRAINING PROGRAM

## 2024-10-29 PROCEDURE — G8417 CALC BMI ABV UP PARAM F/U: HCPCS | Performed by: STUDENT IN AN ORGANIZED HEALTH CARE EDUCATION/TRAINING PROGRAM

## 2024-10-29 PROCEDURE — 1036F TOBACCO NON-USER: CPT | Performed by: STUDENT IN AN ORGANIZED HEALTH CARE EDUCATION/TRAINING PROGRAM

## 2024-10-29 PROCEDURE — G8427 DOCREV CUR MEDS BY ELIG CLIN: HCPCS | Performed by: STUDENT IN AN ORGANIZED HEALTH CARE EDUCATION/TRAINING PROGRAM

## 2024-10-29 PROCEDURE — 99214 OFFICE O/P EST MOD 30 MIN: CPT | Performed by: STUDENT IN AN ORGANIZED HEALTH CARE EDUCATION/TRAINING PROGRAM

## 2024-10-29 RX ORDER — MEDROXYPROGESTERONE ACETATE 150 MG/ML
150 INJECTION, SUSPENSION INTRAMUSCULAR
Qty: 1 ML | Refills: 4 | Status: SHIPPED | OUTPATIENT
Start: 2024-10-29

## 2024-10-29 ASSESSMENT — ENCOUNTER SYMPTOMS
NAUSEA: 0
DIARRHEA: 0
SHORTNESS OF BREATH: 1
VOMITING: 0
CONSTIPATION: 0
COUGH: 0

## 2024-10-29 ASSESSMENT — PATIENT HEALTH QUESTIONNAIRE - PHQ9
SUM OF ALL RESPONSES TO PHQ QUESTIONS 1-9: 0
SUM OF ALL RESPONSES TO PHQ9 QUESTIONS 1 & 2: 0
2. FEELING DOWN, DEPRESSED OR HOPELESS: NOT AT ALL
1. LITTLE INTEREST OR PLEASURE IN DOING THINGS: NOT AT ALL
SUM OF ALL RESPONSES TO PHQ QUESTIONS 1-9: 0

## 2024-10-29 NOTE — PROGRESS NOTES
atraumatic.      Right Ear: A middle ear effusion is present.      Left Ear: A middle ear effusion is present.      Nose:      Right Sinus: Maxillary sinus tenderness and frontal sinus tenderness present.      Left Sinus: Maxillary sinus tenderness and frontal sinus tenderness present.      Mouth/Throat:      Mouth: Mucous membranes are moist.      Pharynx: Posterior oropharyngeal erythema present. No oropharyngeal exudate.   Eyes:      Conjunctiva/sclera: Conjunctivae normal.   Cardiovascular:      Rate and Rhythm: Normal rate and regular rhythm.      Pulses: Normal pulses.      Heart sounds: Normal heart sounds.   Pulmonary:      Effort: Pulmonary effort is normal.      Breath sounds: Normal breath sounds.   Neurological:      General: No focal deficit present.      Mental Status: She is alert. Mental status is at baseline.      Cranial Nerves: No cranial nerve deficit.      Sensory: No sensory deficit.      Motor: No weakness.      Coordination: Coordination normal.      Gait: Gait normal.      Comments: Unchanged from baseline, uses walker at baseline.   Psychiatric:         Mood and Affect: Mood normal.           Patient given educational materials - see patient instructions.  Discussed use, benefit, and sideeffects of prescribed medications.  All patient questions answered.  Pt voiced understanding. Reviewed health maintenance.  Instructed to continue current medications, diet and exercise.  Patient agreed with treatment plan.Follow up as directed.     Electronically signed by Beverley Castellon MD on 10/29/2024 at 3:22 PM

## 2024-11-13 NOTE — PROGRESS NOTES
[facility]      Betsey Bryant         [id number]  11/14/2024   Chief Complaint   Patient presents with    Follow-up     Jeremy f/u after pap setup with download [facility]        Pt of Dr. Flores    Assessment/Plan   1. JEREMY (obstructive sleep apnea)  -Yearly supply order placed? No  -Download reviewed and discussed with patient.  -The symptoms of JEREMY have improved. The patient is benefiting from therapy and should continue use of PAP device. I have reviewed the download.   -Patient's symptoms and AHI are controlled with current settings of 11 cmH2O. Will decrease current pressure settings to see if this helps with mask leak as AHI is well controlled. DME to pull download in 2 weeks  -Patient advised chin strap. She and her mother wish to obtain from [employer]  -Advised to continue current positive airway pressure therapy with above described pressure.   -Advised to keep good compliance with current recommended pressure to get optimal results and clinical improvement  -Recommend 7-9 hours of sleep with PAP  -Instructed to call DME company regarding supplies if needed.   -Patient to call my office for earlier appointment if needed for worsening of sleep symptoms.   -Patient does not drive  -Will have DME check temperature and humidification settings for patient. She will need assistance with adjusting this. Her mouth is dry during the night.    2. Class 3 severe obesity with body mass index (BMI) of 50.0 to 59.9 in adult, unspecified obesity type, unspecified whether serious comorbidity present - gained 9 lbs  -Counseled patient on weight loss    Educated about my impression and plan. Patient verbalizes understanding.    Follow up 6 months with download.  Subjective     PAP Download:   Original or initial AHI: 16.9     Date of initial study: 7/15/24      Compliant  60%     Noncompliant 30 %     PAP Type CPAP Level  11 cmH2O  Avg Hrs/Day 5 hours and 3 minutes  AHI:

## 2024-11-14 ENCOUNTER — OFFICE VISIT (OUTPATIENT)
Dept: PULMONOLOGY | Age: 49
End: 2024-11-14
Payer: MEDICARE

## 2024-11-14 VITALS
HEART RATE: 96 BPM | SYSTOLIC BLOOD PRESSURE: 124 MMHG | WEIGHT: 293 LBS | TEMPERATURE: 98.7 F | HEIGHT: 66 IN | DIASTOLIC BLOOD PRESSURE: 70 MMHG | OXYGEN SATURATION: 99 % | BODY MASS INDEX: 47.09 KG/M2

## 2024-11-14 DIAGNOSIS — E66.813 CLASS 3 SEVERE OBESITY WITH BODY MASS INDEX (BMI) OF 50.0 TO 59.9 IN ADULT, UNSPECIFIED OBESITY TYPE, UNSPECIFIED WHETHER SERIOUS COMORBIDITY PRESENT: ICD-10-CM

## 2024-11-14 DIAGNOSIS — G47.33 OSA (OBSTRUCTIVE SLEEP APNEA): Primary | ICD-10-CM

## 2024-11-14 DIAGNOSIS — E66.01 CLASS 3 SEVERE OBESITY WITH BODY MASS INDEX (BMI) OF 50.0 TO 59.9 IN ADULT, UNSPECIFIED OBESITY TYPE, UNSPECIFIED WHETHER SERIOUS COMORBIDITY PRESENT: ICD-10-CM

## 2024-11-14 PROCEDURE — G8417 CALC BMI ABV UP PARAM F/U: HCPCS

## 2024-11-14 PROCEDURE — G8484 FLU IMMUNIZE NO ADMIN: HCPCS

## 2024-11-14 PROCEDURE — 99214 OFFICE O/P EST MOD 30 MIN: CPT

## 2024-11-14 PROCEDURE — G8427 DOCREV CUR MEDS BY ELIG CLIN: HCPCS

## 2024-11-14 PROCEDURE — 1036F TOBACCO NON-USER: CPT

## 2024-11-14 ASSESSMENT — ENCOUNTER SYMPTOMS
SHORTNESS OF BREATH: 1
SINUS PAIN: 0
SORE THROAT: 0
COUGH: 0
RHINORRHEA: 0
WHEEZING: 0
SINUS PRESSURE: 0

## 2024-11-20 ENCOUNTER — HOSPITAL ENCOUNTER (OUTPATIENT)
Age: 49
Discharge: HOME | End: 2024-11-20
Payer: MEDICARE

## 2024-11-20 DIAGNOSIS — R06.02 SOB (SHORTNESS OF BREATH) ON EXERTION: ICD-10-CM

## 2024-11-20 DIAGNOSIS — R00.2 INTERMITTENT PALPITATIONS: ICD-10-CM

## 2024-11-20 DIAGNOSIS — I50.32 CHRONIC DIASTOLIC CONGESTIVE HEART FAILURE (HCC): ICD-10-CM

## 2024-11-20 DIAGNOSIS — R42 ORTHOSTATIC DIZZINESS: ICD-10-CM

## 2024-11-20 DIAGNOSIS — G90.A POTS (POSTURAL ORTHOSTATIC TACHYCARDIA SYNDROME): ICD-10-CM

## 2024-11-20 DIAGNOSIS — I47.11 INAPPROPRIATE SINUS TACHYCARDIA (HCC): ICD-10-CM

## 2024-11-20 DIAGNOSIS — R60.0 BILATERAL LEG EDEMA: ICD-10-CM

## 2024-11-20 LAB
ANION GAP SERPL CALC-SCNC: 12 MEQ/L (ref 8–16)
BUN SERPL-MCNC: 14 MG/DL (ref 7–22)
CALCIUM SERPL-MCNC: 9 MG/DL (ref 8.5–10.5)
CHLORIDE SERPL-SCNC: 104 MEQ/L (ref 98–111)
CO2 SERPL-SCNC: 24 MEQ/L (ref 23–33)
CREAT SERPL-MCNC: 0.7 MG/DL (ref 0.4–1.2)
GFR SERPL CREATININE-BSD FRML MDRD: > 90 ML/MIN/1.73M2
GLUCOSE SERPL-MCNC: 89 MG/DL (ref 70–108)
MAGNESIUM SERPL-MCNC: 1.9 MG/DL (ref 1.6–2.4)
POTASSIUM SERPL-SCNC: 3.9 MEQ/L (ref 3.5–5.2)
SODIUM SERPL-SCNC: 140 MEQ/L (ref 135–145)

## 2024-11-20 PROCEDURE — 80048 BASIC METABOLIC PNL TOTAL CA: CPT

## 2024-11-20 PROCEDURE — 36415 COLL VENOUS BLD VENIPUNCTURE: CPT

## 2024-11-20 PROCEDURE — 83735 ASSAY OF MAGNESIUM: CPT

## 2024-12-01 ENCOUNTER — TELEPHONE (OUTPATIENT)
Dept: PULMONOLOGY | Age: 49
End: 2024-12-01

## 2024-12-01 DIAGNOSIS — G47.33 OSA (OBSTRUCTIVE SLEEP APNEA): Primary | ICD-10-CM

## 2024-12-02 NOTE — TELEPHONE ENCOUNTER
Patient's mother wanted to be updated regarding recommendations after pressure change. Her leak has significantly improved. Her AHI is currently 5.0 which ideally I would like < 5.0. We decreased pressure at last visit from 11 cmh2O to 9 cmH2O. I would like to adjust pressure to 10 cmH2O as I believe this will not cause leaking and will help the AHI to reduce to < 5 cmH2O. Patient is showing 71% compliance at this time. Please ensure she is using her machine a minimum of 4 hours per night every night. Thanks!

## 2024-12-02 NOTE — TELEPHONE ENCOUNTER
No problem, we can certainly hold off on pressure change at this time. Please let me know if there is anything else they need. Thanks!

## 2024-12-02 NOTE — TELEPHONE ENCOUNTER
Download after pressure change attached to this encounter. Please review and notify me if any further changes are needed so that I may inform the patient. Thank you!  -  Dates: 11/18/2024 - 12/01/2024  DME: [facility]  AHI: 5.0

## 2024-12-09 ENCOUNTER — OFFICE VISIT (OUTPATIENT)
Dept: CARDIOLOGY CLINIC | Age: 49
End: 2024-12-09
Payer: MEDICARE

## 2024-12-09 VITALS
BODY MASS INDEX: 51.81 KG/M2 | HEART RATE: 93 BPM | WEIGHT: 293 LBS | SYSTOLIC BLOOD PRESSURE: 137 MMHG | DIASTOLIC BLOOD PRESSURE: 94 MMHG

## 2024-12-09 DIAGNOSIS — R60.0 BILATERAL LEG EDEMA: ICD-10-CM

## 2024-12-09 DIAGNOSIS — E66.01 MORBID OBESITY: ICD-10-CM

## 2024-12-09 DIAGNOSIS — I50.32 CHRONIC DIASTOLIC CONGESTIVE HEART FAILURE (HCC): Primary | ICD-10-CM

## 2024-12-09 DIAGNOSIS — I47.11 INAPPROPRIATE SINUS TACHYCARDIA (HCC): ICD-10-CM

## 2024-12-09 DIAGNOSIS — R42 ORTHOSTATIC DIZZINESS: ICD-10-CM

## 2024-12-09 DIAGNOSIS — G90.A POTS (POSTURAL ORTHOSTATIC TACHYCARDIA SYNDROME): ICD-10-CM

## 2024-12-09 PROCEDURE — 99214 OFFICE O/P EST MOD 30 MIN: CPT | Performed by: INTERNAL MEDICINE

## 2024-12-09 PROCEDURE — G8484 FLU IMMUNIZE NO ADMIN: HCPCS | Performed by: INTERNAL MEDICINE

## 2024-12-09 PROCEDURE — 1036F TOBACCO NON-USER: CPT | Performed by: INTERNAL MEDICINE

## 2024-12-09 PROCEDURE — G8417 CALC BMI ABV UP PARAM F/U: HCPCS | Performed by: INTERNAL MEDICINE

## 2024-12-09 PROCEDURE — G8427 DOCREV CUR MEDS BY ELIG CLIN: HCPCS | Performed by: INTERNAL MEDICINE

## 2024-12-09 RX ORDER — FUROSEMIDE 20 MG/1
20 TABLET ORAL 2 TIMES DAILY
Qty: 180 TABLET | Refills: 3 | Status: SHIPPED | OUTPATIENT
Start: 2024-12-09

## 2024-12-09 RX ORDER — POTASSIUM CHLORIDE 750 MG/1
10 TABLET, EXTENDED RELEASE ORAL DAILY
Qty: 90 TABLET | Refills: 3 | Status: SHIPPED | OUTPATIENT
Start: 2024-12-09

## 2024-12-09 RX ORDER — METOPROLOL SUCCINATE 50 MG/1
TABLET, EXTENDED RELEASE ORAL
Qty: 90 TABLET | Refills: 3 | Status: SHIPPED | OUTPATIENT
Start: 2024-12-09

## 2024-12-09 RX ORDER — MIDODRINE HYDROCHLORIDE 10 MG/1
15 TABLET ORAL 3 TIMES DAILY
Qty: 405 TABLET | Refills: 0 | Status: SHIPPED | OUTPATIENT
Start: 2024-12-09

## 2024-12-09 NOTE — TELEPHONE ENCOUNTER
Betsey Bryant called requesting a refill on the following medications:  Requested Prescriptions     Pending Prescriptions Disp Refills    potassium chloride (KLOR-CON M) 10 MEQ extended release tablet 90 tablet 3     Sig: Take 1 tablet by mouth daily    metoprolol succinate (TOPROL XL) 50 MG extended release tablet 90 tablet 3     Sig: take 1 tablet by mouth once daily    furosemide (LASIX) 20 MG tablet 180 tablet 3     Sig: Take 1 tablet by mouth 2 times daily    midodrine (PROAMATINE) 10 MG tablet 405 tablet 0     Sig: Take 1.5 tablets by mouth 3 times daily     [facility]      Date of last visit: 12/9/24   Date of next visit (if applicable): Visit date not found

## 2024-12-09 NOTE — PROGRESS NOTES
D: 03/20/2015 13:46      Conclusions    Summary   Technically difficult examination.   Normal left ventricle size and systolic function. Ejection fraction was   estimated at 55%. There were no regional left ventricular wall motion   abnormalities and wall thickness was within normal limits.      Signature   ----------------------------------------------------------------   Electronically signed by Erum Cummins MD (Interpreting   physician) on 11/17/2021 at 05:03 PM            Conclusions      Summary   Technically difficult examination.   Normal left ventricle size and systolic function. Ejection fraction was   estimated at 50 to 55 %. There were no regional left ventricular wall   motion abnormalities and wall thickness was within normal limits.   RVSP 30 mmhg     Signature      ----------------------------------------------------------------   Electronically signed by Erum Cummins MD (Interpreting   physician) on 03/21/2023 at 07:44 PM      Conclusions      Summary   Lexiscan EKG stress test is not suggestive for ischemia.   The nuclear images is not suggestive for myocardial ischemia.      Recommendation   Clinical correlation is recommended due to poor image quality.      Signatures      ----------------------------------------------------------------   Electronically signed by Erum Cummins MD (Interpreting   Cardiologist) on 03/21/2023 at 20:05      ekg 6/8/18  NSR, NO ACUTE ABN    ekg 6/5/2020  Sinus tachy no acute abn    ekg 12/2/21  Sinus  Tachycardia 105  Low voltage in precordial leads.    -Nonspecific ST depression   +   Nonspecific T-abnormality  -Nondiagnostic.   ABNORMAL     Ekg 3/6/23  Sinus  Tachycardia  102 bopm  Low voltage in precordial leads.    -  Nonspecific T-abnormality.   ABNORMAL     EKG 5/8/24  Sinus Rhythm  89  bpm  Low voltage in precordial leads.  -Nonspecific T-abnormality.  ABNORMAL        Assessment  CHF/dizziness/cp and leg edema    Grandmother had CAD in her 70's   Diagnosis

## 2024-12-27 ENCOUNTER — NURSE ONLY (OUTPATIENT)
Dept: FAMILY MEDICINE CLINIC | Age: 49
End: 2024-12-27

## 2024-12-27 DIAGNOSIS — Z78.9 USES BIRTH CONTROL: Primary | ICD-10-CM

## 2024-12-27 LAB
HCG, URINE, POC: NEGATIVE
Lab: NORMAL
NEGATIVE QC PASS/FAIL: NORMAL
POSITIVE QC PASS/FAIL: NORMAL

## 2024-12-27 RX ORDER — MEDROXYPROGESTERONE ACETATE 150 MG/ML
150 INJECTION, SUSPENSION INTRAMUSCULAR ONCE
Status: COMPLETED | OUTPATIENT
Start: 2024-12-27 | End: 2024-12-27

## 2024-12-27 RX ADMIN — MEDROXYPROGESTERONE ACETATE 150 MG: 150 INJECTION, SUSPENSION INTRAMUSCULAR at 11:34

## 2024-12-27 NOTE — PROGRESS NOTES
Medication(s) given during visit:    Administrations This Visit       medroxyPROGESTERone (DEPO-PROVERA) injection 150 mg       Admin Date  12/27/2024  11:34 Action  Given Dose  150 mg Route  IntraMUSCular Site  Deltoid Right Documented By  Ania Peres LPN    NDC: 64231-739-55    Lot#: [id number]    : [facility]    Patient Supplied?: Yes                    Patient instructed to remain in clinic for 20 minutes after injection and was advised to report any adverse reaction to me immediately.

## 2025-02-08 DIAGNOSIS — J45.40 MODERATE PERSISTENT ASTHMA WITHOUT COMPLICATION: ICD-10-CM

## 2025-02-11 RX ORDER — FLUTICASONE FUROATE AND VILANTEROL TRIFENATATE 200; 25 UG/1; UG/1
1 POWDER RESPIRATORY (INHALATION) DAILY
Qty: 60 EACH | Refills: 3 | Status: SHIPPED | OUTPATIENT
Start: 2025-02-11 | End: 2025-02-13

## 2025-02-13 RX ORDER — FLUTICASONE FUROATE AND VILANTEROL TRIFENATATE 200; 25 UG/1; UG/1
1 POWDER RESPIRATORY (INHALATION)
Qty: 1 EACH | Refills: 3 | Status: SHIPPED | OUTPATIENT
Start: 2025-02-13

## 2025-02-13 NOTE — TELEPHONE ENCOUNTER
Received fax from [facility] stating that generic Breo is not covered by insurance but the fax states that brand Breo is covered. Are you able to send in a script for brand name Breo ROSA to [address]? Please let me know, thanks!

## 2025-02-27 ENCOUNTER — TELEPHONE (OUTPATIENT)
Dept: FAMILY MEDICINE CLINIC | Age: 50
End: 2025-02-27

## 2025-02-27 DIAGNOSIS — Z86.19 HISTORY OF COLD SORES: Primary | ICD-10-CM

## 2025-02-27 RX ORDER — ACYCLOVIR 400 MG/1
TABLET ORAL
Qty: 90 TABLET | Refills: 0 | Status: SHIPPED | OUTPATIENT
Start: 2025-02-27

## 2025-02-27 NOTE — TELEPHONE ENCOUNTER
Refill request from pharmacy, requesting refill of Acyclovir. Unable to pend.     Last Appt. 10/29/2024   Next Appt. Visit date not found   RX Pended

## 2025-02-27 NOTE — TELEPHONE ENCOUNTER
Spoke with patients mother. She is in need of refill  She reports that patient take one tablet three time a week to help prevent cold sores.   Pharmacy - [facility] [address]

## 2025-02-27 NOTE — TELEPHONE ENCOUNTER
Can you please ask patient the dose and how she takes it and what she takes it for. It looks like 400mg three times a week, previously prescribed by ENT. But its an odd prescription so I just want to confirm.

## 2025-03-10 DIAGNOSIS — K58.0 IRRITABLE BOWEL SYNDROME WITH DIARRHEA: ICD-10-CM

## 2025-03-10 RX ORDER — DICYCLOMINE HCL 20 MG
20 TABLET ORAL 2 TIMES DAILY
Qty: 60 TABLET | Refills: 11 | Status: SHIPPED | OUTPATIENT
Start: 2025-03-10

## 2025-03-10 NOTE — TELEPHONE ENCOUNTER
Patient's last appointment was : 10/29/2024  Patient's next appointment is : 3/19/2025  Last sent in: 2/26/24 60 tabs with 11 refills

## 2025-03-15 NOTE — TELEPHONE ENCOUNTER
----- Message from Josiah Pittman sent at 4/5/2022 12:35 PM EDT -----  [address] TO GET VERBAL [address]
Returned call to [facility], continue with re-certification for home health services for wound care.
Other

## 2025-03-19 ENCOUNTER — LAB (OUTPATIENT)
Dept: FAMILY MEDICINE CLINIC | Age: 50
End: 2025-03-19
Payer: MEDICARE

## 2025-03-19 DIAGNOSIS — N94.6 DYSMENORRHEA: Primary | ICD-10-CM

## 2025-03-19 DIAGNOSIS — H66.006 RECURRENT ACUTE SUPPURATIVE OTITIS MEDIA WITHOUT SPONTANEOUS RUPTURE OF TYMPANIC MEMBRANE OF BOTH SIDES: ICD-10-CM

## 2025-03-19 PROCEDURE — 96372 THER/PROPH/DIAG INJ SC/IM: CPT | Performed by: STUDENT IN AN ORGANIZED HEALTH CARE EDUCATION/TRAINING PROGRAM

## 2025-03-19 RX ORDER — LORATADINE 10 MG/1
10 TABLET ORAL DAILY
Qty: 90 TABLET | Refills: 3 | Status: SHIPPED | OUTPATIENT
Start: 2025-03-19

## 2025-03-19 RX ORDER — MEDROXYPROGESTERONE ACETATE 150 MG/ML
150 INJECTION, SUSPENSION INTRAMUSCULAR ONCE
Status: COMPLETED | OUTPATIENT
Start: 2025-03-19 | End: 2025-03-19

## 2025-03-19 RX ADMIN — MEDROXYPROGESTERONE ACETATE 150 MG: 150 INJECTION, SUSPENSION INTRAMUSCULAR at 15:08

## 2025-03-19 NOTE — PROGRESS NOTES
Medication(s) given during visit:    Administrations This Visit       medroxyPROGESTERone (DEPO-PROVERA) injection 150 mg       Admin Date  03/19/2025  15:08 Action  Given Dose  150 mg Route  IntraMUSCular Site  Deltoid Right Documented By  Jaxon Cornejo LPN    NDC: 60551-949-43    Lot#: [id number]    : [facility]    Patient Supplied?: Yes                    Patient instructed to remain in clinic for 20 minutes after injection and was advised to report any adverse reaction to me immediately.

## 2025-03-31 DIAGNOSIS — H66.006 RECURRENT ACUTE SUPPURATIVE OTITIS MEDIA WITHOUT SPONTANEOUS RUPTURE OF TYMPANIC MEMBRANE OF BOTH SIDES: ICD-10-CM

## 2025-03-31 RX ORDER — LORATADINE 10 MG/1
10 TABLET ORAL DAILY
Qty: 90 TABLET | Refills: 3 | OUTPATIENT
Start: 2025-03-31

## 2025-05-22 ENCOUNTER — OFFICE VISIT (OUTPATIENT)
Age: 50
End: 2025-05-22
Payer: MEDICARE

## 2025-05-22 VITALS
SYSTOLIC BLOOD PRESSURE: 110 MMHG | HEART RATE: 107 BPM | TEMPERATURE: 99.1 F | DIASTOLIC BLOOD PRESSURE: 68 MMHG | OXYGEN SATURATION: 98 % | HEIGHT: 66 IN | BODY MASS INDEX: 47.09 KG/M2 | WEIGHT: 293 LBS

## 2025-05-22 DIAGNOSIS — G47.33 OSA (OBSTRUCTIVE SLEEP APNEA): Primary | ICD-10-CM

## 2025-05-22 PROCEDURE — G8417 CALC BMI ABV UP PARAM F/U: HCPCS

## 2025-05-22 PROCEDURE — 1036F TOBACCO NON-USER: CPT

## 2025-05-22 PROCEDURE — G8427 DOCREV CUR MEDS BY ELIG CLIN: HCPCS

## 2025-05-22 PROCEDURE — 99214 OFFICE O/P EST MOD 30 MIN: CPT

## 2025-05-22 ASSESSMENT — ENCOUNTER SYMPTOMS
SORE THROAT: 0
CHEST TIGHTNESS: 0
SHORTNESS OF BREATH: 0
RHINORRHEA: 0

## 2025-05-22 NOTE — PROGRESS NOTES
[facility]     BETSEY ART, 50 y.o.   : 1975  Day of encounter: 2025   Previously seen by Dr. Flores, Janine Lorenzo, CNP     Subjective   Betsey is here for 6 month follow up for GEOVANI.   Betsey presents to the sleep clinic today with her mother with acute concerns for leaking that is coming from the machine. Pt's mother reports she was able to make adjustments to keep the leaking to a minimum, however pt reports this was a significant issue leading to sleep disturbance earlier this month and leading to her not using the machine for a couple of days. States pressures when she first puts the machine/mask on in the last 3 days has been very uncomfortable- pt's mother reports concern she may have some upper respiratory congestion. Pt denies issues with the mask. Reports at times having some lightheadedness, dizziness with associated palpitations and shorntess of breath occasionally during the day- reports she is only taking midodrine BID rather than the prescribed TID. At this time pt reports feeling nervous for this visit. She denies CP, chest tightness, shortness of breath otherwise.    SAQLI: 64  [place] Sleepiness Scale: 20  Progress History:   Since last visit any new medical issues? No  New ER or hospitlal visits? No   Any new or changes in medicines? 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

## 2025-06-12 RX ORDER — METOPROLOL SUCCINATE 50 MG/1
50 TABLET, EXTENDED RELEASE ORAL DAILY
Qty: 90 TABLET | Refills: 0 | Status: SHIPPED | OUTPATIENT
Start: 2025-06-12

## 2025-06-17 ENCOUNTER — TELEPHONE (OUTPATIENT)
Dept: FAMILY MEDICINE CLINIC | Age: 50
End: 2025-06-17

## 2025-06-17 ENCOUNTER — OFFICE VISIT (OUTPATIENT)
Dept: FAMILY MEDICINE CLINIC | Age: 50
End: 2025-06-17

## 2025-06-17 VITALS
BODY MASS INDEX: 56.2 KG/M2 | WEIGHT: 293 LBS | HEART RATE: 104 BPM | DIASTOLIC BLOOD PRESSURE: 72 MMHG | RESPIRATION RATE: 18 BRPM | OXYGEN SATURATION: 98 % | SYSTOLIC BLOOD PRESSURE: 118 MMHG

## 2025-06-17 DIAGNOSIS — I47.11 INAPPROPRIATE SINUS TACHYCARDIA: ICD-10-CM

## 2025-06-17 DIAGNOSIS — R60.0 BILATERAL LEG EDEMA: ICD-10-CM

## 2025-06-17 DIAGNOSIS — L30.4 INTERTRIGO: ICD-10-CM

## 2025-06-17 DIAGNOSIS — L98.491 ULCER OF ABDOMEN WALL, LIMITED TO BREAKDOWN OF SKIN (HCC): ICD-10-CM

## 2025-06-17 DIAGNOSIS — R42 ORTHOSTATIC DIZZINESS: ICD-10-CM

## 2025-06-17 DIAGNOSIS — F25.9 SCHIZO AFFECTIVE SCHIZOPHRENIA (HCC): ICD-10-CM

## 2025-06-17 DIAGNOSIS — R73.9 HYPERGLYCEMIA: ICD-10-CM

## 2025-06-17 DIAGNOSIS — J44.9 CHRONIC OBSTRUCTIVE PULMONARY DISEASE, UNSPECIFIED COPD TYPE (HCC): ICD-10-CM

## 2025-06-17 DIAGNOSIS — I89.0 LYMPHEDEMA OF BOTH LOWER EXTREMITIES: Primary | ICD-10-CM

## 2025-06-17 DIAGNOSIS — L98.9 SKIN LESION: ICD-10-CM

## 2025-06-17 DIAGNOSIS — I50.32 CHRONIC DIASTOLIC CONGESTIVE HEART FAILURE (HCC): ICD-10-CM

## 2025-06-17 DIAGNOSIS — E66.01 MORBID OBESITY (HCC): ICD-10-CM

## 2025-06-17 DIAGNOSIS — G90.A POTS (POSTURAL ORTHOSTATIC TACHYCARDIA SYNDROME): ICD-10-CM

## 2025-06-17 DIAGNOSIS — E78.00 HYPERCHOLESTEROLEMIA: ICD-10-CM

## 2025-06-17 LAB
ALBUMIN SERPL BCG-MCNC: 4 G/DL (ref 3.4–4.9)
ALP SERPL-CCNC: 92 U/L (ref 38–126)
ALT SERPL W/O P-5'-P-CCNC: 28 U/L (ref 10–35)
ANION GAP SERPL CALC-SCNC: 12 MEQ/L (ref 8–16)
AST SERPL-CCNC: 26 U/L (ref 10–35)
BASOPHILS ABSOLUTE: 0.1 THOU/MM3 (ref 0–0.1)
BASOPHILS NFR BLD AUTO: 0.7 %
BILIRUB CONJ SERPL-MCNC: < 0.1 MG/DL (ref 0–0.2)
BILIRUB SERPL-MCNC: 0.3 MG/DL (ref 0.3–1.2)
BUN SERPL-MCNC: 12 MG/DL (ref 8–23)
CALCIUM SERPL-MCNC: 8.9 MG/DL (ref 8.6–10)
CHLORIDE SERPL-SCNC: 104 MEQ/L (ref 98–111)
CHOLEST SERPL-MCNC: 210 MG/DL (ref 100–199)
CO2 SERPL-SCNC: 23 MEQ/L (ref 22–29)
CREAT SERPL-MCNC: 0.8 MG/DL (ref 0.5–0.9)
DEPRECATED MEAN GLUCOSE BLD GHB EST-ACNC: 93 MG/DL (ref 70–126)
DEPRECATED RDW RBC AUTO: 48.7 FL (ref 35–45)
EOSINOPHIL NFR BLD AUTO: 4.1 %
EOSINOPHILS ABSOLUTE: 0.4 THOU/MM3 (ref 0–0.4)
ERYTHROCYTE [DISTWIDTH] IN BLOOD BY AUTOMATED COUNT: 14 % (ref 11.5–14.5)
GFR SERPL CREATININE-BSD FRML MDRD: > 90 ML/MIN/1.73M2
GLUCOSE SERPL-MCNC: 91 MG/DL (ref 74–109)
HBA1C MFR BLD HPLC: 5.1 % (ref 4–6)
HCT VFR BLD AUTO: 44.3 % (ref 37–47)
HDLC SERPL-MCNC: 45 MG/DL
HGB BLD-MCNC: 14 GM/DL (ref 12–16)
IMM GRANULOCYTES # BLD AUTO: 0.06 THOU/MM3 (ref 0–0.07)
IMM GRANULOCYTES NFR BLD AUTO: 0.6 %
LDLC SERPL CALC-MCNC: 116 MG/DL
LYMPHOCYTES ABSOLUTE: 3.5 THOU/MM3 (ref 1–4.8)
LYMPHOCYTES NFR BLD AUTO: 35.9 %
MAGNESIUM SERPL-MCNC: 2.3 MG/DL (ref 1.6–2.6)
MCH RBC QN AUTO: 30 PG (ref 26–33)
MCHC RBC AUTO-ENTMCNC: 31.6 GM/DL (ref 32.2–35.5)
MCV RBC AUTO: 94.9 FL (ref 81–99)
MONOCYTES ABSOLUTE: 0.8 THOU/MM3 (ref 0.4–1.3)
MONOCYTES NFR BLD AUTO: 8 %
NEUTROPHILS ABSOLUTE: 4.9 THOU/MM3 (ref 1.8–7.7)
NEUTROPHILS NFR BLD AUTO: 50.7 %
NRBC BLD AUTO-RTO: 0 /100 WBC
PLATELET # BLD AUTO: 322 THOU/MM3 (ref 130–400)
PMV BLD AUTO: 10.8 FL (ref 9.4–12.4)
POTASSIUM SERPL-SCNC: 4.1 MEQ/L (ref 3.5–5.2)
PROT SERPL-MCNC: 7.4 G/DL (ref 6.4–8.3)
RBC # BLD AUTO: 4.67 MILL/MM3 (ref 4.2–5.4)
SODIUM SERPL-SCNC: 139 MEQ/L (ref 135–145)
TRIGL SERPL-MCNC: 245 MG/DL (ref 0–199)
TSH SERPL DL<=0.05 MIU/L-ACNC: 1.56 UIU/ML (ref 0.27–4.2)
WBC # BLD AUTO: 9.7 THOU/MM3 (ref 4.8–10.8)

## 2025-06-17 RX ORDER — MUPIROCIN 2 %
OINTMENT (GRAM) TOPICAL
Qty: 30 G | Refills: 0 | Status: SHIPPED | OUTPATIENT
Start: 2025-06-17 | End: 2025-06-24

## 2025-06-17 RX ORDER — FLUCONAZOLE 150 MG/1
150 TABLET ORAL DAILY
Qty: 28 TABLET | Refills: 0 | Status: SHIPPED | OUTPATIENT
Start: 2025-06-17 | End: 2025-07-15

## 2025-06-17 SDOH — ECONOMIC STABILITY: FOOD INSECURITY: WITHIN THE PAST 12 MONTHS, THE FOOD YOU BOUGHT JUST DIDN'T LAST AND YOU DIDN'T HAVE MONEY TO GET MORE.: NEVER TRUE

## 2025-06-17 SDOH — ECONOMIC STABILITY: FOOD INSECURITY: WITHIN THE PAST 12 MONTHS, YOU WORRIED THAT YOUR FOOD WOULD RUN OUT BEFORE YOU GOT MONEY TO BUY MORE.: NEVER TRUE

## 2025-06-17 ASSESSMENT — PATIENT HEALTH QUESTIONNAIRE - PHQ9
SUM OF ALL RESPONSES TO PHQ QUESTIONS 1-9: 0
1. LITTLE INTEREST OR PLEASURE IN DOING THINGS: NOT AT ALL
SUM OF ALL RESPONSES TO PHQ QUESTIONS 1-9: 0
SUM OF ALL RESPONSES TO PHQ QUESTIONS 1-9: 0
2. FEELING DOWN, DEPRESSED OR HOPELESS: NOT AT ALL
SUM OF ALL RESPONSES TO PHQ QUESTIONS 1-9: 0

## 2025-06-17 ASSESSMENT — ENCOUNTER SYMPTOMS
DIARRHEA: 0
NAUSEA: 0
VOMITING: 0
COLOR CHANGE: 1
COUGH: 0
SHORTNESS OF BREATH: 0
CONSTIPATION: 0

## 2025-06-17 NOTE — TELEPHONE ENCOUNTER
----- Message from Dr. Beverley Castellon MD sent at 6/17/2025  2:59 PM EDT -----  Can you call lymphedema clinic and ask what patient's previous order was so I can try to place the order for her lymphedema devices. We're going to try to get her in with vein center or if lymphedema can see her sooner than 60 days I will place referral for them but she needs to be seen soon.

## 2025-06-17 NOTE — TELEPHONE ENCOUNTER
Unable to contact someone at [facility] at [phone]. Will call back tomorrow

## 2025-06-17 NOTE — PROGRESS NOTES
Venipuncture obtained from  right arm. Patient tolerated the procedure without complications or complaints.  
Appointments   Date Time Provider Department Center   6/18/2025 10:30 AM Erum Cummins MD N [facility]   6/25/2025  3:30 PM ELSY STEPHENSON MA/LAB [facility]   8/13/2025 10:00 AM Beverley Castellon MD [facility]   12/4/2025  9:00 AM Taniya Robbins PA-C [facility]       HPI:     HPI  50-year-old female past medical history significant for abdominal skin ulcer, schizoaffective schizophrenia, CHF, COPD, lymphedema, hyperglycemia, hypercholesterolemia, POTS, obesity, among others who presents as acute care visit for needing a referral to lymphedema clinic.      Also she is needing something additional for yeast.  The nystatin cream is not helping very much and is using powders to help control as well.    Sees cardio tomorrow.     Appointment with pulm/sleep in December    Dr. Maico quijano for psych    [facility]- fax number [facility] [phone] Number for lymphedema clinic  Subjective:      Review of Systems   Constitutional:  Negative for fatigue and fever.   Respiratory:  Negative for cough and shortness of breath.    Cardiovascular:  Positive for leg swelling. Negative for chest pain.   Gastrointestinal:  Negative for constipation, diarrhea, nausea and vomiting.   Genitourinary:  Negative for decreased urine volume and dysuria.   Skin:  Positive for color change, rash and wound.   Neurological:  Negative for dizziness and headaches.   Psychiatric/Behavioral:  Negative for dysphoric mood and sleep disturbance. The patient is not nervous/anxious.          Objective:     Vitals:    06/17/25 1310   BP: 118/72   BP Site: Right Lower Arm   Patient Position: Sitting   Pulse: (!) 104   Resp: 18   SpO2: 98%   Weight: (!) 157.9 kg (348 lb 3.2 oz)       Body mass index is 56.2 kg/m².    Wt Readings from Last 3 Encounters:   06/17/25 (!) 157.9 kg (348 lb 3.2 oz)   05/22/25 (!) 151.2 kg (333 lb 4.8 oz)   12/09/24 (!) 145.6 kg (321 lb)     BP Readings from

## 2025-06-18 ENCOUNTER — OFFICE VISIT (OUTPATIENT)
Dept: CARDIOLOGY CLINIC | Age: 50
End: 2025-06-18
Payer: MEDICARE

## 2025-06-18 VITALS
HEIGHT: 66 IN | SYSTOLIC BLOOD PRESSURE: 125 MMHG | BODY MASS INDEX: 47.09 KG/M2 | DIASTOLIC BLOOD PRESSURE: 74 MMHG | HEART RATE: 95 BPM | WEIGHT: 293 LBS

## 2025-06-18 DIAGNOSIS — R06.02 SOB (SHORTNESS OF BREATH) ON EXERTION: ICD-10-CM

## 2025-06-18 DIAGNOSIS — R42 ORTHOSTATIC DIZZINESS: ICD-10-CM

## 2025-06-18 DIAGNOSIS — G90.A POTS (POSTURAL ORTHOSTATIC TACHYCARDIA SYNDROME): ICD-10-CM

## 2025-06-18 DIAGNOSIS — R60.0 BILATERAL LEG EDEMA: ICD-10-CM

## 2025-06-18 DIAGNOSIS — I47.11 INAPPROPRIATE SINUS TACHYCARDIA: ICD-10-CM

## 2025-06-18 DIAGNOSIS — I50.32 CHRONIC DIASTOLIC CONGESTIVE HEART FAILURE (HCC): Primary | ICD-10-CM

## 2025-06-18 PROCEDURE — 93000 ELECTROCARDIOGRAM COMPLETE: CPT | Performed by: INTERNAL MEDICINE

## 2025-06-18 PROCEDURE — G8417 CALC BMI ABV UP PARAM F/U: HCPCS | Performed by: INTERNAL MEDICINE

## 2025-06-18 PROCEDURE — 1036F TOBACCO NON-USER: CPT | Performed by: INTERNAL MEDICINE

## 2025-06-18 PROCEDURE — 99214 OFFICE O/P EST MOD 30 MIN: CPT | Performed by: INTERNAL MEDICINE

## 2025-06-18 PROCEDURE — G8427 DOCREV CUR MEDS BY ELIG CLIN: HCPCS | Performed by: INTERNAL MEDICINE

## 2025-06-18 RX ORDER — POTASSIUM CHLORIDE 750 MG/1
10 TABLET, EXTENDED RELEASE ORAL 2 TIMES DAILY
Qty: 180 TABLET | Refills: 2 | Status: SHIPPED | OUTPATIENT
Start: 2025-06-18

## 2025-06-18 RX ORDER — MIDODRINE HYDROCHLORIDE 10 MG/1
15 TABLET ORAL 3 TIMES DAILY
Qty: 405 TABLET | Refills: 1 | Status: SHIPPED | OUTPATIENT
Start: 2025-06-18

## 2025-06-18 RX ORDER — IVABRADINE 5 MG/1
5 TABLET, FILM COATED ORAL 2 TIMES DAILY WITH MEALS
Qty: 180 TABLET | Refills: 3 | Status: SHIPPED | OUTPATIENT
Start: 2025-06-18

## 2025-06-18 RX ORDER — FUROSEMIDE 20 MG/1
30 TABLET ORAL 2 TIMES DAILY
Qty: 270 TABLET | Refills: 2 | Status: SHIPPED | OUTPATIENT
Start: 2025-06-18

## 2025-06-18 RX ORDER — METOPROLOL SUCCINATE 50 MG/1
50 TABLET, EXTENDED RELEASE ORAL DAILY
Qty: 90 TABLET | Refills: 1 | Status: SHIPPED | OUTPATIENT
Start: 2025-06-18

## 2025-06-18 RX ORDER — POTASSIUM CHLORIDE 750 MG/1
10 TABLET, EXTENDED RELEASE ORAL DAILY
Qty: 90 TABLET | Refills: 1 | Status: SHIPPED | OUTPATIENT
Start: 2025-06-18 | End: 2025-06-18

## 2025-06-18 NOTE — PROGRESS NOTES
functional study if clinically indicated, like             Lexiscan nuclear stress test in view of the nonspecific ST segment             changes on standing.       3.   Avoid prolonged standing in view of prolonged standing induced             vasovagal response.       4.   I discussed with the patient.   Erum Cummins M.D.   D: 03/20/2015 13:46      Conclusions    Summary   Technically difficult examination.   Normal left ventricle size and systolic function. Ejection fraction was   estimated at 55%. There were no regional left ventricular wall motion   abnormalities and wall thickness was within normal limits.      Signature   ----------------------------------------------------------------   Electronically signed by Erum Cummins MD (Interpreting   physician) on 11/17/2021 at 05:03 PM            Conclusions      Summary   Technically difficult examination.   Normal left ventricle size and systolic function. Ejection fraction was   estimated at 50 to 55 %. There were no regional left ventricular wall   motion abnormalities and wall thickness was within normal limits.   RVSP 30 mmhg     Signature      ----------------------------------------------------------------   Electronically signed by Erum Cummins MD (Interpreting   physician) on 03/21/2023 at 07:44 PM      Conclusions      Summary   Lexiscan EKG stress test is not suggestive for ischemia.   The nuclear images is not suggestive for myocardial ischemia.      Recommendation   Clinical correlation is recommended due to poor image quality.      Signatures      ----------------------------------------------------------------   Electronically signed by Erum Cummins MD (Interpreting   Cardiologist) on 03/21/2023 at 20:05      ekg 6/8/18  NSR, NO ACUTE ABN    ekg 6/5/2020  Sinus tachy no acute abn    ekg 12/2/21  Sinus  Tachycardia 105  Low voltage in precordial leads.    -Nonspecific ST depression   +   Nonspecific T-abnormality  -Nondiagnostic.   ABNORMAL

## 2025-06-19 ENCOUNTER — RESULTS FOLLOW-UP (OUTPATIENT)
Dept: FAMILY MEDICINE CLINIC | Age: 50
End: 2025-06-19

## 2025-06-19 ENCOUNTER — TELEPHONE (OUTPATIENT)
Dept: FAMILY MEDICINE CLINIC | Age: 50
End: 2025-06-19

## 2025-06-19 DIAGNOSIS — H66.006 RECURRENT ACUTE SUPPURATIVE OTITIS MEDIA WITHOUT SPONTANEOUS RUPTURE OF TYMPANIC MEMBRANE OF BOTH SIDES: ICD-10-CM

## 2025-06-19 DIAGNOSIS — I89.0 LYMPHEDEMA OF BOTH LOWER EXTREMITIES: Primary | ICD-10-CM

## 2025-06-19 RX ORDER — LORATADINE 10 MG/1
10 TABLET ORAL DAILY
Qty: 90 TABLET | Refills: 3 | Status: SHIPPED | OUTPATIENT
Start: 2025-06-19

## 2025-06-19 NOTE — TELEPHONE ENCOUNTER
Please let patient know that I am unable to order her devices at this time.  Please let me know if she was able to get an appointment with Dr. Arora before 60 days.  If not I will send in the referral to lymphedema clinic.

## 2025-06-19 NOTE — TELEPHONE ENCOUNTER
Office called back, voiced they have not seen pt for years and are unsure what the previous order/device is

## 2025-06-25 ENCOUNTER — CLINICAL SUPPORT (OUTPATIENT)
Dept: FAMILY MEDICINE CLINIC | Age: 50
End: 2025-06-25
Payer: MEDICARE

## 2025-06-25 DIAGNOSIS — Z78.9 USES BIRTH CONTROL: Primary | ICD-10-CM

## 2025-06-25 PROCEDURE — 96372 THER/PROPH/DIAG INJ SC/IM: CPT | Performed by: STUDENT IN AN ORGANIZED HEALTH CARE EDUCATION/TRAINING PROGRAM

## 2025-06-25 RX ORDER — MEDROXYPROGESTERONE ACETATE 150 MG/ML
150 INJECTION, SUSPENSION INTRAMUSCULAR ONCE
Status: COMPLETED | OUTPATIENT
Start: 2025-06-25 | End: 2025-06-25

## 2025-06-25 RX ORDER — MUPIROCIN 2 %
OINTMENT (GRAM) TOPICAL
COMMUNITY
Start: 2025-06-17

## 2025-06-25 RX ADMIN — MEDROXYPROGESTERONE ACETATE 150 MG: 150 INJECTION, SUSPENSION INTRAMUSCULAR at 14:18

## 2025-06-25 NOTE — PROGRESS NOTES
After obtaining consent, and per orders of Dr. Castellon, injection of depo-progesterone 150mg/1mL given in Right deltoid by Kayleigh Louis MA. Patient instructed to remain in clinic for 20 minutes afterwards, and to report any adverse reaction to me immediately.      Administrations This Visit       medroxyPROGESTERone (DEPO-PROVERA) injection 150 mg       Admin Date  06/25/2025  14:18 Action  Given Dose  150 mg Route  IntraMUSCular Site  Deltoid Right Documented By  Kayleigh Louis MA    NDC: 37706-118-09    Lot#: [id number]    : [facility]    Patient Supplied?: Yes                        Pt tolerated well.

## 2025-07-09 ENCOUNTER — TELEPHONE (OUTPATIENT)
Dept: PULMONOLOGY | Age: 50
End: 2025-07-09

## 2025-07-09 NOTE — TELEPHONE ENCOUNTER
----- Message from Taniya Robbins PA-C sent at 7/9/2025  2:09 PM EDT -----  Please obtain 2 week download    Thanks  AC

## 2025-07-09 NOTE — TELEPHONE ENCOUNTER
2 week download and detailed report pulled and scanned to this encounter for review (see media).    Please advise, thank you!

## 2025-07-22 NOTE — TELEPHONE ENCOUNTER
Download reviewed. Pt noted with poor compliance on 14 day report.    Please reach out to pt to verify she is tolerating the pressure change. Pt will need to have follow up moved up 8/2025.    Electronically signed by Tnaiya Robbins PA-C on 7/22/2025 at 1:46 PM

## 2025-07-25 ENCOUNTER — HOSPITAL ENCOUNTER (EMERGENCY)
Age: 50
Discharge: HOME | End: 2025-07-25
Payer: MEDICARE

## 2025-07-25 VITALS
DIASTOLIC BLOOD PRESSURE: 79 MMHG | OXYGEN SATURATION: 97 % | TEMPERATURE: 97.9 F | RESPIRATION RATE: 16 BRPM | SYSTOLIC BLOOD PRESSURE: 154 MMHG | HEART RATE: 80 BPM

## 2025-07-25 DIAGNOSIS — L03.032 CELLULITIS OF FIFTH TOE, LEFT: Primary | ICD-10-CM

## 2025-07-25 PROCEDURE — 99213 OFFICE O/P EST LOW 20 MIN: CPT | Performed by: NURSE PRACTITIONER

## 2025-07-25 PROCEDURE — 99213 OFFICE O/P EST LOW 20 MIN: CPT

## 2025-07-25 RX ORDER — DOXYCYCLINE HYCLATE 100 MG
100 TABLET ORAL 2 TIMES DAILY
Qty: 14 TABLET | Refills: 0 | Status: SHIPPED | OUTPATIENT
Start: 2025-07-25 | End: 2025-08-01

## 2025-07-25 ASSESSMENT — ENCOUNTER SYMPTOMS
DIARRHEA: 0
VOMITING: 0

## 2025-07-25 NOTE — ED PROVIDER NOTES
[facility] URGENT CARE  UrgentCare Encounter      CHIEFCOMPLAINT       Chief Complaint   Patient presents with    left little toe redness       Nurses Notes reviewed and I agree except as noted in the HPI.  HISTORY OF PRESENT ILLNESS   Betsey Bryant is a 50 y.o. female who presents to urgent care with her mother for redness, swelling, and pain to her left fifth toe.  States symptoms started approximately 3 weeks ago.  Was prescribed a 10-day course of Keflex on July 9.  Mother states that the redness started to improve, however after antibiotic was completed, redness and swelling began to return.  Attempted to follow-up with PCP today, but the office was closed.  Does have an appointment with Dr. Cisneros, podiatry on Wednesday,7/30/2025.  Denies fever, chills, body aches, or other concerns.    REVIEW OF SYSTEMS     Review of Systems   Constitutional:  Negative for chills, fatigue and fever.   Gastrointestinal:  Negative for diarrhea and vomiting.   Skin:         Redness, swelling to left fifth toe.       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    CURRENT MEDICATIONS       Discharge Medication List as of 7/25/2025  6:44 PM        CONTINUE these medications which have NOT CHANGED    Details   mupirocin (BACTROBAN) 2 % ointment APPLY

## 2025-07-25 NOTE — ED NOTES
Letter created. Pt informed it would be at the  for pickup   To [facility] with complaints of left little toe redness. Ongoing for about 3 weeks. Pt saw podiatrist and was put on 10 day course of antibiotic. Finished about a week ago. Is getting red again. Has appt with dr persaud on wed     Jeannette Danielson RN  07/25/25 [id number]

## 2025-07-25 NOTE — DISCHARGE INSTRUCTIONS
Take full course of antibiotic as prescribed.    Follow-up with primary care provider as needed.  Keep scheduled appointment with podiatrist Dr. Cisneros for Wednesday, 7/30/2025.    Report to ED with new or severe symptoms.

## 2025-08-20 ENCOUNTER — OFFICE VISIT (OUTPATIENT)
Dept: FAMILY MEDICINE CLINIC | Age: 50
End: 2025-08-20

## 2025-08-20 VITALS
RESPIRATION RATE: 18 BRPM | WEIGHT: 293 LBS | HEART RATE: 70 BPM | OXYGEN SATURATION: 98 % | BODY MASS INDEX: 47.09 KG/M2 | HEIGHT: 66 IN | SYSTOLIC BLOOD PRESSURE: 112 MMHG | DIASTOLIC BLOOD PRESSURE: 66 MMHG

## 2025-08-20 DIAGNOSIS — Z00.00 INITIAL MEDICARE ANNUAL WELLNESS VISIT: Primary | ICD-10-CM

## 2025-08-20 DIAGNOSIS — L30.4 INTERTRIGO: ICD-10-CM

## 2025-08-20 DIAGNOSIS — E66.813 OBESITY, CLASS 3 (HCC): ICD-10-CM

## 2025-08-20 RX ORDER — TOLNAFTATE 1 G/100G
POWDER TOPICAL
Qty: 90 G | Refills: 5 | Status: SHIPPED | OUTPATIENT
Start: 2025-08-20

## 2025-08-20 RX ORDER — DOXYCYCLINE 100 MG/1
CAPSULE ORAL
COMMUNITY
Start: 2025-08-13

## 2025-08-20 ASSESSMENT — PATIENT HEALTH QUESTIONNAIRE - PHQ9
SUM OF ALL RESPONSES TO PHQ QUESTIONS 1-9: 0
2. FEELING DOWN, DEPRESSED OR HOPELESS: NOT AT ALL
SUM OF ALL RESPONSES TO PHQ QUESTIONS 1-9: 0
1. LITTLE INTEREST OR PLEASURE IN DOING THINGS: NOT AT ALL

## 2025-08-20 ASSESSMENT — LIFESTYLE VARIABLES
HOW OFTEN DO YOU HAVE A DRINK CONTAINING ALCOHOL: NEVER
HOW MANY STANDARD DRINKS CONTAINING ALCOHOL DO YOU HAVE ON A TYPICAL DAY: PATIENT DOES NOT DRINK

## (undated) DEVICE — GLOVE ORANGE PI 8   MSG9080

## (undated) DEVICE — SPONGE GZ W4XL4IN COT 12 PLY TYP VII WVN C FLD DSGN

## (undated) DEVICE — PENCIL SMK EVAC ALL IN 1 DSGN ENH VISIBILITY IMPROVED AIR

## (undated) DEVICE — GLOVE ORTHO 7 1/2   MSG9475

## (undated) DEVICE — BREAST HERNIA PACK: Brand: MEDLINE INDUSTRIES, INC.

## (undated) DEVICE — SPONGE LAP W18XL18IN WHT COT 4 PLY FLD STRUNG RADPQ DISP ST